# Patient Record
Sex: MALE | Race: WHITE | NOT HISPANIC OR LATINO | URBAN - METROPOLITAN AREA
[De-identification: names, ages, dates, MRNs, and addresses within clinical notes are randomized per-mention and may not be internally consistent; named-entity substitution may affect disease eponyms.]

---

## 2024-04-14 ENCOUNTER — INPATIENT (INPATIENT)
Facility: HOSPITAL | Age: 74
LOS: 19 days | Discharge: NURSING, REHAB OR LONG-TERM CARE | End: 2024-05-04
Attending: INTERNAL MEDICINE | Admitting: INTERNAL MEDICINE
Payer: MEDICARE

## 2024-04-14 VITALS
HEART RATE: 91 BPM | SYSTOLIC BLOOD PRESSURE: 161 MMHG | RESPIRATION RATE: 20 BRPM | DIASTOLIC BLOOD PRESSURE: 103 MMHG | OXYGEN SATURATION: 93 % | TEMPERATURE: 98 F

## 2024-04-14 DIAGNOSIS — R09.89 OTHER SPECIFIED SYMPTOMS AND SIGNS INVOLVING THE CIRCULATORY AND RESPIRATORY SYSTEMS: ICD-10-CM

## 2024-04-14 DIAGNOSIS — R74.01 ELEVATION OF LEVELS OF LIVER TRANSAMINASE LEVELS: ICD-10-CM

## 2024-04-14 DIAGNOSIS — I50.9 HEART FAILURE, UNSPECIFIED: ICD-10-CM

## 2024-04-14 DIAGNOSIS — I10 ESSENTIAL (PRIMARY) HYPERTENSION: ICD-10-CM

## 2024-04-14 DIAGNOSIS — M62.82 RHABDOMYOLYSIS: ICD-10-CM

## 2024-04-14 DIAGNOSIS — I48.91 UNSPECIFIED ATRIAL FIBRILLATION: ICD-10-CM

## 2024-04-14 DIAGNOSIS — E87.0 HYPEROSMOLALITY AND HYPERNATREMIA: ICD-10-CM

## 2024-04-14 DIAGNOSIS — I71.20 THORACIC AORTIC ANEURYSM, WITHOUT RUPTURE, UNSPECIFIED: ICD-10-CM

## 2024-04-14 LAB
ALBUMIN SERPL ELPH-MCNC: 3.1 G/DL — LOW (ref 3.4–5)
ALBUMIN SERPL ELPH-MCNC: 3.6 G/DL — SIGNIFICANT CHANGE UP (ref 3.3–5)
ALP SERPL-CCNC: 54 U/L — SIGNIFICANT CHANGE UP (ref 40–120)
ALP SERPL-CCNC: 60 U/L — SIGNIFICANT CHANGE UP (ref 40–120)
ALT FLD-CCNC: 54 U/L — HIGH (ref 10–45)
ALT FLD-CCNC: 58 U/L — HIGH (ref 12–42)
ANION GAP SERPL CALC-SCNC: 10 MMOL/L — SIGNIFICANT CHANGE UP (ref 5–17)
ANION GAP SERPL CALC-SCNC: 12 MMOL/L — SIGNIFICANT CHANGE UP (ref 5–17)
ANION GAP SERPL CALC-SCNC: 12 MMOL/L — SIGNIFICANT CHANGE UP (ref 9–16)
APPEARANCE UR: CLEAR — SIGNIFICANT CHANGE UP
APPEARANCE UR: CLEAR — SIGNIFICANT CHANGE UP
APTT BLD: 105 SEC — HIGH (ref 24.5–35.6)
APTT BLD: 41.2 SEC — HIGH (ref 24.5–35.6)
APTT BLD: >200 SEC — CRITICAL HIGH (ref 24.5–35.6)
AST SERPL-CCNC: 86 U/L — HIGH (ref 15–37)
AST SERPL-CCNC: 91 U/L — HIGH (ref 10–40)
BACTERIA # UR AUTO: ABNORMAL /HPF
BACTERIA # UR AUTO: NEGATIVE /HPF — SIGNIFICANT CHANGE UP
BASOPHILS # BLD AUTO: 0.02 K/UL — SIGNIFICANT CHANGE UP (ref 0–0.2)
BASOPHILS NFR BLD AUTO: 0.2 % — SIGNIFICANT CHANGE UP (ref 0–2)
BILIRUB SERPL-MCNC: 1.4 MG/DL — HIGH (ref 0.2–1.2)
BILIRUB SERPL-MCNC: 1.6 MG/DL — HIGH (ref 0.2–1.2)
BILIRUB UR-MCNC: NEGATIVE — SIGNIFICANT CHANGE UP
BILIRUB UR-MCNC: NEGATIVE — SIGNIFICANT CHANGE UP
BUN SERPL-MCNC: 19 MG/DL — SIGNIFICANT CHANGE UP (ref 7–23)
BUN SERPL-MCNC: 21 MG/DL — SIGNIFICANT CHANGE UP (ref 7–23)
BUN SERPL-MCNC: 22 MG/DL — SIGNIFICANT CHANGE UP (ref 7–23)
CALCIUM SERPL-MCNC: 8.9 MG/DL — SIGNIFICANT CHANGE UP (ref 8.5–10.5)
CALCIUM SERPL-MCNC: 9.4 MG/DL — SIGNIFICANT CHANGE UP (ref 8.4–10.5)
CALCIUM SERPL-MCNC: 9.8 MG/DL — SIGNIFICANT CHANGE UP (ref 8.4–10.5)
CAST: 0 /LPF — SIGNIFICANT CHANGE UP (ref 0–4)
CHLORIDE SERPL-SCNC: 108 MMOL/L — SIGNIFICANT CHANGE UP (ref 96–108)
CHLORIDE SERPL-SCNC: 109 MMOL/L — HIGH (ref 96–108)
CHLORIDE SERPL-SCNC: 111 MMOL/L — HIGH (ref 96–108)
CK MB CFR SERPL CALC: 13.1 NG/ML — HIGH (ref 0–6.7)
CK MB CFR SERPL CALC: 18.5 NG/ML — HIGH (ref 0–6.7)
CK SERPL-CCNC: 1545 U/L — HIGH (ref 30–200)
CK SERPL-CCNC: 1890 U/L — HIGH (ref 30–200)
CK SERPL-CCNC: 2002 U/L — HIGH (ref 39–308)
CK SERPL-CCNC: 2336 U/L — HIGH (ref 39–308)
CO2 SERPL-SCNC: 25 MMOL/L — SIGNIFICANT CHANGE UP (ref 22–31)
CO2 SERPL-SCNC: 30 MMOL/L — SIGNIFICANT CHANGE UP (ref 22–31)
CO2 SERPL-SCNC: 32 MMOL/L — HIGH (ref 22–31)
COLOR SPEC: YELLOW — SIGNIFICANT CHANGE UP
COLOR SPEC: YELLOW — SIGNIFICANT CHANGE UP
CREAT SERPL-MCNC: 0.87 MG/DL — SIGNIFICANT CHANGE UP (ref 0.5–1.3)
CREAT SERPL-MCNC: 0.88 MG/DL — SIGNIFICANT CHANGE UP (ref 0.5–1.3)
CREAT SERPL-MCNC: 1.14 MG/DL — SIGNIFICANT CHANGE UP (ref 0.5–1.3)
DIFF PNL FLD: ABNORMAL
DIFF PNL FLD: ABNORMAL
EGFR: 67 ML/MIN/1.73M2 — SIGNIFICANT CHANGE UP
EGFR: 90 ML/MIN/1.73M2 — SIGNIFICANT CHANGE UP
EGFR: 90 ML/MIN/1.73M2 — SIGNIFICANT CHANGE UP
EOSINOPHIL # BLD AUTO: 0 K/UL — SIGNIFICANT CHANGE UP (ref 0–0.5)
EOSINOPHIL NFR BLD AUTO: 0 % — SIGNIFICANT CHANGE UP (ref 0–6)
FLUAV AG NPH QL: SIGNIFICANT CHANGE UP
FLUBV AG NPH QL: SIGNIFICANT CHANGE UP
GLUCOSE SERPL-MCNC: 110 MG/DL — HIGH (ref 70–99)
GLUCOSE SERPL-MCNC: 133 MG/DL — HIGH (ref 70–99)
GLUCOSE SERPL-MCNC: 94 MG/DL — SIGNIFICANT CHANGE UP (ref 70–99)
GLUCOSE UR QL: NEGATIVE MG/DL — SIGNIFICANT CHANGE UP
GLUCOSE UR QL: NEGATIVE MG/DL — SIGNIFICANT CHANGE UP
HCT VFR BLD CALC: 50 % — SIGNIFICANT CHANGE UP (ref 39–50)
HCT VFR BLD CALC: 51.2 % — HIGH (ref 39–50)
HCT VFR BLD CALC: 52.9 % — HIGH (ref 39–50)
HGB BLD-MCNC: 16.1 G/DL — SIGNIFICANT CHANGE UP (ref 13–17)
HGB BLD-MCNC: 16.1 G/DL — SIGNIFICANT CHANGE UP (ref 13–17)
HGB BLD-MCNC: 16.7 G/DL — SIGNIFICANT CHANGE UP (ref 13–17)
IMM GRANULOCYTES NFR BLD AUTO: 0.2 % — SIGNIFICANT CHANGE UP (ref 0–0.9)
INR BLD: 1.36 — HIGH (ref 0.85–1.18)
INR BLD: 1.49 — HIGH (ref 0.85–1.18)
KETONES UR-MCNC: 15 MG/DL
KETONES UR-MCNC: NEGATIVE MG/DL — SIGNIFICANT CHANGE UP
LACTATE BLDV-MCNC: 1.7 MMOL/L — SIGNIFICANT CHANGE UP (ref 0.5–2)
LACTATE BLDV-MCNC: 2.2 MMOL/L — HIGH (ref 0.5–2)
LACTATE BLDV-MCNC: 3 MMOL/L — HIGH (ref 0.5–2)
LEUKOCYTE ESTERASE UR-ACNC: NEGATIVE — SIGNIFICANT CHANGE UP
LEUKOCYTE ESTERASE UR-ACNC: NEGATIVE — SIGNIFICANT CHANGE UP
LYMPHOCYTES # BLD AUTO: 0.73 K/UL — LOW (ref 1–3.3)
LYMPHOCYTES # BLD AUTO: 8.8 % — LOW (ref 13–44)
MAGNESIUM SERPL-MCNC: 1.8 MG/DL — SIGNIFICANT CHANGE UP (ref 1.6–2.6)
MCHC RBC-ENTMCNC: 29.4 PG — SIGNIFICANT CHANGE UP (ref 27–34)
MCHC RBC-ENTMCNC: 29.9 PG — SIGNIFICANT CHANGE UP (ref 27–34)
MCHC RBC-ENTMCNC: 30.1 PG — SIGNIFICANT CHANGE UP (ref 27–34)
MCHC RBC-ENTMCNC: 31.4 GM/DL — LOW (ref 32–36)
MCHC RBC-ENTMCNC: 31.6 GM/DL — LOW (ref 32–36)
MCHC RBC-ENTMCNC: 32.2 GM/DL — SIGNIFICANT CHANGE UP (ref 32–36)
MCV RBC AUTO: 93.6 FL — SIGNIFICANT CHANGE UP (ref 80–100)
MCV RBC AUTO: 93.6 FL — SIGNIFICANT CHANGE UP (ref 80–100)
MCV RBC AUTO: 94.6 FL — SIGNIFICANT CHANGE UP (ref 80–100)
MONOCYTES # BLD AUTO: 0.51 K/UL — SIGNIFICANT CHANGE UP (ref 0–0.9)
MONOCYTES NFR BLD AUTO: 6.1 % — SIGNIFICANT CHANGE UP (ref 2–14)
NEUTROPHILS # BLD AUTO: 7.04 K/UL — SIGNIFICANT CHANGE UP (ref 1.8–7.4)
NEUTROPHILS NFR BLD AUTO: 84.7 % — HIGH (ref 43–77)
NITRITE UR-MCNC: NEGATIVE — SIGNIFICANT CHANGE UP
NITRITE UR-MCNC: NEGATIVE — SIGNIFICANT CHANGE UP
NRBC # BLD: 0 /100 WBCS — SIGNIFICANT CHANGE UP (ref 0–0)
NT-PROBNP SERPL-SCNC: 5893 PG/ML — HIGH (ref 0–300)
NT-PROBNP SERPL-SCNC: 7174 PG/ML — HIGH
PCO2 BLDV: 53 MMHG — SIGNIFICANT CHANGE UP (ref 42–55)
PH BLDV: 7.32 — SIGNIFICANT CHANGE UP (ref 7.32–7.43)
PH UR: 5 — SIGNIFICANT CHANGE UP (ref 5–8)
PH UR: 6 — SIGNIFICANT CHANGE UP (ref 5–8)
PLATELET # BLD AUTO: 219 K/UL — SIGNIFICANT CHANGE UP (ref 150–400)
PLATELET # BLD AUTO: 223 K/UL — SIGNIFICANT CHANGE UP (ref 150–400)
PLATELET # BLD AUTO: 235 K/UL — SIGNIFICANT CHANGE UP (ref 150–400)
PO2 BLDV: <35 MMHG — SIGNIFICANT CHANGE UP (ref 25–45)
POTASSIUM SERPL-MCNC: 4 MMOL/L — SIGNIFICANT CHANGE UP (ref 3.5–5.3)
POTASSIUM SERPL-MCNC: 4.1 MMOL/L — SIGNIFICANT CHANGE UP (ref 3.5–5.3)
POTASSIUM SERPL-MCNC: 4.2 MMOL/L — SIGNIFICANT CHANGE UP (ref 3.5–5.3)
POTASSIUM SERPL-SCNC: 4 MMOL/L — SIGNIFICANT CHANGE UP (ref 3.5–5.3)
POTASSIUM SERPL-SCNC: 4.1 MMOL/L — SIGNIFICANT CHANGE UP (ref 3.5–5.3)
POTASSIUM SERPL-SCNC: 4.2 MMOL/L — SIGNIFICANT CHANGE UP (ref 3.5–5.3)
PROT SERPL-MCNC: 6.4 G/DL — SIGNIFICANT CHANGE UP (ref 6–8.3)
PROT SERPL-MCNC: 7.2 G/DL — SIGNIFICANT CHANGE UP (ref 6.4–8.2)
PROT UR-MCNC: 300 MG/DL
PROT UR-MCNC: NEGATIVE MG/DL — SIGNIFICANT CHANGE UP
PROTHROM AB SERPL-ACNC: 14.8 SEC — HIGH (ref 9.5–13)
PROTHROM AB SERPL-ACNC: 16.8 SEC — HIGH (ref 9.5–13)
RBC # BLD: 5.34 M/UL — SIGNIFICANT CHANGE UP (ref 4.2–5.8)
RBC # BLD: 5.47 M/UL — SIGNIFICANT CHANGE UP (ref 4.2–5.8)
RBC # BLD: 5.59 M/UL — SIGNIFICANT CHANGE UP (ref 4.2–5.8)
RBC # FLD: 17.2 % — HIGH (ref 10.3–14.5)
RBC # FLD: 17.2 % — HIGH (ref 10.3–14.5)
RBC # FLD: 17.4 % — HIGH (ref 10.3–14.5)
RBC CASTS # UR COMP ASSIST: 1 /HPF — SIGNIFICANT CHANGE UP (ref 0–4)
RBC CASTS # UR COMP ASSIST: 14 /HPF — HIGH (ref 0–4)
RSV RNA NPH QL NAA+NON-PROBE: SIGNIFICANT CHANGE UP
SAO2 % BLDV: 44.5 % — LOW (ref 67–88)
SARS-COV-2 RNA SPEC QL NAA+PROBE: SIGNIFICANT CHANGE UP
SODIUM SERPL-SCNC: 148 MMOL/L — HIGH (ref 132–145)
SODIUM SERPL-SCNC: 149 MMOL/L — HIGH (ref 135–145)
SODIUM SERPL-SCNC: 152 MMOL/L — HIGH (ref 135–145)
SP GR SPEC: 1.01 — SIGNIFICANT CHANGE UP (ref 1–1.03)
SP GR SPEC: 1.03 — HIGH (ref 1–1.03)
SQUAMOUS # UR AUTO: 0 /HPF — SIGNIFICANT CHANGE UP (ref 0–5)
TROPONIN I, HIGH SENSITIVITY RESULT: 109.8 NG/L — HIGH
TROPONIN I, HIGH SENSITIVITY RESULT: 91.9 NG/L — HIGH
TROPONIN T, HIGH SENSITIVITY RESULT: 52 NG/L — CRITICAL HIGH (ref 0–51)
TROPONIN T, HIGH SENSITIVITY RESULT: 54 NG/L — CRITICAL HIGH (ref 0–51)
TSH SERPL-MCNC: 3.09 UIU/ML — SIGNIFICANT CHANGE UP (ref 0.36–3.74)
UROBILINOGEN FLD QL: 1 MG/DL — SIGNIFICANT CHANGE UP (ref 0.2–1)
UROBILINOGEN FLD QL: 1 MG/DL — SIGNIFICANT CHANGE UP (ref 0.2–1)
WBC # BLD: 8.32 K/UL — SIGNIFICANT CHANGE UP (ref 3.8–10.5)
WBC # BLD: 8.42 K/UL — SIGNIFICANT CHANGE UP (ref 3.8–10.5)
WBC # BLD: 9.53 K/UL — SIGNIFICANT CHANGE UP (ref 3.8–10.5)
WBC # FLD AUTO: 8.32 K/UL — SIGNIFICANT CHANGE UP (ref 3.8–10.5)
WBC # FLD AUTO: 8.42 K/UL — SIGNIFICANT CHANGE UP (ref 3.8–10.5)
WBC # FLD AUTO: 9.53 K/UL — SIGNIFICANT CHANGE UP (ref 3.8–10.5)
WBC UR QL: 0 /HPF — SIGNIFICANT CHANGE UP (ref 0–5)
WBC UR QL: 1 /HPF — SIGNIFICANT CHANGE UP (ref 0–5)

## 2024-04-14 PROCEDURE — 99291 CRITICAL CARE FIRST HOUR: CPT

## 2024-04-14 PROCEDURE — 70450 CT HEAD/BRAIN W/O DYE: CPT | Mod: 26,MC

## 2024-04-14 PROCEDURE — 99223 1ST HOSP IP/OBS HIGH 75: CPT | Mod: GC

## 2024-04-14 PROCEDURE — 71045 X-RAY EXAM CHEST 1 VIEW: CPT | Mod: 26

## 2024-04-14 PROCEDURE — 93010 ELECTROCARDIOGRAM REPORT: CPT

## 2024-04-14 PROCEDURE — 74174 CTA ABD&PLVS W/CONTRAST: CPT | Mod: 26,MC

## 2024-04-14 PROCEDURE — 71275 CT ANGIOGRAPHY CHEST: CPT | Mod: 26,MC

## 2024-04-14 RX ORDER — VALSARTAN 80 MG/1
20 TABLET ORAL
Refills: 0 | Status: DISCONTINUED | OUTPATIENT
Start: 2024-04-14 | End: 2024-04-18

## 2024-04-14 RX ORDER — APIXABAN 2.5 MG/1
1 TABLET, FILM COATED ORAL
Qty: 60 | Refills: 0
Start: 2024-04-14 | End: 2024-05-13

## 2024-04-14 RX ORDER — PIPERACILLIN AND TAZOBACTAM 4; .5 G/20ML; G/20ML
3.38 INJECTION, POWDER, LYOPHILIZED, FOR SOLUTION INTRAVENOUS ONCE
Refills: 0 | Status: COMPLETED | OUTPATIENT
Start: 2024-04-14 | End: 2024-04-14

## 2024-04-14 RX ORDER — HEPARIN SODIUM 5000 [USP'U]/ML
4500 INJECTION INTRAVENOUS; SUBCUTANEOUS EVERY 6 HOURS
Refills: 0 | Status: DISCONTINUED | OUTPATIENT
Start: 2024-04-14 | End: 2024-04-14

## 2024-04-14 RX ORDER — VANCOMYCIN HCL 1 G
1250 VIAL (EA) INTRAVENOUS ONCE
Refills: 0 | Status: COMPLETED | OUTPATIENT
Start: 2024-04-14 | End: 2024-04-14

## 2024-04-14 RX ORDER — HEPARIN SODIUM 5000 [USP'U]/ML
9500 INJECTION INTRAVENOUS; SUBCUTANEOUS EVERY 6 HOURS
Refills: 0 | Status: DISCONTINUED | OUTPATIENT
Start: 2024-04-14 | End: 2024-04-14

## 2024-04-14 RX ORDER — SENNA PLUS 8.6 MG/1
2 TABLET ORAL AT BEDTIME
Refills: 0 | Status: DISCONTINUED | OUTPATIENT
Start: 2024-04-14 | End: 2024-04-24

## 2024-04-14 RX ORDER — FUROSEMIDE 40 MG
80 TABLET ORAL DAILY
Refills: 0 | Status: DISCONTINUED | OUTPATIENT
Start: 2024-04-14 | End: 2024-04-14

## 2024-04-14 RX ORDER — HEPARIN SODIUM 5000 [USP'U]/ML
4500 INJECTION INTRAVENOUS; SUBCUTANEOUS EVERY 6 HOURS
Refills: 0 | Status: DISCONTINUED | OUTPATIENT
Start: 2024-04-14 | End: 2024-04-22

## 2024-04-14 RX ORDER — HEPARIN SODIUM 5000 [USP'U]/ML
INJECTION INTRAVENOUS; SUBCUTANEOUS
Qty: 25000 | Refills: 0 | Status: DISCONTINUED | OUTPATIENT
Start: 2024-04-14 | End: 2024-04-14

## 2024-04-14 RX ORDER — HEPARIN SODIUM 5000 [USP'U]/ML
9500 INJECTION INTRAVENOUS; SUBCUTANEOUS ONCE
Refills: 0 | Status: COMPLETED | OUTPATIENT
Start: 2024-04-14 | End: 2024-04-14

## 2024-04-14 RX ORDER — HEPARIN SODIUM 5000 [USP'U]/ML
1700 INJECTION INTRAVENOUS; SUBCUTANEOUS
Qty: 25000 | Refills: 0 | Status: DISCONTINUED | OUTPATIENT
Start: 2024-04-14 | End: 2024-04-22

## 2024-04-14 RX ORDER — FUROSEMIDE 40 MG
80 TABLET ORAL EVERY 12 HOURS
Refills: 0 | Status: DISCONTINUED | OUTPATIENT
Start: 2024-04-14 | End: 2024-04-14

## 2024-04-14 RX ORDER — HEPARIN SODIUM 5000 [USP'U]/ML
9500 INJECTION INTRAVENOUS; SUBCUTANEOUS EVERY 6 HOURS
Refills: 0 | Status: DISCONTINUED | OUTPATIENT
Start: 2024-04-14 | End: 2024-04-22

## 2024-04-14 RX ORDER — POLYETHYLENE GLYCOL 3350 17 G/17G
17 POWDER, FOR SOLUTION ORAL DAILY
Refills: 0 | Status: DISCONTINUED | OUTPATIENT
Start: 2024-04-14 | End: 2024-04-19

## 2024-04-14 RX ADMIN — Medication 166.67 MILLIGRAM(S): at 06:50

## 2024-04-14 RX ADMIN — Medication 80 MILLIGRAM(S): at 06:32

## 2024-04-14 RX ADMIN — HEPARIN SODIUM 1700 UNIT(S)/HR: 5000 INJECTION INTRAVENOUS; SUBCUTANEOUS at 14:30

## 2024-04-14 RX ADMIN — HEPARIN SODIUM 1400 UNIT(S)/HR: 5000 INJECTION INTRAVENOUS; SUBCUTANEOUS at 20:37

## 2024-04-14 RX ADMIN — HEPARIN SODIUM 9500 UNIT(S): 5000 INJECTION INTRAVENOUS; SUBCUTANEOUS at 08:54

## 2024-04-14 RX ADMIN — VALSARTAN 20 MILLIGRAM(S): 80 TABLET ORAL at 17:55

## 2024-04-14 RX ADMIN — HEPARIN SODIUM 2100 UNIT(S)/HR: 5000 INJECTION INTRAVENOUS; SUBCUTANEOUS at 08:52

## 2024-04-14 RX ADMIN — HEPARIN SODIUM 2100 UNIT(S)/HR: 5000 INJECTION INTRAVENOUS; SUBCUTANEOUS at 10:58

## 2024-04-14 RX ADMIN — HEPARIN SODIUM 0 UNIT(S)/HR: 5000 INJECTION INTRAVENOUS; SUBCUTANEOUS at 13:13

## 2024-04-14 RX ADMIN — Medication 10 MILLIGRAM(S): at 16:07

## 2024-04-14 RX ADMIN — PIPERACILLIN AND TAZOBACTAM 200 GRAM(S): 4; .5 INJECTION, POWDER, LYOPHILIZED, FOR SOLUTION INTRAVENOUS at 06:32

## 2024-04-14 RX ADMIN — HEPARIN SODIUM 1700 UNIT(S)/HR: 5000 INJECTION INTRAVENOUS; SUBCUTANEOUS at 20:01

## 2024-04-14 NOTE — H&P ADULT - PROBLEM SELECTOR PLAN 6
CT Abd/Pelvis 04/13/24: No thoracoabdominal dissection, no thoracic aorta hematoma. Ascending thoracic aortic aneurysm 4.0 cm  - Consider CTS follow up in outpatient setting for surveillance

## 2024-04-14 NOTE — CONSULT NOTE ADULT - ASSESSMENT
74M no known significant PMHx presented to [facility] found to have CHF, Afib w/ RVR, w/ concern for PE and rhabdomyolysis medicine consulted for comanagement 74M no known significant PMHx presented to [facility] found to have CHF, Afib w/ RVR, w/ concern for PE and rhabdomyolysis medicine consulted for comanagement    #Rhabdomyolysis.   Patient had mechanical fall and was down on the ground x2 days   BUN/Cr 22/1.14 CK 2336 -> 2002   UA +blood. F/u repeat labs   CK level below 2,000 and continues to downtrend. mild elevation of Cr that did not meet criteria for SILKE on admission.   - encourage oral intake of fluids but does not need to  of CHF/Afib    #Hypernatremia  #Metabolic Alkalosis  free water deficit of 5 liters. contraction alkalosis 2/2 free water deficit  - Should not correct no more than 8mEq daily  - fluid balance managed by Cardiology given CHF exacerbation      #Transaminitis  patient w/ enlarged liver on CT scan w/ multiple small hypodensities that could not be characterized.  patient also w/ large stool burden.   DDx: Metastasis vs Primary Liver Pathology vs congestion from CHF  - bowel regimen  - consider Doppler RUQ ultrasound  - trend liver enzymes daily    #Segmental PE  CTA Chest 04/13/24 :No central PE, possible segmental PE within RLL written in on the prelim read per cardiology PA. on final read no mention of PE  -  C/w heparin gtt   -  f/u Pulm recs regarding CT imaging  -  F/u B/L LE Duplex U/S.    #HTN (hypertension).   SBP 150s- 160s  - C/w Valsartan 20mg BID     #Congestive heart failure (CHF).   - appreciate cardiology recommendations    #Atrial fibrillation.   - appreciate cardiology recommendations    #Aneurysm of thoracic aorta.   CT Abd/Pelvis 04/13/24:. Ascending thoracic aortic aneurysm 4.0 cm  - Consider CTS follow up in outpatient setting for surveillance.       74M no known significant PMHx presented to [facility] found to have CHF, Afib w/ RVR, w/ concern for PE and rhabdomyolysis medicine consulted for comanagement    #Rhabdomyolysis.   Patient had mechanical fall and was down on the ground x2 days   BUN/Cr 22/1.14 CK 2336 -> 2002   UA +blood. F/u repeat labs   CK level below 2,000 and continues to downtrend. mild elevation of Cr that did not meet criteria for SILKE on admission.   - encourage oral intake of fluids but does not need to  of CHF/Afib    #Hypernatremia  #Metabolic Alkalosis  free water deficit of 5 liters. contraction alkalosis 2/2 free water deficit  - Should not correct no more than 8mEq daily  - fluid balance managed by Cardiology given CHF exacerbation      #Transaminitis  patient w/ enlarged liver on CT scan w/ multiple small hypodensities that could not be characterized.  patient also w/ large stool burden.   DDx: Metastasis vs Primary Liver Pathology vs congestion from CHF  - bowel regimen  - consider Doppler RUQ ultrasound  - trend liver enzymes daily  - Hep Panel    #Segmental PE ???  CTA Chest 04/13/24 :No central PE, possible segmental PE within RLL written in on the prelim read per cardiology PA. on final read no mention of PE  -  C/w heparin gtt   -  f/u Pulm recs regarding CT imaging  -  F/u B/L LE Duplex U/S.    #HTN (hypertension).   SBP 150s- 160s  - C/w Valsartan 20mg BID     #Congestive heart failure (CHF).   - appreciate cardiology recommendations    #Atrial fibrillation.   - appreciate cardiology recommendations    #Aneurysm of thoracic aorta.   CT Abd/Pelvis 04/13/24:. Ascending thoracic aortic aneurysm 4.0 cm  - Consider CTS follow up in outpatient setting for surveillance.       74M no known significant PMHx presented to [facility] found to have CHF, Afib w/ RVR, w/ concern for PE and rhabdomyolysis medicine consulted for comanagement    #Rhabdomyolysis.   Patient had mechanical fall and was down on the ground x2 days   BUN/Cr 22/1.14 CK 2336 -> 2002   UA +blood. F/u repeat labs   CK level below 2,000 and continues to downtrend. mild elevation of Cr that did not meet criteria for SILKE on admission.   - encourage oral intake of fluids but does not need to  of CHF/Afib      #Hypernatremia  #Metabolic Alkalosis  free water deficit of 5 liters. contraction alkalosis 2/2 free water deficit  - Should not correct no more than 8mEq daily  - fluid balance managed by Cardiology given CHF exacerbation  - would hold diuresis i/s/o fluid deficit if ok from cards and begin diuresis tomorrow to allow oral intake       #Transaminitis  patient w/ enlarged liver on CT scan w/ multiple small hypodensities that could not be characterized.  patient also w/ large stool burden.   DDx: Metastasis vs Primary Liver Pathology vs congestion from CHF  - bowel regimen  - consider Doppler RUQ ultrasound  - trend liver enzymes daily  - Hep Panel    #Segmental PE ???  CTA Chest 04/13/24 :No central PE, possible segmental PE within RLL written in on the prelim read per cardiology PA. on final read no mention of PE  -  C/w heparin gtt   -  f/u Pulm recs regarding CT imaging  -  F/u B/L LE Duplex U/S.    #HTN (hypertension).   SBP 150s- 160s  - C/w Valsartan 20mg BID     #Congestive heart failure (CHF).   - appreciate cardiology recommendations    #Atrial fibrillation.   - appreciate cardiology recommendations    #Aneurysm of thoracic aorta.   CT Abd/Pelvis 04/13/24:. Ascending thoracic aortic aneurysm 4.0 cm  - Consider CTS follow up in outpatient setting for surveillance.

## 2024-04-14 NOTE — H&P ADULT - PROBLEM SELECTOR PLAN 3
HR 100s, unclear whether patient has hx of afib or is new   - EKG  BPM   - F/u repeat cardiac enzyme   - Holding BB/CCB given unknown EF.   - Continue with heparin gtt and with plan to transition to NOAC     #segmental PE  -  CTA Chest 04/13/24 :No central PE, possible segmental PE within RLL  -  C/w heparin gtt for now. Pulm consulted to review images.  -  F/u B/L LE Duplex U/S Patient had mechanical fall and was down on the ground x2 days   - BUN/Cr 22/1.14 CK 2336 -> 2002   - UA +blood. F/u repeat labs   - Medicine consulted for co-management On admission TBili 1.4 ALP 54 AST/ALT 91/54  - F/u Hep panel and RUQ. Unclear whether transaminitis can be related to congestion from CHF or malignancy.   - CT Pelvis : Liver measures near 18 cm in craniocaudal dimension. Subcentimeter hepatic hypodensities are too small to characterize.

## 2024-04-14 NOTE — H&P ADULT - HISTORY OF PRESENT ILLNESS
74M unclear PMHx presented to [facility] on 04/14/24 after falling on the floor 2 days ago and was stuck under the shelf until landlord called 911 to help patient get out. Patient denies head trauma or pain currently. Bishop has not seen medical doctor in many years.     On arrival to cardiac tele jess denies CP, SOB, palpitations, dizziness, lightheadness, orthopnea, PND, abd pain, N/V, hematuria, hematochezia, melena, BRPR, LE edema, fever,  chills, cough, recent travel, or sick contacts.     [facility] Course   -942/  HR 90 -118 RR 18 20 SpO2 98% on 4L NC  WBC 8.32 H/H 16.7/52.9 Plt 219 PT/PTT 14.8/41.2 INR 1.36  Na 148 K 4.2 Cl 111 Co2 25 AG 12 BUN/Cr 22/1.14 TBili 2.1 ALP 60 AST/ALT 86/58 Mg 1.8   Lactate  3.0 -> 2.2. 0> 1.7 BCx collcted   TSH 3.092   Trop I 91.9 CK 2k  BNP 7k   UA +ketones, +proteinuria, +blood. Negative for LE/Nitrites   Respiratory Panel: SARS-CoV/Influenza A/B, RSV not detected   EKG per ED documentation Afib w/ HR 100s     Imaging  o CXR 4/14/24: No acute infiltrates, cardiomegaly   o CTA Chest/ A/P 4/14/24: No central PE, possible segmental PE within RLL. No thoracoabdominal dissection, no thoracic aorta hematoma. Interlobular septal thickening, small pleural effusions, and cardiomegaly suggestive of pulmonary edema. Ascending thoracic aortic aneursym 4.0 cm, mild ascites, cholelithiasis.   o CTH 4/14/24: No acute intracranial hemorrhage or calvarial fracture.    ER Interventions: Lasix 80mg IVP x1 (400cc ouput), Zoysn 3.375mg IVPx1, and Vancomycin 1250mg IVPB x1    Patient now transferred to cardiac tele for further management of CHF exacerbation, Afib, rhabdomyolysis and segmental PE.    74M no known significant PMHx presented to [facility] on 04/14/24 after mechanical fall and was down on the ground x2 days. Patient states he attempted to crawl to phone to make call and chest fell down on him and he was unable to move and 911 was called to help get patient out. Patient endorses cramp in LLE and difficulty walking with L leg since Nov 2023, MATHEWS, and episodes of hematochezia.     On arrival to cardiac tele patient denies head trauma,  CP, SOB, palpitations, dizziness, lightheadness, orthopnea, PND, abd pain, N/V, hematuria,  melena, BRPR, LE edema, fever,  chills, cough, recent travel, or sick contacts. Patient has not seen medical doctor in many years.    [facility] Course   -605/  HR 90 -118 RR 18 20 SpO2 98% on 4L NC  WBC 8.32 H/H 16.7/52.9 Plt 219 PT/PTT 14.8/41.2 INR 1.36  Na 148 K 4.2 Cl 111 Co2 25 AG 12 BUN/Cr 22/1.14 TBili 2.1 ALP 60 AST/ALT 86/58 Mg 1.8   Lactate  3.0 -> 2.2. 0> 1.7 BCx collcted   TSH 3.092   Trop I 91.9 CK 2k  BNP 7k   UA +ketones, +proteinuria, +blood. Negative for LE/Nitrites   Respiratory Panel: SARS-CoV/Influenza A/B, RSV not detected   EKG per ED documentation Afib w/ HR 100s     Imaging  o CXR 4/14/24: No acute infiltrates, cardiomegaly   o CTA Chest/ A/P 4/14/24: No central PE, possible segmental PE within RLL. No thoracoabdominal dissection, no thoracic aorta hematoma. Interlobular septal thickening, small pleural effusions, and cardiomegaly suggestive of pulmonary edema. Ascending thoracic aortic aneurysm 4.0 cm, mild ascites, cholelithiasis.   o CTH 4/14/24: No acute intracranial hemorrhage or calvarial fracture.    ER Interventions: Lasix 80mg IVP x1 (400cc ouput), Zoysn 3.375mg IVPx1, and Vancomycin 1250mg IVPB x1    Patient now transferred to cardiac tele for further management of CHF exacerbation, Afib, rhabdomyolysis and segmental PE.    74M no known significant PMHx presented to [facility] on 04/14/24 after mechanical fall and was down on the ground x2 days. Patient states he attempted to crawl to phone to make call and chest fell down on him and he was unable to move and 911 was called to help get patient out. Patient endorses cramp in LLE and difficulty walking with L leg since Nov 2023, MATHEWS, and episodes of hematochezia.     On arrival to cardiac tele patient denies head trauma,  CP, SOB, palpitations, dizziness, lightheadedness, orthopnea, PND, abd pain, N/V, hematuria,  melena, BRPR, LE edema, fever,  chills, cough, recent travel, or sick contacts. Patient has not seen medical doctor in many years.    [facility] Course   -507/  HR 90 -118 RR 18 20 SpO2 98% on 4L NC  WBC 8.32 H/H 16.7/52.9 Plt 219 PT/PTT 14.8/41.2 INR 1.36  Na 148 K 4.2 Cl 111 Co2 25 AG 12 BUN/Cr 22/1.14 TBili 2.1 ALP 60 AST/ALT 86/58 Mg 1.8   Lactate  3.0 -> 2.2. 0> 1.7 BCx collcted   TSH 3.092   Trop I 91.9 CK 2k  BNP 7k   UA +ketones, +proteinuria, +blood. Negative for LE/Nitrites   Respiratory Panel: SARS-CoV/Influenza A/B, RSV not detected   EKG per ED documentation Afib w/ HR 100s     Imaging  o CXR 4/14/24: No acute infiltrates, cardiomegaly   o CTA Chest/ A/P 4/14/24: No central PE, possible segmental PE within RLL. No thoracoabdominal dissection, no thoracic aorta hematoma. Interlobular septal thickening, small pleural effusions, and cardiomegaly suggestive of pulmonary edema. Ascending thoracic aortic aneurysm 4.0 cm, mild ascites, cholelithiasis.   o CTH 4/14/24: No acute intracranial hemorrhage or calvarial fracture.    ER Interventions: Lasix 80mg IVP x1 (400cc ouput), Zoysn 3.375mg IVPx1, and Vancomycin 1250mg IVPB x1    Patient now transferred to cardiac tele for further management of CHF exacerbation, Afib, rhabdomyolysis and segmental PE.    74M no known significant PMHx presented to [facility] on 04/14/24 after mechanical fall and was down on the ground x2 days. Patient states he attempted to crawl to phone to make call and chest fell down on him and he was unable to move and 911 was called to help get patient out. Patient endorses cramp in LLE and difficulty walking with L leg since Nov 2023, MATHEWS, and episodes of hematochezia.     On arrival to cardiac tele patient denies head trauma,  CP, SOB, palpitations, dizziness, lightheadedness, orthopnea, PND, abd pain, N/V, hematuria,  melena, BRPR, LE edema, fever,  chills, cough, recent travel, or sick contacts. Patient has not seen medical doctor in many years.    [facility] Course   -033/  HR 90 -118 RR 18 20 SpO2 98% on 4L NC  WBC 8.32 H/H 16.7/52.9 Plt 219 PT/PTT 14.8/41.2 INR 1.36  Na 148 K 4.2 Cl 111 Co2 25 AG 12 BUN/Cr 22/1.14 TBili 2.1 ALP 60 AST/ALT 86/58 Mg 1.8   Lactate  3.0 -> 2.2. 0> 1.7 BCx collcted   TSH 3.092   Trop I 91.9 CK 2k  BNP 7k   UA +ketones, +proteinuria, +blood. Negative for LE/Nitrites   Respiratory Panel: SARS-CoV/Influenza A/B, RSV not detected   EKG per ED documentation Afib w/ HR 100s     Imaging  o CXR 4/14/24: No acute infiltrates, cardiomegaly   o CTA Chest 4/14/24:  No central, main, or lobar pulmonary embolus. Remainder of the pulmonary arterial tree cannot be assessed due to respiratory motion. This represents a change from the preliminary report. Enlarged main pulmonary   artery may reflect pulmonary arterial hypertension. Cardiac enlargement. Coronary artery calcifications. Small right and trace left pleural effusions.  Mid ascending thoracic aorta is aneurysmal measuring 4 cm. No aortic dissection identified within the limitations of this non-ECG gated study.  o CT ABDOMEN/PELVIS 4/14/24: Small volume abdominopelvic ascites, simple appearing in the pelvis. Upper abdomen ascites evaluation is limited by significant streak  artifact, particularly surrounding the liver and spleen. Short-term   follow-up reevaluation can be considered in the setting of trauma to exclude solid organ injury. Abdominal wall soft tissue edema, left greater than right, as well as at the umbilicus. Cholelithiasis.  o CTH 4/14/24: No acute intracranial hemorrhage or calvarial fracture.    ER Interventions: Lasix 80mg IVP x1 (400cc ouput), Zoysn 3.375mg IVPx1, and Vancomycin 1250mg IVPB x1    Patient now transferred to cardiac tele for further management of CHF exacerbation, Afib, rhabdomyolysis and segmental PE.

## 2024-04-14 NOTE — H&P ADULT - NS ATTEND AMEND GEN_ALL_CORE FT
74M no known PMHx presented to [facility] on 04/14/24 after mechanical fall, down on ground x2D trapped under a table. [facility] At [facility] patient with elevated CK, lactate, hypernatremia, CASI, rapid afib. UA + blood. CTA preliminary read could not rule out subsegmental PE, however final read cleared patient for PE.  Patient transferred to cardiac tele on heparin gtt for further management of suspected CHF and new onset afib. Upon arrival, patient stable, rate controlled Afib with brief episodes into HR 120s. Mucous membranes dry, CASI 2+ edema, chronic LE wounds in various stages of healing, unkempt appearance, visibly soiled extremities. Clear lungs, irreg irreg rhythm, tachycardic rate, CASI, obese abdomen, large neck circumference. Labs notable for resolved lactic acidosis, hypernatremia, down trending CK.   Plan  Medicine consult given c/f rhabdomyolysis  Pulmonary consult for CTA PE review given conflicting report  LE duplex to r/o DVT iso b/l edema and exam c/w chronic venous stasis/PVD  Cont heparin gtt for afib cva risk reduction, send NOAC to vivo for price check  Valsartan 20 BID for stage II HTN mgmt, if LVEF reduced on TTE will transition to ARNI  Holding IV diuresis today given med recs for patient to have unrestricted access to fluids given mild rhabdo  TTE for cardiac structural assessment  CPAP at night for suspected BETTINA  PT evaluation given fall with trauma  ROVERTO-M.Bright Evans M.D.  Cardiology Attending  During non face-to-face time, I reviewed relevant portions of the patient’s medical record; including vitals, labs, medications, cardiac studies, remaining additional imaging and consultant recommendations. During face-to-face time, I took a relevant history and examined the patient. I also explained to the patient their diagnoses, and specific cardiopulmonary management plan, which required a high level of medical decision making.  I answered all questions related to the patient's medical conditions. I spent 85 minutes on total encounter; more than 50% of the visit was spent counseling and/or coordinating care by the attending physician, with plan of care discussed with the patient, and cardiac care team.

## 2024-04-14 NOTE — H&P ADULT - PROBLEM SELECTOR PLAN 4
SBP 150s- 160s  - C/w Valsartan 20mg BID       F: PO   E: Replete for K <4, Mg <2  N: DASH (1.2L restriction)   GI PPx: Protonix   DVT PPx: Heparin Gtt full AC     FULL CODE  Discussed w/ Dr. Novoa CT Abd/Pelvis 04/13/24: No thoracoabdominal dissection, no thoracic aorta hematoma. Ascending thoracic aortic aneursym 4.0 cm  - Consider CTS follow up in outpatient setting for surveillance CT Abd/Pelvis 04/13/24: No thoracoabdominal dissection, no thoracic aorta hematoma. Ascending thoracic aortic aneurysm 4.0 cm  - Consider CTS follow up in outpatient setting for surveillance On admission Na On admission Na 152 likely from hypervolemia   - On admission Na 152 likely from hypervolemia   - Continue to monitor On admission Na 152 likely from hypervolemia   - As per medicine recs should not correct more than 8mEq daily  - Continue to monitor

## 2024-04-14 NOTE — H&P ADULT - NSICDXFAMILYHX_GEN_ALL_CORE_FT
FAMILY HISTORY:  Father  Still living? No  FH: pulmonary embolism, Age at diagnosis: Age Unknown    Mother  Still living? Unknown  Family history of MI (myocardial infarction), Age at diagnosis: 81-90    Sibling  Still living? No  FH: multiple myeloma, Age at diagnosis: Age Unknown

## 2024-04-14 NOTE — H&P ADULT - PROBLEM SELECTOR PLAN 2
Patient had mechanical fall and was down on the ground x2 days   - BUN/Cr 22/1.14 CK 2336 -> 2002   - UA +blood. F/u repeat labs   - Medicine consulted for co-management HR 100s, unclear whether patient has hx of afib or is new   - EKG  BPM   - F/u repeat cardiac enzyme   - Holding BB/CCB given unknown EF.   - Continue with heparin gtt and with plan to transition to NOAC     #segmental PE  -  CTA Chest 04/13/24 :No central PE, possible segmental PE within RLL  -  C/w heparin gtt for now. Pulm consulted to review images.  -  F/u B/L LE Duplex U/S HR 100s, unclear whether patient has hx of afib or is new   - EKG  BPM   - F/u repeat cardiac enzyme   - Holding BB/CCB given unknown EF.   - Continue with heparin gtt and with plan to transition to NOAC     #Rule out DVT   -  F/u B/L LE Duplex U/S

## 2024-04-14 NOTE — ED PROVIDER NOTE - OBJECTIVE STATEMENT
74-year-old male history of HTN?, HLD?, CHF? afib? presenting to the ED after patient fell on the floor 2 days ago and was stuck under  shelf  until normal call 911 to help get patient out.   No head trauma, no pain.  Patient has not seen a doctor in many years, so uncertain of diagnosis.   Denying any fevers or chills now. 74-year-old male history of HTN?, HLD?, CHF? afib? presenting to the ED after patient fell on the floor 2 days ago and was stuck under  shelf  until normal call 911 to help get patient out.   No head trauma, no pain currently.  Patient has not seen a doctor in many years, so uncertain of Any medical history. Denying any fevers or chills now.     EKG shows new A-fib.  Patient appears to be volume overloaded 74-year-old male history of HTN?, HLD?, CHF? afib? presenting to the ED after patient fell on the floor 2 days ago and was stuck under  shelf  until karen called 911 to help get patient out.   No head trauma, no pain currently.  Patient has not seen a doctor in many years, so uncertain of Any medical history. Denying any fevers or chills now. no cp, sob.

## 2024-04-14 NOTE — ED PROVIDER NOTE - PHYSICAL EXAMINATION
Const:   unkempt clothing,   Eyes: no conjunctival injection, and symmetrical lids.  HEENT: Head NCAT, no lesions. Atraumatic external nose and ears. Moist MM.  Neck: Symmetric, trachea midline.   RESP: Unlabored respiratory effort.   GI: Nontender/Nondistended  MSK:  bilateral lower extremity edema  Skin: Warm, dry and intact.   Neuro: CNs II-XII grossly intact. Motor & Sensation grossly intact.  Psych: Awake, Alert, & Oriented (AAO) x3. Appropriate mood and affect. Const:   unkempt clothing,   Eyes: no conjunctival injection, and symmetrical lids.  HEENT: Head NCAT, no lesions. Atraumatic external nose and ears. Moist MM.  Neck: Symmetric, trachea midline.   RESP: Unlabored respiratory effort. .  Decreased breath sounds diffusely no crackles rales or rhonchi  GI: Distended abdomen with ascites nontender reducible umbilical hernia, no overlying streaking  : Chaperone Nataliia Metz and Yael Alfred, normal external genitalia with bilateral testicular edema and overlying erythema there is no regions of necrosis crepitus or streaking.  MSK:  bilateral lower extremity edema, With regions of excoriation and erythema.  Both bilateral anterior shins with quarter sized stage ulceration.    Skin: Warm, dry and intact.   Neuro: CNs II-XII grossly intact. Motor & Sensation grossly intact.  Psych: Awake, Alert, & Oriented (AAO) x3. Appropriate mood and affect.

## 2024-04-14 NOTE — ED ADULT NURSE NOTE - NSFALLRISKINTERV_ED_ALL_ED

## 2024-04-14 NOTE — ED PROVIDER NOTE - NS ED MD DISPO SPECIAL CONSIDERATION1
Detail Level: Detailed Quality 130: Documentation Of Current Medications In The Medical Record: Current Medications Documented Quality 431: Preventive Care And Screening: Unhealthy Alcohol Use - Screening: Patient not screened for unhealthy alcohol use using a systematic screening method Quality 110: Preventive Care And Screening: Influenza Immunization: Influenza immunization was not ordered or administered, reason not given Quality 226: Preventive Care And Screening: Tobacco Use: Screening And Cessation Intervention: Tobacco Screening not Performed for Medical Reasons None

## 2024-04-14 NOTE — H&P ADULT - NSHPLABSRESULTS_GEN_ALL_CORE
16.1   9.53  )-----------( 223      ( 14 Apr 2024 10:47 )             51.2       04-14    x   |  x   |  19  ----------------------------<  110<H>  x    |  x   |  0.87    Ca    9.8      14 Apr 2024 10:47  Mg     1.8     04-14    TPro  7.2  /  Alb  3.1<L>  /  TBili  1.6<H>  /  DBili  x   /  AST  86<H>  /  ALT  58<H>  /  AlkPhos  60  04-14      PT/INR - ( 14 Apr 2024 11:00 )   PT: 16.8 sec;   INR: 1.49          PTT - ( 14 Apr 2024 11:00 )  PTT:>200.0 sec    CARDIAC MARKERS ( 14 Apr 2024 08:55 )  x     / x     / 2002 U/L / x     / x      CARDIAC MARKERS ( 14 Apr 2024 04:13 )  x     / x     / 2336 U/L / x     / x            Urinalysis Basic - ( 14 Apr 2024 10:47 )    Color: x / Appearance: x / SG: x / pH: x  Gluc: 110 mg/dL / Ketone: x  / Bili: x / Urobili: x   Blood: x / Protein: x / Nitrite: x   Leuk Esterase: x / RBC: x / WBC x   Sq Epi: x / Non Sq Epi: x / Bacteria: x        EKG:

## 2024-04-14 NOTE — ED PROVIDER NOTE - CARE PLAN
Principal Discharge DX:	Atrial fibrillation  Secondary Diagnosis:	Acute CHF  Secondary Diagnosis:	Pulmonary embolism   1 Principal Discharge DX:	Atrial fibrillation  Secondary Diagnosis:	Acute CHF  Secondary Diagnosis:	Pulmonary embolism  Secondary Diagnosis:	Rhabdomyolysis

## 2024-04-14 NOTE — ED PROVIDER NOTE - CLINICAL SUMMARY MEDICAL DECISION MAKING FREE TEXT BOX
74-year-old male history of HTN?, HLD?, CHF? afib? presenting to the ED after patient fell on the floor 2 days ago and was stuck under  shelf  until normal call 911 to help get patient out.   No head trauma, no pain currently.  Patient has not seen a doctor in many years, so uncertain of Any medical history. Denying any fevers or chills now.     EKG shows new A-fib.  Patient appears to be volume overloaded on exam,   Concern for CHF acute vs chronic.    Will get imaging, labs, diurese and admit. 74-year-old male history of HTN?, HLD?, CHF? afib? presenting to the ED after patient fell on the floor 2 days ago and was stuck under  shelf  until karen called 911 to help get patient out.   No head trauma, no pain currently.  Patient has not seen a doctor in many years, so uncertain of Any medical history. Denying any fevers or chills now. no cp, sob.     EKG shows new A-fib.  Patient appears to be volume overloaded on exam,  Concern for CHF acute vs chronic.    Will get imaging, labs, diurese and admit.

## 2024-04-14 NOTE — H&P ADULT - CONVERSATION DETAILS
Goals of Care Discussion  Date of evaluation:   Purpose of discussion: In the setting of advanced age and multiple comorbidities, discussion of patient's wishes was performed should there be any deterioration in health status.   Purpose of discussion: In the event of an emergency situation requiring life-rescuing therapy, it is best to proactively address patient's wishes.   Presentation: as above    Plan:  Code status: Patient is FULL CODE  Emergency Contact/Niece: Opal Jenkins

## 2024-04-14 NOTE — H&P ADULT - PROBLEM SELECTOR PLAN 1
3+ pitting edema above knees, satting 95%   -Trops I 91.9 -> 109.6 BNP 2k    - CXR: no acute infiltrates, cardiomegaly   -EKG Afib RVR w/ HR 112s    - Diuresis: Lasix 80mg IVP x1 at [facility] w/ 400cc urine output. Holding diuresis at this given concern for rhabdo   -Core measures, strict I/O's daily weights, 1L PO restriction, VS per routine, cont tele/pulse ox 3+ pitting edema above knees, satting 95%   -Trops I 91.9 -> 109.6 BNP 2k    - CXR: no acute infiltrates, cardiomegaly   -EKG Afib RVR w/ HR 112s    - Diuresis: Lasix 80mg IVP x1 at [facility] w/ 400cc urine output. Holding diuresis at this given concern for rhabdo   - GDMT: Start Valsartan 20mg BID.  - Etiology unclear given unclear if patient has prior hx of Afib and tachy-induced. Can consider ischemic work up once euvolemic   - F/u TTE   -Core measures, strict I/O's daily weights, 1.2L PO restriction, strict I/Os,  cont tele/pulse ox 3+ pitting edema above knees, satting 95%   -Trops I 91.9 -> 109.6 BNP 2k    - CXR: no acute infiltrates, cardiomegaly   -EKG Afib RVR w/ HR 112s    - Diuresis: Lasix 80mg IV BID  - GDMT: Start Valsartan 20mg BID.  - Etiology unclear given unclear if patient has prior hx of Afib and tachy-induced. Can consider ischemic work up once euvolemic   - F/u TTE   -Core measures, strict I/O's daily weights, 1.2L PO restriction, strict I/Os,  cont tele/pulse ox 3+ pitting edema above knees, satting 95%   -Trops I 91.9 -> 109.6 BNP 2k  Lactate 3.0 -> 1.7   - CXR: no acute infiltrates, cardiomegaly   -EKG Afib RVR w/ HR 112s    - Diuresis: Lasix 80mg IV BID  - GDMT: Start Valsartan 20mg BID.  - Etiology unclear given unclear if patient has prior hx of Afib and tachy-induced. Can consider ischemic work up once euvolemic   - F/u TTE   -Core measures, strict I/O's daily weights, 1.2L PO restriction, strict I/Os,  cont tele/pulse ox 3+ pitting edema above knees, satting 95%   -Trops I 91.9 -> 109.6 BNP 2k  Lactate 3.0 -> 1.7   - CXR: no acute infiltrates, cardiomegaly   -EKG Afib RVR w/ HR 112s    - Diuresis: Lasix 80mg IV BID  - GDMT: Start Valsartan 20mg BID.  - Etiology: unclear can consider tachy-induced however unclear whether Afib is new vs ischemic eval once euvolemic   - F/u TTE   -Core measures, strict I/O's daily weights, 1.2L PO restriction, strict I/Os,  cont tele/pulse ox

## 2024-04-14 NOTE — ED PROVIDER NOTE - NS ED ATTENDING STATEMENT MOD
I have seen and examined this patient and fully participated in the care of this patient as the teaching attending.  The service was shared with the TOBI.  I reviewed and verified the documentation.

## 2024-04-14 NOTE — H&P ADULT - PROBLEM SELECTOR PLAN 5
SBP 150s- 160s  - C/w Valsartan 20mg BID       F: PO   E: Replete for K <4, Mg <2  N: DASH (1.2L restriction)   GI PPx: Protonix   DVT PPx: Heparin gtt full AC     FULL CODE  Discussed w/ Dr. Novoa Patient had mechanical fall and was down on the ground x2 days   - BUN/Cr 22/1.14 CK 2336 -> 2002   - UA +blood. F/u repeat labs   - Medicine consulted for co-management

## 2024-04-14 NOTE — ED PROVIDER NOTE - ATTENDING CONTRIBUTION TO CARE
I have discussed the case with the resident/mid level provider. I have personally performed a history, physical exam, and my own medical decision making. I have reviewed the note and agree with the findings and plan     Upon my evaluation, this patient had a high probability of imminent or life-threatening deterioration due to __New onset atrial fibrillation and CHF as well as diagnosed pulmonary embolism requiring telemetry stepdown admission_, which required my direct attention, intervention, and personal management.    I have personally provided __30_ minutes of critical care time exclusive of time spent on separately billable procedures. Time includes review of laboratory data, radiology results, discussion with consultants, and monitoring for potential decompensation. Interventions were performed as documented above.    Patient presents for swelling and shortness of breath and found to be volume overloaded on exam likely secondary to, heart failure _, based on history, exam, and work up. Patient was given lasix_With adequate diuresis within the first after 400 cc. Patient also new onset atrial fibrillation at a rate of 90s to 100s, with adequate blood pressure clinically stable will hold on beta-blockers and calcium channel blockers in the setting of unknown ejection fraction.  Consider digoxen if heart rate increases significantly, will discuss with cardiology on-call for cardiology telemetry stepdown as well as start heparin drip in the setting of subsegmental PE as well as new onset A-fib.  Patient consented to admission and transfer to [facility] and understands why he is getting admitted.. He has elevated lactate but is afebrile has a normal white count and otherwise is not likely to be in septic shock or severe sepsis.  Covered antibiotics with vancomycin and Zosyn. Pancultured. Please see physical exam as noted above

## 2024-04-14 NOTE — PATIENT PROFILE ADULT - HOME ACCESSIBILITY CONCERNS - OTHER
Lives alone in apartment, fell with no ability to call for help, landlord found on floor of apartment 48 hrs after fall

## 2024-04-14 NOTE — ED ADULT TRIAGE NOTE - CHIEF COMPLAINT QUOTE
pt. was found lying on his apartment floor, as per EMS landlord had to call 911 to break the door down. Pt. was found wedged under a bookshelf reports he feel on Friday night and was unable to get up. Pt. denies any pain or head trauma. Pt. also noted to have bilateral pitting edema to lower extremities with weeping sores. Pt. denies any PMH- reports he last saw a doctor 40 years ago.

## 2024-04-14 NOTE — H&P ADULT - ASSESSMENT
74M with unclear PMHx presented to [facility] on 04/14/24 after beeing found down on the floor x2 days by karen. Labs significant for Trop T CK 2k BNP 7k . Imaging signifcant for segmental PE and ascites. CTH without acute bleed. Patient now admitted to cardiac tele for further management of CHF exacerbation, Afib, presumed RLL PE, and rhabdomyolysis.    74M with no known PMHx presented to [facility] on 04/14/24 after beeing found down on the floor x2 days by karen. Labs significant for Trop T CK 2k BNP 7k . Imaging signifcant for segmental PE and ascites. CTH without acute bleed. Patient now admitted to cardiac tele for further management of CHF exacerbation, Afib, presumed RLL PE, and rhabdomyolysis.    74M with no known PMHx presented to [facility] on 04/14/24 after beeing found down on the floor x2 days by karen. Labs significant for Trop T CK 2k BNP 7k . Imaging signifcant for segmental PE and ascites. CTH without acute bleed. Patient now admitted to cardiac tele for further management of acute CHF exacerbation, Afib, presumed RLL PE, and rhabdomyolysis.    74M with no known PMHx presented to [facility] on 04/14/24 after beeing found down on the floor x2 days by karen. Labs significant for Trop T CK 2k BNP 7k . Imaging signifcant for segmental PE and ascites. CTH without acute bleed. Patient now admitted to cardiac tele for further management of acute CHF exacerbation, Afib, and rhabdomyolysis.

## 2024-04-14 NOTE — PATIENT PROFILE ADULT - FALL HARM RISK - HARM RISK INTERVENTIONS
Assistance with ambulation/Assistance OOB with selected safe patient handling equipment/Communicate Risk of Fall with Harm to all staff/Discuss with provider need for PT consult/Monitor gait and stability/Reinforce activity limits and safety measures with patient and family/Tailored Fall Risk Interventions/Visual Cue: Yellow wristband and red socks/Bed in lowest position, wheels locked, appropriate side rails in place/Call bell, personal items and telephone in reach/Instruct patient to call for assistance before getting out of bed or chair/Non-slip footwear when patient is out of bed/[place] to call system/Physically safe environment - no spills, clutter or unnecessary equipment/Purposeful Proactive Rounding/Room/bathroom lighting operational, light cord in reach

## 2024-04-14 NOTE — ED PROVIDER NOTE - WHICH SHOWED
EKG was interpreted by me Dr. Estella Galvez, ED attending, which showed A-fib at a rate of 106 with a QTc of 470 T wave flattening and inversion in 3 and aVF as well as in 4 5 and 6

## 2024-04-14 NOTE — H&P ADULT - PROBLEM SELECTOR PLAN 7
SBP 150s- 160s  - C/w Valsartan 20mg BID       F: PO   E: Replete for K <4, Mg <2  N: DASH (1.2L restriction)   GI PPx: Protonix   DVT PPx: Heparin gtt full AC     FULL CODE  Discussed w/ Dr. Novoa

## 2024-04-14 NOTE — ED PROVIDER NOTE - PROGRESS NOTE DETAILS
attempted to call cardiology x 2 since 7:28 awaiting callback. Called report to cardiology on-call admission to be placed under Dr. Novoa agrees with starting heparin drip. Called report to cardiology on-call Dr Johnson admission to be placed under Dr. Novoa agrees with starting heparin drip. Patient is amenable to admission and transfer to [facility] understands why he is being admitted, and signed consent. he has no primary or cardiology outpatient care at this time. attempted to call cardiology x 2 since 7:15 awaiting callback.

## 2024-04-15 ENCOUNTER — RESULT REVIEW (OUTPATIENT)
Age: 74
End: 2024-04-15

## 2024-04-15 LAB
A1C WITH ESTIMATED AVERAGE GLUCOSE RESULT: 6 % — HIGH (ref 4–5.6)
ALBUMIN SERPL ELPH-MCNC: 3.3 G/DL — SIGNIFICANT CHANGE UP (ref 3.3–5)
ALBUMIN SERPL ELPH-MCNC: 3.3 G/DL — SIGNIFICANT CHANGE UP (ref 3.3–5)
ALP SERPL-CCNC: 48 U/L — SIGNIFICANT CHANGE UP (ref 40–120)
ALP SERPL-CCNC: 49 U/L — SIGNIFICANT CHANGE UP (ref 40–120)
ALT FLD-CCNC: 47 U/L — HIGH (ref 10–45)
ALT FLD-CCNC: 48 U/L — HIGH (ref 10–45)
ANION GAP SERPL CALC-SCNC: 8 MMOL/L — SIGNIFICANT CHANGE UP (ref 5–17)
ANION GAP SERPL CALC-SCNC: 9 MMOL/L — SIGNIFICANT CHANGE UP (ref 5–17)
APTT BLD: 199.9 SEC — CRITICAL HIGH (ref 24.5–35.6)
APTT BLD: 58.7 SEC — HIGH (ref 24.5–35.6)
APTT BLD: 60.3 SEC — HIGH (ref 24.5–35.6)
AST SERPL-CCNC: 63 U/L — HIGH (ref 10–40)
AST SERPL-CCNC: 64 U/L — HIGH (ref 10–40)
BILIRUB SERPL-MCNC: 0.7 MG/DL — SIGNIFICANT CHANGE UP (ref 0.2–1.2)
BILIRUB SERPL-MCNC: 0.9 MG/DL — SIGNIFICANT CHANGE UP (ref 0.2–1.2)
BUN SERPL-MCNC: 21 MG/DL — SIGNIFICANT CHANGE UP (ref 7–23)
BUN SERPL-MCNC: 21 MG/DL — SIGNIFICANT CHANGE UP (ref 7–23)
CALCIUM SERPL-MCNC: 9.3 MG/DL — SIGNIFICANT CHANGE UP (ref 8.4–10.5)
CALCIUM SERPL-MCNC: 9.4 MG/DL — SIGNIFICANT CHANGE UP (ref 8.4–10.5)
CHLORIDE SERPL-SCNC: 110 MMOL/L — HIGH (ref 96–108)
CHLORIDE SERPL-SCNC: 112 MMOL/L — HIGH (ref 96–108)
CHOLEST SERPL-MCNC: 129 MG/DL — SIGNIFICANT CHANGE UP
CK SERPL-CCNC: 777 U/L — HIGH (ref 30–200)
CO2 SERPL-SCNC: 31 MMOL/L — SIGNIFICANT CHANGE UP (ref 22–31)
CO2 SERPL-SCNC: 31 MMOL/L — SIGNIFICANT CHANGE UP (ref 22–31)
CREAT SERPL-MCNC: 0.81 MG/DL — SIGNIFICANT CHANGE UP (ref 0.5–1.3)
CREAT SERPL-MCNC: 0.87 MG/DL — SIGNIFICANT CHANGE UP (ref 0.5–1.3)
CULTURE RESULTS: SIGNIFICANT CHANGE UP
EGFR: 90 ML/MIN/1.73M2 — SIGNIFICANT CHANGE UP
EGFR: 92 ML/MIN/1.73M2 — SIGNIFICANT CHANGE UP
ESTIMATED AVERAGE GLUCOSE: 126 MG/DL — HIGH (ref 68–114)
GLUCOSE SERPL-MCNC: 104 MG/DL — HIGH (ref 70–99)
GLUCOSE SERPL-MCNC: 123 MG/DL — HIGH (ref 70–99)
HAV IGM SER-ACNC: SIGNIFICANT CHANGE UP
HBV CORE AB SER-ACNC: SIGNIFICANT CHANGE UP
HBV CORE IGM SER-ACNC: SIGNIFICANT CHANGE UP
HBV SURFACE AB SER-ACNC: SIGNIFICANT CHANGE UP
HBV SURFACE AG SER-ACNC: SIGNIFICANT CHANGE UP
HCT VFR BLD CALC: 49.6 % — SIGNIFICANT CHANGE UP (ref 39–50)
HCT VFR BLD CALC: 50 % — SIGNIFICANT CHANGE UP (ref 39–50)
HCV AB S/CO SERPL IA: 0.04 S/CO — SIGNIFICANT CHANGE UP
HCV AB S/CO SERPL IA: 0.04 S/CO — SIGNIFICANT CHANGE UP
HCV AB SERPL-IMP: SIGNIFICANT CHANGE UP
HCV AB SERPL-IMP: SIGNIFICANT CHANGE UP
HDLC SERPL-MCNC: 44 MG/DL — SIGNIFICANT CHANGE UP
HGB BLD-MCNC: 15.3 G/DL — SIGNIFICANT CHANGE UP (ref 13–17)
HGB BLD-MCNC: 15.4 G/DL — SIGNIFICANT CHANGE UP (ref 13–17)
INR BLD: 1.39 — HIGH (ref 0.85–1.18)
LIPID PNL WITH DIRECT LDL SERPL: 72 MG/DL — SIGNIFICANT CHANGE UP
MAGNESIUM SERPL-MCNC: 2.2 MG/DL — SIGNIFICANT CHANGE UP (ref 1.6–2.6)
MCHC RBC-ENTMCNC: 29.4 PG — SIGNIFICANT CHANGE UP (ref 27–34)
MCHC RBC-ENTMCNC: 29.7 PG — SIGNIFICANT CHANGE UP (ref 27–34)
MCHC RBC-ENTMCNC: 30.6 GM/DL — LOW (ref 32–36)
MCHC RBC-ENTMCNC: 31 GM/DL — LOW (ref 32–36)
MCV RBC AUTO: 95.6 FL — SIGNIFICANT CHANGE UP (ref 80–100)
MCV RBC AUTO: 96.2 FL — SIGNIFICANT CHANGE UP (ref 80–100)
NON HDL CHOLESTEROL: 85 MG/DL — SIGNIFICANT CHANGE UP
NRBC # BLD: 0 /100 WBCS — SIGNIFICANT CHANGE UP (ref 0–0)
NRBC # BLD: 0 /100 WBCS — SIGNIFICANT CHANGE UP (ref 0–0)
PLATELET # BLD AUTO: 220 K/UL — SIGNIFICANT CHANGE UP (ref 150–400)
PLATELET # BLD AUTO: 224 K/UL — SIGNIFICANT CHANGE UP (ref 150–400)
POTASSIUM SERPL-MCNC: 3.7 MMOL/L — SIGNIFICANT CHANGE UP (ref 3.5–5.3)
POTASSIUM SERPL-MCNC: 4.3 MMOL/L — SIGNIFICANT CHANGE UP (ref 3.5–5.3)
POTASSIUM SERPL-SCNC: 3.7 MMOL/L — SIGNIFICANT CHANGE UP (ref 3.5–5.3)
POTASSIUM SERPL-SCNC: 4.3 MMOL/L — SIGNIFICANT CHANGE UP (ref 3.5–5.3)
PROT SERPL-MCNC: 5.9 G/DL — LOW (ref 6–8.3)
PROT SERPL-MCNC: 6 G/DL — SIGNIFICANT CHANGE UP (ref 6–8.3)
PROTHROM AB SERPL-ACNC: 15.7 SEC — HIGH (ref 9.5–13)
RBC # BLD: 5.19 M/UL — SIGNIFICANT CHANGE UP (ref 4.2–5.8)
RBC # BLD: 5.2 M/UL — SIGNIFICANT CHANGE UP (ref 4.2–5.8)
RBC # FLD: 17 % — HIGH (ref 10.3–14.5)
RBC # FLD: 17.2 % — HIGH (ref 10.3–14.5)
SODIUM SERPL-SCNC: 150 MMOL/L — HIGH (ref 135–145)
SODIUM SERPL-SCNC: 151 MMOL/L — HIGH (ref 135–145)
SPECIMEN SOURCE: SIGNIFICANT CHANGE UP
T4 AB SER-ACNC: 5.06 UG/DL — SIGNIFICANT CHANGE UP (ref 4.5–11.7)
TRIGL SERPL-MCNC: 59 MG/DL — SIGNIFICANT CHANGE UP
TSH SERPL-MCNC: 2.04 UIU/ML — SIGNIFICANT CHANGE UP (ref 0.27–4.2)
WBC # BLD: 7.36 K/UL — SIGNIFICANT CHANGE UP (ref 3.8–10.5)
WBC # BLD: 7.72 K/UL — SIGNIFICANT CHANGE UP (ref 3.8–10.5)
WBC # FLD AUTO: 7.36 K/UL — SIGNIFICANT CHANGE UP (ref 3.8–10.5)
WBC # FLD AUTO: 7.72 K/UL — SIGNIFICANT CHANGE UP (ref 3.8–10.5)

## 2024-04-15 PROCEDURE — 93306 TTE W/DOPPLER COMPLETE: CPT | Mod: 26

## 2024-04-15 PROCEDURE — 99233 SBSQ HOSP IP/OBS HIGH 50: CPT

## 2024-04-15 RX ORDER — IPRATROPIUM/ALBUTEROL SULFATE 18-103MCG
3 AEROSOL WITH ADAPTER (GRAM) INHALATION EVERY 6 HOURS
Refills: 0 | Status: DISCONTINUED | OUTPATIENT
Start: 2024-04-15 | End: 2024-04-22

## 2024-04-15 RX ORDER — SODIUM CHLORIDE 9 MG/ML
1000 INJECTION, SOLUTION INTRAVENOUS
Refills: 0 | Status: DISCONTINUED | OUTPATIENT
Start: 2024-04-15 | End: 2024-04-16

## 2024-04-15 RX ORDER — SODIUM CHLORIDE 9 MG/ML
1000 INJECTION INTRAMUSCULAR; INTRAVENOUS; SUBCUTANEOUS
Refills: 0 | Status: DISCONTINUED | OUTPATIENT
Start: 2024-04-15 | End: 2024-04-15

## 2024-04-15 RX ADMIN — HEPARIN SODIUM 1000 UNIT(S)/HR: 5000 INJECTION INTRAVENOUS; SUBCUTANEOUS at 17:35

## 2024-04-15 RX ADMIN — Medication 3 MILLILITER(S): at 17:29

## 2024-04-15 RX ADMIN — HEPARIN SODIUM 1000 UNIT(S)/HR: 5000 INJECTION INTRAVENOUS; SUBCUTANEOUS at 04:06

## 2024-04-15 RX ADMIN — VALSARTAN 20 MILLIGRAM(S): 80 TABLET ORAL at 06:02

## 2024-04-15 RX ADMIN — HEPARIN SODIUM 0 UNIT(S)/HR: 5000 INJECTION INTRAVENOUS; SUBCUTANEOUS at 03:07

## 2024-04-15 RX ADMIN — VALSARTAN 20 MILLIGRAM(S): 80 TABLET ORAL at 17:28

## 2024-04-15 RX ADMIN — SODIUM CHLORIDE 75 MILLILITER(S): 9 INJECTION INTRAMUSCULAR; INTRAVENOUS; SUBCUTANEOUS at 13:14

## 2024-04-15 RX ADMIN — POLYETHYLENE GLYCOL 3350 17 GRAM(S): 17 POWDER, FOR SOLUTION ORAL at 13:16

## 2024-04-15 RX ADMIN — HEPARIN SODIUM 1000 UNIT(S)/HR: 5000 INJECTION INTRAVENOUS; SUBCUTANEOUS at 10:34

## 2024-04-15 RX ADMIN — SODIUM CHLORIDE 75 MILLILITER(S): 9 INJECTION, SOLUTION INTRAVENOUS at 21:01

## 2024-04-15 RX ADMIN — HEPARIN SODIUM 1000 UNIT(S)/HR: 5000 INJECTION INTRAVENOUS; SUBCUTANEOUS at 08:17

## 2024-04-15 RX ADMIN — HEPARIN SODIUM 1000 UNIT(S)/HR: 5000 INJECTION INTRAVENOUS; SUBCUTANEOUS at 19:54

## 2024-04-15 RX ADMIN — HEPARIN SODIUM 1400 UNIT(S)/HR: 5000 INJECTION INTRAVENOUS; SUBCUTANEOUS at 00:49

## 2024-04-15 NOTE — PROGRESS NOTE ADULT - PROBLEM SELECTOR PLAN 3
On admission TBili 1.4 ALP 54 AST/ALT 91/54  - F/u Hep panel and RUQ. Unclear whether transaminitis can be related to congestion from CHF or malignancy.   - CT Pelvis : Liver measures near 18 cm in craniocaudal dimension. Subcentimeter hepatic hypodensities are too small to characterize. On admission TBili 1.4 ALP 54 AST/ALT 91/54  . Unclear whether transaminitis can be related to congestion from CHF or malignancy.   - CT Pelvis : Liver measures near 18 cm in craniocaudal dimension. Subcentimeter hepatic hypodensities are too small to characterize.  - F/u Hep panel and RUQ

## 2024-04-15 NOTE — PROGRESS NOTE ADULT - SUBJECTIVE AND OBJECTIVE BOX
Cardiology PA Adult Progress Note    SUBJECTIVE ASSESSMENT:  	  MEDICATIONS:  valsartan 20 milliGRAM(s) Oral two times a day          polyethylene glycol 3350 17 Gram(s) Oral daily  senna 2 Tablet(s) Oral at bedtime      heparin   Injectable 4500 Unit(s) IV Push every 6 hours PRN  heparin   Injectable 9500 Unit(s) IV Push every 6 hours PRN  heparin  Infusion. 1700 Unit(s)/Hr IV Continuous <Continuous>    	  VITAL SIGNS:  T(C): 36.8 (04-15-24 @ 05:22), Max: 37 (04-14-24 @ 15:50)  HR: 109 (04-15-24 @ 05:22) (87 - 121)  BP: 148/84 (04-15-24 @ 05:22) (118/69 - 154/93)  RR: 18 (04-15-24 @ 05:22) (18 - 18)  SpO2: 94% (04-15-24 @ 05:22) (94% - 98%)  Wt(kg): --    I&O's Summary    14 Apr 2024 07:01  -  15 Apr 2024 07:00  --------------------------------------------------------  IN: 180 mL / OUT: 2050 mL / NET: -1870 mL      Height (cm): 167.6 (04-14 @ 16:05)  Weight (kg): 117.9 (04-14 @ 13:08)  BMI (kg/m2): 42 (04-14 @ 16:05)  BSA (m2): 2.24 (04-14 @ 16:05)                                     PHYSICAL EXAM:  Appearance: Normal	  HEENT: Normal oral mucosa, PERRL, EOMI	  Neck: Supple, + JVD/ - JVD; ___ Carotid Bruit   Cardiovascular: Normal S1 S2, No murmurs  Respiratory: Lungs clear to auscultation/Decreased Breath Sounds/No Rales, Rhonchi, Wheezing	  Gastrointestinal:  Soft, Non-tender, + BS	  Skin: No rashes, No ecchymoses, No cyanosis  Extremities: Normal range of motion, No clubbing, cyanosis or edema  Vascular: Peripheral pulses palpable 2+ bilaterally  Neurologic: Non-focal  Psychiatry: A & O x 3, Mood & affect appropriate    LABS:	 	                                  15.3   7.36  )-----------( 224      ( 15 Apr 2024 05:30 )             50.0     04-15    150<H>  |  110<H>  |  21  ----------------------------<  104<H>  3.7   |  31  |  0.87    Ca    9.4      15 Apr 2024 05:30  Mg     2.2     04-15    TPro  5.9<L>  /  Alb  3.3  /  TBili  0.9  /  DBili  x   /  AST  64<H>  /  ALT  47<H>  /  AlkPhos  49  04-15    proBNP:   Lipid Profile:   HgA1c:   TSH: Thyroid Stimulating Hormone, Serum: 2.040 uIU/mL (04-15 @ 05:30)    PT/INR - ( 14 Apr 2024 11:00 )   PT: 16.8 sec;   INR: 1.49          PTT - ( 15 Apr 2024 02:09 )  PTT:199.9 sec Cardiology PA Adult Progress Note    SUBJECTIVE ASSESSMENT:  	  MEDICATIONS:  valsartan 20 milliGRAM(s) Oral two times a day  polyethylene glycol 3350 17 Gram(s) Oral daily  senna 2 Tablet(s) Oral at bedtime  heparin   Injectable 4500 Unit(s) IV Push every 6 hours PRN  heparin   Injectable 9500 Unit(s) IV Push every 6 hours PRN  heparin  Infusion. 1700 Unit(s)/Hr IV Continuous <Continuous>    	  VITAL SIGNS:  T(C): 36.8 (04-15-24 @ 05:22), Max: 37 (04-14-24 @ 15:50)  HR: 109 (04-15-24 @ 05:22) (87 - 121)  BP: 148/84 (04-15-24 @ 05:22) (118/69 - 154/93)  RR: 18 (04-15-24 @ 05:22) (18 - 18)  SpO2: 94% (04-15-24 @ 05:22) (94% - 98%)  Wt(kg): --    I&O's Summary    14 Apr 2024 07:01  -  15 Apr 2024 07:00  --------------------------------------------------------  IN: 180 mL / OUT: 2050 mL / NET: -1870 mL      Height (cm): 167.6 (04-14 @ 16:05)  Weight (kg): 117.9 (04-14 @ 13:08)  BMI (kg/m2): 42 (04-14 @ 16:05)  BSA (m2): 2.24 (04-14 @ 16:05)                                     PHYSICAL EXAM:  Appearance: Normal	  HEENT:  EOMI	  Neck: Supple  Cardiovascular: Normal S1 S2, No murmurs  Respiratory: Distant lung sounds b/l, 4L oxygen   Gastrointestinal:  Soft, Non-tender 	  Skin: No rashes, No ecchymoses, No cyanosis  Extremities: +2-3 edema LE  Neurologic: Non-focal  Psychiatry: A & O x 3, Mood & affect appropriate    LABS:	 	                                  15.3   7.36  )-----------( 224      ( 15 Apr 2024 05:30 )             50.0     04-15    150<H>  |  110<H>  |  21  ----------------------------<  104<H>  3.7   |  31  |  0.87    Ca    9.4      15 Apr 2024 05:30  Mg     2.2     04-15    TPro  5.9<L>  /  Alb  3.3  /  TBili  0.9  /  DBili  x   /  AST  64<H>  /  ALT  47<H>  /  AlkPhos  49  04-15    TSH: Thyroid Stimulating Hormone, Serum: 2.040 uIU/mL (04-15 @ 05:30)    PT/INR - ( 14 Apr 2024 11:00 )   PT: 16.8 sec;   INR: 1.49          PTT - ( 15 Apr 2024 02:09 )  PTT:199.9 sec Cardiology PA Adult Progress Note    SUBJECTIVE ASSESSMENT:  	  Denies chest pain    MEDICATIONS:  valsartan 20 milliGRAM(s) Oral two times a day  polyethylene glycol 3350 17 Gram(s) Oral daily  senna 2 Tablet(s) Oral at bedtime  heparin   Injectable 4500 Unit(s) IV Push every 6 hours PRN  heparin   Injectable 9500 Unit(s) IV Push every 6 hours PRN  heparin  Infusion. 1700 Unit(s)/Hr IV Continuous <Continuous>    	  VITAL SIGNS:  T(C): 36.8 (04-15-24 @ 05:22), Max: 37 (04-14-24 @ 15:50)  HR: 109 (04-15-24 @ 05:22) (87 - 121)  BP: 148/84 (04-15-24 @ 05:22) (118/69 - 154/93)  RR: 18 (04-15-24 @ 05:22) (18 - 18)  SpO2: 94% (04-15-24 @ 05:22) (94% - 98%)  Wt(kg): --    I&O's Summary    14 Apr 2024 07:01  -  15 Apr 2024 07:00  --------------------------------------------------------  IN: 180 mL / OUT: 2050 mL / NET: -1870 mL      Height (cm): 167.6 (04-14 @ 16:05)  Weight (kg): 117.9 (04-14 @ 13:08)  BMI (kg/m2): 42 (04-14 @ 16:05)  BSA (m2): 2.24 (04-14 @ 16:05)                                     PHYSICAL EXAM:  Appearance: Normal	  HEENT:  EOMI	  Neck: Supple  Cardiovascular: Normal S1 S2, No murmurs  Respiratory: Distant lung sounds b/l, 4L oxygen   Gastrointestinal:  Soft, Non-tender 	  Skin: No rashes, No ecchymoses, No cyanosis  Extremities: +2-3 edema LE  Neurologic: Non-focal  Psychiatry: A & O x 3, Mood & affect appropriate    LABS:	 	                                  15.3   7.36  )-----------( 224      ( 15 Apr 2024 05:30 )             50.0     04-15    150<H>  |  110<H>  |  21  ----------------------------<  104<H>  3.7   |  31  |  0.87    Ca    9.4      15 Apr 2024 05:30  Mg     2.2     04-15    TPro  5.9<L>  /  Alb  3.3  /  TBili  0.9  /  DBili  x   /  AST  64<H>  /  ALT  47<H>  /  AlkPhos  49  04-15    TSH: Thyroid Stimulating Hormone, Serum: 2.040 uIU/mL (04-15 @ 05:30)    PT/INR - ( 14 Apr 2024 11:00 )   PT: 16.8 sec;   INR: 1.49          PTT - ( 15 Apr 2024 02:09 )  PTT:199.9 sec

## 2024-04-15 NOTE — PROGRESS NOTE ADULT - PROBLEM SELECTOR PLAN 2
HR 100s, unclear whether patient has hx of afib or is new   - EKG  BPM   - F/u repeat cardiac enzyme   - Holding BB/CCB given unknown EF.   - Continue with heparin gtt and with plan to transition to NOAC     #Rule out DVT   -  F/u B/L LE Duplex U/S HR 100s, unclear whether patient has hx of afib or is new   - EKG  BPM   -Now echo resulted w/ low EF, discuss uptitrating GDMT including considering BB for rate control as needed  - Continue with heparin gtt and with plan to transition to NOAC     #Rule out DVT   -  F/u B/L LE Duplex U/S HR 100s, unclear whether patient has hx of afib or is new   - EKG  BPM   -Now echo resulted w/ low EF, discuss uptitrating GDMT including considering BB for rate control as needed  - Continue with heparin gtt and with plan to transition to NOAC

## 2024-04-15 NOTE — PROGRESS NOTE ADULT - PROBLEM SELECTOR PLAN 4
On admission Na 152 likely from hypervolemia   - As per medicine recs should not correct more than 8mEq daily  - Continue to monitor On admission Na 152 likely from hypervolemia   - As per medicine recs should not correct more than 8mEq daily  - Continue to monitor  -Initially received IV NS hydration, however, now ordered lactate ringers IV  -f/u BMP at 3am on 4/16

## 2024-04-15 NOTE — PROGRESS NOTE ADULT - PROBLEM SELECTOR PLAN 5
Patient had mechanical fall and was down on the ground x2 days   - BUN/Cr 22/1.14 CK 2336 -> 2002   - UA +blood. F/u repeat labs   - Medicine consulted for co-management Patient had mechanical fall and was down on the ground x2 days   - BUN/Cr 22/1.14 CK 2336 -> 2002   - UA +blood.   -CK improving  - Medicine consulted for co-management

## 2024-04-15 NOTE — PROGRESS NOTE ADULT - ASSESSMENT
74 y/p M with no known PMHx presented to [facility] found to have CHF, Afib w/ RVR, w/ concern for PE and rhabdomyolysis medicine consulted for comanagement      Acute hypoxia  ? PE  Patient denies SOB, no pleuritic pain. On AC for Afib, CT chest noted. Unknown baseline 02 saturation, cannot r/o underlying parenchymal disease  Would start on duonebs, q6,  attempt to wean 02 as tolerated. Target 02 sat>93%  Will need PFTs as outpatient     Acute decompensated HF  Afib with RVR  HTN uncontrolled   Will defer diuresis to primary team, Na 150 probably in setting of Hypovolemia. Would not correct by more than 6-8 mEq/24h  Started on Valsartan, follow-up TTE  AC with heparin gtt, monitor PTT per protocol     Rhabdomyolysis traumatic  Hypernatremia  Patient denies myalgias, CK improving. Continue to monitor CK, Phos level and creatinine.   Abdominal imaging noted, Hb stable     Transaminitis  Follow-up US LUQ   Hold hepatotoxics     Thoracic aortic aneurysm   Follow-up with CTS    DVT ppx: Heparin gtt  Discussed with primary team

## 2024-04-15 NOTE — PROGRESS NOTE ADULT - ASSESSMENT
74M with no known PMHx presented to [facility] on 04/14/24 after beeing found down on the floor x2 days by karen. Labs significant for Trop T CK 2k BNP 7k . Imaging signifcant for segmental PE and ascites. CTH without acute bleed. Patient now admitted to cardiac tele for further management of acute CHF exacerbation, Afib, and rhabdomyolysis.    74M with no known PMHx presented to [facility] on 04/14/24 after beeing found down on the floor x2 days by karen. Labs significant for Trop T CK 2k BNP 7k . Imaging signifcant for segmental PE and ascites. CTH without acute bleed. Patient now admitted to cardiac tele for further management of acute CHF exacerbation, Afib, and rhabdomyolysis.    74M with no known PMHx presented to [facility] on 04/14/24 after being found down on the floor x2 days by karen. Labs significant for Trop T CK 2k BNP 7k . Imaging significant for segmental PE and ascites. CTH without acute bleed. Patient now admitted to cardiac tele for further management of acute CHF exacerbation, Afib, and rhabdomyolysis. Course c/b hypernatremia being managed w/ medicine team, and transaminitis.    74M with no known PMHx presented to [facility] on 04/14/24 after being found down on the floor x2 days by karen. Labs significant for Trop T CK 2k BNP 7k . Imaging significant for ascites. CTH without acute bleed. Patient now admitted to cardiac tele for further management of Afib, and rhabdomyolysis. Initially received lasix for suspicion of CHF exacerbation. Course c/b hypernatremia being managed w/ medicine team, and transaminitis.

## 2024-04-15 NOTE — PROGRESS NOTE ADULT - PROBLEM SELECTOR PLAN 1
3+ pitting edema above knees, satting 95%   -Trops I 91.9 -> 109.6 BNP 2k  Lactate 3.0 -> 1.7   - CXR: no acute infiltrates, cardiomegaly   -EKG Afib RVR w/ HR 112s    - Diuresis: Lasix 80mg IV BID  - GDMT: Start Valsartan 20mg BID.  - Etiology: unclear can consider tachy-induced however unclear whether Afib is new vs ischemic eval once euvolemic   - F/u TTE   -Core measures, strict I/O's daily weights, 1.2L PO restriction, strict I/Os,  cont tele/pulse ox 3+ pitting edema above knees, satting 95%.   -Trops I 91.9 -> 109.6 BNP 2k  Lactate 3.0 -> 1.7   - CXR: no acute infiltrates, cardiomegaly   -EKG Afib RVR w/ HR 112s    - Diuresis: Lasix 80mg IV BID given on 4/14, however, now being given IV lactate ringers for hypernatremia suspected iso hypovolemia  - GDMT: c/w Valsartan 20mg BID.  - Etiology: unclear can consider tachy-induced however unclear whether Afib is new vs ischemic eval once euvolemic   - Echo 4/15: Severely reduced LVEF 20-25%, reduced RVSF, mild biatrial enlargement, no significant valve disease, PASP 68mmHgm dilated IVC. Pt was tachycardic during the study.   -Core measures, strict I/O's daily weights, 1.2L PO restriction, strict I/Os,  cont tele/pulse ox

## 2024-04-15 NOTE — PROGRESS NOTE ADULT - SUBJECTIVE AND OBJECTIVE BOX
SUBJECTIVE:  Patient was seen and examined at bedside. Patient reports feeling at baseline, denies SOB, no chest pain, denies abdominal pain, no myalgias. Telemetry reviewed, no other complaints or events reported.    Review of systems: No fever, chills, dizziness, HA, Changes in vision, CP, dyspnea, nausea or vomiting, dysuria, changes in bowel movements, LE edema. Rest of 12 point Review of systems negative unless otherwise documented elsewhere in note.     Diet, DASH/TLC:   Sodium & Cholesterol Restricted  2000mL Fluid Restriction ([id number]) (04-14-24 @ 19:43) [Active]      MEDICATIONS:  MEDICATIONS  (STANDING):  heparin  Infusion. 1700 Unit(s)/Hr (17 mL/Hr) IV Continuous <Continuous>  polyethylene glycol 3350 17 Gram(s) Oral daily  senna 2 Tablet(s) Oral at bedtime  valsartan 20 milliGRAM(s) Oral two times a day    MEDICATIONS  (PRN):  heparin   Injectable 4500 Unit(s) IV Push every 6 hours PRN For aPTT between 40 - 57  heparin   Injectable 9500 Unit(s) IV Push every 6 hours PRN For aPTT less than 40      Allergies    No Known Allergies    Intolerances        OBJECTIVE:  Vital Signs Last 24 Hrs  T(C): 37.1 (15 Apr 2024 08:35), Max: 37.1 (15 Apr 2024 08:35)  T(F): 98.7 (15 Apr 2024 08:35), Max: 98.7 (15 Apr 2024 08:35)  HR: 105 (15 Apr 2024 08:47) (97 - 121)  BP: 145/88 (15 Apr 2024 08:35) (118/69 - 148/84)  BP(mean): 111 (15 Apr 2024 08:35) (88 - 111)  RR: 18 (15 Apr 2024 08:35) (18 - 18)  SpO2: 94% (15 Apr 2024 08:47) (94% - 98%)    Parameters below as of 15 Apr 2024 08:47  Patient On (Oxygen Delivery Method): nasal cannula  O2 Flow (L/min): 5    I&O's Summary    14 Apr 2024 07:01  -  15 Apr 2024 07:00  --------------------------------------------------------  IN: 180 mL / OUT: 2050 mL / NET: -1870 mL    15 Apr 2024 07:01  -  15 Apr 2024 11:28  --------------------------------------------------------  IN: 260 mL / OUT: 100 mL / NET: 160 mL        PHYSICAL EXAM:  General: AOX3, NAD, sitting in bed, speaking in full sentences, no labored breathing   HEENT: AT/NC, no facial asymmetry   Lungs: decreased air entry, decreased air movement in bases, no wheezes  Heart: irregular, tachycardic  Abdomen: obese, soft, no tenderness  Extremities: warm, + edema, legs in dressing with some wounds, no tenderness, no focal deficit     LABS:                        15.4   7.72  )-----------( 220      ( 15 Apr 2024 09:34 )             49.6     04-15    150<H>  |  110<H>  |  21  ----------------------------<  104<H>  3.7   |  31  |  0.87    Ca    9.4      15 Apr 2024 05:30  Mg     2.2     04-15    TPro  5.9<L>  /  Alb  3.3  /  TBili  0.9  /  DBili  x   /  AST  64<H>  /  ALT  47<H>  /  AlkPhos  49  04-15    LIVER FUNCTIONS - ( 15 Apr 2024 05:30 )  Alb: 3.3 g/dL / Pro: 5.9 g/dL / ALK PHOS: 49 U/L / ALT: 47 U/L / AST: 64 U/L / GGT: x           PT/INR - ( 15 Apr 2024 09:34 )   PT: 15.7 sec;   INR: 1.39          PTT - ( 15 Apr 2024 09:34 )  PTT:58.7 sec  CAPILLARY BLOOD GLUCOSE        Urinalysis Basic - ( 15 Apr 2024 05:30 )    Color: x / Appearance: x / SG: x / pH: x  Gluc: 104 mg/dL / Ketone: x  / Bili: x / Urobili: x   Blood: x / Protein: x / Nitrite: x   Leuk Esterase: x / RBC: x / WBC x   Sq Epi: x / Non Sq Epi: x / Bacteria: x        MICRODATA:    Urinalysis with Rflx Culture (collected 14 Apr 2024 11:52)        RADIOLOGY/OTHER STUDIES:

## 2024-04-15 NOTE — PROGRESS NOTE ADULT - NS ATTEND AMEND GEN_ALL_CORE FT
74M with no known PMHx presented to [facility] on 04/14/24 after being found down on the floor x2 days by karen. Labs significant for Trop T CK 2k BNP 7k . Imaging signifcant for segmental PE and ascites. CTH without acute bleed. Patient now admitted to cardiac tele for further management of acute CHF exacerbation, Afib, and rhabdomyolysis.    1. systolic HF, chronic   3+ pitting edema above knees, RA oxygen sat  95%   -Trop I 91.9 -> 109.6 BNP 2k  Lactate 3.0 -> 1.7   - CXR: no acute infiltrates, cardiomegaly     - Diuresis: Lasix 80mg IV BID  - GDMT: Start Valsartan 20mg BID.  - Etiology: unclear can consider tachy-induced however unclear whether Afib is new vs ischemic eval once euvolemic   - F/u TTE    2. PAF  -EKG Afib RVR w/ HR 112s    HR 100s, unclear whether patient has hx of afib or is new   - EKG  BPM   - F/u repeat cardiac enzyme   - Holding BB/CCB given unknown EF.   - Continue with heparin gtt and with plan to transition to NOAC    3. Rhabdo  Patient had mechanical fall and was on the ground x2 days   - BUN/Cr 22/1.14 CK 2336 -> 2002   - UA +blood. F/u repeat labs  4. Hypernatremia - resolving w iv fluid  - Medicine consulted for co-management    appreciate med consult   RUQ imaging pending fo enlarged liver (? fatty liver vs CHF?)

## 2024-04-16 LAB
ALBUMIN SERPL ELPH-MCNC: 3.3 G/DL — SIGNIFICANT CHANGE UP (ref 3.3–5)
ALBUMIN SERPL ELPH-MCNC: 3.9 G/DL — SIGNIFICANT CHANGE UP (ref 3.3–5)
ALP SERPL-CCNC: 50 U/L — SIGNIFICANT CHANGE UP (ref 40–120)
ALP SERPL-CCNC: 54 U/L — SIGNIFICANT CHANGE UP (ref 40–120)
ALT FLD-CCNC: 43 U/L — SIGNIFICANT CHANGE UP (ref 10–45)
ALT FLD-CCNC: 44 U/L — SIGNIFICANT CHANGE UP (ref 10–45)
ANION GAP SERPL CALC-SCNC: 11 MMOL/L — SIGNIFICANT CHANGE UP (ref 5–17)
ANION GAP SERPL CALC-SCNC: 6 MMOL/L — SIGNIFICANT CHANGE UP (ref 5–17)
ANION GAP SERPL CALC-SCNC: 7 MMOL/L — SIGNIFICANT CHANGE UP (ref 5–17)
APTT BLD: 70.4 SEC — HIGH (ref 24.5–35.6)
AST SERPL-CCNC: 45 U/L — HIGH (ref 10–40)
AST SERPL-CCNC: 55 U/L — HIGH (ref 10–40)
BASOPHILS # BLD AUTO: 0.02 K/UL — SIGNIFICANT CHANGE UP (ref 0–0.2)
BASOPHILS # BLD AUTO: 0.02 K/UL — SIGNIFICANT CHANGE UP (ref 0–0.2)
BASOPHILS NFR BLD AUTO: 0.3 % — SIGNIFICANT CHANGE UP (ref 0–2)
BASOPHILS NFR BLD AUTO: 0.3 % — SIGNIFICANT CHANGE UP (ref 0–2)
BILIRUB SERPL-MCNC: 0.7 MG/DL — SIGNIFICANT CHANGE UP (ref 0.2–1.2)
BILIRUB SERPL-MCNC: 0.8 MG/DL — SIGNIFICANT CHANGE UP (ref 0.2–1.2)
BUN SERPL-MCNC: 18 MG/DL — SIGNIFICANT CHANGE UP (ref 7–23)
BUN SERPL-MCNC: 19 MG/DL — SIGNIFICANT CHANGE UP (ref 7–23)
BUN SERPL-MCNC: 19 MG/DL — SIGNIFICANT CHANGE UP (ref 7–23)
CALCIUM SERPL-MCNC: 9 MG/DL — SIGNIFICANT CHANGE UP (ref 8.4–10.5)
CALCIUM SERPL-MCNC: 9.2 MG/DL — SIGNIFICANT CHANGE UP (ref 8.4–10.5)
CALCIUM SERPL-MCNC: 9.4 MG/DL — SIGNIFICANT CHANGE UP (ref 8.4–10.5)
CHLORIDE SERPL-SCNC: 106 MMOL/L — SIGNIFICANT CHANGE UP (ref 96–108)
CHLORIDE SERPL-SCNC: 110 MMOL/L — HIGH (ref 96–108)
CHLORIDE SERPL-SCNC: 110 MMOL/L — HIGH (ref 96–108)
CO2 SERPL-SCNC: 28 MMOL/L — SIGNIFICANT CHANGE UP (ref 22–31)
CO2 SERPL-SCNC: 34 MMOL/L — HIGH (ref 22–31)
CO2 SERPL-SCNC: 35 MMOL/L — HIGH (ref 22–31)
CREAT SERPL-MCNC: 0.8 MG/DL — SIGNIFICANT CHANGE UP (ref 0.5–1.3)
CREAT SERPL-MCNC: 0.81 MG/DL — SIGNIFICANT CHANGE UP (ref 0.5–1.3)
CREAT SERPL-MCNC: 0.89 MG/DL — SIGNIFICANT CHANGE UP (ref 0.5–1.3)
EGFR: 89 ML/MIN/1.73M2 — SIGNIFICANT CHANGE UP
EGFR: 92 ML/MIN/1.73M2 — SIGNIFICANT CHANGE UP
EGFR: 92 ML/MIN/1.73M2 — SIGNIFICANT CHANGE UP
EOSINOPHIL # BLD AUTO: 0.02 K/UL — SIGNIFICANT CHANGE UP (ref 0–0.5)
EOSINOPHIL # BLD AUTO: 0.04 K/UL — SIGNIFICANT CHANGE UP (ref 0–0.5)
EOSINOPHIL NFR BLD AUTO: 0.3 % — SIGNIFICANT CHANGE UP (ref 0–6)
EOSINOPHIL NFR BLD AUTO: 0.6 % — SIGNIFICANT CHANGE UP (ref 0–6)
GLUCOSE SERPL-MCNC: 105 MG/DL — HIGH (ref 70–99)
GLUCOSE SERPL-MCNC: 122 MG/DL — HIGH (ref 70–99)
GLUCOSE SERPL-MCNC: 94 MG/DL — SIGNIFICANT CHANGE UP (ref 70–99)
HCT VFR BLD CALC: 47.7 % — SIGNIFICANT CHANGE UP (ref 39–50)
HCT VFR BLD CALC: 50.7 % — HIGH (ref 39–50)
HGB BLD-MCNC: 14.7 G/DL — SIGNIFICANT CHANGE UP (ref 13–17)
HGB BLD-MCNC: 15.6 G/DL — SIGNIFICANT CHANGE UP (ref 13–17)
IMM GRANULOCYTES NFR BLD AUTO: 0.3 % — SIGNIFICANT CHANGE UP (ref 0–0.9)
IMM GRANULOCYTES NFR BLD AUTO: 0.5 % — SIGNIFICANT CHANGE UP (ref 0–0.9)
LACTATE SERPL-SCNC: 1.2 MMOL/L — SIGNIFICANT CHANGE UP (ref 0.5–2)
LYMPHOCYTES # BLD AUTO: 0.9 K/UL — LOW (ref 1–3.3)
LYMPHOCYTES # BLD AUTO: 1.22 K/UL — SIGNIFICANT CHANGE UP (ref 1–3.3)
LYMPHOCYTES # BLD AUTO: 13.5 % — SIGNIFICANT CHANGE UP (ref 13–44)
LYMPHOCYTES # BLD AUTO: 18.5 % — SIGNIFICANT CHANGE UP (ref 13–44)
MAGNESIUM SERPL-MCNC: 2 MG/DL — SIGNIFICANT CHANGE UP (ref 1.6–2.6)
MAGNESIUM SERPL-MCNC: 2.1 MG/DL — SIGNIFICANT CHANGE UP (ref 1.6–2.6)
MCHC RBC-ENTMCNC: 29.2 PG — SIGNIFICANT CHANGE UP (ref 27–34)
MCHC RBC-ENTMCNC: 29.6 PG — SIGNIFICANT CHANGE UP (ref 27–34)
MCHC RBC-ENTMCNC: 30.8 GM/DL — LOW (ref 32–36)
MCHC RBC-ENTMCNC: 30.8 GM/DL — LOW (ref 32–36)
MCV RBC AUTO: 94.6 FL — SIGNIFICANT CHANGE UP (ref 80–100)
MCV RBC AUTO: 96.2 FL — SIGNIFICANT CHANGE UP (ref 80–100)
MONOCYTES # BLD AUTO: 0.57 K/UL — SIGNIFICANT CHANGE UP (ref 0–0.9)
MONOCYTES # BLD AUTO: 0.66 K/UL — SIGNIFICANT CHANGE UP (ref 0–0.9)
MONOCYTES NFR BLD AUTO: 10 % — SIGNIFICANT CHANGE UP (ref 2–14)
MONOCYTES NFR BLD AUTO: 8.5 % — SIGNIFICANT CHANGE UP (ref 2–14)
NEUTROPHILS # BLD AUTO: 4.64 K/UL — SIGNIFICANT CHANGE UP (ref 1.8–7.4)
NEUTROPHILS # BLD AUTO: 5.16 K/UL — SIGNIFICANT CHANGE UP (ref 1.8–7.4)
NEUTROPHILS NFR BLD AUTO: 70.1 % — SIGNIFICANT CHANGE UP (ref 43–77)
NEUTROPHILS NFR BLD AUTO: 77.1 % — HIGH (ref 43–77)
NRBC # BLD: 0 /100 WBCS — SIGNIFICANT CHANGE UP (ref 0–0)
NRBC # BLD: 0 /100 WBCS — SIGNIFICANT CHANGE UP (ref 0–0)
PLATELET # BLD AUTO: 227 K/UL — SIGNIFICANT CHANGE UP (ref 150–400)
PLATELET # BLD AUTO: 228 K/UL — SIGNIFICANT CHANGE UP (ref 150–400)
POTASSIUM SERPL-MCNC: 3.9 MMOL/L — SIGNIFICANT CHANGE UP (ref 3.5–5.3)
POTASSIUM SERPL-MCNC: 4 MMOL/L — SIGNIFICANT CHANGE UP (ref 3.5–5.3)
POTASSIUM SERPL-MCNC: 4.1 MMOL/L — SIGNIFICANT CHANGE UP (ref 3.5–5.3)
POTASSIUM SERPL-SCNC: 3.9 MMOL/L — SIGNIFICANT CHANGE UP (ref 3.5–5.3)
POTASSIUM SERPL-SCNC: 4 MMOL/L — SIGNIFICANT CHANGE UP (ref 3.5–5.3)
POTASSIUM SERPL-SCNC: 4.1 MMOL/L — SIGNIFICANT CHANGE UP (ref 3.5–5.3)
PROCALCITONIN SERPL-MCNC: 0.06 NG/ML — SIGNIFICANT CHANGE UP (ref 0.02–0.1)
PROT SERPL-MCNC: 6.3 G/DL — SIGNIFICANT CHANGE UP (ref 6–8.3)
PROT SERPL-MCNC: 6.6 G/DL — SIGNIFICANT CHANGE UP (ref 6–8.3)
RAPID RVP RESULT: SIGNIFICANT CHANGE UP
RBC # BLD: 5.04 M/UL — SIGNIFICANT CHANGE UP (ref 4.2–5.8)
RBC # BLD: 5.27 M/UL — SIGNIFICANT CHANGE UP (ref 4.2–5.8)
RBC # FLD: 16.7 % — HIGH (ref 10.3–14.5)
RBC # FLD: 17.3 % — HIGH (ref 10.3–14.5)
SARS-COV-2 RNA SPEC QL NAA+PROBE: SIGNIFICANT CHANGE UP
SODIUM SERPL-SCNC: 148 MMOL/L — HIGH (ref 135–145)
SODIUM SERPL-SCNC: 149 MMOL/L — HIGH (ref 135–145)
SODIUM SERPL-SCNC: 150 MMOL/L — HIGH (ref 135–145)
WBC # BLD: 6.61 K/UL — SIGNIFICANT CHANGE UP (ref 3.8–10.5)
WBC # BLD: 6.69 K/UL — SIGNIFICANT CHANGE UP (ref 3.8–10.5)
WBC # FLD AUTO: 6.61 K/UL — SIGNIFICANT CHANGE UP (ref 3.8–10.5)
WBC # FLD AUTO: 6.69 K/UL — SIGNIFICANT CHANGE UP (ref 3.8–10.5)

## 2024-04-16 PROCEDURE — 99233 SBSQ HOSP IP/OBS HIGH 50: CPT

## 2024-04-16 PROCEDURE — 71045 X-RAY EXAM CHEST 1 VIEW: CPT | Mod: 26

## 2024-04-16 RX ORDER — FUROSEMIDE 40 MG
40 TABLET ORAL DAILY
Refills: 0 | Status: DISCONTINUED | OUTPATIENT
Start: 2024-04-16 | End: 2024-04-18

## 2024-04-16 RX ORDER — METOPROLOL TARTRATE 50 MG
25 TABLET ORAL
Refills: 0 | Status: DISCONTINUED | OUTPATIENT
Start: 2024-04-16 | End: 2024-04-24

## 2024-04-16 RX ORDER — SPIRONOLACTONE 25 MG/1
25 TABLET, FILM COATED ORAL DAILY
Refills: 0 | Status: DISCONTINUED | OUTPATIENT
Start: 2024-04-16 | End: 2024-04-22

## 2024-04-16 RX ADMIN — Medication 3 MILLILITER(S): at 18:15

## 2024-04-16 RX ADMIN — POLYETHYLENE GLYCOL 3350 17 GRAM(S): 17 POWDER, FOR SOLUTION ORAL at 14:09

## 2024-04-16 RX ADMIN — HEPARIN SODIUM 1000 UNIT(S)/HR: 5000 INJECTION INTRAVENOUS; SUBCUTANEOUS at 20:16

## 2024-04-16 RX ADMIN — Medication 25 MILLIGRAM(S): at 11:21

## 2024-04-16 RX ADMIN — SENNA PLUS 2 TABLET(S): 8.6 TABLET ORAL at 22:12

## 2024-04-16 RX ADMIN — Medication 40 MILLIGRAM(S): at 14:08

## 2024-04-16 RX ADMIN — SPIRONOLACTONE 25 MILLIGRAM(S): 25 TABLET, FILM COATED ORAL at 14:08

## 2024-04-16 RX ADMIN — HEPARIN SODIUM 1000 UNIT(S)/HR: 5000 INJECTION INTRAVENOUS; SUBCUTANEOUS at 08:34

## 2024-04-16 RX ADMIN — VALSARTAN 20 MILLIGRAM(S): 80 TABLET ORAL at 06:16

## 2024-04-16 RX ADMIN — Medication 3 MILLILITER(S): at 01:27

## 2024-04-16 RX ADMIN — HEPARIN SODIUM 1000 UNIT(S)/HR: 5000 INJECTION INTRAVENOUS; SUBCUTANEOUS at 22:13

## 2024-04-16 RX ADMIN — Medication 3 MILLILITER(S): at 11:21

## 2024-04-16 RX ADMIN — HEPARIN SODIUM 1000 UNIT(S)/HR: 5000 INJECTION INTRAVENOUS; SUBCUTANEOUS at 01:16

## 2024-04-16 RX ADMIN — Medication 25 MILLIGRAM(S): at 22:12

## 2024-04-16 RX ADMIN — VALSARTAN 20 MILLIGRAM(S): 80 TABLET ORAL at 17:33

## 2024-04-16 RX ADMIN — HEPARIN SODIUM 1000 UNIT(S)/HR: 5000 INJECTION INTRAVENOUS; SUBCUTANEOUS at 07:47

## 2024-04-16 NOTE — PROGRESS NOTE ADULT - PROBLEM SELECTOR PLAN 3
On admission TBili 1.4 ALP 54 AST/ALT 91/54  . Unclear whether transaminitis can be related to congestion from CHF or malignancy.   - CT Pelvis : Liver measures near 18 cm in craniocaudal dimension. Subcentimeter hepatic hypodensities are too small to characterize.  - F/u Hep panel and RUQ On admission TBili 1.4 ALP 54 AST/ALT 91/54-> improving  - Hold hepatotoxic agents  - CT Pelvis: Liver measures near 18 cm in craniocaudal dimension. Subcentimeter hepatic hypodensities are too small to characterize.  - Hep panel negative  - Pending RUQ US

## 2024-04-16 NOTE — PROGRESS NOTE ADULT - SUBJECTIVE AND OBJECTIVE BOX
SUBJECTIVE:  Patient was seen and examined at bedside. Reports feeling fine, denies SOB, no chest pain. Telemetry reviewed     Review of systems: No fever, chills, dizziness, HA, Changes in vision, CP, dyspnea, nausea or vomiting, dysuria, changes in bowel movements. Rest of 12 point Review of systems negative unless otherwise documented elsewhere in note.         MEDICATIONS:  MEDICATIONS  (STANDING):  albuterol/ipratropium for Nebulization 3 milliLiter(s) Nebulizer every 6 hours  heparin  Infusion. 1700 Unit(s)/Hr (17 mL/Hr) IV Continuous <Continuous>  polyethylene glycol 3350 17 Gram(s) Oral daily  senna 2 Tablet(s) Oral at bedtime  valsartan 20 milliGRAM(s) Oral two times a day    MEDICATIONS  (PRN):  heparin   Injectable 4500 Unit(s) IV Push every 6 hours PRN For aPTT between 40 - 57  heparin   Injectable 9500 Unit(s) IV Push every 6 hours PRN For aPTT less than 40      Allergies    No Known Allergies    Intolerances        OBJECTIVE:  Vital Signs Last 24 Hrs  T(C): 37.4 (16 Apr 2024 08:41), Max: 37.4 (16 Apr 2024 08:41)  T(F): 99.4 (16 Apr 2024 08:41), Max: 99.4 (16 Apr 2024 08:41)  HR: 123 (16 Apr 2024 08:41) (90 - 123)  BP: 129/91 (16 Apr 2024 08:41) (127/83 - 144/91)  BP(mean): 106 (16 Apr 2024 06:38) (96 - 113)  RR: 20 (16 Apr 2024 08:41) (18 - 22)  SpO2: 93% (16 Apr 2024 08:41) (92% - 96%)    Parameters below as of 16 Apr 2024 08:41  Patient On (Oxygen Delivery Method): nasal cannula  O2 Flow (L/min): 4    I&O's Summary    15 Apr 2024 07:01  -  16 Apr 2024 07:00  --------------------------------------------------------  IN: 1290 mL / OUT: 900 mL / NET: 390 mL    16 Apr 2024 07:01  -  16 Apr 2024 10:26  --------------------------------------------------------  IN: 180 mL / OUT: 0 mL / NET: 180 mL        PHYSICAL EXAM:  General: AOX3, NAD, lying in bed, speaking in full sentences, no labored breathing on NC 3L  HEENT: AT/NC, no facial asymmetry   Lungs: improved air movement, limited exam, no wheezes  Heart: irregular, tachycardic  Abdomen: obese, soft, no tenderness  Extremities: warm, + edema, legs in dressing, no tenderness, no focal deficit     LABS:                        14.7   6.61  )-----------( 227      ( 16 Apr 2024 05:30 )             47.7     04-16    149<H>  |  110<H>  |  19  ----------------------------<  94  4.1   |  28  |  0.81    Ca    9.0      16 Apr 2024 05:30  Mg     2.1     04-16    TPro  6.3  /  Alb  3.3  /  TBili  0.7  /  DBili  x   /  AST  55<H>  /  ALT  43  /  AlkPhos  54  04-16    LIVER FUNCTIONS - ( 16 Apr 2024 05:30 )  Alb: 3.3 g/dL / Pro: 6.3 g/dL / ALK PHOS: 54 U/L / ALT: 43 U/L / AST: 55 U/L / GGT: x           PT/INR - ( 15 Apr 2024 09:34 )   PT: 15.7 sec;   INR: 1.39          PTT - ( 16 Apr 2024 05:30 )  PTT:70.4 sec  CAPILLARY BLOOD GLUCOSE        Urinalysis Basic - ( 16 Apr 2024 05:30 )    Color: x / Appearance: x / SG: x / pH: x  Gluc: 94 mg/dL / Ketone: x  / Bili: x / Urobili: x   Blood: x / Protein: x / Nitrite: x   Leuk Esterase: x / RBC: x / WBC x   Sq Epi: x / Non Sq Epi: x / Bacteria: x        MICRODATA:    Urinalysis with Rflx Culture (collected 14 Apr 2024 11:52)    Culture - Urine (collected 14 Apr 2024 04:13)  Source: Clean Catch Clean Catch (Midstream)  Final Report (15 Apr 2024 12:18):    <10,000 CFU/mL Normal Urogenital Viridiana    Culture - Blood (collected 14 Apr 2024 04:13)  Source: .Blood Blood-Peripheral  Preliminary Report (15 Apr 2024 12:10):    No growth at 24 hours    Culture - Blood (collected 14 Apr 2024 04:13)  Source: .Blood Blood-Peripheral  Preliminary Report (15 Apr 2024 13:02):    No growth at 24 hours        RADIOLOGY/OTHER STUDIES:  TTE:  1. Severely reduced left ventricular systolic function.   2. Reduced right ventricular systolic function.   3. Mild biatrial enlargement.   4. Mild tricuspid regurgitation.   5. No other significant valvular disease.   6. Pulmonary hypertension present, pulmonary artery systolic pressure is   68 mmHg.   7. No pericardial effusion.   8. Dilated IVC.   9. Patient was tachycardic during the study.

## 2024-04-16 NOTE — CONSULT NOTE ADULT - ASSESSMENT
75 yo M with no reported PMHx presents after a mechanical fall to [facility], also found to have acute decompensated systolic heart failure and new atrial fibrillation. Course complicated by hypervolemic hypernatremia     #HFrEF (EF 20-25%)   ACC/AHA: Stage C  NYHA: NYHA Class II-III   Etiology: Unknown, possibly tachy induced   - GDMT: Start leigh ann 25mg po qd. C/w Valsartan 20 BID, plan for eventual addition of Entresto. On metoprolol tartrate 25 BID.   - Diuretics: start Lasix 40mg IV qd  - Device: premature, will need to reassess after 3months of maximal GDMT    #Atrial Fibrillation  [id number]: 3   Has-Bled: 1  AC: heparin gtt     #Hypervolemic hypernatremia  Calculated free water defecit 4.6L   - favor resuscitation with D5% if needed.     Patient seen and case discussed with Dr. Hernandez  73 yo M with no reported PMHx presents after a mechanical fall to [facility], also found to have acute decompensated systolic heart failure and new atrial fibrillation. Course complicated by hypervolemic hypernatremia     #HFrEF (EF 20-25%)   ACC/AHA: Stage C  NYHA: NYHA Class II-III   Etiology: Unknown, possibly tachy induced   - GDMT: Start leigh ann 25mg po qd. C/w Valsartan 20 BID, plan for eventual addition of Entresto. On metoprolol tartrate 25 BID.   - Diuretics: start Lasix 40mg IV qd  - Device: premature, will need to reassess after 3months of maximal GDMT  NPO for L+RHC tomorrow     #Atrial Fibrillation  [id number]: 3   Has-Bled: 1  AC: heparin gtt     #Hypervolemic hypernatremia  Calculated free water defecit 4.6L   - favor resuscitation with D5% if needed.     Patient seen and case discussed with Dr. Hernandez

## 2024-04-16 NOTE — PROGRESS NOTE ADULT - SUBJECTIVE AND OBJECTIVE BOX
Cardiology PA Adult Progress Note    SUBJECTIVE ASSESSMENT:  	  MEDICATIONS:  valsartan 20 milliGRAM(s) Oral two times a day      albuterol/ipratropium for Nebulization 3 milliLiter(s) Nebulizer every 6 hours      polyethylene glycol 3350 17 Gram(s) Oral daily  senna 2 Tablet(s) Oral at bedtime      heparin   Injectable 4500 Unit(s) IV Push every 6 hours PRN  heparin   Injectable 9500 Unit(s) IV Push every 6 hours PRN  heparin  Infusion. 1700 Unit(s)/Hr IV Continuous <Continuous>    	  VITAL SIGNS:  T(C): 37.4 (04-16-24 @ 08:41), Max: 37.4 (04-16-24 @ 08:41)  HR: 123 (04-16-24 @ 08:41) (90 - 123)  BP: 129/91 (04-16-24 @ 08:41) (127/83 - 144/91)  RR: 20 (04-16-24 @ 08:41) (18 - 22)  SpO2: 93% (04-16-24 @ 08:41) (92% - 96%)  Wt(kg): --    I&O's Summary    15 Apr 2024 07:01  -  16 Apr 2024 07:00  --------------------------------------------------------  IN: 1290 mL / OUT: 900 mL / NET: 390 mL    16 Apr 2024 07:01  -  16 Apr 2024 10:45  --------------------------------------------------------  IN: 180 mL / OUT: 0 mL / NET: 180 mL                                           PHYSICAL EXAM:  Appearance: Normal	  HEENT: Normal oral mucosa, PERRL, EOMI	  Neck: Supple, + JVD/ - JVD; ___ Carotid Bruit   Cardiovascular: Normal S1 S2, No murmurs  Respiratory: Lungs clear to auscultation/Decreased Breath Sounds/No Rales, Rhonchi, Wheezing	  Gastrointestinal:  Soft, Non-tender, + BS	  Skin: No rashes, No ecchymoses, No cyanosis  Extremities: Normal range of motion, No clubbing, cyanosis or edema  Vascular: Peripheral pulses palpable 2+ bilaterally  Neurologic: Non-focal  Psychiatry: A & O x 3, Mood & affect appropriate    LABS:	 	                                  14.7   6.61  )-----------( 227      ( 16 Apr 2024 05:30 )             47.7     04-16    149<H>  |  110<H>  |  19  ----------------------------<  94  4.1   |  28  |  0.81    Ca    9.0      16 Apr 2024 05:30  Mg     2.1     04-16    TPro  6.3  /  Alb  3.3  /  TBili  0.7  /  DBili  x   /  AST  55<H>  /  ALT  43  /  AlkPhos  54  04-16    proBNP:   Lipid Profile:   HgA1c:   TSH:   PT/INR - ( 15 Apr 2024 09:34 )   PT: 15.7 sec;   INR: 1.39          PTT - ( 16 Apr 2024 05:30 )  PTT:70.4 sec Cardiology PA Adult Progress Note    SUBJECTIVE ASSESSMENT: Seen and examined at bedside. Admits he is feeling ok. Still remains SOB on 4 L NC spO2 94%. Tele reveals afib w/ rapid -130s. Received NS bolus and LR for hypernatremia management that finished at 3 am.   	  MEDICATIONS:  valsartan 20 milliGRAM(s) Oral two times a day  albuterol/ipratropium for Nebulization 3 milliLiter(s) Nebulizer every 6 hours  polyethylene glycol 3350 17 Gram(s) Oral daily  senna 2 Tablet(s) Oral at bedtime  heparin   Injectable 4500 Unit(s) IV Push every 6 hours PRN  heparin   Injectable 9500 Unit(s) IV Push every 6 hours PRN  heparin  Infusion. 1700 Unit(s)/Hr IV Continuous <Continuous>    VITAL SIGNS:  T(C): 37.4 (04-16-24 @ 08:41), Max: 37.4 (04-16-24 @ 08:41)  HR: 123 (04-16-24 @ 08:41) (90 - 123)  BP: 129/91 (04-16-24 @ 08:41) (127/83 - 144/91)  RR: 20 (04-16-24 @ 08:41) (18 - 22)  SpO2: 93% (04-16-24 @ 08:41) (92% - 96%)  Wt(kg): --    I&O's Summary    15 Apr 2024 07:01  -  16 Apr 2024 07:00  --------------------------------------------------------  IN: 1290 mL / OUT: 900 mL / NET: 390 mL    16 Apr 2024 07:01  -  16 Apr 2024 10:45  --------------------------------------------------------  IN: 180 mL / OUT: 0 mL / NET: 180 mL                        PHYSICAL EXAM:  Appearance: Normal	  HEENT: Normal oral mucosa, PERRL, EOMI	  Neck: Supple, +JVP, No carotid bruit  Cardiovascular: Normal S1 S2, No murmurs  Respiratory: Diminished at bases, poor air inspiratory effort  Gastrointestinal:  Soft, Non-tender, + BS	  Skin: No rashes, No ecchymoses, No cyanosis  Extremities: +2-3 pitting edema bilaterally  Vascular: Peripheral pulses palpable 2+ bilaterally  Neurologic: Non-focal  Psychiatry: A & O x 3, Mood & affect appropriate    LABS:	 	             14.7   6.61  )-----------( 227      ( 16 Apr 2024 05:30 )             47.7     04-16    149<H>  |  110<H>  |  19  ----------------------------<  94  4.1   |  28  |  0.81    Ca    9.0      16 Apr 2024 05:30  Mg     2.1     04-16    TPro  6.3  /  Alb  3.3  /  TBili  0.7  /  DBili  x   /  AST  55<H>  /  ALT  43  /  AlkPhos  54  04-16    PT/INR - ( 15 Apr 2024 09:34 )   PT: 15.7 sec;   INR: 1.39        PTT - ( 16 Apr 2024 05:30 )  PTT:70.4 sec

## 2024-04-16 NOTE — PROGRESS NOTE ADULT - PROBLEM SELECTOR PLAN 7
SBP 150s- 160s  - C/w Valsartan 20mg BID       F: PO   E: Replete for K <4, Mg <2  N: DASH (1.2L restriction)   GI PPx: Protonix   DVT PPx: Heparin gtt full AC     FULL CODE  Discussed w/ Dr. Novoa SBP 150s- 160s  - C/w Valsartan and Metoprolol as ABOVE      F: PO   E: Replete for K <4, Mg <2  N: DASH (1.2L restriction)   GI PPx: Protonix   DVT PPx: Heparin gtt full AC     FULL CODE  Discussed w/ Dr. Novoa SBP 150s- 160s  - C/w Valsartan and Metoprolol as ABOVE      F: PO   E: Replete for K <4, Mg <2  N: DASH (1.2L restriction)   GI PPx: Protonix   DVT PPx: Heparin gtt full AC     FULL CODE  Discussed w/ Dr. Colon

## 2024-04-16 NOTE — PROGRESS NOTE ADULT - PROBLEM SELECTOR PLAN 5
Patient had mechanical fall and was down on the ground x2 days   - BUN/Cr 22/1.14 CK 2336 -> 2002   - UA +blood.   -CK improving  - Medicine consulted for co-management Patient had mechanical fall and was down on the ground x 2 days.   - BUN/Cr 22/1.14 CK 5526 -> 2002-> 777  - Medicine following

## 2024-04-16 NOTE — PROGRESS NOTE ADULT - PROBLEM SELECTOR PLAN 1
3+ pitting edema above knees, satting 95%.   -Trops I 91.9 -> 109.6 BNP 2k  Lactate 3.0 -> 1.7   - CXR: no acute infiltrates, cardiomegaly   -EKG Afib RVR w/ HR 112s    - Diuresis: Lasix 80mg IV BID given on 4/14, however, now being given IV lactate ringers for hypernatremia suspected iso hypovolemia  - GDMT: c/w Valsartan 20mg BID.  - Etiology: unclear can consider tachy-induced however unclear whether Afib is new vs ischemic eval once euvolemic   - Echo 4/15: Severely reduced LVEF 20-25%, reduced RVSF, mild biatrial enlargement, no significant valve disease, PASP 68mmHgm dilated IVC. Pt was tachycardic during the study.   -Core measures, strict I/O's daily weights, 1.2L PO restriction, strict I/Os,  cont tele/pulse ox Anasarca, 2-3+ pitting edema, +JVP, ascites, 92-94% on 4L  -TTE 4/15/24: Severely reduced LVSF and RVSF, LVEF 20-25%, mild biatrial enlargement, mild TR, PASP 68, dilated IVC  -CTA chest: Neg for PE. Small right and trace left pleural effusions.   -Trops T 52->54, BNP 5893,  Lactate 3.0 -> 1.7. AST 55/ALT 43  -EKG Afib RVR w/ HR 112s    -Diuresis: Lasix 80mg IV given on 4/14, however, s/p IV lactated ringers for hypernatremia.   -GDMT: c/w Valsartan 20mg BID. Start metoprolol tartrate 25 mg BID   -Core measures, strict I/O's daily weights, 1.2L PO restriction, strict I/Os,  cont tele/pulse ox  -Consulted Advanced HF team; Tentative RHC/LHC for hemodynamics and fluid assessment in AM Anasarca, 2-3+ pitting edema, +JVP, ascites, 92-94% on 4L  -TTE 4/15/24: Severely reduced LVSF and RVSF, LVEF 20-25%, mild biatrial enlargement, mild TR, PASP 68, dilated IVC  -CTA chest: Neg for PE. Small right and trace left pleural effusions.   -Trops T 52->54, BNP 5893,  Lactate 3.0 -> 1.7. AST 55/ALT 43  -EKG Afib RVR w/ HR 112s    -Diuresis: Start IV Lasix 40 mg daily. s/p Lasix 80mg IV given on 4/14, however, s/p IV LR for hypernatremia.   -GDMT: c/w Valsartan 20mg BID. Start spironolactone 25 mg QD and metoprolol tartrate 25 mg BID   -Core measures, strict I/O's daily weights, 1.2L PO restriction, strict I/Os,  cont tele/pulse ox  -Consulted Advanced HF team; [facility] for hemodynamics and fluid assessment in AM

## 2024-04-16 NOTE — PROGRESS NOTE ADULT - PROBLEM SELECTOR PLAN 2
HR 100s, unclear whether patient has hx of afib or is new   - EKG  BPM   -Now echo resulted w/ low EF, discuss uptitrating GDMT including considering BB for rate control as needed  - Continue with heparin gtt and with plan to transition to NOAC HR 100s, unclear whether patient has hx of afib or is new. Never f/u with Drs.  - EKG  BPM   - Rate control: Start metoprolol tartrate 25 mg BID  - AC: Heparin gtt

## 2024-04-16 NOTE — PROGRESS NOTE ADULT - NS ATTEND AMEND GEN_ALL_CORE FT
74M with no known PMHx presented to [facility] on 04/14/24 after being found down on the floor x2 days by landlord. Labs significant for Trop T CK 2k BNP 7k . Imaging signifcant for segmental PE and ascites. CTH without acute bleed. Patient now admitted to cardiac tele for further management of acute CHF exacerbation, Afib, and rhabdomyolysis.    1. systolic HF, chronic   3+ pitting edema above knees, RA oxygen sat  95%   still volume overload ed on exam, but hypernatremic    - Diuresis: Lasix 80mg IV BID  - GDMT: Start Valsartan 20mg BID.  - Etiology: unclear can consider tachy-induced, possible amyloid however unclear whether Afib is new vs ischemic eval once euvolemic   - CHF consult, for RHC tomorrow     2. PAF  -EKG Afib RVR w/ HR 112s    HR 100s, unclear whether patient has hx of afib or is new   - EKG  BPM   - F/u repeat cardiac enzyme   - can resume BB  - Continue with heparin gtt and with plan to transition to NOAC    3. Rhabdo  Patient had mechanical fall and was on the ground x2 days   - BUN/Cr 22/1.14 CK 2336 -> 2002   - UA +blood. F/u repeat labs    4. Hypernatremia - resolving w iv fluid  - Medicine consulted for co-management    appreciate med consult   RUQ imaging pending fo enlarged liver (? fatty liver vs CHF?).

## 2024-04-16 NOTE — PROGRESS NOTE ADULT - ASSESSMENT
74M with no known PMHx presented to [facility] on 04/14/24 after being found down on the floor x2 days by karen. Labs significant for Trop T CK 2k BNP 7k . Imaging significant for ascites. CTH without acute bleed. Patient now admitted to cardiac tele for further management of Afib, and rhabdomyolysis. Initially received lasix for suspicion of CHF exacerbation. Course c/b hypernatremia being managed w/ medicine team, and transaminitis.    73 y/o M with no known PMHx presented to [facility] on 04/14/24 after mechanical fall, was found down on floor x 2 days by landlord. Labs significant for Trop T 52->54, CK 2k BNP 7k . CTH without acute bleed. CT A/P neg for PE, + ascites. Pt admitted to cardiac tele for newly dx rapid Afib, rhabdo and suspected ADHF exacerbation, initiated on IV Lasix. Course c/b hypernatremia, being managed w/ medicine team, and transaminitis. Echo reveals LVEF 20-25%, reduced RVSF. Advanced HF team consulted, tentative RHC/LHC in AM.    75 y/o M with no known PMHx presented to [facility] on 04/14/24 after mechanical fall, was found down on floor x 2 days by landlord. Labs significant for Trop T 52->54, CK 2k BNP 7k . CTH without acute bleed. CT A/P neg for PE, + ascites. Pt admitted to cardiac tele for newly dx rapid Afib, rhabdo and suspected ADHF exacerbation, initiated on IV Lasix. Course c/b hypernatremia, being managed w/ medicine team, and transaminitis. Echo reveals LVEF 20-25%, reduced RVSF. Advanced HF team consulted. Start IV Lasix 40 mg daily, RHC/LHC in AM.

## 2024-04-16 NOTE — PROGRESS NOTE ADULT - PROBLEM SELECTOR PLAN 4
On admission Na 152 likely from hypervolemia   - As per medicine recs should not correct more than 8mEq daily  - Continue to monitor  -Initially received IV NS hydration, however, now ordered lactate ringers IV  -f/u BMP at 3am on 4/16 On admission Na 152->149   -Initially received IV NS hydration, then IV lactate ringers  -Remains hypernatremic, monitor closely. Asx presently. Medicine following.

## 2024-04-16 NOTE — PROGRESS NOTE ADULT - ASSESSMENT
74 y/p M with no known PMHx presented to [facility] found to have CHF, Afib w/ RVR, w/ concern for PE and rhabdomyolysis medicine consulted for comanagement      Acute hypoxia  ? PE  Patient denies SOB, no pleuritic pain. On AC for Afib, CT chest noted. Unknown baseline 02 saturation, cannot r/o underlying parenchymal disease. TTE reviewed, would get Pulmonary input     Acute decompensated HF  Afib with RVR  HTN uncontrolled   Clinically hypervolemic, denies thirst, IVC dilated. Patient remains hypernatremic, Bicarb decreased to 28 (received NS yesterday). Would get urine Osm, patient without diarrhea, no vomiting, afebrile. Pending primary team decision with diuresis, can anticipate some Na urinary losses with Furosemide. Based on Na trend, will adjust management   Started on Valsartan, TTE reviewed. Will defer BB to primary team   AC with heparin gtt, monitor PTT per protocol     Rhabdomyolysis traumatic  Hypernatremia  Patient denies myalgias, CK improving. Continue to monitor CK, Phos level and creatinine.   Patient with     Transaminitis  Follow-up US LUQ   Hold hepatotoxics     Thoracic aortic aneurysm   Follow-up with CTS    DVT ppx: Heparin gtt  Discussed with primary team, will follow-up    74 y/p M with no known PMHx presented to [facility] found to have CHF, Afib w/ RVR, w/ concern for PE and rhabdomyolysis medicine consulted for comanagement      Acute hypoxia  ? PE  Patient denies SOB, no pleuritic pain. On AC for Afib, CT chest noted. Unknown baseline 02 saturation, would suspect possible chronic hypoxia.    TTE reviewed, would get Pulmonary input     Acute decompensated HF  Afib with RVR  HTN uncontrolled   Clinically hypervolemic, denies thirst, IVC dilated. Patient remains hypernatremic, Bicarb decreased to 28 (received NS yesterday). Would get urine Osm, patient without diarrhea, no vomiting, afebrile. Pending primary team decision with diuresis, can anticipate some Na urinary losses with Furosemide. Based on Na trend, will adjust management   Started on Valsartan, TTE reviewed. Will defer BB to primary team   AC with heparin gtt, monitor PTT per protocol     Rhabdomyolysis traumatic  Hypernatremia  Patient denies myalgias, CK improving. Continue to monitor CK, Phos level and creatinine.   Patient with     Transaminitis  Follow-up US LUQ   Hold hepatotoxics     Thoracic aortic aneurysm   Follow-up with CTS    DVT ppx: Heparin gtt  Discussed with primary team, will follow-up

## 2024-04-17 DIAGNOSIS — R50.9 FEVER, UNSPECIFIED: ICD-10-CM

## 2024-04-17 DIAGNOSIS — A41.9 SEPSIS, UNSPECIFIED ORGANISM: ICD-10-CM

## 2024-04-17 LAB
ADD ON TEST-SPECIMEN IN LAB: SIGNIFICANT CHANGE UP
ALBUMIN SERPL ELPH-MCNC: 3.4 G/DL — SIGNIFICANT CHANGE UP (ref 3.3–5)
ALP SERPL-CCNC: 50 U/L — SIGNIFICANT CHANGE UP (ref 40–120)
ALT FLD-CCNC: 43 U/L — SIGNIFICANT CHANGE UP (ref 10–45)
ANION GAP SERPL CALC-SCNC: 9 MMOL/L — SIGNIFICANT CHANGE UP (ref 5–17)
APPEARANCE UR: CLEAR — SIGNIFICANT CHANGE UP
APTT BLD: 73.7 SEC — HIGH (ref 24.5–35.6)
AST SERPL-CCNC: 44 U/L — HIGH (ref 10–40)
BACTERIA # UR AUTO: NEGATIVE /HPF — SIGNIFICANT CHANGE UP
BASE EXCESS BLDV CALC-SCNC: 12.1 MMOL/L — HIGH (ref -2–3)
BASOPHILS # BLD AUTO: 0.02 K/UL — SIGNIFICANT CHANGE UP (ref 0–0.2)
BASOPHILS NFR BLD AUTO: 0.3 % — SIGNIFICANT CHANGE UP (ref 0–2)
BILIRUB SERPL-MCNC: 0.7 MG/DL — SIGNIFICANT CHANGE UP (ref 0.2–1.2)
BILIRUB UR-MCNC: NEGATIVE — SIGNIFICANT CHANGE UP
BUN SERPL-MCNC: 21 MG/DL — SIGNIFICANT CHANGE UP (ref 7–23)
CA-I SERPL-SCNC: 1.21 MMOL/L — SIGNIFICANT CHANGE UP (ref 1.15–1.33)
CALCIUM SERPL-MCNC: 9.7 MG/DL — SIGNIFICANT CHANGE UP (ref 8.4–10.5)
CAST: 0 /LPF — SIGNIFICANT CHANGE UP (ref 0–4)
CHLORIDE SERPL-SCNC: 106 MMOL/L — SIGNIFICANT CHANGE UP (ref 96–108)
CO2 BLDV-SCNC: 42 MMOL/L — HIGH (ref 22–26)
CO2 SERPL-SCNC: 33 MMOL/L — HIGH (ref 22–31)
COLOR SPEC: YELLOW — SIGNIFICANT CHANGE UP
CREAT SERPL-MCNC: 0.89 MG/DL — SIGNIFICANT CHANGE UP (ref 0.5–1.3)
DIFF PNL FLD: NEGATIVE — SIGNIFICANT CHANGE UP
EGFR: 89 ML/MIN/1.73M2 — SIGNIFICANT CHANGE UP
EOSINOPHIL # BLD AUTO: 0.04 K/UL — SIGNIFICANT CHANGE UP (ref 0–0.5)
EOSINOPHIL NFR BLD AUTO: 0.6 % — SIGNIFICANT CHANGE UP (ref 0–6)
GAS PNL BLDV: 143 MMOL/L — SIGNIFICANT CHANGE UP (ref 136–145)
GAS PNL BLDV: SIGNIFICANT CHANGE UP
GLUCOSE SERPL-MCNC: 109 MG/DL — HIGH (ref 70–99)
GLUCOSE UR QL: NEGATIVE MG/DL — SIGNIFICANT CHANGE UP
HCO3 BLDV-SCNC: 40 MMOL/L — HIGH (ref 22–29)
HCT VFR BLD CALC: 51.5 % — HIGH (ref 39–50)
HGB BLD-MCNC: 15.8 G/DL — SIGNIFICANT CHANGE UP (ref 13–17)
IMM GRANULOCYTES NFR BLD AUTO: 0.3 % — SIGNIFICANT CHANGE UP (ref 0–0.9)
KETONES UR-MCNC: NEGATIVE MG/DL — SIGNIFICANT CHANGE UP
LACTATE SERPL-SCNC: 1.4 MMOL/L — SIGNIFICANT CHANGE UP (ref 0.5–2)
LEUKOCYTE ESTERASE UR-ACNC: NEGATIVE — SIGNIFICANT CHANGE UP
LYMPHOCYTES # BLD AUTO: 1.1 K/UL — SIGNIFICANT CHANGE UP (ref 1–3.3)
LYMPHOCYTES # BLD AUTO: 17 % — SIGNIFICANT CHANGE UP (ref 13–44)
MAGNESIUM SERPL-MCNC: 2.1 MG/DL — SIGNIFICANT CHANGE UP (ref 1.6–2.6)
MCHC RBC-ENTMCNC: 29.9 PG — SIGNIFICANT CHANGE UP (ref 27–34)
MCHC RBC-ENTMCNC: 30.7 GM/DL — LOW (ref 32–36)
MCV RBC AUTO: 97.5 FL — SIGNIFICANT CHANGE UP (ref 80–100)
MONOCYTES # BLD AUTO: 0.54 K/UL — SIGNIFICANT CHANGE UP (ref 0–0.9)
MONOCYTES NFR BLD AUTO: 8.3 % — SIGNIFICANT CHANGE UP (ref 2–14)
NEUTROPHILS # BLD AUTO: 4.75 K/UL — SIGNIFICANT CHANGE UP (ref 1.8–7.4)
NEUTROPHILS NFR BLD AUTO: 73.5 % — SIGNIFICANT CHANGE UP (ref 43–77)
NITRITE UR-MCNC: NEGATIVE — SIGNIFICANT CHANGE UP
NRBC # BLD: 0 /100 WBCS — SIGNIFICANT CHANGE UP (ref 0–0)
PCO2 BLDV: 66 MMHG — HIGH (ref 42–55)
PH BLDV: 7.39 — SIGNIFICANT CHANGE UP (ref 7.32–7.43)
PH UR: 6 — SIGNIFICANT CHANGE UP (ref 5–8)
PLATELET # BLD AUTO: 213 K/UL — SIGNIFICANT CHANGE UP (ref 150–400)
PO2 BLDV: 62 MMHG — HIGH (ref 25–45)
POTASSIUM BLDV-SCNC: 4.1 MMOL/L — SIGNIFICANT CHANGE UP (ref 3.5–5.1)
POTASSIUM SERPL-MCNC: 3.8 MMOL/L — SIGNIFICANT CHANGE UP (ref 3.5–5.3)
POTASSIUM SERPL-SCNC: 3.8 MMOL/L — SIGNIFICANT CHANGE UP (ref 3.5–5.3)
PROT SERPL-MCNC: 6.5 G/DL — SIGNIFICANT CHANGE UP (ref 6–8.3)
PROT UR-MCNC: 30 MG/DL
RBC # BLD: 5.28 M/UL — SIGNIFICANT CHANGE UP (ref 4.2–5.8)
RBC # FLD: 16.6 % — HIGH (ref 10.3–14.5)
RBC CASTS # UR COMP ASSIST: 0 /HPF — SIGNIFICANT CHANGE UP (ref 0–4)
SAO2 % BLDV: 91.4 % — HIGH (ref 67–88)
SODIUM SERPL-SCNC: 148 MMOL/L — HIGH (ref 135–145)
SP GR SPEC: 1.01 — SIGNIFICANT CHANGE UP (ref 1–1.03)
SQUAMOUS # UR AUTO: 0 /HPF — SIGNIFICANT CHANGE UP (ref 0–5)
UROBILINOGEN FLD QL: 1 MG/DL — SIGNIFICANT CHANGE UP (ref 0.2–1)
WBC # BLD: 6.38 K/UL — SIGNIFICANT CHANGE UP (ref 3.8–10.5)
WBC # FLD AUTO: 6.38 K/UL — SIGNIFICANT CHANGE UP (ref 3.8–10.5)
WBC UR QL: 0 /HPF — SIGNIFICANT CHANGE UP (ref 0–5)

## 2024-04-17 PROCEDURE — 99222 1ST HOSP IP/OBS MODERATE 55: CPT

## 2024-04-17 PROCEDURE — 99233 SBSQ HOSP IP/OBS HIGH 50: CPT

## 2024-04-17 RX ORDER — CEFTRIAXONE 500 MG/1
2000 INJECTION, POWDER, FOR SOLUTION INTRAMUSCULAR; INTRAVENOUS EVERY 24 HOURS
Refills: 0 | Status: DISCONTINUED | OUTPATIENT
Start: 2024-04-17 | End: 2024-04-22

## 2024-04-17 RX ORDER — ACETAMINOPHEN 500 MG
650 TABLET ORAL ONCE
Refills: 0 | Status: COMPLETED | OUTPATIENT
Start: 2024-04-17 | End: 2024-04-17

## 2024-04-17 RX ORDER — POTASSIUM CHLORIDE 20 MEQ
20 PACKET (EA) ORAL ONCE
Refills: 0 | Status: COMPLETED | OUTPATIENT
Start: 2024-04-17 | End: 2024-04-17

## 2024-04-17 RX ADMIN — Medication 40 MILLIGRAM(S): at 05:41

## 2024-04-17 RX ADMIN — CEFTRIAXONE 100 MILLIGRAM(S): 500 INJECTION, POWDER, FOR SOLUTION INTRAMUSCULAR; INTRAVENOUS at 13:49

## 2024-04-17 RX ADMIN — Medication 650 MILLIGRAM(S): at 14:43

## 2024-04-17 RX ADMIN — Medication 3 MILLILITER(S): at 17:24

## 2024-04-17 RX ADMIN — Medication 3 MILLILITER(S): at 09:33

## 2024-04-17 RX ADMIN — Medication 3 MILLILITER(S): at 23:42

## 2024-04-17 RX ADMIN — Medication 25 MILLIGRAM(S): at 17:24

## 2024-04-17 RX ADMIN — HEPARIN SODIUM 1000 UNIT(S)/HR: 5000 INJECTION INTRAVENOUS; SUBCUTANEOUS at 07:57

## 2024-04-17 RX ADMIN — Medication 650 MILLIGRAM(S): at 01:49

## 2024-04-17 RX ADMIN — HEPARIN SODIUM 1000 UNIT(S)/HR: 5000 INJECTION INTRAVENOUS; SUBCUTANEOUS at 20:20

## 2024-04-17 RX ADMIN — HEPARIN SODIUM 1000 UNIT(S)/HR: 5000 INJECTION INTRAVENOUS; SUBCUTANEOUS at 08:21

## 2024-04-17 RX ADMIN — Medication 20 MILLIEQUIVALENT(S): at 13:50

## 2024-04-17 RX ADMIN — Medication 650 MILLIGRAM(S): at 13:49

## 2024-04-17 RX ADMIN — VALSARTAN 20 MILLIGRAM(S): 80 TABLET ORAL at 05:42

## 2024-04-17 RX ADMIN — SENNA PLUS 2 TABLET(S): 8.6 TABLET ORAL at 23:42

## 2024-04-17 RX ADMIN — POLYETHYLENE GLYCOL 3350 17 GRAM(S): 17 POWDER, FOR SOLUTION ORAL at 23:42

## 2024-04-17 RX ADMIN — VALSARTAN 20 MILLIGRAM(S): 80 TABLET ORAL at 17:24

## 2024-04-17 RX ADMIN — Medication 650 MILLIGRAM(S): at 00:46

## 2024-04-17 RX ADMIN — Medication 3 MILLILITER(S): at 05:41

## 2024-04-17 RX ADMIN — Medication 25 MILLIGRAM(S): at 05:42

## 2024-04-17 RX ADMIN — SPIRONOLACTONE 25 MILLIGRAM(S): 25 TABLET, FILM COATED ORAL at 05:42

## 2024-04-17 NOTE — CONSULT NOTE ADULT - ASSESSMENT
# Fever  - Favor this is due to a community acquired aspiration PNA (given patient history of being down for 2 days, with poor dentition, and possible developing L side opacity on CXR). Also, noted initial concern for subsegmental PE (not reported on final CTPE read). Recommend BLE venous duplex to evaluate for DVT.  - Recommend continue ceftriaxone 2g IV q24h. Anticipate 5 day empiric course. Stop doxycycline.  - Please collect urine strep and legionella antigen. If patient develops cough/sputum production, please send a sputum culture.  - Check routine HIV screen  - Follow pending blood cultures  - Favor that bilateral pleural effusions are from fluid overload, however if patient does not respond rapidly to antibiotic therapy will consider further workup for pleural infection.    ID Team 2 will follow

## 2024-04-17 NOTE — PROGRESS NOTE ADULT - SUBJECTIVE AND OBJECTIVE BOX
Patient is a 74y old  Male who presents with a chief complaint of CHF, AFib (17 Apr 2024 11:35)    INTERVAL EVENTS:  Had fever     SUBJECTIVE:  Patient was seen and examined at bedside. Patient reports feeling at baseline, denies headache, no change in vision, no cough, no abdominal pain, no N.V, denies pain with urination, no diarrhea. Denies leg pain, has chronic lesions in leg. No other complaints or events reported.    Review of systems: No chills, dizziness, HA, Changes in vision, CP, dyspnea, nausea or vomiting, dysuria, changes in bowel movements, LE edema. Rest of 12 point Review of systems negative unless otherwise documented elsewhere in note.     Diet, NPO after Midnight:      NPO Start Date: 16-Apr-2024,   NPO Start Time: 23:59 (04-16-24 @ 11:00) [Active]  Diet, DASH/TLC:   Sodium & Cholesterol Restricted (04-16-24 @ 10:45) [Active]      MEDICATIONS:  MEDICATIONS  (STANDING):  albuterol/ipratropium for Nebulization 3 milliLiter(s) Nebulizer every 6 hours  furosemide   Injectable 40 milliGRAM(s) IV Push daily  heparin  Infusion. 1700 Unit(s)/Hr (17 mL/Hr) IV Continuous <Continuous>  metoprolol tartrate 25 milliGRAM(s) Oral two times a day  polyethylene glycol 3350 17 Gram(s) Oral daily  potassium chloride    Tablet ER 20 milliEquivalent(s) Oral once  senna 2 Tablet(s) Oral at bedtime  spironolactone 25 milliGRAM(s) Oral daily  valsartan 20 milliGRAM(s) Oral two times a day    MEDICATIONS  (PRN):  heparin   Injectable 4500 Unit(s) IV Push every 6 hours PRN For aPTT between 40 - 57  heparin   Injectable 9500 Unit(s) IV Push every 6 hours PRN For aPTT less than 40      Allergies    No Known Allergies    Intolerances        OBJECTIVE:  Vital Signs Last 24 Hrs  T(C): 38.6 (17 Apr 2024 11:24), Max: 38.7 (17 Apr 2024 00:00)  T(F): 101.4 (17 Apr 2024 11:24), Max: 101.6 (17 Apr 2024 00:00)  HR: 102 (17 Apr 2024 11:24) (96 - 118)  BP: 124/77 (17 Apr 2024 11:24) (114/78 - 149/70)  BP(mean): 100 (17 Apr 2024 05:37) (100 - 130)  RR: 20 (17 Apr 2024 11:24) (18 - 22)  SpO2: 93% (17 Apr 2024 11:24) (92% - 100%)    Parameters below as of 17 Apr 2024 11:24  Patient On (Oxygen Delivery Method): nasal cannula w/ humidification  O2 Flow (L/min): 4    I&O's Summary    16 Apr 2024 07:01  -  17 Apr 2024 07:00  --------------------------------------------------------  IN: 900 mL / OUT: 3000 mL / NET: -2100 mL    17 Apr 2024 07:01  -  17 Apr 2024 12:33  --------------------------------------------------------  IN: 0 mL / OUT: 0 mL / NET: 0 mL        PHYSICAL EXAM:  General:  HEENT:  Lungs:  Heart:  Abdomen:  Extremities:      LABS:                        15.8   6.38  )-----------( 213      ( 17 Apr 2024 07:11 )             51.5     04-17    148<H>  |  106  |  21  ----------------------------<  109<H>  3.8   |  33<H>  |  0.89    Ca    9.7      17 Apr 2024 07:11  Mg     2.1     04-17    TPro  6.5  /  Alb  3.4  /  TBili  0.7  /  DBili  x   /  AST  44<H>  /  ALT  43  /  AlkPhos  50  04-17    LIVER FUNCTIONS - ( 17 Apr 2024 07:11 )  Alb: 3.4 g/dL / Pro: 6.5 g/dL / ALK PHOS: 50 U/L / ALT: 43 U/L / AST: 44 U/L / GGT: x           PTT - ( 17 Apr 2024 07:11 )  PTT:73.7 sec  CAPILLARY BLOOD GLUCOSE        Urinalysis Basic - ( 17 Apr 2024 07:11 )    Color: x / Appearance: x / SG: x / pH: x  Gluc: 109 mg/dL / Ketone: x  / Bili: x / Urobili: x   Blood: x / Protein: x / Nitrite: x   Leuk Esterase: x / RBC: x / WBC x   Sq Epi: x / Non Sq Epi: x / Bacteria: x        MICRODATA:    Urinalysis with Rflx Culture (collected 17 Apr 2024 00:14)        RADIOLOGY/OTHER STUDIES:   Patient is a 74y old  Male who presents with a chief complaint of CHF, AFib (17 Apr 2024 11:35)    INTERVAL EVENTS:  Had fever     SUBJECTIVE:  Patient was seen and examined at bedside. Patient reports feeling at baseline, denies headache, no change in vision, no cough, no abdominal pain, no N.V, denies pain with urination, no diarrhea. Denies leg pain, has chronic lesions in leg, no scrotal pain.  No other complaints or events reported.    Review of systems: No chills, dizziness, HA, Changes in vision, CP, dyspnea, nausea or vomiting, dysuria, changes in bowel movements, LE edema. Rest of 12 point Review of systems negative unless otherwise documented elsewhere in note.     Diet, NPO after Midnight:      NPO Start Date: 16-Apr-2024,   NPO Start Time: 23:59 (04-16-24 @ 11:00) [Active]  Diet, DASH/TLC:   Sodium & Cholesterol Restricted (04-16-24 @ 10:45) [Active]      MEDICATIONS:  MEDICATIONS  (STANDING):  albuterol/ipratropium for Nebulization 3 milliLiter(s) Nebulizer every 6 hours  furosemide   Injectable 40 milliGRAM(s) IV Push daily  heparin  Infusion. 1700 Unit(s)/Hr (17 mL/Hr) IV Continuous <Continuous>  metoprolol tartrate 25 milliGRAM(s) Oral two times a day  polyethylene glycol 3350 17 Gram(s) Oral daily  potassium chloride    Tablet ER 20 milliEquivalent(s) Oral once  senna 2 Tablet(s) Oral at bedtime  spironolactone 25 milliGRAM(s) Oral daily  valsartan 20 milliGRAM(s) Oral two times a day    MEDICATIONS  (PRN):  heparin   Injectable 4500 Unit(s) IV Push every 6 hours PRN For aPTT between 40 - 57  heparin   Injectable 9500 Unit(s) IV Push every 6 hours PRN For aPTT less than 40      Allergies    No Known Allergies    Intolerances        OBJECTIVE:  Vital Signs Last 24 Hrs  T(C): 38.6 (17 Apr 2024 11:24), Max: 38.7 (17 Apr 2024 00:00)  T(F): 101.4 (17 Apr 2024 11:24), Max: 101.6 (17 Apr 2024 00:00)  HR: 102 (17 Apr 2024 11:24) (96 - 118)  BP: 124/77 (17 Apr 2024 11:24) (114/78 - 149/70)  BP(mean): 100 (17 Apr 2024 05:37) (100 - 130)  RR: 20 (17 Apr 2024 11:24) (18 - 22)  SpO2: 93% (17 Apr 2024 11:24) (92% - 100%)    Parameters below as of 17 Apr 2024 11:24  Patient On (Oxygen Delivery Method): nasal cannula w/ humidification  O2 Flow (L/min): 4    I&O's Summary    16 Apr 2024 07:01  -  17 Apr 2024 07:00  --------------------------------------------------------  IN: 900 mL / OUT: 3000 mL / NET: -2100 mL    17 Apr 2024 07:01  -  17 Apr 2024 12:33  --------------------------------------------------------  IN: 0 mL / OUT: 0 mL / NET: 0 mL        PHYSICAL EXAM:  General: initially sleeping, awakes to voice, no labored breathing on NC 4L, speaking in full sentences  HEENT: AT/NC, no facial asymmetry, JACINDA, no neck rigidity  Lungs: limited exam, decreased air entry in bases  Heart: irregualr  Abdomen: soft, + edema, no visible erythema, no tenderness to palpation  Extremities: warm, + edema, skin ulceration, no signs of infection, no crepitus, no focal deficit. following commands       LABS:                        15.8   6.38  )-----------( 213      ( 17 Apr 2024 07:11 )             51.5     04-17    148<H>  |  106  |  21  ----------------------------<  109<H>  3.8   |  33<H>  |  0.89    Ca    9.7      17 Apr 2024 07:11  Mg     2.1     04-17    TPro  6.5  /  Alb  3.4  /  TBili  0.7  /  DBili  x   /  AST  44<H>  /  ALT  43  /  AlkPhos  50  04-17    LIVER FUNCTIONS - ( 17 Apr 2024 07:11 )  Alb: 3.4 g/dL / Pro: 6.5 g/dL / ALK PHOS: 50 U/L / ALT: 43 U/L / AST: 44 U/L / GGT: x           PTT - ( 17 Apr 2024 07:11 )  PTT:73.7 sec  CAPILLARY BLOOD GLUCOSE        Urinalysis Basic - ( 17 Apr 2024 07:11 )    Color: x / Appearance: x / SG: x / pH: x  Gluc: 109 mg/dL / Ketone: x  / Bili: x / Urobili: x   Blood: x / Protein: x / Nitrite: x   Leuk Esterase: x / RBC: x / WBC x   Sq Epi: x / Non Sq Epi: x / Bacteria: x        MICRODATA:    Urinalysis with Rflx Culture (collected 17 Apr 2024 00:14)        RADIOLOGY/OTHER STUDIES:

## 2024-04-17 NOTE — PROGRESS NOTE ADULT - PROBLEM SELECTOR PLAN 4
On admission Na 152->149   -Initially received IV NS hydration, then IV lactate ringers  -Remains hypernatremic, monitor closely. Asx presently. Medicine following. - On admission TBili 1.4 ALP 54 AST/ALT 91/54-> improving  - Hold hepatotoxic agents  - CT Pelvis: Liver measures near 18 cm in craniocaudal dimension. Subcentimeter hepatic hypodensities are too small to characterize.  - Hep panel negative  - Pending RUQ US

## 2024-04-17 NOTE — PROGRESS NOTE ADULT - PROBLEM SELECTOR PLAN 1
Anasarca, 2-3+ pitting edema, +JVP, ascites, 92-94% on 4L  -TTE 4/15/24: Severely reduced LVSF and RVSF, LVEF 20-25%, mild biatrial enlargement, mild TR, PASP 68, dilated IVC  -CTA chest: Neg for PE. Small right and trace left pleural effusions.   -Trops T 52->54, BNP 5893,  Lactate 3.0 -> 1.7. AST 55/ALT 43  -EKG Afib RVR w/ HR 112s    -Diuresis: Start IV Lasix 40 mg daily. s/p Lasix 80mg IV given on 4/14, however, s/p IV LR for hypernatremia.   -GDMT: c/w Valsartan 20mg BID. Start spironolactone 25 mg QD and metoprolol tartrate 25 mg BID   -Core measures, strict I/O's daily weights, 1.2L PO restriction, strict I/Os,  cont tele/pulse ox  -Consulted Advanced HF team; [facility] for hemodynamics and fluid assessment in AM -Clinically volume overloaded with warm extremities, anasarca, 2-3+ pitting edema, +JVP, ascites, 92-94% on 4L  -TTE 4/15/24: Severely reduced LVSF and RVSF, LVEF 20-25%, mild biatrial enlargement, mild TR, PASP 68, dilated IVC  -CTA chest: Neg for PE. Small right and trace left pleural effusions.   -Trops T 52->54, BNP 5893, Lactate 3.0 -> 1.7. AST 44/ALT 43  -EKG Afib RVR w/ HR 112s    -Diuresis: c/w IV Lasix 40 mg daily. s/p Lasix 80mg IV given on 4/14, however, s/p IV LR for hypernatremia.   -GDMT: c/w Valsartan 20mg BID, spironolactone 25 mg QD and metoprolol tartrate 25 mg BID   -Core measures, strict I/O's daily weights, 1.2L PO restriction, strict I/Os,  cont tele/pulse ox  -Advanced HF team recs to start spironolactone 25 mg po daily, valsartan with eventual transition to Entresto, and c/w metoprolol 25 mg po bid. Will need LHC/RHC once afebrile. -Clinically volume overloaded w/ warm extremities, anasarca, 2-3+ pitting edema, +JVP, ascites, 92-94% on 4L  -TTE 4/15/24: Severely reduced LVSF and RVSF, LVEF 20-25%, mild biatrial enlargement, mild TR, PASP 68, dilated IVC  -CTA chest: Neg for PE. Small right and trace left pleural effusions.   -Diuresis: c/w IV Lasix 40 mg daily  -GDMT: c/w Valsartan 20mg BID, spironolactone 25 mg QD, metoprolol tartrate 25 mg BID   -Core measures, strict I/O's daily weights, 1.2L PO restriction, strict I/Os, cont tele/pulse ox  -Advanced HF team following; Will need LHC/RHC once medically optimized

## 2024-04-17 NOTE — PROGRESS NOTE ADULT - SUBJECTIVE AND OBJECTIVE BOX
Cardiology PA Adult Progress Note    SUBJECTIVE ASSESSMENT:  	  MEDICATIONS:  furosemide   Injectable 40 milliGRAM(s) IV Push daily  metoprolol tartrate 25 milliGRAM(s) Oral two times a day  spironolactone 25 milliGRAM(s) Oral daily  valsartan 20 milliGRAM(s) Oral two times a day  albuterol/ipratropium for Nebulization 3 milliLiter(s) Nebulizer every 6 hours  polyethylene glycol 3350 17 Gram(s) Oral daily  senna 2 Tablet(s) Oral at bedtime  heparin   Injectable 4500 Unit(s) IV Push every 6 hours PRN  heparin   Injectable 9500 Unit(s) IV Push every 6 hours PRN  heparin  Infusion. 1700 Unit(s)/Hr IV Continuous <Continuous>  potassium chloride    Tablet ER 20 milliEquivalent(s) Oral once    	  VITAL SIGNS:  T(C): 38.6 (04-17-24 @ 11:24), Max: 38.7 (04-17-24 @ 00:00)  HR: 102 (04-17-24 @ 11:24) (96 - 118)  BP: 124/77 (04-17-24 @ 11:24) (114/78 - 149/70)  RR: 20 (04-17-24 @ 11:24) (18 - 22)  SpO2: 93% (04-17-24 @ 11:24) (92% - 100%)  Wt(kg): --    I&O's Summary    16 Apr 2024 07:01  -  17 Apr 2024 07:00  --------------------------------------------------------  IN: 900 mL / OUT: 3000 mL / NET: -2100 mL    17 Apr 2024 07:01  -  17 Apr 2024 11:36  --------------------------------------------------------  IN: 0 mL / OUT: 0 mL / NET: 0 mL                                           PHYSICAL EXAM:  Appearance: Normal	  HEENT: Normal oral mucosa, PERRL, EOMI	  Neck: Supple, + JVD/ - JVD; ___ Carotid Bruit   Cardiovascular: Normal S1 S2, No murmurs  Respiratory: Lungs clear to auscultation/Decreased Breath Sounds/No Rales, Rhonchi, Wheezing	  Gastrointestinal:  Soft, Non-tender, + BS	  Skin: No rashes, No ecchymoses, No cyanosis  Extremities: Normal range of motion, No clubbing, cyanosis or edema  Vascular: Peripheral pulses palpable 2+ bilaterally  Neurologic: Non-focal  Psychiatry: A & O x 3, Mood & affect appropriate    LABS:	 	                                  15.8   6.38  )-----------( 213      ( 17 Apr 2024 07:11 )             51.5     04-17    148<H>  |  106  |  21  ----------------------------<  109<H>  3.8   |  33<H>  |  0.89    Ca    9.7      17 Apr 2024 07:11  Mg     2.1     04-17    TPro  6.5  /  Alb  3.4  /  TBili  0.7  /  DBili  x   /  AST  44<H>  /  ALT  43  /  AlkPhos  50  04-17    proBNP:   Lipid Profile:   HgA1c:   TSH:   PTT - ( 17 Apr 2024 07:11 )  PTT:73.7 sec Cardiology PA Adult Progress Note    SUBJECTIVE ASSESSMENT: Seen and examined at bedside. Pt reports feeling fatigued. Pt denies SOB, on 4L nasal cannula. Pt became febrile, 101.4F. Ceftriaxone 2 g IV daily started. ID consulted. LHC/RHC cancelled due to fevers.   	  MEDICATIONS:  furosemide   Injectable 40 milliGRAM(s) IV Push daily  metoprolol tartrate 25 milliGRAM(s) Oral two times a day  spironolactone 25 milliGRAM(s) Oral daily  valsartan 20 milliGRAM(s) Oral two times a day  albuterol/ipratropium for Nebulization 3 milliLiter(s) Nebulizer every 6 hours  polyethylene glycol 3350 17 Gram(s) Oral daily  senna 2 Tablet(s) Oral at bedtime  heparin   Injectable 4500 Unit(s) IV Push every 6 hours PRN  heparin   Injectable 9500 Unit(s) IV Push every 6 hours PRN  heparin  Infusion. 1700 Unit(s)/Hr IV Continuous <Continuous>  potassium chloride    Tablet ER 20 milliEquivalent(s) Oral once    	  VITAL SIGNS:  T(C): 38.6 (04-17-24 @ 11:24), Max: 38.7 (04-17-24 @ 00:00)  HR: 102 (04-17-24 @ 11:24) (96 - 118)  BP: 124/77 (04-17-24 @ 11:24) (114/78 - 149/70)  RR: 20 (04-17-24 @ 11:24) (18 - 22)  SpO2: 93% (04-17-24 @ 11:24) (92% - 100%)  Wt(kg): --    I&O's Summary    16 Apr 2024 07:01  -  17 Apr 2024 07:00  --------------------------------------------------------  IN: 900 mL / OUT: 3000 mL / NET: -2100 mL    17 Apr 2024 07:01  -  17 Apr 2024 11:36  --------------------------------------------------------  IN: 0 mL / OUT: 0 mL / NET: 0 mL                                       PHYSICAL EXAM:  Appearance: Normal	  HEENT: Normal oral mucosa, PERRL, EOMI	  Neck: Supple, +JVD   Cardiovascular: Normal S1 S2, No murmurs  Respiratory: B/L crackles in the lower lung fields. 	  Gastrointestinal:  Soft, Non-tender, + BS	  Skin: No rashes, No ecchymoses, No cyanosis  Extremities: Normal range of motion, No clubbing, cyanosis. +2 B/L LE  edema. warm extremities.   Vascular: Peripheral pulses palpable 2+ bilaterally  Neurologic: Non-focal  Psychiatry: A & O x 3, Mood & affect appropriate    LABS:	 	             15.8   6.38  )-----------( 213      ( 17 Apr 2024 07:11 )             51.5     04-17    148<H>  |  106  |  21  ----------------------------<  109<H>  3.8   |  33<H>  |  0.89    Ca    9.7      17 Apr 2024 07:11  Mg     2.1     04-17    TPro  6.5  /  Alb  3.4  /  TBili  0.7  /  DBili  x   /  AST  44<H>  /  ALT  43  /  AlkPhos  50  04-17  PTT - ( 17 Apr 2024 07:11 )  PTT:73.7 sec Cardiology PA Adult Progress Note    SUBJECTIVE ASSESSMENT: Seen and examined at bedside. Pt reports feeling fatigued. Pt denies SOB, on 4L nasal cannula. Pt became febrile, 101.4F. Ceftriaxone 2 g IV daily started. ID consulted. LHC/RHC cancelled due to concern for sepsis.  	  MEDICATIONS:  furosemide   Injectable 40 milliGRAM(s) IV Push daily  metoprolol tartrate 25 milliGRAM(s) Oral two times a day  spironolactone 25 milliGRAM(s) Oral daily  valsartan 20 milliGRAM(s) Oral two times a day  albuterol/ipratropium for Nebulization 3 milliLiter(s) Nebulizer every 6 hours  polyethylene glycol 3350 17 Gram(s) Oral daily  senna 2 Tablet(s) Oral at bedtime  heparin   Injectable 4500 Unit(s) IV Push every 6 hours PRN  heparin   Injectable 9500 Unit(s) IV Push every 6 hours PRN  heparin  Infusion. 1700 Unit(s)/Hr IV Continuous <Continuous>  potassium chloride    Tablet ER 20 milliEquivalent(s) Oral once    	  VITAL SIGNS:  T(C): 38.6 (04-17-24 @ 11:24), Max: 38.7 (04-17-24 @ 00:00)  HR: 102 (04-17-24 @ 11:24) (96 - 118)  BP: 124/77 (04-17-24 @ 11:24) (114/78 - 149/70)  RR: 20 (04-17-24 @ 11:24) (18 - 22)  SpO2: 93% (04-17-24 @ 11:24) (92% - 100%)  Wt(kg): --    I&O's Summary    16 Apr 2024 07:01  -  17 Apr 2024 07:00  --------------------------------------------------------  IN: 900 mL / OUT: 3000 mL / NET: -2100 mL    17 Apr 2024 07:01  -  17 Apr 2024 11:36  --------------------------------------------------------  IN: 0 mL / OUT: 0 mL / NET: 0 mL                                       PHYSICAL EXAM:  Appearance: Normal	  HEENT: Normal oral mucosa, PERRL, EOMI	  Neck: Supple, +JVD   Cardiovascular: Normal S1 S2, No murmurs  Respiratory: B/L crackles in the lower lung fields. 	  Gastrointestinal:  Soft, Non-tender, + BS	  Skin: No rashes, No ecchymoses, No cyanosis  Extremities: Normal range of motion, +2 B/L LE  edema. warm extremities. ACE bandages.   Vascular: Peripheral pulses palpable 2+ bilaterally  Neurologic: Non-focal  Psychiatry: A & O x 3, Mood & affect appropriate    LABS:	 	             15.8   6.38  )-----------( 213      ( 17 Apr 2024 07:11 )             51.5     04-17    148<H>  |  106  |  21  ----------------------------<  109<H>  3.8   |  33<H>  |  0.89    Ca    9.7      17 Apr 2024 07:11  Mg     2.1     04-17    TPro  6.5  /  Alb  3.4  /  TBili  0.7  /  DBili  x   /  AST  44<H>  /  ALT  43  /  AlkPhos  50  04-17  PTT - ( 17 Apr 2024 07:11 )  PTT:73.7 sec

## 2024-04-17 NOTE — PROGRESS NOTE ADULT - PROBLEM SELECTOR PLAN 8
- -140s.   - c/w valsartan 20 mg BID PO and metoprolol 25 mg BID PO     F: none   E: Replete for K <4, Mg <2  N: DASH (1.2L restriction)   GI PPx: Protonix   DVT PPx: Heparin gtt full AC     FULL CODE - -140s.   - c/w valsartan 20 mg BID and metoprolol 25 mg BID     F: none   E: Replete for K <4, Mg <2  N: DASH (1.2L restriction)   GI PPx: Protonix   DVT PPx: Heparin gtt full AC     FULL CODE

## 2024-04-17 NOTE — PROGRESS NOTE ADULT - PROBLEM SELECTOR PLAN 5
Patient had mechanical fall and was down on the ground x 2 days.   - BUN/Cr 22/1.14 CK 6126 -> 2002-> 777  - Medicine following - On admission Na 152->148  - Initially received IV NS hydration, then IV lactate ringers  - Remains hypernatremic, monitor closely. Asx presently. Medicine following. - On admission Na 152->148  - Initially received IV NS hydration, then IV lactate ringers  - Remains hypernatremic, monitor closely. Asx.

## 2024-04-17 NOTE — PROGRESS NOTE ADULT - ASSESSMENT
74 y/p M with no known PMHx presented to [facility] found to have CHF, Afib w/ RVR, w/ concern for PE and rhabdomyolysis medicine consulted for comanagement    Fever  Patient with new fevers, denies any symptom. RVP negative, no leucocytosis. No signs of cellulitis.  CXR noted, procal negative, UA negative  Would get repeat Procalcitonin, ESR/CRP, Urine legionella, urine strep,  follow-up Bcx, Get Doppler US LE  Get ID evaluation     Acute hypoxia  ? PE  Patient denies SOB, no pleuritic pain. On AC for Afib, CT chest noted. Unknown baseline 02 saturation, would suspect possible chronic hypoxia.  CT chest with air trapping and mosaicisms   TTE reviewed, would get Pulmonary input     Acute decompensated HF  Afib with RVR  HTN uncontrolled   Started on diuresis, Na improving. VBG noted.  Diuresis per primary team. Based on Na trend, will adjust management   GDMT per primary team   AC with heparin gtt, monitor PTT per protocol. Patient without headache, no focal deficit on exam     Rhabdomyolysis traumatic  Hypernatremia  Patient denies myalgias, CK improved    Transaminitis  Resolved   Follow-up US LUQ   Hold hepatotoxics     Thoracic aortic aneurysm   Follow-up with CTS    DVT ppx: Heparin gtt  Discussed with primary team, will follow-up

## 2024-04-17 NOTE — PROGRESS NOTE ADULT - PROBLEM SELECTOR PLAN 3
On admission TBili 1.4 ALP 54 AST/ALT 91/54-> improving  - Hold hepatotoxic agents  - CT Pelvis: Liver measures near 18 cm in craniocaudal dimension. Subcentimeter hepatic hypodensities are too small to characterize.  - Hep panel negative  - Pending RUQ US - HR 100s, unclear whether patient has hx of afib or is new. Never f/u with Drs.  - EKG  BPM   - Rate control: c/w metoprolol tartrate 25 mg BID  - AC: c/w Heparin gtt

## 2024-04-17 NOTE — PROGRESS NOTE ADULT - NS ATTEND AMEND GEN_ALL_CORE FT
74M with no known PMHx presented to [facility] on 04/14/24 after being found down on the floor x2 days by landlordennis. Labs significant for Trop T CK 2k BNP 7k . Imaging signifcant for segmental PE and ascites. CTH without acute bleed. Patient now admitted to cardiac tele for further management of acute CHF exacerbation, Afib, and rhabdomyolysis.    1. systolic HF, chronic   3+ pitting edema above knees, RA oxygen sat  95%   still volume overload ed on exam, but hypernatremic    - Diuresis: Lasix 80mg IV BID  - GDMT: Start Valsartan 20mg BID.  - Etiology: unclear can consider tachy-induced, possible amyloid however unclear whether Afib is new vs ischemic eval once euvolemic   - CHF consult, for RHC, defer for now re fever      2. PAF  -EKG Afib RVR w/ HR 112s    HR 100s, unclear whether patient has hx of afib or is new   - EKG  BPM   - F/u repeat cardiac enzyme   - can resume BB  - Continue with heparin gtt and with plan to transition to NOAC    3. Rhabdo  Patient had mechanical fall and was on the ground x2 days   - BUN/Cr 22/1.14 CK 2336 -> 2002   - UA +blood. F/u repeat labs    4. Hypernatremia - resolving w iv fluid  - Medicine consulted for co-management    5. fever  concern re possible pneumonia  pro cac neg  02 sat 93% RA  ID consult (? tap pleural effusion as source of fever)

## 2024-04-17 NOTE — PROGRESS NOTE ADULT - ASSESSMENT
73 y/o M with no known PMHx presented to [facility] on 04/14/24 after mechanical fall, was found down on floor x 2 days by landlord. Labs significant for Trop T 52->54, CK 2k BNP 7k . CTH without acute bleed. CT A/P neg for PE, + ascites. Pt admitted to cardiac tele for newly dx rapid Afib, rhabdo and suspected ADHF exacerbation, initiated on IV Lasix. Course c/b hypernatremia, being managed w/ medicine team, and transaminitis. Echo reveals LVEF 20-25%, reduced RVSF. Advanced HF team consulted. Start IV Lasix 40 mg daily, RHC/LHC in AM.    75 y/o M with no known PMHx presented to [facility] on 04/14/24 after mechanical fall, was found down on floor x 2 days by landlord. Labs significant for Trop T 52->54, CK 2k BNP 7k . CTH without acute bleed. CT A/P neg for PE, + ascites. Pt admitted to cardiac tele for newly dx rapid Afib, rhabdo and suspected ADHF exacerbation, initiated on IV Lasix. Course c/b hypernatremia, being managed w/ medicine team, and transaminitis. Echo reveals LVEF 20-25%, reduced RVSF. Advanced HF team consulted, c/w IV Lasix 40 mg daily, RHC/LHC cancelled due to fevers, r/o infectious etiology. Blood cultures sent, pending results. ID consulted, rec ceftriaxone 2 g iv daily.  75 y/o M with no known PMHx presented to [facility] on 04/14/24 after mechanical fall, was found down on floor x 2 days by landlord. Labs significant for Trop T 52->54, CK 2k BNP 7k . CTH without acute bleed. CT A/P neg for PE, + ascites. Pt admitted to cardiac tele for newly dx rapid Afib, rhabdo and suspected ADHF exacerbation, initiated on IV Lasix. Course c/b hypernatremia, s/p lactated ringers. Echo reveals LVEF 20-25%, reduced RVSF. Advanced HF team consulted, rec'd IV Lasix 40 mg daily w/ plan for RHC/LHC, however postponed given new fevers. ID consulted, rec ceftriaxone 2 g IV daily for possible aspiration PNA.

## 2024-04-17 NOTE — PROGRESS NOTE ADULT - PROBLEM SELECTOR PLAN 2
HR 100s, unclear whether patient has hx of afib or is new. Never f/u with Drs.  - EKG  BPM   - Rate control: Start metoprolol tartrate 25 mg BID  - AC: Heparin gtt - intermittent fevers, highest temperature recorded is 101.6F   - empiric coverage with ceftriaxone 2 gram iv QD per ID recs.    - Per ID recs, recommend with ceftriaxone 2 gram IV QD, anticipate 5 day empiric course. Doxycycline d/c'd.   - Routine HIV, urine strep and legionella antigen pending.   - If patient develops cough/sputum production, will send sputum culture.   - Blood cultures sent and pending results.   - CTA PE negative for PE. B/L LE dopplers pending to evaluate for DVTs.   - if pt does not respond to antibiotic therapy, will consider further workup for pleural effusions. - Intermittent fevers, highest temp 101.6F. Now 100.5 F  - Empiric coverage with ceftriaxone 2 g IV QD per ID recs, anticipate 5 day empiric course. Doxycycline d/c'd.   - Routine HIV, urine strep and legionella antigen pending.   - If patient develops cough/sputum production, will send sputum cx  - Blood cultures sent, pending results  - CTA PE negative for PE. B/L LE dopplers pending to evaluate for DVTs. - Intermittent fevers, highest temp 101.6F. Now 100.5 F  - Per ID, favor community acquired aspiration PNA (given poor dentition, L side opacity on CXR)  - Empiric coverage with ceftriaxone 2 g IV QD per ID recs, anticipate 5 day empiric course. Doxycycline d/c'd.   - Routine HIV, urine strep and legionella antigen pending.   - If patient develops cough/sputum production, will send sputum cx  - Blood cultures sent, pending results  - CTA PE negative for PE. B/L LE dopplers pending to evaluate for DVTs.

## 2024-04-17 NOTE — PROGRESS NOTE ADULT - PROBLEM SELECTOR PLAN 6
CT Abd/Pelvis 04/13/24: No thoracoabdominal dissection, no thoracic aorta hematoma. Ascending thoracic aortic aneurysm 4.0 cm  - Consider CTS follow up in outpatient setting for surveillance - Patient had mechanical fall and was down on the ground x 2 days.   - BUN/Cr 22/1.14 -> 21/0.89  - CK improving, 2336 -> 2002-> 777  - Medicine following

## 2024-04-17 NOTE — PROGRESS NOTE ADULT - PROBLEM SELECTOR PLAN 7
SBP 150s- 160s  - C/w Valsartan and Metoprolol as ABOVE      F: PO   E: Replete for K <4, Mg <2  N: DASH (1.2L restriction)   GI PPx: Protonix   DVT PPx: Heparin gtt full AC     FULL CODE  Discussed w/ Dr. Colon - CT Abd/Pelvis 04/13/24: No thoracoabdominal dissection, no thoracic aorta hematoma. Ascending thoracic aortic aneurysm 4.0 cm  - Consider CTS follow up in outpatient setting for surveillance

## 2024-04-18 LAB
ADD ON TEST-SPECIMEN IN LAB: SIGNIFICANT CHANGE UP
ALBUMIN SERPL ELPH-MCNC: 3.2 G/DL — LOW (ref 3.3–5)
ALP SERPL-CCNC: 44 U/L — SIGNIFICANT CHANGE UP (ref 40–120)
ALT FLD-CCNC: 38 U/L — SIGNIFICANT CHANGE UP (ref 10–45)
ANION GAP SERPL CALC-SCNC: 8 MMOL/L — SIGNIFICANT CHANGE UP (ref 5–17)
APTT BLD: 110.8 SEC — HIGH (ref 24.5–35.6)
APTT BLD: 55.2 SEC — HIGH (ref 24.5–35.6)
APTT BLD: 79.9 SEC — HIGH (ref 24.5–35.6)
AST SERPL-CCNC: 36 U/L — SIGNIFICANT CHANGE UP (ref 10–40)
BASOPHILS # BLD AUTO: 0.02 K/UL — SIGNIFICANT CHANGE UP (ref 0–0.2)
BASOPHILS NFR BLD AUTO: 0.3 % — SIGNIFICANT CHANGE UP (ref 0–2)
BILIRUB SERPL-MCNC: 0.5 MG/DL — SIGNIFICANT CHANGE UP (ref 0.2–1.2)
BUN SERPL-MCNC: 23 MG/DL — SIGNIFICANT CHANGE UP (ref 7–23)
CALCIUM SERPL-MCNC: 9.6 MG/DL — SIGNIFICANT CHANGE UP (ref 8.4–10.5)
CHLORIDE SERPL-SCNC: 108 MMOL/L — SIGNIFICANT CHANGE UP (ref 96–108)
CO2 SERPL-SCNC: 32 MMOL/L — HIGH (ref 22–31)
CREAT SERPL-MCNC: 0.92 MG/DL — SIGNIFICANT CHANGE UP (ref 0.5–1.3)
CRP SERPL-MCNC: 13.8 MG/L — HIGH (ref 0–4)
EGFR: 87 ML/MIN/1.73M2 — SIGNIFICANT CHANGE UP
EOSINOPHIL # BLD AUTO: 0.06 K/UL — SIGNIFICANT CHANGE UP (ref 0–0.5)
EOSINOPHIL NFR BLD AUTO: 1 % — SIGNIFICANT CHANGE UP (ref 0–6)
ERYTHROCYTE [SEDIMENTATION RATE] IN BLOOD: 7 MM/HR — SIGNIFICANT CHANGE UP
GLUCOSE SERPL-MCNC: 125 MG/DL — HIGH (ref 70–99)
HCT VFR BLD CALC: 51.6 % — HIGH (ref 39–50)
HGB BLD-MCNC: 15.8 G/DL — SIGNIFICANT CHANGE UP (ref 13–17)
HIV 1+2 AB+HIV1 P24 AG SERPL QL IA: SIGNIFICANT CHANGE UP
IMM GRANULOCYTES NFR BLD AUTO: 0.5 % — SIGNIFICANT CHANGE UP (ref 0–0.9)
LEGIONELLA AG UR QL: NEGATIVE — SIGNIFICANT CHANGE UP
LYMPHOCYTES # BLD AUTO: 1.03 K/UL — SIGNIFICANT CHANGE UP (ref 1–3.3)
LYMPHOCYTES # BLD AUTO: 16.3 % — SIGNIFICANT CHANGE UP (ref 13–44)
MAGNESIUM SERPL-MCNC: 2.1 MG/DL — SIGNIFICANT CHANGE UP (ref 1.6–2.6)
MCHC RBC-ENTMCNC: 29.5 PG — SIGNIFICANT CHANGE UP (ref 27–34)
MCHC RBC-ENTMCNC: 30.6 GM/DL — LOW (ref 32–36)
MCV RBC AUTO: 96.4 FL — SIGNIFICANT CHANGE UP (ref 80–100)
MONOCYTES # BLD AUTO: 0.54 K/UL — SIGNIFICANT CHANGE UP (ref 0–0.9)
MONOCYTES NFR BLD AUTO: 8.6 % — SIGNIFICANT CHANGE UP (ref 2–14)
NEUTROPHILS # BLD AUTO: 4.63 K/UL — SIGNIFICANT CHANGE UP (ref 1.8–7.4)
NEUTROPHILS NFR BLD AUTO: 73.3 % — SIGNIFICANT CHANGE UP (ref 43–77)
NRBC # BLD: 0 /100 WBCS — SIGNIFICANT CHANGE UP (ref 0–0)
PLATELET # BLD AUTO: 209 K/UL — SIGNIFICANT CHANGE UP (ref 150–400)
POTASSIUM SERPL-MCNC: 4.2 MMOL/L — SIGNIFICANT CHANGE UP (ref 3.5–5.3)
POTASSIUM SERPL-SCNC: 4.2 MMOL/L — SIGNIFICANT CHANGE UP (ref 3.5–5.3)
PROCALCITONIN SERPL-MCNC: 0.05 NG/ML — SIGNIFICANT CHANGE UP (ref 0.02–0.1)
PROT SERPL-MCNC: 6.3 G/DL — SIGNIFICANT CHANGE UP (ref 6–8.3)
RBC # BLD: 5.35 M/UL — SIGNIFICANT CHANGE UP (ref 4.2–5.8)
RBC # FLD: 16.5 % — HIGH (ref 10.3–14.5)
S PNEUM AG UR QL: NEGATIVE — SIGNIFICANT CHANGE UP
SODIUM SERPL-SCNC: 148 MMOL/L — HIGH (ref 135–145)
WBC # BLD: 6.31 K/UL — SIGNIFICANT CHANGE UP (ref 3.8–10.5)
WBC # FLD AUTO: 6.31 K/UL — SIGNIFICANT CHANGE UP (ref 3.8–10.5)

## 2024-04-18 PROCEDURE — 99222 1ST HOSP IP/OBS MODERATE 55: CPT

## 2024-04-18 PROCEDURE — 99233 SBSQ HOSP IP/OBS HIGH 50: CPT

## 2024-04-18 PROCEDURE — 99232 SBSQ HOSP IP/OBS MODERATE 35: CPT

## 2024-04-18 PROCEDURE — 71045 X-RAY EXAM CHEST 1 VIEW: CPT | Mod: 26

## 2024-04-18 PROCEDURE — 70450 CT HEAD/BRAIN W/O DYE: CPT | Mod: 26

## 2024-04-18 RX ORDER — VALSARTAN 80 MG/1
20 TABLET ORAL ONCE
Refills: 0 | Status: COMPLETED | OUTPATIENT
Start: 2024-04-18 | End: 2024-04-18

## 2024-04-18 RX ORDER — INFLUENZA VIRUS VACCINE 15; 15; 15; 15 UG/.5ML; UG/.5ML; UG/.5ML; UG/.5ML
0.7 SUSPENSION INTRAMUSCULAR ONCE
Refills: 0 | Status: DISCONTINUED | OUTPATIENT
Start: 2024-04-18 | End: 2024-05-04

## 2024-04-18 RX ORDER — VALSARTAN 80 MG/1
40 TABLET ORAL EVERY 12 HOURS
Refills: 0 | Status: DISCONTINUED | OUTPATIENT
Start: 2024-04-18 | End: 2024-04-22

## 2024-04-18 RX ORDER — FUROSEMIDE 40 MG
40 TABLET ORAL EVERY 12 HOURS
Refills: 0 | Status: DISCONTINUED | OUTPATIENT
Start: 2024-04-18 | End: 2024-04-19

## 2024-04-18 RX ADMIN — Medication 3 MILLILITER(S): at 05:10

## 2024-04-18 RX ADMIN — HEPARIN SODIUM 1000 UNIT(S)/HR: 5000 INJECTION INTRAVENOUS; SUBCUTANEOUS at 02:14

## 2024-04-18 RX ADMIN — VALSARTAN 20 MILLIGRAM(S): 80 TABLET ORAL at 05:11

## 2024-04-18 RX ADMIN — HEPARIN SODIUM 700 UNIT(S)/HR: 5000 INJECTION INTRAVENOUS; SUBCUTANEOUS at 08:38

## 2024-04-18 RX ADMIN — Medication 3 MILLILITER(S): at 22:16

## 2024-04-18 RX ADMIN — VALSARTAN 40 MILLIGRAM(S): 80 TABLET ORAL at 22:15

## 2024-04-18 RX ADMIN — HEPARIN SODIUM 1000 UNIT(S)/HR: 5000 INJECTION INTRAVENOUS; SUBCUTANEOUS at 20:18

## 2024-04-18 RX ADMIN — Medication 40 MILLIGRAM(S): at 05:11

## 2024-04-18 RX ADMIN — SENNA PLUS 2 TABLET(S): 8.6 TABLET ORAL at 22:16

## 2024-04-18 RX ADMIN — HEPARIN SODIUM 1000 UNIT(S)/HR: 5000 INJECTION INTRAVENOUS; SUBCUTANEOUS at 22:53

## 2024-04-18 RX ADMIN — Medication 40 MILLIGRAM(S): at 17:40

## 2024-04-18 RX ADMIN — HEPARIN SODIUM 1000 UNIT(S)/HR: 5000 INJECTION INTRAVENOUS; SUBCUTANEOUS at 15:36

## 2024-04-18 RX ADMIN — Medication 3 MILLILITER(S): at 11:28

## 2024-04-18 RX ADMIN — VALSARTAN 20 MILLIGRAM(S): 80 TABLET ORAL at 12:44

## 2024-04-18 RX ADMIN — Medication 3 MILLILITER(S): at 17:41

## 2024-04-18 RX ADMIN — Medication 25 MILLIGRAM(S): at 17:41

## 2024-04-18 RX ADMIN — Medication 25 MILLIGRAM(S): at 05:11

## 2024-04-18 RX ADMIN — HEPARIN SODIUM 700 UNIT(S)/HR: 5000 INJECTION INTRAVENOUS; SUBCUTANEOUS at 07:44

## 2024-04-18 RX ADMIN — SPIRONOLACTONE 25 MILLIGRAM(S): 25 TABLET, FILM COATED ORAL at 05:10

## 2024-04-18 NOTE — CONSULT NOTE ADULT - ASSESSMENT
74 year old with no known medical issues presenting with worsening hypoxia and concern for volume overload. Given the hypoxia pulmonary team was consulted due to possible pneumonia.    Acute hypoxic respiratory failure  CHF exacerbation    US shows bilateral B lines with small bilateral effusions and bilateral lower extremity edema. Patient appears to be volume overloaded and is likely cause of hypoxia. CT scan also is compatibility with these findings and no signs of consolidation. Agree with continued diuresis and BiPAP at night time as he likely has underlying OHS given elevated bicarb and high BMI.    - Agree with diuresis  - Volume overload as cause of hypoxia  - No signs of pneumonia

## 2024-04-18 NOTE — PROGRESS NOTE ADULT - SUBJECTIVE AND OBJECTIVE BOX
Patient is a 74y old  Male who presents with a chief complaint of CHF, AFib (17 Apr 2024 15:48)    INTERVAL EVENTS:  Tamx 100.1    SUBJECTIVE:  Patient was seen and examined at bedside. Initially sleeping, awakes to voice, following simple commands, denies complaints.    Review of systems: limited, negative    Diet, DASH/TLC:   Sodium & Cholesterol Restricted (04-16-24 @ 10:45) [Active]      MEDICATIONS:  MEDICATIONS  (STANDING):  albuterol/ipratropium for Nebulization 3 milliLiter(s) Nebulizer every 6 hours  cefTRIAXone   IVPB 2000 milliGRAM(s) IV Intermittent every 24 hours  furosemide   Injectable 40 milliGRAM(s) IV Push daily  heparin  Infusion. 1700 Unit(s)/Hr (17 mL/Hr) IV Continuous <Continuous>  influenza  Vaccine (HIGH DOSE) 0.7 milliLiter(s) IntraMuscular once  metoprolol tartrate 25 milliGRAM(s) Oral two times a day  polyethylene glycol 3350 17 Gram(s) Oral daily  senna 2 Tablet(s) Oral at bedtime  spironolactone 25 milliGRAM(s) Oral daily  valsartan 20 milliGRAM(s) Oral two times a day    MEDICATIONS  (PRN):  heparin   Injectable 4500 Unit(s) IV Push every 6 hours PRN For aPTT between 40 - 57  heparin   Injectable 9500 Unit(s) IV Push every 6 hours PRN For aPTT less than 40      Allergies    No Known Allergies    Intolerances        OBJECTIVE:  Vital Signs Last 24 Hrs  T(C): 37.8 (18 Apr 2024 10:32), Max: 38.6 (17 Apr 2024 11:24)  T(F): 100.1 (18 Apr 2024 10:32), Max: 101.4 (17 Apr 2024 11:24)  HR: 108 (18 Apr 2024 09:18) (89 - 119)  BP: 138/97 (18 Apr 2024 09:18) (122/79 - 166/94)  BP(mean): 118 (18 Apr 2024 04:10) (96 - 118)  RR: 20 (18 Apr 2024 09:18) (18 - 20)  SpO2: 93% (18 Apr 2024 09:18) (93% - 99%)    Parameters below as of 18 Apr 2024 09:18    O2 Flow (L/min): 4    I&O's Summary    17 Apr 2024 07:01  -  18 Apr 2024 07:00  --------------------------------------------------------  IN: 600 mL / OUT: 1100 mL / NET: -500 mL    18 Apr 2024 07:01  -  18 Apr 2024 11:12  --------------------------------------------------------  IN: 194 mL / OUT: 0 mL / NET: 194 mL        PHYSICAL EXAM:  General: initially sleeping, awakes to voice, no labored breathing on NC 4L,   HEENT: AT/NC, no facial asymmetry, JACINDA  Lungs: limited exam, decreased air entry in bases  Heart: irregular  Abdomen: soft, + edema, no visible erythema, no tenderness to palpation    LABS:                        15.8   6.31  )-----------( 209      ( 18 Apr 2024 05:30 )             51.6     04-18    148<H>  |  108  |  23  ----------------------------<  125<H>  4.2   |  32<H>  |  0.92    Ca    9.6      18 Apr 2024 05:30  Mg     2.1     04-18    TPro  6.3  /  Alb  3.2<L>  /  TBili  0.5  /  DBili  x   /  AST  36  /  ALT  38  /  AlkPhos  44  04-18    LIVER FUNCTIONS - ( 18 Apr 2024 05:30 )  Alb: 3.2 g/dL / Pro: 6.3 g/dL / ALK PHOS: 44 U/L / ALT: 38 U/L / AST: 36 U/L / GGT: x           PTT - ( 18 Apr 2024 05:30 )  PTT:110.8 sec  CAPILLARY BLOOD GLUCOSE        Urinalysis Basic - ( 18 Apr 2024 05:30 )    Color: x / Appearance: x / SG: x / pH: x  Gluc: 125 mg/dL / Ketone: x  / Bili: x / Urobili: x   Blood: x / Protein: x / Nitrite: x   Leuk Esterase: x / RBC: x / WBC x   Sq Epi: x / Non Sq Epi: x / Bacteria: x        MICRODATA:    Urinalysis with Rflx Culture (collected 17 Apr 2024 00:14)    Culture - Blood (collected 16 Apr 2024 18:05)  Source: .Blood Blood-Venous  Preliminary Report (17 Apr 2024 23:08):    No growth at 24 hours        RADIOLOGY/OTHER STUDIES:   Patient is a 74y old  Male who presents with a chief complaint of CHF, AFib (17 Apr 2024 15:48)    INTERVAL EVENTS:  Tamx 100.1    SUBJECTIVE:  Patient was seen and examined at bedside. Initially sleeping, awakes to voice, following simple commands, denies complaints.  Patient reevaluated later in morning, more awake and interactive. Denies any complaints.    Review of systems: limited, negative    Diet, DASH/TLC:   Sodium & Cholesterol Restricted (04-16-24 @ 10:45) [Active]      MEDICATIONS:  MEDICATIONS  (STANDING):  albuterol/ipratropium for Nebulization 3 milliLiter(s) Nebulizer every 6 hours  cefTRIAXone   IVPB 2000 milliGRAM(s) IV Intermittent every 24 hours  furosemide   Injectable 40 milliGRAM(s) IV Push daily  heparin  Infusion. 1700 Unit(s)/Hr (17 mL/Hr) IV Continuous <Continuous>  influenza  Vaccine (HIGH DOSE) 0.7 milliLiter(s) IntraMuscular once  metoprolol tartrate 25 milliGRAM(s) Oral two times a day  polyethylene glycol 3350 17 Gram(s) Oral daily  senna 2 Tablet(s) Oral at bedtime  spironolactone 25 milliGRAM(s) Oral daily  valsartan 20 milliGRAM(s) Oral two times a day    MEDICATIONS  (PRN):  heparin   Injectable 4500 Unit(s) IV Push every 6 hours PRN For aPTT between 40 - 57  heparin   Injectable 9500 Unit(s) IV Push every 6 hours PRN For aPTT less than 40      Allergies    No Known Allergies    Intolerances        OBJECTIVE:  Vital Signs Last 24 Hrs  T(C): 37.8 (18 Apr 2024 10:32), Max: 38.6 (17 Apr 2024 11:24)  T(F): 100.1 (18 Apr 2024 10:32), Max: 101.4 (17 Apr 2024 11:24)  HR: 108 (18 Apr 2024 09:18) (89 - 119)  BP: 138/97 (18 Apr 2024 09:18) (122/79 - 166/94)  BP(mean): 118 (18 Apr 2024 04:10) (96 - 118)  RR: 20 (18 Apr 2024 09:18) (18 - 20)  SpO2: 93% (18 Apr 2024 09:18) (93% - 99%)    Parameters below as of 18 Apr 2024 09:18    O2 Flow (L/min): 4    I&O's Summary    17 Apr 2024 07:01  -  18 Apr 2024 07:00  --------------------------------------------------------  IN: 600 mL / OUT: 1100 mL / NET: -500 mL    18 Apr 2024 07:01  -  18 Apr 2024 11:12  --------------------------------------------------------  IN: 194 mL / OUT: 0 mL / NET: 194 mL        PHYSICAL EXAM:  General: initially sleeping, awakes to voice, no labored breathing on NC 4L,   HEENT: AT/NC, no facial asymmetry, JACINDA  Lungs: limited exam, decreased air entry in bases  Heart: irregular  Abdomen: soft, + edema, no visible erythema, no tenderness to palpation    LABS:                        15.8   6.31  )-----------( 209      ( 18 Apr 2024 05:30 )             51.6     04-18    148<H>  |  108  |  23  ----------------------------<  125<H>  4.2   |  32<H>  |  0.92    Ca    9.6      18 Apr 2024 05:30  Mg     2.1     04-18    TPro  6.3  /  Alb  3.2<L>  /  TBili  0.5  /  DBili  x   /  AST  36  /  ALT  38  /  AlkPhos  44  04-18    LIVER FUNCTIONS - ( 18 Apr 2024 05:30 )  Alb: 3.2 g/dL / Pro: 6.3 g/dL / ALK PHOS: 44 U/L / ALT: 38 U/L / AST: 36 U/L / GGT: x           PTT - ( 18 Apr 2024 05:30 )  PTT:110.8 sec  CAPILLARY BLOOD GLUCOSE        Urinalysis Basic - ( 18 Apr 2024 05:30 )    Color: x / Appearance: x / SG: x / pH: x  Gluc: 125 mg/dL / Ketone: x  / Bili: x / Urobili: x   Blood: x / Protein: x / Nitrite: x   Leuk Esterase: x / RBC: x / WBC x   Sq Epi: x / Non Sq Epi: x / Bacteria: x        MICRODATA:    Urinalysis with Rflx Culture (collected 17 Apr 2024 00:14)    Culture - Blood (collected 16 Apr 2024 18:05)  Source: .Blood Blood-Venous  Preliminary Report (17 Apr 2024 23:08):    No growth at 24 hours        RADIOLOGY/OTHER STUDIES:

## 2024-04-18 NOTE — PROGRESS NOTE ADULT - PROBLEM SELECTOR PLAN 3
- HR 100s, unclear whether patient has hx of afib or is new. Never f/u with Drs.  - EKG  BPM   - Rate control: c/w metoprolol tartrate 25 mg BID  - AC: c/w Heparin gtt

## 2024-04-18 NOTE — PROGRESS NOTE ADULT - PROBLEM SELECTOR PLAN 2
- Intermittent fevers, highest temp 101.6F. Now 100.5 F  - Per ID, favor community acquired aspiration PNA (given poor dentition, L side opacity on CXR)  - Empiric coverage with ceftriaxone 2 g IV QD per ID recs, anticipate 5 day empiric course. Doxycycline d/c'd.   - Routine HIV, urine strep and legionella antigen pending.   - If patient develops cough/sputum production, will send sputum cx  - Blood cultures sent, pending results  - CTA PE negative for PE. B/L LE dopplers pending to evaluate for DVTs. - Intermittent fevers, highest temp rectal 101.6F 4/17, now rectal 100 4/18  - Per ID, favor community acquired aspiration PNA (given poor dentition, L side opacity on CXR)  - Empiric coverage with ceftriaxone 2 g IV QD per ID recs, anticipate 5 day empiric course.  - Routine HIV, urine strep and legionella antigen pending.   - If patient develops cough/sputum production, will send sputum cx  - Blood cultures sent, pending results  - CTA PE negative for PE. B/L LE dopplers pending to evaluate for DVTs.

## 2024-04-18 NOTE — PROGRESS NOTE ADULT - PROBLEM SELECTOR PLAN 5
- On admission Na 152->148  - Initially received IV NS hydration, then IV lactate ringers  - Remains hypernatremic, monitor closely. Asx.

## 2024-04-18 NOTE — PROGRESS NOTE ADULT - PROBLEM SELECTOR PLAN 7
- CT Abd/Pelvis 04/13/24: No thoracoabdominal dissection, no thoracic aorta hematoma. Ascending thoracic aortic aneurysm 4.0 cm  - Consider CTS follow up in outpatient setting for surveillance

## 2024-04-18 NOTE — PROGRESS NOTE ADULT - NS ATTEND AMEND GEN_ALL_CORE FT
74M with no known PMHx presented to [facility] on 04/14/24 after being found down on the floor x2 days by landlordennis. Labs significant for Trop T CK 2k BNP 7k . Imaging signifcant for segmental PE and ascites. CTH without acute bleed. Patient now admitted to cardiac tele for further management of acute CHF exacerbation, Afib, and rhabdomyolysis.    1. systolic HF, chronic   3+ pitting edema above knees, RA oxygen sat  95%   still volume overloaded on exam, but hypernatremic    - Diuresis: Lasix 80mg IV BID  - GDMT: Start Valsartan 20mg BID.  - Etiology: unclear can consider tachy-induced, possible amyloid however unclear whether Afib is new vs ischemic eval once euvolemic   - CHF consult, for RHC, defer for now re fever      2. PAF  -EKG Afib RVR w/ HR 112s    HR 100s, unclear whether patient has hx of afib or is new   - EKG  BPM   - F/u repeat cardiac enzyme   - can resume BB  - Continue with heparin gtt and with plan to transition to NOAC    3. Rhabdo  Patient had mechanical fall and was on the ground x2 days   - BUN/Cr 22/1.14 CK 2336 -> 2002   - UA +blood. F/u repeat labs    4. Hypernatremia - resolving w iv fluid  - Medicine consulted for co-management    5. fever  improved to 100.1   concern re possible pneumonia  pro cac neg  02 sat 93% RA  ID consult (? tap pleural effusion as source of fever).

## 2024-04-18 NOTE — ADVANCED PRACTICE NURSE CONSULT - REASON FOR CONSULT
BLE wounds, present on admission    Per chart review, 75 y/o M with no known PMHx presented to [facility] on 04/14/24 after mechanical fall, was found down on floor x 2 days by landlord. Labs significant for Trop T 52->54, CK 2k BNP 7k . CTH without acute bleed. CT A/P neg for PE, + ascites. Pt admitted to cardiac tele for newly dx rapid Afib, rhabdo and suspected ADHF exacerbation. Course c/b hypernatremia, s/p lactated ringers. Echo revealing LVEF 20-25%, reduced RVSF. Advanced HF team consulted, rec'd IV Lasix 40 mg BID w/ plan for RHC/LHC, however postponed given new fevers (fever peak 4/18 pm rectal 101). ID consulted, rec ceftriaxone 2 g IV daily for possible aspiration PNA, medicine team following, pulm consulted for mosaicism and air trapping on CT. LHC to be done when patient is afebrile.

## 2024-04-18 NOTE — PROGRESS NOTE ADULT - SUBJECTIVE AND OBJECTIVE BOX
INFECTIOUS DISEASES CONSULT FOLLOW-UP NOTE    INTERVAL HPI/OVERNIGHT EVENTS:  Fever curve improving, no cough, tolerating abx  Still tachypneic and hypoxic- team planning to increase IV diuresis today    ROS:   Constitutional, eyes, ENT, cardiovascular, respiratory, gastrointestinal, genitourinary, integumentary, neurological, psychiatric and heme/lymph are otherwise negative other than noted above       ANTIBIOTICS/RELEVANT:    MEDICATIONS  (STANDING):  albuterol/ipratropium for Nebulization 3 milliLiter(s) Nebulizer every 6 hours  cefTRIAXone   IVPB 2000 milliGRAM(s) IV Intermittent every 24 hours  furosemide   Injectable 40 milliGRAM(s) IV Push daily  heparin  Infusion. 1700 Unit(s)/Hr (17 mL/Hr) IV Continuous <Continuous>  influenza  Vaccine (HIGH DOSE) 0.7 milliLiter(s) IntraMuscular once  metoprolol tartrate 25 milliGRAM(s) Oral two times a day  polyethylene glycol 3350 17 Gram(s) Oral daily  senna 2 Tablet(s) Oral at bedtime  spironolactone 25 milliGRAM(s) Oral daily  valsartan 20 milliGRAM(s) Oral two times a day    MEDICATIONS  (PRN):  heparin   Injectable 4500 Unit(s) IV Push every 6 hours PRN For aPTT between 40 - 57  heparin   Injectable 9500 Unit(s) IV Push every 6 hours PRN For aPTT less than 40        Vital Signs Last 24 Hrs  T(C): 37.8 (18 Apr 2024 10:32), Max: 38.1 (17 Apr 2024 16:44)  T(F): 100.1 (18 Apr 2024 10:32), Max: 100.5 (17 Apr 2024 16:44)  HR: 108 (18 Apr 2024 09:18) (89 - 119)  BP: 138/97 (18 Apr 2024 09:18) (122/79 - 166/94)  BP(mean): 118 (18 Apr 2024 04:10) (96 - 118)  RR: 20 (18 Apr 2024 09:18) (18 - 20)  SpO2: 93% (18 Apr 2024 09:18) (93% - 99%)    Parameters below as of 18 Apr 2024 09:18    O2 Flow (L/min): 4      04-17-24 @ 07:01  -  04-18-24 @ 07:00  --------------------------------------------------------  IN: 600 mL / OUT: 1100 mL / NET: -500 mL    04-18-24 @ 07:01  -  04-18-24 @ 11:50  --------------------------------------------------------  IN: 194 mL / OUT: 0 mL / NET: 194 mL      PHYSICAL EXAM:  Constitutional: alert, NAD  Eyes: the sclera and conjunctiva were normal.   ENT: poor dentition  Neck: the appearance of the neck was normal and the neck was supple.   Pulmonary: mildly increased effort on 4L NC. Clear in anterior fields  Heart: mild tachycardia  Abdomen: soft, non-tender  : Condom cath with clear yellow urine  MSK: Bilateral lower extremity edema, with small ulcerations/scabs over anterior shin, without surrounding erythema or tenderness          LABS:                        15.8   6.31  )-----------( 209      ( 18 Apr 2024 05:30 )             51.6     04-18    148<H>  |  108  |  23  ----------------------------<  125<H>  4.2   |  32<H>  |  0.92    Ca    9.6      18 Apr 2024 05:30  Mg     2.1     04-18    TPro  6.3  /  Alb  3.2<L>  /  TBili  0.5  /  DBili  x   /  AST  36  /  ALT  38  /  AlkPhos  44  04-18    PTT - ( 18 Apr 2024 05:30 )  PTT:110.8 sec  Urinalysis Basic - ( 18 Apr 2024 05:30 )    Color: x / Appearance: x / SG: x / pH: x  Gluc: 125 mg/dL / Ketone: x  / Bili: x / Urobili: x   Blood: x / Protein: x / Nitrite: x   Leuk Esterase: x / RBC: x / WBC x   Sq Epi: x / Non Sq Epi: x / Bacteria: x        MICROBIOLOGY:  Reviewed    RADIOLOGY & ADDITIONAL STUDIES:  Reviewed

## 2024-04-18 NOTE — PROGRESS NOTE ADULT - PROBLEM SELECTOR PLAN 4
- On admission TBili 1.4 ALP 54 AST/ALT 91/54-> improving  - Hold hepatotoxic agents  - CT Pelvis: Liver measures near 18 cm in craniocaudal dimension. Subcentimeter hepatic hypodensities are too small to characterize.  - Hep panel negative  - Pending RUQ US

## 2024-04-18 NOTE — PROGRESS NOTE ADULT - PROBLEM SELECTOR PLAN 1
-TTE 4/15/24: Severely reduced LVSF and RVSF, LVEF 20-25%, mild biatrial enlargement, mild TR, PASP 68, dilated IVC  -CTA chest: Neg for PE. Small right and trace left pleural effusions.   -Diuresis: INCREASE IV Lasix 40 BID  -GDMT: INCREASE Valsartan 40 QD, spironolactone 25 mg QD, metoprolol tartrate 25 mg BID   -Core measures, strict I/O's daily weights, 1.2L PO restriction, strict I/Os, cont tele/pulse ox  -Advanced HF team following; [facility] tentatively for 4/19 -TTE 4/15/24: Severely reduced LVSF and RVSF, LVEF 20-25%, mild biatrial enlargement, mild TR, PASP 68, dilated IVC  -CTA chest: Neg for PE. Small right and trace left pleural effusions.   -Diuresis: INCREASE IV Lasix 40 BID  -GDMT: INCREASE Valsartan 40 BID, spironolactone 25 mg QD, metoprolol tartrate 25 mg BID   -Core measures, strict I/O's daily weights, 1.2L PO restriction, strict I/Os, cont tele/pulse ox  -Advanced HF team following; [facility] tentatively for 4/19

## 2024-04-18 NOTE — ADVANCED PRACTICE NURSE CONSULT - ASSESSMENT
Pt awake, alert, states BLE wounds started as blisters when his legs were swollen. Denies pain.    BLE: very minimal nonpitting edema, feet warm, dry, faint DPs. Scattered flattened dried crusted blisters to bilateral shins, no drainage, no erythema. Denies pain. No open wounds.

## 2024-04-18 NOTE — PROGRESS NOTE ADULT - ASSESSMENT
74 y/p M with no known PMHx presented to [facility] found to have CHF, Afib w/ RVR, w/ concern for PE and rhabdomyolysis medicine consulted for comanagement    Fever  Patient with new fevers, denies any symptom. RVP negative, no leucocytosis. No signs of cellulitis.  CXR noted, procal negative, UA negative  Would get repeat Procalcitonin, ESR/CRP, Urine legionella, urine strep,  follow-up Bcx, Get Doppler US LE  Appreciate ID, patient on Ceftriaxone.     Lethargy?  Patient noted more sleepy, no signs of CNS infection, neuro exam non-focal, VBG without increased CO2. Patient on Heparin gtt, would get CT head without contrast to evaluate.      Acute hypoxia  ? PE  Patient denies SOB, no pleuritic pain. On AC for Afib, CT chest noted. Unknown baseline 02 saturation, would suspect possible chronic hypoxia.  CT chest with air trapping and mosaicisms   TTE reviewed, would get Pulmonary input     Acute decompensated HF  Afib with RVR  HTN uncontrolled   Started on diuresis, Na stable.  Diuresis per primary team. Based on Na trend, will adjust management   GDMT per primary team   AC with heparin gtt, monitor PTT per protocol. Patient denies headache, no focal deficit on exam, follow-up CT head    Rhabdomyolysis traumatic  Hypernatremia  Patient denies myalgias, CK improved    Transaminitis  Resolved   Follow-up US LUQ   Hold hepatotoxics     Thoracic aortic aneurysm   Follow-up with CTS    DVT ppx: Heparin gtt  Discussed with primary team, will follow-up

## 2024-04-18 NOTE — PROGRESS NOTE ADULT - SUBJECTIVE AND OBJECTIVE BOX
Heart Failure Consult Service Progress Note     Olu Garcia MD ELIZA  Cardiology Fellow   Pager [phone]    Patient is a 74y old  Male who presents with a chief complaint of CHF, AFib (18 Apr 2024 11:49)    SUBJECTIVE/OVERNIGHT EVENTS   Persistent fevers   Reporting cough     OBJECTIVE  Vital Signs Last 24 Hrs  T(C): 37.4 (18 Apr 2024 12:32), Max: 38.1 (17 Apr 2024 16:44)  T(F): 99.3 (18 Apr 2024 12:32), Max: 100.5 (17 Apr 2024 16:44)  HR: 105 (18 Apr 2024 12:32) (89 - 119)  BP: 140/94 (18 Apr 2024 12:32) (122/79 - 166/94)  BP(mean): 118 (18 Apr 2024 04:10) (96 - 118)  RR: 20 (18 Apr 2024 12:32) (18 - 20)  SpO2: 95% (18 Apr 2024 12:32) (93% - 99%)    Parameters below as of 18 Apr 2024 12:32    O2 Flow (L/min): 4    I&O's Summary    17 Apr 2024 07:01  -  18 Apr 2024 07:00  --------------------------------------------------------  IN: 600 mL / OUT: 1100 mL / NET: -500 mL    18 Apr 2024 07:01  -  18 Apr 2024 14:34  --------------------------------------------------------  IN: 374 mL / OUT: 0 mL / NET: 374 mL    PHYSICAL EXAM:  GENERAL: NAD  HEAD:  Atraumatic, Normocephalic  EYES: EOMI, conjunctiva and sclera clear  NECK: Supple, JVP ~12cm  CHEST/LUNG: Coarse breath sounds; No wheeze  HEART: Irregularly irregular rhythm; No murmurs  ABDOMEN: Soft, Nontender, Nondistended; Bowel sounds present  EXTREMITIES:  +WWP, +2 peripheral edema  PSYCH: AAOx3  NEUROLOGY: non-focal  SKIN: No rashes or lesions    LABS                        15.8   6.31  )-----------( 209      ( 18 Apr 2024 05:30 )             51.6                         15.8   6.38  )-----------( 213      ( 17 Apr 2024 07:11 )             51.5     04-18    148<H>  |  108  |  23  ----------------------------<  125<H>  4.2   |  32<H>  |  0.92  04-17    148<H>  |  106  |  21  ----------------------------<  109<H>  3.8   |  33<H>  |  0.89    Ca    9.6      18 Apr 2024 05:30  Ca    9.7      17 Apr 2024 07:11  Mg     2.1     04-18    TPro  6.3  /  Alb  3.2<L>  /  TBili  0.5  /  DBili  x   /  AST  36  /  ALT  38  /  AlkPhos  44  04-18  TPro  6.5  /  Alb  3.4  /  TBili  0.7  /  DBili  x   /  AST  44<H>  /  ALT  43  /  AlkPhos  50  04-17    PTT - ( 18 Apr 2024 13:58 )  PTT:55.2 sec   15 Apr 2024 05:30 Troponin x     /  U/L / CKMB x     / CKMB Units x       14 Apr 2024 18:00 Troponin x     / CK 1545 U/L / CKMB x     / CKMB Units 13.1 ng/mL   14 Apr 2024 10:47 Troponin x     / CK 1890 U/L / CKMB x     / CKMB Units 18.5 ng/mL    MEDICATIONS  (STANDING):  albuterol/ipratropium for Nebulization 3 milliLiter(s) Nebulizer every 6 hours  cefTRIAXone   IVPB 2000 milliGRAM(s) IV Intermittent every 24 hours  furosemide   Injectable 40 milliGRAM(s) IV Push every 12 hours  heparin  Infusion. 1700 Unit(s)/Hr (17 mL/Hr) IV Continuous <Continuous>  influenza  Vaccine (HIGH DOSE) 0.7 milliLiter(s) IntraMuscular once  metoprolol tartrate 25 milliGRAM(s) Oral two times a day  polyethylene glycol 3350 17 Gram(s) Oral daily  senna 2 Tablet(s) Oral at bedtime  spironolactone 25 milliGRAM(s) Oral daily  valsartan 40 milliGRAM(s) Oral every 12 hours    MEDICATIONS  (PRN):  heparin   Injectable 4500 Unit(s) IV Push every 6 hours PRN For aPTT between 40 - 57  heparin   Injectable 9500 Unit(s) IV Push every 6 hours PRN For aPTT less than 40

## 2024-04-18 NOTE — PROGRESS NOTE ADULT - PROBLEM SELECTOR PLAN 8
- -140s.   - c/w valsartan 20 mg BID and metoprolol 25 mg BID     F: none   E: Replete for K <4, Mg <2  N: DASH (1.2L restriction)   GI PPx: Protonix   DVT PPx: Heparin gtt full AC     FULL CODE - -140s.   - c/w valsartan 40 mg BID and metoprolol 25 mg BID     F: none   E: Replete for K <4, Mg <2  N: DASH (1.2L restriction)   GI PPx: Protonix   DVT PPx: Heparin gtt full AC     FULL CODE

## 2024-04-18 NOTE — PROGRESS NOTE ADULT - ASSESSMENT
75 yo M with no reported PMHx presents after a mechanical fall to [facility], also found to have acute decompensated systolic heart failure and new atrial fibrillation. Course complicated by hypervolemic hypernatremia     #HFrEF (EF 20-25%)   ACC/AHA: Stage C  NYHA: NYHA Class II-III   Etiology: Unknown, possibly tachy induced   - GDMT: C/w leigh ann 25mg po qd. Increase Valsartan to 40 BID, plan for eventual addition of Entresto. On metoprolol tartrate 25 BID.   - Diuretics: Increase Lasix to 40mg IV BID  - Device: premature, will need to reassess after 3months of maximal GDMT  NPO @ MN, for L+RHC tomorrow if afebrile     #Atrial Fibrillation  [id number]: 3   Has-Bled: 1  AC: heparin gtt     #Hypervolemic hypernatremia  Calculated free water deficit 3.4L   - favor resuscitation with D5% if needed.     Patient seen and case discussed with Dr. Hernandez

## 2024-04-18 NOTE — PROGRESS NOTE ADULT - PROBLEM SELECTOR PLAN 6
- Patient had mechanical fall and was down on the ground x 2 days.   - BUN/Cr 22/1.14 -> 21/0.89  - CK improving, 2336 -> 2002-> 777  - Medicine following

## 2024-04-18 NOTE — PROGRESS NOTE ADULT - SUBJECTIVE AND OBJECTIVE BOX
Interventional Cardiology PA Adult Progress Note    Overnight Events:      Subjective Assessment:      ROS Negative except as per Subjective and HPI  	  MEDICATIONS:  furosemide   Injectable 40 milliGRAM(s) IV Push daily  metoprolol tartrate 25 milliGRAM(s) Oral two times a day  spironolactone 25 milliGRAM(s) Oral daily  valsartan 20 milliGRAM(s) Oral two times a day    cefTRIAXone   IVPB 2000 milliGRAM(s) IV Intermittent every 24 hours    albuterol/ipratropium for Nebulization 3 milliLiter(s) Nebulizer every 6 hours      polyethylene glycol 3350 17 Gram(s) Oral daily  senna 2 Tablet(s) Oral at bedtime      heparin   Injectable 4500 Unit(s) IV Push every 6 hours PRN  heparin   Injectable 9500 Unit(s) IV Push every 6 hours PRN  heparin  Infusion. 1700 Unit(s)/Hr IV Continuous <Continuous>  influenza  Vaccine (HIGH DOSE) 0.7 milliLiter(s) IntraMuscular once        [PHYSICAL EXAM:  TELEMETRY:  T(C): 37.8 (04-18-24 @ 10:32), Max: 38.6 (04-17-24 @ 11:24)  HR: 108 (04-18-24 @ 09:18) (89 - 119)  BP: 138/97 (04-18-24 @ 09:18) (122/79 - 166/94)  RR: 20 (04-18-24 @ 09:18) (18 - 20)  SpO2: 93% (04-18-24 @ 09:18) (93% - 99%)  Wt(kg): --  I&O's Summary    17 Apr 2024 07:01  -  18 Apr 2024 07:00  --------------------------------------------------------  IN: 600 mL / OUT: 1100 mL / NET: -500 mL    18 Apr 2024 07:01  -  18 Apr 2024 11:17  --------------------------------------------------------  IN: 194 mL / OUT: 0 mL / NET: 194 mL                                  Appearance: Normal, warm to touch	  HEENT:   Normal oral mucosa, PERRL, EOMI	  Neck: Supple, - JVD  Cardiovascular: Normal S1 S2, No JVD, No murmurs,   Respiratory: Lungs clear to auscultation  Gastrointestinal:  Soft, Non-tender, + BS	  Skin: No rashes, No ecchymoses, No cyanosis  Extremities: Normal range of motion, No clubbing, cyanosis or edema, warm  Neurologic: Non-focal  Psychiatry: A & O x 3, Mood & affect appropriate  	  CARDIAC MARKERS:                            15.8   6.31  )-----------( 209      ( 18 Apr 2024 05:30 )             51.6     04-18    148<H>  |  108  |  23  ----------------------------<  125<H>  4.2   |  32<H>  |  0.92    Ca    9.6      18 Apr 2024 05:30  Mg     2.1     04-18    TPro  6.3  /  Alb  3.2<L>  /  TBili  0.5  /  DBili  x   /  AST  36  /  ALT  38  /  AlkPhos  44  04-18    PTT - ( 18 Apr 2024 05:30 )  PTT:110.8 sec     Interventional Cardiology PA Adult Progress Note    Overnight Events:  None    Subjective Assessment:  Patient seen and examined at bedside. Patient alert and oriented x 3. Patient denied any acute symptoms including CP, abdominal pain, fatigue, dysuria, cough and fevers.     ROS Negative except as per Subjective and HPI  	  MEDICATIONS:  furosemide   Injectable 40 milliGRAM(s) IV Push daily  metoprolol tartrate 25 milliGRAM(s) Oral two times a day  spironolactone 25 milliGRAM(s) Oral daily  valsartan 20 milliGRAM(s) Oral two times a day  cefTRIAXone   IVPB 2000 milliGRAM(s) IV Intermittent every 24 hours  albuterol/ipratropium for Nebulization 3 milliLiter(s) Nebulizer every 6 hours  polyethylene glycol 3350 17 Gram(s) Oral daily  senna 2 Tablet(s) Oral at bedtime  heparin   Injectable 4500 Unit(s) IV Push every 6 hours PRN  heparin   Injectable 9500 Unit(s) IV Push every 6 hours PRN  heparin  Infusion. 1700 Unit(s)/Hr IV Continuous <Continuous>  influenza  Vaccine (HIGH DOSE) 0.7 milliLiter(s) IntraMuscular once        [PHYSICAL EXAM:  TELEMETRY:  T(C): 37.8 (04-18-24 @ 10:32), Max: 38.6 (04-17-24 @ 11:24)  HR: 108 (04-18-24 @ 09:18) (89 - 119)  BP: 138/97 (04-18-24 @ 09:18) (122/79 - 166/94)  RR: 20 (04-18-24 @ 09:18) (18 - 20)  SpO2: 93% (04-18-24 @ 09:18) (93% - 99%)  Wt(kg): --  I&O's Summary    17 Apr 2024 07:01  -  18 Apr 2024 07:00  --------------------------------------------------------  IN: 600 mL / OUT: 1100 mL / NET: -500 mL    18 Apr 2024 07:01  -  18 Apr 2024 11:17  --------------------------------------------------------  IN: 194 mL / OUT: 0 mL / NET: 194 mL                                  Appearance: Normal, warm to touch	  HEENT:   Normal oral mucosa, PERRL, EOMI	  Neck: Supple, - JVD  Cardiovascular: Normal S1 S2, No JVD, No murmurs,   Respiratory: Lungs clear to auscultation  Gastrointestinal:  Soft, Non-tender, + BS	  Skin: No rashes, No ecchymoses, No cyanosis  Extremities: Normal range of motion, No clubbing, cyanosis or edema, warm  Neurologic: Non-focal  Psychiatry: A & O x 3, Mood & affect appropriate  	  CARDIAC MARKERS:                            15.8   6.31  )-----------( 209      ( 18 Apr 2024 05:30 )             51.6     04-18    148<H>  |  108  |  23  ----------------------------<  125<H>  4.2   |  32<H>  |  0.92    Ca    9.6      18 Apr 2024 05:30  Mg     2.1     04-18    TPro  6.3  /  Alb  3.2<L>  /  TBili  0.5  /  DBili  x   /  AST  36  /  ALT  38  /  AlkPhos  44  04-18    PTT - ( 18 Apr 2024 05:30 )  PTT:110.8 sec

## 2024-04-18 NOTE — PROGRESS NOTE ADULT - ASSESSMENT
# Community acquired aspiration PNA  - Continue ceftriaxone 2g IV q24h. Anticipate 5 day empiric course (EOT 4/21)  - Please collect urine strep and legionella antigen. If patient develops cough/sputum production, please send a sputum culture.  - Follow pending blood cultures  - Favor that bilateral pleural effusions are from fluid overload, however if patient does not respond rapidly to antibiotic therapy will consider further workup for pleural infection.    ID Team 2 will follow

## 2024-04-18 NOTE — ADVANCED PRACTICE NURSE CONSULT - RECOMMEDATIONS
Recommend twice daily moisturizing  No topical wound care needed    Discussed with patient and PA.    Please reconsult if wounds deteriorate or new concerns arise.    Total time spent: 20 minutes

## 2024-04-19 DIAGNOSIS — I50.23 ACUTE ON CHRONIC SYSTOLIC (CONGESTIVE) HEART FAILURE: ICD-10-CM

## 2024-04-19 DIAGNOSIS — I50.20 UNSPECIFIED SYSTOLIC (CONGESTIVE) HEART FAILURE: ICD-10-CM

## 2024-04-19 DIAGNOSIS — K59.00 CONSTIPATION, UNSPECIFIED: ICD-10-CM

## 2024-04-19 LAB
ALBUMIN SERPL ELPH-MCNC: 3.4 G/DL — SIGNIFICANT CHANGE UP (ref 3.3–5)
ALP SERPL-CCNC: 47 U/L — SIGNIFICANT CHANGE UP (ref 40–120)
ALT FLD-CCNC: 30 U/L — SIGNIFICANT CHANGE UP (ref 10–45)
ANION GAP SERPL CALC-SCNC: 9 MMOL/L — SIGNIFICANT CHANGE UP (ref 5–17)
APPEARANCE UR: CLEAR — SIGNIFICANT CHANGE UP
APTT BLD: 69.9 SEC — HIGH (ref 24.5–35.6)
AST SERPL-CCNC: 25 U/L — SIGNIFICANT CHANGE UP (ref 10–40)
BACTERIA # UR AUTO: NEGATIVE /HPF — SIGNIFICANT CHANGE UP
BILIRUB SERPL-MCNC: 0.6 MG/DL — SIGNIFICANT CHANGE UP (ref 0.2–1.2)
BILIRUB UR-MCNC: NEGATIVE — SIGNIFICANT CHANGE UP
BUN SERPL-MCNC: 24 MG/DL — HIGH (ref 7–23)
CALCIUM SERPL-MCNC: 9.4 MG/DL — SIGNIFICANT CHANGE UP (ref 8.4–10.5)
CHLORIDE SERPL-SCNC: 103 MMOL/L — SIGNIFICANT CHANGE UP (ref 96–108)
CO2 SERPL-SCNC: 36 MMOL/L — HIGH (ref 22–31)
COLOR SPEC: YELLOW — SIGNIFICANT CHANGE UP
CREAT SERPL-MCNC: 0.8 MG/DL — SIGNIFICANT CHANGE UP (ref 0.5–1.3)
CULTURE RESULTS: SIGNIFICANT CHANGE UP
CULTURE RESULTS: SIGNIFICANT CHANGE UP
DIFF PNL FLD: NEGATIVE — SIGNIFICANT CHANGE UP
EGFR: 92 ML/MIN/1.73M2 — SIGNIFICANT CHANGE UP
GLUCOSE SERPL-MCNC: 100 MG/DL — HIGH (ref 70–99)
GLUCOSE UR QL: NEGATIVE MG/DL — SIGNIFICANT CHANGE UP
HCT VFR BLD CALC: 52 % — HIGH (ref 39–50)
HGB BLD-MCNC: 15.9 G/DL — SIGNIFICANT CHANGE UP (ref 13–17)
KETONES UR-MCNC: NEGATIVE MG/DL — SIGNIFICANT CHANGE UP
LACTATE SERPL-SCNC: 1.4 MMOL/L — SIGNIFICANT CHANGE UP (ref 0.5–2)
LEUKOCYTE ESTERASE UR-ACNC: NEGATIVE — SIGNIFICANT CHANGE UP
MAGNESIUM SERPL-MCNC: 2 MG/DL — SIGNIFICANT CHANGE UP (ref 1.6–2.6)
MCHC RBC-ENTMCNC: 29.2 PG — SIGNIFICANT CHANGE UP (ref 27–34)
MCHC RBC-ENTMCNC: 30.6 GM/DL — LOW (ref 32–36)
MCV RBC AUTO: 95.6 FL — SIGNIFICANT CHANGE UP (ref 80–100)
NITRITE UR-MCNC: NEGATIVE — SIGNIFICANT CHANGE UP
NRBC # BLD: 0 /100 WBCS — SIGNIFICANT CHANGE UP (ref 0–0)
PH UR: 8 — SIGNIFICANT CHANGE UP (ref 5–8)
PLATELET # BLD AUTO: 205 K/UL — SIGNIFICANT CHANGE UP (ref 150–400)
POTASSIUM SERPL-MCNC: 4.1 MMOL/L — SIGNIFICANT CHANGE UP (ref 3.5–5.3)
POTASSIUM SERPL-SCNC: 4.1 MMOL/L — SIGNIFICANT CHANGE UP (ref 3.5–5.3)
PROCALCITONIN SERPL-MCNC: 0.04 NG/ML — SIGNIFICANT CHANGE UP (ref 0.02–0.1)
PROT SERPL-MCNC: 6.5 G/DL — SIGNIFICANT CHANGE UP (ref 6–8.3)
PROT UR-MCNC: 30 MG/DL
RAPID RVP RESULT: SIGNIFICANT CHANGE UP
RBC # BLD: 5.44 M/UL — SIGNIFICANT CHANGE UP (ref 4.2–5.8)
RBC # FLD: 16.3 % — HIGH (ref 10.3–14.5)
RBC CASTS # UR COMP ASSIST: 1 /HPF — SIGNIFICANT CHANGE UP (ref 0–4)
SARS-COV-2 RNA SPEC QL NAA+PROBE: SIGNIFICANT CHANGE UP
SODIUM SERPL-SCNC: 148 MMOL/L — HIGH (ref 135–145)
SP GR SPEC: 1.01 — SIGNIFICANT CHANGE UP (ref 1–1.03)
SPECIMEN SOURCE: SIGNIFICANT CHANGE UP
SPECIMEN SOURCE: SIGNIFICANT CHANGE UP
UROBILINOGEN FLD QL: 1 MG/DL — SIGNIFICANT CHANGE UP (ref 0.2–1)
WBC # BLD: 6.31 K/UL — SIGNIFICANT CHANGE UP (ref 3.8–10.5)
WBC # FLD AUTO: 6.31 K/UL — SIGNIFICANT CHANGE UP (ref 3.8–10.5)
WBC UR QL: 1 /HPF — SIGNIFICANT CHANGE UP (ref 0–5)

## 2024-04-19 PROCEDURE — 76705 ECHO EXAM OF ABDOMEN: CPT | Mod: 26

## 2024-04-19 PROCEDURE — 99233 SBSQ HOSP IP/OBS HIGH 50: CPT

## 2024-04-19 PROCEDURE — 99232 SBSQ HOSP IP/OBS MODERATE 35: CPT

## 2024-04-19 PROCEDURE — 71045 X-RAY EXAM CHEST 1 VIEW: CPT | Mod: 26

## 2024-04-19 PROCEDURE — 93970 EXTREMITY STUDY: CPT | Mod: 26

## 2024-04-19 RX ORDER — LINEZOLID 600 MG/300ML
600 INJECTION, SOLUTION INTRAVENOUS EVERY 12 HOURS
Refills: 0 | Status: COMPLETED | OUTPATIENT
Start: 2024-04-19 | End: 2024-04-24

## 2024-04-19 RX ORDER — MINERAL OIL
133 OIL (ML) MISCELLANEOUS ONCE
Refills: 0 | Status: COMPLETED | OUTPATIENT
Start: 2024-04-19 | End: 2024-04-19

## 2024-04-19 RX ORDER — POLYETHYLENE GLYCOL 3350 17 G/17G
17 POWDER, FOR SOLUTION ORAL
Refills: 0 | Status: DISCONTINUED | OUTPATIENT
Start: 2024-04-19 | End: 2024-04-24

## 2024-04-19 RX ORDER — FUROSEMIDE 40 MG
20 TABLET ORAL ONCE
Refills: 0 | Status: COMPLETED | OUTPATIENT
Start: 2024-04-19 | End: 2024-04-19

## 2024-04-19 RX ORDER — ACETAMINOPHEN 500 MG
650 TABLET ORAL ONCE
Refills: 0 | Status: COMPLETED | OUTPATIENT
Start: 2024-04-19 | End: 2024-04-19

## 2024-04-19 RX ORDER — MUPIROCIN 20 MG/G
1 OINTMENT TOPICAL
Refills: 0 | Status: DISCONTINUED | OUTPATIENT
Start: 2024-04-19 | End: 2024-05-04

## 2024-04-19 RX ORDER — FUROSEMIDE 40 MG
60 TABLET ORAL EVERY 24 HOURS
Refills: 0 | Status: DISCONTINUED | OUTPATIENT
Start: 2024-04-20 | End: 2024-04-22

## 2024-04-19 RX ADMIN — Medication 40 MILLIGRAM(S): at 05:37

## 2024-04-19 RX ADMIN — Medication 25 MILLIGRAM(S): at 05:38

## 2024-04-19 RX ADMIN — Medication 3 MILLILITER(S): at 11:19

## 2024-04-19 RX ADMIN — Medication 3 MILLILITER(S): at 05:41

## 2024-04-19 RX ADMIN — SPIRONOLACTONE 25 MILLIGRAM(S): 25 TABLET, FILM COATED ORAL at 05:38

## 2024-04-19 RX ADMIN — POLYETHYLENE GLYCOL 3350 17 GRAM(S): 17 POWDER, FOR SOLUTION ORAL at 18:07

## 2024-04-19 RX ADMIN — Medication 25 MILLIGRAM(S): at 18:07

## 2024-04-19 RX ADMIN — LINEZOLID 600 MILLIGRAM(S): 600 INJECTION, SOLUTION INTRAVENOUS at 14:30

## 2024-04-19 RX ADMIN — Medication 133 MILLILITER(S): at 10:15

## 2024-04-19 RX ADMIN — SENNA PLUS 2 TABLET(S): 8.6 TABLET ORAL at 21:59

## 2024-04-19 RX ADMIN — CEFTRIAXONE 100 MILLIGRAM(S): 500 INJECTION, POWDER, FOR SOLUTION INTRAMUSCULAR; INTRAVENOUS at 14:34

## 2024-04-19 RX ADMIN — Medication 650 MILLIGRAM(S): at 11:18

## 2024-04-19 RX ADMIN — Medication 650 MILLIGRAM(S): at 12:00

## 2024-04-19 RX ADMIN — VALSARTAN 40 MILLIGRAM(S): 80 TABLET ORAL at 18:07

## 2024-04-19 RX ADMIN — Medication 20 MILLIGRAM(S): at 14:30

## 2024-04-19 RX ADMIN — HEPARIN SODIUM 1000 UNIT(S)/HR: 5000 INJECTION INTRAVENOUS; SUBCUTANEOUS at 08:43

## 2024-04-19 RX ADMIN — HEPARIN SODIUM 1000 UNIT(S)/HR: 5000 INJECTION INTRAVENOUS; SUBCUTANEOUS at 05:36

## 2024-04-19 RX ADMIN — Medication 3 MILLILITER(S): at 21:59

## 2024-04-19 RX ADMIN — VALSARTAN 40 MILLIGRAM(S): 80 TABLET ORAL at 05:38

## 2024-04-19 RX ADMIN — MUPIROCIN 1 APPLICATION(S): 20 OINTMENT TOPICAL at 17:59

## 2024-04-19 NOTE — PROGRESS NOTE ADULT - SUBJECTIVE AND OBJECTIVE BOX
Heart Failure Consult Service Progress Note     Olu Garcia MD ELIZA  Cardiology Fellow   Pager [phone]    Patient is a 74y old  Male who presents with a chief complaint of FALL (19 Apr 2024 12:36)    SUBJECTIVE/OVERNIGHT EVENTS   No acute events overnight.    Febrile overnight   No subjective complaints     OBJECTIVE  Vital Signs Last 24 Hrs  T(C): 38.1 (19 Apr 2024 10:23), Max: 38.2 (18 Apr 2024 19:31)  T(F): 100.6 (19 Apr 2024 10:23), Max: 100.8 (18 Apr 2024 19:31)  HR: 112 (19 Apr 2024 11:18) (102 - 118)  BP: 141/93 (19 Apr 2024 11:18) (111/72 - 148/91)  BP(mean): 113 (19 Apr 2024 05:25) (113 - 113)  RR: 18 (19 Apr 2024 11:18) (17 - 21)  SpO2: 92% (19 Apr 2024 11:18) (90% - 98%)    Parameters below as of 19 Apr 2024 11:18  Patient On (Oxygen Delivery Method): nasal cannula  O2 Flow (L/min): 4    I&O's Summary    18 Apr 2024 07:01  -  19 Apr 2024 07:00  --------------------------------------------------------  IN: 374 mL / OUT: 200 mL / NET: 174 mL    19 Apr 2024 07:01  -  19 Apr 2024 14:51  --------------------------------------------------------  IN: 480 mL / OUT: 1400 mL / NET: -920 mL    PHYSICAL EXAM:  GENERAL: NAD  HEAD:  Atraumatic, Normocephalic  EYES: EOMI, conjunctiva and sclera clear  NECK: Supple, JVP ~6-8 cm  CHEST/LUNG: Clear to auscultation bilaterally; No wheeze  HEART: Regular rate and rhythm; No murmurs  ABDOMEN: Soft, Nontender, Nondistended; Bowel sounds present  EXTREMITIES:  +WWP, improved (+2) peripheral edema  PSYCH: AAOx3  NEUROLOGY: non-focal  SKIN: No rashes or lesions    LABS                        15.9   6.31  )-----------( 205      ( 19 Apr 2024 06:34 )             52.0                         15.8   6.31  )-----------( 209      ( 18 Apr 2024 05:30 )             51.6     04-19    148<H>  |  103  |  24<H>  ----------------------------<  100<H>  4.1   |  36<H>  |  0.80  04-18    148<H>  |  108  |  23  ----------------------------<  125<H>  4.2   |  32<H>  |  0.92    Ca    9.4      19 Apr 2024 06:34  Ca    9.6      18 Apr 2024 05:30  Mg     2.0     04-19    TPro  6.5  /  Alb  3.4  /  TBili  0.6  /  DBili  x   /  AST  25  /  ALT  30  /  AlkPhos  47  04-19  TPro  6.3  /  Alb  3.2<L>  /  TBili  0.5  /  DBili  x   /  AST  36  /  ALT  38  /  AlkPhos  44  04-18    PTT - ( 19 Apr 2024 04:24 )  PTT:69.9 sec   15 Apr 2024 05:30 Troponin x     /  U/L / CKMB x     / CKMB Units x       14 Apr 2024 18:00 Troponin x     / CK 1545 U/L / CKMB x     / CKMB Units 13.1 ng/mL   14 Apr 2024 10:47 Troponin x     / CK 1890 U/L / CKMB x     / CKMB Units 18.5 ng/mL    MEDICATIONS  (STANDING):  albuterol/ipratropium for Nebulization 3 milliLiter(s) Nebulizer every 6 hours  cefTRIAXone   IVPB 2000 milliGRAM(s) IV Intermittent every 24 hours  heparin  Infusion. 1700 Unit(s)/Hr (17 mL/Hr) IV Continuous <Continuous>  influenza  Vaccine (HIGH DOSE) 0.7 milliLiter(s) IntraMuscular once  linezolid    Tablet 600 milliGRAM(s) Oral every 12 hours  metoprolol tartrate 25 milliGRAM(s) Oral two times a day  mupirocin 2% Ointment 1 Application(s) Topical two times a day  polyethylene glycol 3350 17 Gram(s) Oral two times a day  senna 2 Tablet(s) Oral at bedtime  spironolactone 25 milliGRAM(s) Oral daily  valsartan 40 milliGRAM(s) Oral every 12 hours    MEDICATIONS  (PRN):  heparin   Injectable 9500 Unit(s) IV Push every 6 hours PRN For aPTT less than 40  heparin   Injectable 4500 Unit(s) IV Push every 6 hours PRN For aPTT between 40 - 57

## 2024-04-19 NOTE — PROGRESS NOTE ADULT - PROBLEM SELECTOR PLAN 1
-TTE 4/15/24: Severely reduced LVSF and RVSF, LVEF 20-25%, mild biatrial enlargement, mild TR, PASP 68, dilated IVC  -CTA chest: Neg for PE. Small right and trace left pleural effusions.   -Diuresis: INCREASE IV Lasix 40 BID  -GDMT: INCREASE Valsartan 40 BID, spironolactone 25 mg QD, metoprolol tartrate 25 mg BID   -Core measures, strict I/O's daily weights, 1.2L PO restriction, strict I/Os, cont tele/pulse ox  -Advanced HF team following; [facility] tentatively for 4/19 -TTE 4/15/24: Severely reduced LVSF and RVSF, LVEF 20-25%, mild biatrial enlargement, mild TR, PASP 68, dilated IVC  -CTA chest: Neg for PE. Small right and trace left pleural effusions.   -Diuresis: IV Lasix 60   -GDMT: Valsartan 40 BID, spironolactone 25 mg QD, metoprolol tartrate 25 mg BID   -Core measures, strict I/O's daily weights, 1.2L PO restriction, strict I/Os, cont tele/pulse ox  -Advanced HF team following; [facility] tentatively for 4/22

## 2024-04-19 NOTE — PROGRESS NOTE ADULT - ASSESSMENT
# Likely community acquired aspiration PNA  # Possible mild SSTI involving lower extremity anterior shin wounds  - Continue ceftriaxone 2g IV q24h. Anticipate 5 day empiric course (EOT 4/21)  - Start linezolid 600mg PO q12h. Anticipate 5 day empiric course (EOT 4/24)  - Follow pending blood cultures  - Recommend repeat CTPA to r/o PE (initial CT was motion degraded and the prelim read reported a possible segmental PE) and also to re-evaluate pleural space    ID Team 2 will follow

## 2024-04-19 NOTE — DIETITIAN INITIAL EVALUATION ADULT - PROBLEM SELECTOR PLAN 1
3+ pitting edema above knees, satting 95%   -Trops I 91.9 -> 109.6 BNP 2k  Lactate 3.0 -> 1.7   - CXR: no acute infiltrates, cardiomegaly   -EKG Afib RVR w/ HR 112s    - Diuresis: Lasix 80mg IV BID  - GDMT: Start Valsartan 20mg BID.  - Etiology: unclear can consider tachy-induced however unclear whether Afib is new vs ischemic eval once euvolemic   - F/u TTE   -Core measures, strict I/O's daily weights, 1.2L PO restriction, strict I/Os,  cont tele/pulse ox

## 2024-04-19 NOTE — DIETITIAN INITIAL EVALUATION ADULT - NSFNSPHYEXAMSKINFT_GEN_A_CORE
Alvaro 13. 2+Edema (left leg; right leg; scrotum) - varying during admit. Noted with venous ulcers. No pressure ulcers per flow sheets.

## 2024-04-19 NOTE — PROGRESS NOTE ADULT - SUBJECTIVE AND OBJECTIVE BOX
Patient is a 74y old  Male who presents with a chief complaint of CHF, AFib (2024 14:51)    INTERVAL EVENTS:  tolerating diet, no nausea,   no dysuria,   no cough, no chills,   last bowel movement was ~7 days ago (last Friday)     SUBJECTIVE:  Patient was seen and examined at bedside  Review of systems: No CP, dyspnea, nausea or vomiting, dysuria,     Diet, DASH/TLC:   Sodium & Cholesterol Restricted (24 @ 10:45) [Active]    MEDICATIONS:  MEDICATIONS  (STANDING):  albuterol/ipratropium for Nebulization 3 milliLiter(s) Nebulizer every 6 hours  cefTRIAXone   IVPB 2000 milliGRAM(s) IV Intermittent every 24 hours  heparin  Infusion. 1700 Unit(s)/Hr (17 mL/Hr) IV Continuous <Continuous>  influenza  Vaccine (HIGH DOSE) 0.7 milliLiter(s) IntraMuscular once  linezolid    Tablet 600 milliGRAM(s) Oral every 12 hours  metoprolol tartrate 25 milliGRAM(s) Oral two times a day  mupirocin 2% Ointment 1 Application(s) Topical two times a day  polyethylene glycol 3350 17 Gram(s) Oral two times a day  senna 2 Tablet(s) Oral at bedtime  spironolactone 25 milliGRAM(s) Oral daily  valsartan 40 milliGRAM(s) Oral every 12 hours    MEDICATIONS  (PRN):  heparin   Injectable 4500 Unit(s) IV Push every 6 hours PRN For aPTT between 40 - 57  heparin   Injectable 9500 Unit(s) IV Push every 6 hours PRN For aPTT less than 40    Allergies    No Known Allergies    Intolerances    OBJECTIVE:  Vital Signs Last 24 Hrs  T(C): 38.1 (2024 10:23), Max: 38.2 (2024 19:31)  T(F): 100.6 (2024 10:23), Max: 100.8 (2024 19:31)  HR: 125 (2024 14:26) (102 - 125)  BP: 151/99 (2024 14:26) (111/72 - 151/99)  BP(mean): 113 (2024 05:25) (113 - 113)  RR: 18 (2024 14:26) (17 - 21)  SpO2: 96% (2024 14:26) (90% - 98%)    Parameters below as of 2024 14:26  Patient On (Oxygen Delivery Method): nasal cannula  O2 Flow (L/min): 4    I&O's Summary    2024 07:01  -  2024 07:00  --------------------------------------------------------  IN: 374 mL / OUT: 200 mL / NET: 174 mL    2024 07:01  -  2024 16:36  --------------------------------------------------------  IN: 480 mL / OUT: 1400 mL / NET: -920 mL    PHYSICAL EXAM:  Gen: Reclining in bed at time of exam, appears stated age  HEENT: MMM, clear OP  Neck: supple, trachea at midline  CV: RRR, +S1/S2  Pulm: adequate respiratory effort, no increase in work of breathing  Abd: soft, ND  Skin: warm and dry,   Ext: some purulent drainage from scabbed over wounds on bilateral anterior shins today ; unroofed ; no other areas of fluctuance or induration   Neuro: AOx3, speaking in full sentences  Psych: affect and behavior appropriate, pleasant at time of interview    LABS:                        15.9   6.31  )-----------( 205      ( 2024 06:34 )             52.0     19    148<H>  |  103  |  24<H>  ----------------------------<  100<H>  4.1   |  36<H>  |  0.80    Ca    9.4      2024 06:34  Mg     2.0         TPro  6.5  /  Alb  3.4  /  TBili  0.6  /  DBili  x   /  AST  25  /  ALT  30  /  AlkPhos  47  0419    LIVER FUNCTIONS - ( 2024 06:34 )  Alb: 3.4 g/dL / Pro: 6.5 g/dL / ALK PHOS: 47 U/L / ALT: 30 U/L / AST: 25 U/L / GGT: x           PTT - ( 2024 04:24 )  PTT:69.9 sec  CAPILLARY BLOOD GLUCOSE        Urinalysis Basic - ( 2024 10:56 )    Color: Yellow / Appearance: Clear / S.011 / pH: x  Gluc: x / Ketone: Negative mg/dL  / Bili: Negative / Urobili: 1.0 mg/dL   Blood: x / Protein: 30 mg/dL / Nitrite: Negative   Leuk Esterase: Negative / RBC: 1 /HPF / WBC 1 /HPF   Sq Epi: x / Non Sq Epi: x / Bacteria: Negative /HPF        MICRODATA:    Urinalysis with Rflx Culture (collected 2024 00:14)    Culture - Blood (collected 2024 18:05)  Source: .Blood Blood-Venous  Preliminary Report (2024 23:01):    No growth at 48 Hours        RADIOLOGY/OTHER STUDIES:

## 2024-04-19 NOTE — PROGRESS NOTE ADULT - PROBLEM SELECTOR PLAN 2
- Intermittent fevers, highest temp rectal 101.6F 4/17, now rectal 100 4/18  - Per ID, favor community acquired aspiration PNA (given poor dentition, L side opacity on CXR)  - Empiric coverage with ceftriaxone 2 g IV QD per ID recs, anticipate 5 day empiric course.  - Routine HIV, urine strep and legionella antigen pending.   - If patient develops cough/sputum production, will send sputum cx  - Blood cultures sent, pending results  - CTA PE negative for PE. B/L LE dopplers pending to evaluate for DVTs. - Intermittent fevers, highest temp rectal 101.6F 4/17, now rectal 100. 6 4/19  - Per ID, favor community acquired aspiration PNA (given poor dentition, L side opacity on CXR)  - Empiric coverage with ceftriaxone 2 g IV QD per ID recs, anticipate 5 day empiric course.  - HIV -, HEP -  - If patient develops cough/sputum production, will send sputum cx  - BCx 3 NGTD for 48 hours, BC collected 4/19 f/u  - CTA PE negative for PE at [facility]  - was indeterminate d/t motion artifact  - will order CTA PE to r/o PE  - B/L LE dopplers pending to evaluate for DVTs. - Intermittent fevers, highest temp rectal 101.6F 4/17, now rectal 100. 6 4/19  - Per ID, favor community acquired aspiration PNA (given poor dentition, L side opacity on CXR)  - Empiric coverage with ceftriaxone 2 g IV QD per ID recs, anticipate 5 day empiric course.  - HIV -, HEP -, UStrep -, Ulegionella -, Procal -  - If patient develops cough/sputum production, will send sputum cx  - BCx 3 NGTD for 48 hours, BC collected 4/19 f/u  - CTA PE negative for PE at [facility]  - was indeterminate d/t motion artifact  - will order CTA PE to r/o PE  - B/L LE dopplers pending to evaluate for DVTs.

## 2024-04-19 NOTE — PROGRESS NOTE ADULT - ASSESSMENT
73 yo M with no reported PMHx presents after a mechanical fall to [facility], also found to have acute decompensated systolic heart failure and new atrial fibrillation. Course complicated by hypervolemic hypernatremia     #HFrEF (EF 20-25%)   ACC/AHA: Stage C  NYHA: NYHA Class II-III   Etiology: Unknown, possibly tachy induced   - GDMT: C/w leigh ann 25mg po qd. c/w Valsartan to 40 BID, plan for eventual addition of Entresto. On metoprolol tartrate 25 BID.   - Diuretics: Cahnge Lasix to 60mg IV qd  - Device: premature, will need to reassess after 3months of maximal GDMT  L+RHC potentially on Monday      #Atrial Fibrillation  [id number]: 3   Has-Bled: 1  AC: heparin gtt     #Hypervolemic hypernatremia  Calculated free water deficit 1.5L   - favor resuscitation with D5% if needed.     Patient seen and case discussed with Dr. Hernandez

## 2024-04-19 NOTE — DIETITIAN INITIAL EVALUATION ADULT - OTHER CALCULATIONS
5'6''  pounds+-10%; %IBW>120%  IBW used to calculate energy needs due to pt's current body weight exceeding 120% of IBW   Adjusted for age/BMI, HF/Renal, Fevers, Malnutrition Risk - fluids per team

## 2024-04-19 NOTE — DIETITIAN INITIAL EVALUATION ADULT - PROBLEM SELECTOR PLAN 4
On admission Na 152 likely from hypervolemia   - As per medicine recs should not correct more than 8mEq daily  - Continue to monitor

## 2024-04-19 NOTE — PROGRESS NOTE ADULT - PROBLEM SELECTOR PLAN 7
- CT Abd/Pelvis 04/13/24: No thoracoabdominal dissection, no thoracic aorta hematoma. Ascending thoracic aortic aneurysm 4.0 cm  - Consider CTS follow up in outpatient setting for surveillance Last BM was 7 days ago.     - CONT miralax BID, senna qHS   - s/p mineral oil enema 4/19  - tap water enema 4/20 if pt still constipated

## 2024-04-19 NOTE — DIETITIAN INITIAL EVALUATION ADULT - PROBLEM SELECTOR PLAN 2
HR 100s, unclear whether patient has hx of afib or is new   - EKG  BPM   - F/u repeat cardiac enzyme   - Holding BB/CCB given unknown EF.   - Continue with heparin gtt and with plan to transition to NOAC     #Rule out DVT   -  F/u B/L LE Duplex U/S

## 2024-04-19 NOTE — PROGRESS NOTE ADULT - PROBLEM SELECTOR PLAN 8
- -140s.   - c/w valsartan 40 mg BID and metoprolol 25 mg BID     F: none   E: Replete for K <4, Mg <2  N: DASH (1.2L restriction)   GI PPx: Protonix   DVT PPx: Heparin gtt full AC     FULL CODE - CT Abd/Pelvis 04/13/24: No thoracoabdominal dissection, no thoracic aorta hematoma. Ascending thoracic aortic aneurysm 4.0 cm  - Consider CTS follow up in outpatient setting for surveillance

## 2024-04-19 NOTE — DIETITIAN INITIAL EVALUATION ADULT - NSFNSGIIOFT_GEN_A_CORE
Per rounds possible plan for Enema this afternoon d/t lack of BM x5 days. LBM per flow sheets 4/14. Currently ordered for Senna and MIRALAX.

## 2024-04-19 NOTE — DIETITIAN INITIAL EVALUATION ADULT - OTHER INFO
75 y/o M with no known PMHx presented to [facility] 4/14/24 after mechanical fall, was found down on floor x 2 days by landlord. Labs significant for Trop T 52->54, CK 2k BNP 7k . CTH without acute bleed. CT A/P neg for PE, + ascites. Pt admitted to cardiac tele for newly dx rapid Afib, rhabdo and suspected ADHF exacerbation. Course c/b hypernatremia, s/p lactated ringers. Echo revealing LVEF 20-25%, reduced RVSF. Advanced HF team consulted, rec'd IV Lasix 40 mg BID w/ plan for RHC/LHC, however postponed given new fevers (fever peak 4/18 pm rectal 101). ID consulted, rec ceftriaxone 2 g IV daily for possible aspiration PNA, medicine team following, pulm consulted for mosaicism and air trapping on CT. LHC to be done when pt is afebrile - possible plan for 4/19.     Pt seen on 5UR. Pt presented with limited concern for RD visit today. Diet: DASHTLC. Reports eating without issues during admit - denies issues chewing/swallowing. However during admit pt with Intermittent fevers and per progress notes pt being treated for aspiration PNA, (given poor dentition, L side opacity on CXR). Unclear when episode occurred, per team pt was down for over 2 days PTA - unclear if aspiration occurred during admit/PTA. No bedside dysphagia screen noted.     PO intake/Diet Type: Limited recall obtained PTA - assume poor intake x at least 2 days while down - Should pt have been down longer, would pt at Risk for Malnutrtion. Otherwise pt consumes salmon, OJ, Milk, yogurt. NKFA.   Weight: Pt unsure of UBW however, reports his clothing has fit the same. Admit wt 249 pounds/BMI 42 noted. Daily wts on down trend since admit, assume i/s/o ongoing diuresis.   Please see below for nutritions recommendations.

## 2024-04-19 NOTE — DIETITIAN INITIAL EVALUATION ADULT - PERTINENT MEDS FT
MEDICATIONS  (STANDING):  albuterol/ipratropium for Nebulization 3 milliLiter(s) Nebulizer every 6 hours  cefTRIAXone   IVPB 2000 milliGRAM(s) IV Intermittent every 24 hours  furosemide   Injectable 40 milliGRAM(s) IV Push every 12 hours  heparin  Infusion. 1700 Unit(s)/Hr (17 mL/Hr) IV Continuous <Continuous>  influenza  Vaccine (HIGH DOSE) 0.7 milliLiter(s) IntraMuscular once  metoprolol tartrate 25 milliGRAM(s) Oral two times a day  polyethylene glycol 3350 17 Gram(s) Oral two times a day  senna 2 Tablet(s) Oral at bedtime  spironolactone 25 milliGRAM(s) Oral daily  valsartan 40 milliGRAM(s) Oral every 12 hours    MEDICATIONS  (PRN):  heparin   Injectable 4500 Unit(s) IV Push every 6 hours PRN For aPTT between 40 - 57  heparin   Injectable 9500 Unit(s) IV Push every 6 hours PRN For aPTT less than 40

## 2024-04-19 NOTE — PROGRESS NOTE ADULT - PROBLEM SELECTOR PLAN 5
- On admission Na 152->148  - Initially received IV NS hydration, then IV lactate ringers  - Remains hypernatremic, monitor closely. Asx. - On admission Na 152->148  - Initially received IV NS hydration, then IV lactate ringers  - Remains hypernatremic, monitor closely. Asx.  - medicine following

## 2024-04-19 NOTE — PROGRESS NOTE ADULT - SUBJECTIVE AND OBJECTIVE BOX
INFECTIOUS DISEASES CONSULT FOLLOW-UP NOTE    INTERVAL HPI/OVERNIGHT EVENTS:  Still febrile, 100.6  Tachycardic in 130s on my visit  WBC wnl  Denies any symptoms. Tolerating abx    ROS:   Constitutional, eyes, ENT, cardiovascular, respiratory, gastrointestinal, genitourinary, integumentary, neurological, psychiatric and heme/lymph are otherwise negative other than noted above       ANTIBIOTICS/RELEVANT:    MEDICATIONS  (STANDING):  albuterol/ipratropium for Nebulization 3 milliLiter(s) Nebulizer every 6 hours  cefTRIAXone   IVPB 2000 milliGRAM(s) IV Intermittent every 24 hours  heparin  Infusion. 1700 Unit(s)/Hr (17 mL/Hr) IV Continuous <Continuous>  influenza  Vaccine (HIGH DOSE) 0.7 milliLiter(s) IntraMuscular once  linezolid    Tablet 600 milliGRAM(s) Oral every 12 hours  metoprolol tartrate 25 milliGRAM(s) Oral two times a day  mupirocin 2% Ointment 1 Application(s) Topical two times a day  polyethylene glycol 3350 17 Gram(s) Oral two times a day  senna 2 Tablet(s) Oral at bedtime  spironolactone 25 milliGRAM(s) Oral daily  valsartan 40 milliGRAM(s) Oral every 12 hours    MEDICATIONS  (PRN):  heparin   Injectable 9500 Unit(s) IV Push every 6 hours PRN For aPTT less than 40  heparin   Injectable 4500 Unit(s) IV Push every 6 hours PRN For aPTT between 40 - 57        Vital Signs Last 24 Hrs  T(C): 38.1 (2024 10:23), Max: 38.2 (2024 19:31)  T(F): 100.6 (2024 10:23), Max: 100.8 (2024 19:31)  HR: 125 (2024 14:26) (102 - 125)  BP: 151/99 (2024 14:26) (111/72 - 151/99)  BP(mean): 113 (2024 05:25) (113 - 113)  RR: 18 (2024 14:26) (17 - 21)  SpO2: 96% (2024 14:26) (90% - 98%)    Parameters below as of 2024 14:26  Patient On (Oxygen Delivery Method): nasal cannula  O2 Flow (L/min): 4      24 @ 07:01  -  24 @ 07:00  --------------------------------------------------------  IN: 374 mL / OUT: 200 mL / NET: 174 mL    24 @ 07:01  -  24 @ 17:20  --------------------------------------------------------  IN: 480 mL / OUT: 1400 mL / NET: -920 mL      PHYSICAL EXAM:  Constitutional: alert, NAD  Eyes: the sclera and conjunctiva were normal.   ENT: poor dentition  Neck: the appearance of the neck was normal and the neck was supple.   Pulmonary: mildly increased effort on 4L NC. Clear in anterior fields  Heart: tachycardic  Abdomen: soft, non-tender  : Condom cath with clear yellow urine  MSK: Bilateral lower extremity edema, with small ulcerations/scabs over anterior shin, without surrounding erythema or tenderness, however on picture by Dr. Wick today there was mild purulent drainage from both of these wounds (none could be expressed on my exam)          LABS:                        15.9   6.31  )-----------( 205      ( 2024 06:34 )             52.0         148<H>  |  103  |  24<H>  ----------------------------<  100<H>  4.1   |  36<H>  |  0.80    Ca    9.4      2024 06:34  Mg     2.0         TPro  6.5  /  Alb  3.4  /  TBili  0.6  /  DBili  x   /  AST  25  /  ALT  30  /  AlkPhos  47  04-19    PTT - ( 2024 04:24 )  PTT:69.9 sec  Urinalysis Basic - ( 2024 10:56 )    Color: Yellow / Appearance: Clear / S.011 / pH: x  Gluc: x / Ketone: Negative mg/dL  / Bili: Negative / Urobili: 1.0 mg/dL   Blood: x / Protein: 30 mg/dL / Nitrite: Negative   Leuk Esterase: Negative / RBC: 1 /HPF / WBC 1 /HPF   Sq Epi: x / Non Sq Epi: x / Bacteria: Negative /HPF        MICROBIOLOGY:  Reviewed    RADIOLOGY & ADDITIONAL STUDIES:  Reviewed

## 2024-04-19 NOTE — PROGRESS NOTE ADULT - ASSESSMENT
75 y/o man with minimal medical hx in our EMR; who presented to [facility] after a fall at home. Found to have acute HFrEF,   Afib w/ RVR, hypoxemia (still requiring O2 Nasal cannula ), planned for left heart cath later  this admission.     # Fever  No cough, no sore throat, RVP from time of admission negative, no leucocytosis.   procal negative, UA from time of admission negative  On Ceftriaxone April 17th -- planned for 5 day course.   Source of fever unclear.   [ ] f/u LE Dopplers   [ ] initial CT PE from time of admission (April 14th) unable to exclude segmental PE 2/2 respiratory motion.   Already on full dose AC for afib wtih RVR.   Results would not change AC plan, but would consider repeating CT PE protocol to clarify if PE present (if PE present, would explain fevers); and to check for interval changes in pleural effusions and lung parenchyma    # Acute decompensated HF  Diuresis per primary team. currently on furosemide 60mg IV q24hrs   GDMT per primary team ; on valsartan 40 mg q12, spironolactone 25mg qd     # Atrial fibrillation with RVR - on heparin drip. Metoprolol tartrate 25mg BID ,     # Leg wounds  some purulent drainage from scabbed over wounds on bilateral anterior shins on April 19th. ; unroofed ; no other areas of fluctuance or induration.   Recommend linezolid 600mg BID x 5 days (Apri 19th - )  Recommend topical mupirocin x 5 days to areas of drainage    # Constipation   Last BM was 7 days ago.    Bowel regimen increased to miralax BID, senna qHS   [ ] recommend mineral oil enema   [ ] escalate bowel regimen if still constipated. suggest     # Hypoxemia   - consider repeating CT PE (first scan from April 4th unable to exclude segemental PE due to respiratory motion)   - pleural effusions likely contributory to hypoxemia; diuretic plan per primary team   defer final recs to pulm consult     # Lethargy - improved  per chart review, Some sleepiness noted on previous days. CT head on 4/19 showing no hemorrhage, mass effect or infarction.  Today, awake, alert, participating appropriately in interview . Agree with pulm recs for BiPAP at night  Per pulm, some suspicion for OHS, BETTINA, given elevated bicarb and high BMI.     -- [ ] Check arterial blood gas if patient becomes lethargic   Should get close follow up with pulmonology for sleep study vs setting up with BiPAP before discharge if daytime PaCO2 >45mmHg.     # Incidental finding of Thoracic aortic aneurysm  Thoracic aortic aneurysm measuring 4cm.   f/u with CT surgery   Can follow up outpatient for interval imaging if consistent with patient's wishes    # Morbid Obesity  BMI: BMI (kg/m2): 42 (04-14-24 @ 16:05)  Affects all aspects of care. Dose medications by weight   HbA1c: A1C with Estimated Average Glucose Result: 6.0 % (04-15-24 @ 05:30)    DVT ppx: Heparin gtt 73 y/o man with minimal medical hx in our EMR; who presented to [facility] after a fall at home. Found to have acute HFrEF,   Afib w/ RVR, hypoxemia (still requiring O2 Nasal cannula ), planned for left heart cath later  this admission. Cath delayed due to fever.     # Fever  No cough, no sore throat, RVP from time of admission negative, no leucocytosis.   procal negative, UA from time of admission negative  On Ceftriaxone April 17th -- planned for 5 day course.   Source of fever unclear.   [ ] f/u LE Dopplers   [ ] initial CT PE from time of admission (April 14th) unable to exclude segmental PE 2/2 respiratory motion.   Already on full dose AC for afib wtih RVR.   Results would not change AC plan, but would consider repeating CT PE protocol to clarify if PE present (if PE present, would explain fevers); and to check for interval changes in pleural effusions and lung parenchyma    # Acute decompensated HF  Diuresis per primary team. currently on furosemide 60mg IV q24hrs   GDMT per primary team ; on valsartan 40 mg q12, spironolactone 25mg qd     # Atrial fibrillation with RVR - on heparin drip. Metoprolol tartrate 25mg BID ,     # Leg wounds  some purulent drainage from scabbed over wounds on bilateral anterior shins on April 19th. ; unroofed ; no other areas of fluctuance or induration.   Recommend linezolid 600mg BID x 5 days (Apri 19th - )  Recommend topical mupirocin x 5 days to areas of drainage    # Constipation   Last BM was 7 days ago.    Bowel regimen increased to miralax BID, senna qHS   [ ] recommend mineral oil enema   [ ] escalate bowel regimen if still constipated. suggest     # Hypoxemia   - consider repeating CT PE (first scan from April 4th unable to exclude segemental PE due to respiratory motion)   - pleural effusions likely contributory to hypoxemia; diuretic plan per primary team   defer final recs to pulm consult     # Lethargy - improved  per chart review, Some sleepiness noted on previous days. CT head on 4/19 showing no hemorrhage, mass effect or infarction.  Today, awake, alert, participating appropriately in interview . Agree with pulm recs for BiPAP at night  Per pulm, some suspicion for OHS, BETTINA, given elevated bicarb and high BMI.     -- [ ] Check arterial blood gas if patient becomes lethargic   Should get close follow up with pulmonology for sleep study vs setting up with BiPAP before discharge if daytime PaCO2 >45mmHg.     # Incidental finding of Thoracic aortic aneurysm  Thoracic aortic aneurysm measuring 4cm.   f/u with CT surgery   Can follow up outpatient for interval imaging if consistent with patient's wishes    # Morbid Obesity  BMI: BMI (kg/m2): 42 (04-14-24 @ 16:05)  Affects all aspects of care. Dose medications by weight   HbA1c: A1C with Estimated Average Glucose Result: 6.0 % (04-15-24 @ 05:30)    DVT ppx: Heparin gtt

## 2024-04-19 NOTE — DIETITIAN INITIAL EVALUATION ADULT - ADD RECOMMEND
1. ? Need for Change to Soft Diet given poor dentition vs need for Formal SLP Eval given noted aspiration PNA in pt with ongoing fevers.  >> If SLP Eval: NPO Prior if s/s of issues swallowing are noted or suspected. With passing results, recommend DASH TLC diet with PO consistency per SLP Recs.   2. Pain/GI per team. Optimize regime in setting PRN.  3. Monitor Skin, Wt, Pain, Labs, GOC.  4. RD to remain available for additional nutrition interventions as needed.  ** High Nutrition Risk.

## 2024-04-19 NOTE — DIETITIAN INITIAL EVALUATION ADULT - PROBLEM SELECTOR PLAN 3
On admission TBili 1.4 ALP 54 AST/ALT 91/54  - F/u Hep panel and RUQ. Unclear whether transaminitis can be related to congestion from CHF or malignancy.   - CT Pelvis : Liver measures near 18 cm in craniocaudal dimension. Subcentimeter hepatic hypodensities are too small to characterize.

## 2024-04-19 NOTE — PROGRESS NOTE ADULT - SUBJECTIVE AND OBJECTIVE BOX
Interventional Cardiology PA Adult Progress Note    Overnight Events:      Subjective Assessment:      ROS Negative except as per Subjective and HPI  	  MEDICATIONS:  metoprolol tartrate 25 milliGRAM(s) Oral two times a day  spironolactone 25 milliGRAM(s) Oral daily  valsartan 40 milliGRAM(s) Oral every 12 hours    cefTRIAXone   IVPB 2000 milliGRAM(s) IV Intermittent every 24 hours  linezolid    Tablet 600 milliGRAM(s) Oral every 12 hours    albuterol/ipratropium for Nebulization 3 milliLiter(s) Nebulizer every 6 hours      polyethylene glycol 3350 17 Gram(s) Oral two times a day  senna 2 Tablet(s) Oral at bedtime      heparin   Injectable 9500 Unit(s) IV Push every 6 hours PRN  heparin   Injectable 4500 Unit(s) IV Push every 6 hours PRN  heparin  Infusion. 1700 Unit(s)/Hr IV Continuous <Continuous>  influenza  Vaccine (HIGH DOSE) 0.7 milliLiter(s) IntraMuscular once  mupirocin 2% Ointment 1 Application(s) Topical two times a day        [PHYSICAL EXAM:  TELEMETRY:  T(C): 38.1 (04-19-24 @ 10:23), Max: 38.2 (04-18-24 @ 19:31)  HR: 112 (04-19-24 @ 11:18) (102 - 118)  BP: 141/93 (04-19-24 @ 11:18) (111/72 - 148/91)  RR: 18 (04-19-24 @ 11:18) (17 - 21)  SpO2: 92% (04-19-24 @ 11:18) (90% - 98%)  Wt(kg): --  I&O's Summary    18 Apr 2024 07:01  -  19 Apr 2024 07:00  --------------------------------------------------------  IN: 374 mL / OUT: 200 mL / NET: 174 mL    19 Apr 2024 07:01  -  19 Apr 2024 15:21  --------------------------------------------------------  IN: 480 mL / OUT: 1400 mL / NET: -920 mL                                           Appearance: Normal	  HEENT:   Normal oral mucosa, PERRL, EOMI	  Neck: Supple, + JVD/ - JVD; Carotid Bruit   Cardiovascular: Normal S1 S2, No JVD, No murmurs,   Respiratory: Lungs clear to auscultation/Decreased Breath Sounds/No Rales, Rhonchi, Wheezing	  Gastrointestinal:  Soft, Non-tender, + BS	  Skin: No rashes, No ecchymoses, No cyanosis  Extremities: Normal range of motion, No clubbing, cyanosis or edema  Vascular: Peripheral pulses palpable 2+ bilaterally  Neurologic: Non-focal  Psychiatry: A & O x 3, Mood & affect appropriate  	  CARDIAC MARKERS                          15.9   6.31  )-----------( 205      ( 19 Apr 2024 06:34 )             52.0     04-19    148<H>  |  103  |  24<H>  ----------------------------<  100<H>  4.1   |  36<H>  |  0.80    Ca    9.4      19 Apr 2024 06:34  Mg     2.0     04-19    TPro  6.5  /  Alb  3.4  /  TBili  0.6  /  DBili  x   /  AST  25  /  ALT  30  /  AlkPhos  47  04-19    proBNP:   Lipid Profile:   HgA1c:   TSH:   PTT - ( 19 Apr 2024 04:24 )  PTT:69.9 sec     Interventional Cardiology PA Adult Progress Note    Overnight Events:  None    Subjective Assessment:  Patient seen and examined at bedside. Patient denied chest pain, SOB, abdominal pain, fatigue and dysuria.     ROS Negative except as per Subjective and HPI  	  MEDICATIONS:  metoprolol tartrate 25 milliGRAM(s) Oral two times a day  spironolactone 25 milliGRAM(s) Oral daily  valsartan 40 milliGRAM(s) Oral every 12 hours  cefTRIAXone   IVPB 2000 milliGRAM(s) IV Intermittent every 24 hours  linezolid    Tablet 600 milliGRAM(s) Oral every 12 hours  albuterol/ipratropium for Nebulization 3 milliLiter(s) Nebulizer every 6 hours  polyethylene glycol 3350 17 Gram(s) Oral two times a day  senna 2 Tablet(s) Oral at bedtime  heparin   Injectable 9500 Unit(s) IV Push every 6 hours PRN  heparin   Injectable 4500 Unit(s) IV Push every 6 hours PRN  heparin  Infusion. 1700 Unit(s)/Hr IV Continuous <Continuous>  influenza  Vaccine (HIGH DOSE) 0.7 milliLiter(s) IntraMuscular once  mupirocin 2% Ointment 1 Application(s) Topical two times a day    [PHYSICAL EXAM:  TELEMETRY:  T(C): 38.1 (04-19-24 @ 10:23), Max: 38.2 (04-18-24 @ 19:31)  HR: 112 (04-19-24 @ 11:18) (102 - 118)  BP: 141/93 (04-19-24 @ 11:18) (111/72 - 148/91)  RR: 18 (04-19-24 @ 11:18) (17 - 21)  SpO2: 92% (04-19-24 @ 11:18) (90% - 98%)  Wt(kg): --  I&O's Summary    18 Apr 2024 07:01  -  19 Apr 2024 07:00  --------------------------------------------------------  IN: 374 mL / OUT: 200 mL / NET: 174 mL    19 Apr 2024 07:01  -  19 Apr 2024 15:21  --------------------------------------------------------  IN: 480 mL / OUT: 1400 mL / NET: -920 mL                                 Appearance: Normal	  HEENT:   Normal oral mucosa, PERRL, EOMI	  Neck: Supple, + JVD  Cardiovascular: Normal S1 S2, No JVD, No murmurs,   Respiratory: b/l low-mid lung crackles   Gastrointestinal:  Soft, Non-tender, + BS	  Skin: No rashes, No ecchymoses, No cyanosis  Extremities: warm, scab on L and R anterior tibia, 1+ edema  Neurologic: Non-focal  Psychiatry: A & O x 3  	  CARDIAC MARKERS                          15.9   6.31  )-----------( 205      ( 19 Apr 2024 06:34 )             52.0     04-19    148<H>  |  103  |  24<H>  ----------------------------<  100<H>  4.1   |  36<H>  |  0.80    Ca    9.4      19 Apr 2024 06:34  Mg     2.0     04-19    TPro  6.5  /  Alb  3.4  /  TBili  0.6  /  DBili  x   /  AST  25  /  ALT  30  /  AlkPhos  47  04-19    proBNP:   Lipid Profile:   HgA1c:   TSH:   PTT - ( 19 Apr 2024 04:24 )  PTT:69.9 sec

## 2024-04-19 NOTE — DIETITIAN INITIAL EVALUATION ADULT - PERTINENT LABORATORY DATA
04-19    148<H>  |  103  |  24<H>  ----------------------------<  100<H>  4.1   |  36<H>  |  0.80    Ca    9.4      19 Apr 2024 06:34  Mg     2.0     04-19    TPro  6.5  /  Alb  3.4  /  TBili  0.6  /  DBili  x   /  AST  25  /  ALT  30  /  AlkPhos  47  04-19  A1C with Estimated Average Glucose Result: 6.0 % (04-15-24 @ 05:30)

## 2024-04-19 NOTE — PROGRESS NOTE ADULT - ASSESSMENT
75 y/o M with no known PMHx presented to [facility] on 04/14/24 after mechanical fall, was found down on floor x 2 days by landlord. Labs significant for Trop T 52->54, CK 2k BNP 7k . CTH without acute bleed. CT A/P neg for PE, + ascites. Pt admitted to cardiac tele for newly dx rapid Afib, rhabdo and suspected ADHF exacerbation. Course c/b hypernatremia, s/p lactated ringers. Echo revealing LVEF 20-25%, reduced RVSF. Advanced HF team consulted plan for R/LHC however postponed given new fevers (fever peak 4/18 pm rectal 101). 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 LHC for Monday/Tuesday with Dr. Hernandez. CT PE ordered 4/19 to r/o PE d/t continued fever on IV Abx.

## 2024-04-19 NOTE — PROGRESS NOTE ADULT - NS ATTEND AMEND GEN_ALL_CORE FT
74M with no known PMHx presented to [facility] on 04/14/24 after being found down on the floor x2 days by karen. Labs significant for Trop T CK 2k BNP 7k . Imaging signifcant for segmental PE and ascites. CTH without acute bleed. Patient now admitted to cardiac tele for further management of acute CHF exacerbation, Afib, and rhabdomyolysis.    1. systolic HF, chronic   3+ pitting edema above knees, RA oxygen sat  95%   still volume overloaded on exam, but hypernatremic, CXR with effusions, CTPA today for further evaluation. Lasix 80mg IV BID  valsartan 20mg BID.  - Etiology: unclear can consider tachy-induced, possible amyloid however unclear whether Afib is new vs ischemic eval once euvolemic   - CHF consult, for RHC, defer for now re fever  , probably on Monday if necessary.    2. PAF  -EKG Afib RVR w/ HR 112s    HR 100s, unclear whether patient has hx of afib or is new   - EKG  BPM   - F/u repeat cardiac enzyme   - can resume BB  - Continue with heparin gtt and with plan to transition to NOAC    3. Rhabdo  Patient had mechanical fall and was on the ground x2 days   - BUN/Cr 22/1.14 CK 2336 -> 2002   - UA +blood. F/u repeat labs    4. Hypernatremia - resolving w iv fluid  - Medicine consulted for co-management    5. fever  improved to 100.1   concern re possible pneumonia  pro cac neg  02 sat 93% RA  ID consult (? tap pleural effusion as source of fever).  CTPA.    During non face-to-face time, I reviewed relevant portions of the patient’s medical record; including vitals, labs, medications, cardiac studies, remaining additional imaging and consultant recommendations. During face-to-face time, I took a relevant history and examined the patient. I also explained to the patient their diagnoses, and specific cardiopulmonary management plan, which required a high level of medical decision making.  I answered all questions related to the patient's medical conditions. I spent 50  minutes on total encounter; more than 50% of the visit was spent counseling and/or coordinating care by the attending physician, with plan of care discussed with the patient..

## 2024-04-20 LAB
ALBUMIN SERPL ELPH-MCNC: 3.4 G/DL — SIGNIFICANT CHANGE UP (ref 3.3–5)
ALP SERPL-CCNC: 46 U/L — SIGNIFICANT CHANGE UP (ref 40–120)
ALT FLD-CCNC: 34 U/L — SIGNIFICANT CHANGE UP (ref 10–45)
ANION GAP SERPL CALC-SCNC: 9 MMOL/L — SIGNIFICANT CHANGE UP (ref 5–17)
APTT BLD: 94.7 SEC — HIGH (ref 24.5–35.6)
AST SERPL-CCNC: 25 U/L — SIGNIFICANT CHANGE UP (ref 10–40)
BILIRUB SERPL-MCNC: 0.6 MG/DL — SIGNIFICANT CHANGE UP (ref 0.2–1.2)
BUN SERPL-MCNC: 26 MG/DL — HIGH (ref 7–23)
CALCIUM SERPL-MCNC: 9.6 MG/DL — SIGNIFICANT CHANGE UP (ref 8.4–10.5)
CHLORIDE SERPL-SCNC: 103 MMOL/L — SIGNIFICANT CHANGE UP (ref 96–108)
CO2 SERPL-SCNC: 36 MMOL/L — HIGH (ref 22–31)
CREAT SERPL-MCNC: 0.81 MG/DL — SIGNIFICANT CHANGE UP (ref 0.5–1.3)
EGFR: 92 ML/MIN/1.73M2 — SIGNIFICANT CHANGE UP
GLUCOSE SERPL-MCNC: 108 MG/DL — HIGH (ref 70–99)
HCT VFR BLD CALC: 53.7 % — HIGH (ref 39–50)
HGB BLD-MCNC: 16.4 G/DL — SIGNIFICANT CHANGE UP (ref 13–17)
MAGNESIUM SERPL-MCNC: 2.2 MG/DL — SIGNIFICANT CHANGE UP (ref 1.6–2.6)
MCHC RBC-ENTMCNC: 29.2 PG — SIGNIFICANT CHANGE UP (ref 27–34)
MCHC RBC-ENTMCNC: 30.5 GM/DL — LOW (ref 32–36)
MCV RBC AUTO: 95.7 FL — SIGNIFICANT CHANGE UP (ref 80–100)
NRBC # BLD: 0 /100 WBCS — SIGNIFICANT CHANGE UP (ref 0–0)
PLATELET # BLD AUTO: 188 K/UL — SIGNIFICANT CHANGE UP (ref 150–400)
POTASSIUM SERPL-MCNC: 3.7 MMOL/L — SIGNIFICANT CHANGE UP (ref 3.5–5.3)
POTASSIUM SERPL-SCNC: 3.7 MMOL/L — SIGNIFICANT CHANGE UP (ref 3.5–5.3)
PROT SERPL-MCNC: 6.2 G/DL — SIGNIFICANT CHANGE UP (ref 6–8.3)
RBC # BLD: 5.61 M/UL — SIGNIFICANT CHANGE UP (ref 4.2–5.8)
RBC # FLD: 15.9 % — HIGH (ref 10.3–14.5)
SODIUM SERPL-SCNC: 148 MMOL/L — HIGH (ref 135–145)
WBC # BLD: 6.46 K/UL — SIGNIFICANT CHANGE UP (ref 3.8–10.5)
WBC # FLD AUTO: 6.46 K/UL — SIGNIFICANT CHANGE UP (ref 3.8–10.5)

## 2024-04-20 PROCEDURE — 99233 SBSQ HOSP IP/OBS HIGH 50: CPT

## 2024-04-20 PROCEDURE — 71275 CT ANGIOGRAPHY CHEST: CPT | Mod: 26

## 2024-04-20 RX ADMIN — Medication 60 MILLIGRAM(S): at 09:20

## 2024-04-20 RX ADMIN — Medication 25 MILLIGRAM(S): at 06:09

## 2024-04-20 RX ADMIN — HEPARIN SODIUM 1000 UNIT(S)/HR: 5000 INJECTION INTRAVENOUS; SUBCUTANEOUS at 06:08

## 2024-04-20 RX ADMIN — Medication 3 MILLILITER(S): at 22:08

## 2024-04-20 RX ADMIN — HEPARIN SODIUM 1000 UNIT(S)/HR: 5000 INJECTION INTRAVENOUS; SUBCUTANEOUS at 07:54

## 2024-04-20 RX ADMIN — MUPIROCIN 1 APPLICATION(S): 20 OINTMENT TOPICAL at 06:10

## 2024-04-20 RX ADMIN — POLYETHYLENE GLYCOL 3350 17 GRAM(S): 17 POWDER, FOR SOLUTION ORAL at 06:09

## 2024-04-20 RX ADMIN — SPIRONOLACTONE 25 MILLIGRAM(S): 25 TABLET, FILM COATED ORAL at 06:09

## 2024-04-20 RX ADMIN — Medication 3 MILLILITER(S): at 15:42

## 2024-04-20 RX ADMIN — MUPIROCIN 1 APPLICATION(S): 20 OINTMENT TOPICAL at 17:33

## 2024-04-20 RX ADMIN — POLYETHYLENE GLYCOL 3350 17 GRAM(S): 17 POWDER, FOR SOLUTION ORAL at 17:32

## 2024-04-20 RX ADMIN — CEFTRIAXONE 100 MILLIGRAM(S): 500 INJECTION, POWDER, FOR SOLUTION INTRAMUSCULAR; INTRAVENOUS at 11:47

## 2024-04-20 RX ADMIN — LINEZOLID 600 MILLIGRAM(S): 600 INJECTION, SOLUTION INTRAVENOUS at 13:19

## 2024-04-20 RX ADMIN — VALSARTAN 40 MILLIGRAM(S): 80 TABLET ORAL at 05:19

## 2024-04-20 RX ADMIN — Medication 3 MILLILITER(S): at 05:24

## 2024-04-20 RX ADMIN — SENNA PLUS 2 TABLET(S): 8.6 TABLET ORAL at 22:08

## 2024-04-20 RX ADMIN — VALSARTAN 40 MILLIGRAM(S): 80 TABLET ORAL at 16:04

## 2024-04-20 RX ADMIN — HEPARIN SODIUM 1000 UNIT(S)/HR: 5000 INJECTION INTRAVENOUS; SUBCUTANEOUS at 20:16

## 2024-04-20 RX ADMIN — Medication 3 MILLILITER(S): at 09:20

## 2024-04-20 RX ADMIN — LINEZOLID 600 MILLIGRAM(S): 600 INJECTION, SOLUTION INTRAVENOUS at 01:54

## 2024-04-20 RX ADMIN — Medication 25 MILLIGRAM(S): at 17:33

## 2024-04-20 NOTE — PROGRESS NOTE ADULT - PROBLEM SELECTOR PLAN 7
Last BM was 7 days ago.     - CONT miralax BID, senna qHS   - s/p mineral oil enema 4/19  - tap water enema ordered

## 2024-04-20 NOTE — PROGRESS NOTE ADULT - PROBLEM SELECTOR PLAN 4
- On admission TBili 1.4 ALP 54 AST/ALT 91/54-> improving  - Hold hepatotoxic agents  - CT Pelvis: Liver measures near 18 cm in craniocaudal dimension. Subcentimeter hepatic hypodensities are too small to characterize.  - Hep panel negative  - RUQ US no acute findings Unremarkable liver. No biliary dilatation. 1.4 cm gallstone within the gallbladder neck similar to CT abdomen/pelvis from 4/14/2024. No evidence of acute cholecystitis.

## 2024-04-20 NOTE — PROGRESS NOTE ADULT - PROBLEM SELECTOR PLAN 5
- On admission Na 152->148  - Initially received IV NS hydration, then IV lactate ringers  - Remains hypernatremic, monitor closely. Asx.  - medicine following

## 2024-04-20 NOTE — PROGRESS NOTE ADULT - ASSESSMENT
73 y/o M with no known PMHx presented to [facility] on 04/14/24 after mechanical fall, was found down on floor x 2 days by landlord. Labs significant for Trop T 52->54, CK 2k BNP 7k . CTH without acute bleed. CT A/P neg for PE, + ascites. Pt admitted to cardiac tele for newly dx rapid Afib, rhabdo and suspected ADHF exacerbation. Course c/b hypernatremia, s/p lactated ringers. Echo revealing LVEF 20-25%, reduced RVSF. Advanced HF team consulted plan for R/LHC however postponed given new fevers (fever peak 4/18 pm rectal 101). 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 LHC for Monday/Tuesday with Dr. Hernandez. CT PE ordered 4/19 to r/o PE d/t continued fever on IV Abx.

## 2024-04-20 NOTE — PROGRESS NOTE ADULT - PROBLEM SELECTOR PLAN 1
-TTE 4/15/24: Severely reduced LVSF and RVSF, LVEF 20-25%, mild biatrial enlargement, mild TR, PASP 68, dilated IVC  -CTA chest: Neg for PE. Small right and trace left pleural effusions.   -Diuresis: IV Lasix 60   -GDMT: Valsartan 40 BID, spironolactone 25 mg QD, metoprolol tartrate 25 mg BID   -Core measures, strict I/O's daily weights, 1.2L PO restriction, strict I/Os, cont tele/pulse ox  -Advanced HF team following; [facility] tentatively for 4/22

## 2024-04-20 NOTE — PROGRESS NOTE ADULT - NS ATTEND AMEND GEN_ALL_CORE FT
74M with no known PMHx presented to [facility] on 04/14/24 after being found down on the floor x2 days by karen. Labs significant for Trop T CK 2k BNP 7k . Imaging signifcant for segmental PE and ascites. CTH without acute bleed. Patient now admitted to cardiac tele for further management of acute CHF exacerbation, Afib, and rhabdomyolysis.    1. systolic HF, chronic   3+ pitting edema above knees, RA oxygen sat  95%   still volume overloaded on exam, but hypernatremic, CXR with effusions, CTPA today for further evaluation. Lasix 80mg IV BID  valsartan 20mg BID.  - Etiology: unclear can consider tachy-induced, possible amyloid however unclear whether Afib is new vs ischemic eval once euvolemic   - CHF consult, for RHC, defer for now re fever  , probably on Monday if necessary.    2. PAF  -EKG Afib RVR w/ HR 112s    HR 100s, unclear whether patient has hx of afib or is new   - EKG  BPM   - F/u repeat cardiac enzyme   - can resume BB  - Continue with heparin gtt and with plan to transition to NOAC    3. Rhabdo  Patient had mechanical fall and was on the ground x2 days   - BUN/Cr 22/1.14 CK 2336 -> 2002   - UA +blood. F/u repeat labs    4. Hypernatremia - resolving w iv fluid  - Medicine consulted for co-management    5. fever  CTPA with PE, on hep gtt.    During non face-to-face time, I reviewed relevant portions of the patient’s medical record; including vitals, labs, medications, cardiac studies, remaining additional imaging and consultant recommendations. During face-to-face time, I took a relevant history and examined the patient. I also explained to the patient their diagnoses, and specific cardiopulmonary management plan, which required a high level of medical decision making.  I answered all questions related to the patient's medical conditions. I spent 50  minutes on total encounter; more than 50% of the visit was spent counseling and/or coordinating care by the attending physician, with plan of care discussed with the patient..

## 2024-04-20 NOTE — PROGRESS NOTE ADULT - ASSESSMENT
75 y/o man with minimal medical hx in our EMR; who presented to [facility] after a fall at home. Found to have acute HFrEF,   Afib w/ RVR, hypoxemia (still requiring O2 Nasal cannula ), planned for left heart cath later  this admission. Cath delayed due to fever.     # Fever  -Pt has been afebrile for over 24hrs and no leucocytosis.   -Will continue Ceftriaxone fro 5 day course started on April 17th -- planned for 5 day course. (EOT 4/21)  -Will f/u Blood cx   -Pt is s/p negative LE Dopplers on 4/19/24   -Awaiting repeating CT PE protocol to clarify if PE present (if PE present, would explain fevers); and to evaluate  for interval changes in pleural effusions and lung parenchyma    # Acute decompensated HF  Diuresis per primary team. Pt is currently on furosemide 60mg IV q24hrs   GDMT per primary team and pt is currently on  valsartan 40 mg q12 and , spironolactone 25mg qd     # Atrial fibrillation with RVR   -Will continue  heparin gtt and monitor PTT Goal 58-99  -Will continue Metoprolol tartrate 25mg BID     # Leg wounds   -Will continue linezolid 600mg BID x 5 days started Apri 19th  (EOT 4/24)  -Will continue topical mupirocin x 5 days to areas of drainage    # Constipation   Last BM was over7 days ago.  -Continue miralax BID and senna qHS   -Will start lactulose     # Hypoxemia likley secondary to volume overload  -Awaiting repeating CT PE (first scan from April 4th unable to exclude segemental PE due to respiratory motion)   - pleural effusions likely contributory to hypoxemia; diuretic plan per primary team   -Pulmonary following and will continue aggressive diuresis     # Lethargy  (resolved)  -Will continue to monitor MS   -Per chart review pt s/p CT head on 4/19 showing no hemorrhage, mass effect or infarction.  -Will continue BiPAP at night  -Per pulm, some suspicion for OHS, BETTINA, given elevated bicarb and high BMI.   -Recommend Checking an  arterial blood gas if patient becomes lethargic   -Pt should  follow up with pulmonology for sleep study vs setting up with BiPAP before discharge if daytime PaCO2 >45mmHg.     # Incidental finding of Thoracic aortic aneurysm  Thoracic aortic aneurysm measuring 4cm.   -Pt to f/u with CT surgery as outpatient for interval imaging if consistent with patient's wishes    # Morbid Obesity  BMI: BMI (kg/m2): 42 (04-14-24 @ 16:05)  Affects all aspects of care. Dose medications by weight       DVT ppx: Heparin gtt    #DISPO  -As per primary team     50 minutes spent on total encounter. The necessity of the time spent during the encounter on this date of service was due to:    Review of hospital course, labs, vitals, medical records.  Bedside exam   Discussed plan of care with primary team   Documenting the encounter. 75 y/o man with minimal medical hx in our EMR; who presented to [facility] after a fall at home. Found to have acute HFrEF,   Afib w/ RVR, hypoxemia (still requiring O2 Nasal cannula ), planned for left heart cath later  this admission. Cath delayed due to fever.     # Fever  -Pt has been afebrile for over 24hrs and no leucocytosis.   -Will continue Ceftriaxone fro 5 day course started on April 17th -- planned for 5 day course. (EOT 4/21)  -Will f/u Blood cx   -Pt is s/p negative LE Dopplers on 4/19/24   -Awaiting repeating CT PE protocol to clarify if PE present (if PE present, would explain fevers); and to evaluate  for interval changes in pleural effusions and lung parenchyma    # Acute decompensated HF  Diuresis per primary team. Pt is currently on furosemide 60mg IV q24hrs   GDMT per primary team and pt is currently on  valsartan 40 mg q12 and , spironolactone 25mg qd   -Pt is for L+RHC potentially on Monday      # Atrial fibrillation with RVR   -Will continue  heparin gtt and monitor PTT Goal 58-99  -Will continue Metoprolol tartrate 25mg BID     # Leg wounds   -Will continue linezolid 600mg BID x 5 days started Apri 19th  (EOT 4/24)  -Will continue topical mupirocin x 5 days to areas of drainage    # Constipation   Last BM was over7 days ago.  -Continue miralax BID and senna qHS   -Will start lactulose     # Hypoxemia likley secondary to volume overload  -Awaiting repeating CT PE (first scan from April 4th unable to exclude segemental PE due to respiratory motion)   - pleural effusions likely contributory to hypoxemia; diuretic plan per primary team   -Pulmonary following and will continue aggressive diuresis     # Lethargy  (resolved)  -Will continue to monitor MS   -Per chart review pt s/p CT head on 4/19 showing no hemorrhage, mass effect or infarction.  -Will continue BiPAP at night  -Per pulm, some suspicion for OHS, BETTINA, given elevated bicarb and high BMI.   -Recommend Checking an  arterial blood gas if patient becomes lethargic   -Pt should  follow up with pulmonology for sleep study vs setting up with BiPAP before discharge if daytime PaCO2 >45mmHg.     # Incidental finding of Thoracic aortic aneurysm  Thoracic aortic aneurysm measuring 4cm.   -Pt to f/u with CT surgery as outpatient for interval imaging if consistent with patient's wishes    # Morbid Obesity  BMI: BMI (kg/m2): 42 (04-14-24 @ 16:05)  Affects all aspects of care. Dose medications by weight       DVT ppx: Heparin gtt    #DISPO  -As per primary team     50 minutes spent on total encounter. The necessity of the time spent during the encounter on this date of service was due to:    Review of hospital course, labs, vitals, medical records.  Bedside exam   Discussed plan of care with primary team   Documenting the encounter. 75 y/o man with minimal medical hx in our EMR; who presented to [facility] after a fall at home. Found to have acute HFrEF,   Afib w/ RVR, hypoxemia (still requiring O2 Nasal cannula ), planned for left heart cath later  this admission. Cath delayed due to fever.     # Fever  -Pt has been afebrile for over 24hrs and no leucocytosis.   -Will continue Ceftriaxone fro 5 day course started on April 17th -- planned for 5 day course. (EOT 4/21)  -Will f/u Blood cx   -Pt is s/p negative LE Dopplers on 4/19/24   -Awaiting repeating CT PE protocol to clarify if PE present (if PE present, would explain fevers); and to evaluate  for interval changes in pleural effusions and lung parenchyma    # Acute decompensated HF  Diuresis per primary team. Pt is currently on furosemide 60mg IV q24hrs   GDMT per primary team and pt is currently on  valsartan 40 mg q12 and , spironolactone 25mg qd   -Pt is for L+RHC potentially on Monday      # Atrial fibrillation with RVR   -Will continue  heparin gtt and monitor PTT Goal 58-99  -Will continue Metoprolol tartrate 25mg BID     # Leg wounds   -Will continue linezolid 600mg BID x 5 days started Apri 19th  (EOT 4/24)  -Will continue topical mupirocin x 5 days to areas of drainage    # Constipation   Last BM was over7 days ago.  -Continue miralax BID and senna qHS   -Will start lactulose and given an enema.     # Hypoxemia likley secondary to volume overload  -Awaiting repeating CT PE (first scan from April 4th unable to exclude segemental PE due to respiratory motion)   - pleural effusions likely contributory to hypoxemia; diuretic plan per primary team   -Pulmonary following and will continue aggressive diuresis     # Lethargy  (resolved)  -Will continue to monitor MS   -Per chart review pt s/p CT head on 4/19 showing no hemorrhage, mass effect or infarction.  -Will continue BiPAP at night  -Per pulm, some suspicion for OHS, BETTINA, given elevated bicarb and high BMI.   -Recommend Checking an  arterial blood gas if patient becomes lethargic   -Pt should  follow up with pulmonology for sleep study vs setting up with BiPAP before discharge if daytime PaCO2 >45mmHg.     # Incidental finding of Thoracic aortic aneurysm  Thoracic aortic aneurysm measuring 4cm.   -Pt to f/u with CT surgery as outpatient for interval imaging if consistent with patient's wishes    # Morbid Obesity  BMI: BMI (kg/m2): 42 (04-14-24 @ 16:05)  Affects all aspects of care. Dose medications by weight       DVT ppx: Heparin gtt    #DISPO  -As per primary team     50 minutes spent on total encounter. The necessity of the time spent during the encounter on this date of service was due to:    Review of hospital course, labs, vitals, medical records.  Bedside exam   Discussed plan of care with primary team   Documenting the encounter. 73 y/o man with minimal medical hx in our EMR; who presented to [facility] after a fall at home. Found to have acute HFrEF,   Afib w/ RVR, hypoxemia (still requiring O2 Nasal cannula ), planned for left heart cath later  this admission. Cath delayed due to fever.     # Fever  -Pt has been afebrile for over 24hrs and no leucocytosis.   -Will continue Ceftriaxone fro 5 day course started on April 17th -- planned for 5 day course. (EOT 4/21)  -Will f/u Blood cx   -Pt is s/p negative LE Dopplers on 4/19/24   -Awaiting repeating CT PE protocol to clarify if PE present (if PE present, would explain fevers); and to evaluate  for interval changes in pleural effusions and lung parenchyma    # Acute decompensated HF  Diuresis per primary team. Pt is currently on furosemide 60mg IV q24hrs   GDMT per primary team and pt is currently on  valsartan 40 mg q12 and , spironolactone 25mg qd   -Pt is for L+RHC potentially on Monday      #Hypernatremia (downtrending)  -Na 148  -Will continue to monitor Na     # Atrial fibrillation with RVR   -Will continue  heparin gtt and monitor PTT Goal 58-99  -Will continue Metoprolol tartrate 25mg BID     # Leg wounds   -Will continue linezolid 600mg BID x 5 days started Apri 19th  (EOT 4/24)  -Will continue topical mupirocin x 5 days to areas of drainage    # Constipation   Last BM was over7 days ago.  -Continue miralax BID and senna qHS   -Will start lactulose and given an enema.     # Hypoxemia likley secondary to volume overload  -Awaiting repeating CT PE (first scan from April 4th unable to exclude segemental PE due to respiratory motion)   - pleural effusions likely contributory to hypoxemia; diuretic plan per primary team   -Pulmonary following and will continue aggressive diuresis     # Lethargy  (resolved)  -Will continue to monitor MS   -Per chart review pt s/p CT head on 4/19 showing no hemorrhage, mass effect or infarction.  -Will continue BiPAP at night  -Per pulm, some suspicion for OHS, BETTINA, given elevated bicarb and high BMI.   -Recommend Checking an  arterial blood gas if patient becomes lethargic   -Pt should  follow up with pulmonology for sleep study vs setting up with BiPAP before discharge if daytime PaCO2 >45mmHg.     # Incidental finding of Thoracic aortic aneurysm  Thoracic aortic aneurysm measuring 4cm.   -Pt to f/u with CT surgery as outpatient for interval imaging if consistent with patient's wishes    # Morbid Obesity  BMI: BMI (kg/m2): 42 (04-14-24 @ 16:05)  Affects all aspects of care. Dose medications by weight       DVT ppx: Heparin gtt    #DISPO  -As per primary team     50 minutes spent on total encounter. The necessity of the time spent during the encounter on this date of service was due to:    Review of hospital course, labs, vitals, medical records.  Bedside exam   Discussed plan of care with primary team   Documenting the encounter.

## 2024-04-20 NOTE — PROGRESS NOTE ADULT - SUBJECTIVE AND OBJECTIVE BOX
Patient was seen and examined at bedside. Case discuss with the primary  team. Pt still without a BM despite bowel regimen. Pt denied CP or SOB.     OBJECTIVE:  Vital Signs Last 24 Hrs  T(C): 36.9 (20 Apr 2024 05:15), Max: 37.1 (19 Apr 2024 18:06)  T(F): 98.4 (20 Apr 2024 05:15), Max: 98.7 (19 Apr 2024 18:06)  HR: 100 (20 Apr 2024 08:34) (98 - 125)  BP: 155/86 (20 Apr 2024 08:34) (108/66 - 155/86)  BP(mean): 108 (20 Apr 2024 08:34) (101 - 111)  RR: 18 (20 Apr 2024 08:34) (18 - 19)  SpO2: 92% (20 Apr 2024 08:34) (92% - 96%)    Parameters below as of 20 Apr 2024 08:34  Patient On (Oxygen Delivery Method): nasal cannula  O2 Flow (L/min): 4        PHYSICAL EXAM:  Gen: NAD laying in bed; Pt is on nasal cannula 4L   CV: RRR, +S1/S2, no mumur  Pulm: CTA b/l no wheezing or crackles   Abd: soft, NTND + BS no rebound or guarding   Ext: some purulent drainage from scabbed over wounds on bilateral anterior shins today   Neuro: AOx3, speaking in full sentences        LABS:                        16.4   6.46  )-----------( 188      ( 20 Apr 2024 05:30 )             53.7     04-20    148<H>  |  103  |  26<H>  ----------------------------<  108<H>  3.7   |  36<H>  |  0.81    Ca    9.6      20 Apr 2024 05:30  Mg     2.2     04-20    TPro  6.2  /  Alb  3.4  /  TBili  0.6  /  DBili  x   /  AST  25  /  ALT  34  /  AlkPhos  46  04-20    LIVER FUNCTIONS - ( 20 Apr 2024 05:30 )  Alb: 3.4 g/dL / Pro: 6.2 g/dL / ALK PHOS: 46 U/L / ALT: 34 U/L / AST: 25 U/L / GGT: x           PTT - ( 20 Apr 2024 05:30 )  PTT:94.7 sec  CAPILLARY BLOOD GLUCOSE      albuterol/ipratropium for Nebulization 3 milliLiter(s) Nebulizer every 6 hours  cefTRIAXone   IVPB 2000 milliGRAM(s) IV Intermittent every 24 hours  furosemide   Injectable 60 milliGRAM(s) IV Push every 24 hours  heparin   Injectable 4500 Unit(s) IV Push every 6 hours PRN  heparin   Injectable 9500 Unit(s) IV Push every 6 hours PRN  heparin  Infusion. 1700 Unit(s)/Hr IV Continuous <Continuous>  influenza  Vaccine (HIGH DOSE) 0.7 milliLiter(s) IntraMuscular once  linezolid    Tablet 600 milliGRAM(s) Oral every 12 hours  metoprolol tartrate 25 milliGRAM(s) Oral two times a day  mupirocin 2% Ointment 1 Application(s) Topical two times a day  polyethylene glycol 3350 17 Gram(s) Oral two times a day  senna 2 Tablet(s) Oral at bedtime  spironolactone 25 milliGRAM(s) Oral daily  valsartan 40 milliGRAM(s) Oral every 12 hours

## 2024-04-20 NOTE — PROGRESS NOTE ADULT - PROBLEM SELECTOR PLAN 2
- Intermittent fevers, highest temp rectal 101.6F 4/17, now rectal 100. 6 4/19  - Per ID, favor community acquired aspiration PNA (given poor dentition, L side opacity on CXR)  - Empiric coverage with ceftriaxone 2 g IV QD per ID recs, anticipate 5 day empiric course.  - HIV -, HEP -, UStrep -, Ulegionella -, Procal -  - If patient develops cough/sputum production, will send sputum cx  - BCx 3 NGTD for 48 hours, BC collected 4/19 f/u  - CTA PE negative for PE at [facility]  - was indeterminate d/t motion artifact  - CTA pe rule out on 4/20 with improvement in study showing Multiple small subsegmental emboli identified within the right lower lobe pulmonary artery branches. Small plueral effusions with atelectasis.   - B/L LE dopplers negative for DVT

## 2024-04-20 NOTE — PROGRESS NOTE ADULT - SUBJECTIVE AND OBJECTIVE BOX
INCOMPLETE    OVERNIGHT EVENTS:  No acute events overnight.    SUBJECTIVE / INTERVAL HPI: Patient seen and examined at bedside.    Denies CP, SOB, dizziness/lightheadedness, palpitations    12 point ROS otherwise negative    VITAL SIGNS:  Vital Signs Last 24 Hrs  T(C): 36.9 (20 Apr 2024 05:15), Max: 37.1 (19 Apr 2024 18:06)  T(F): 98.4 (20 Apr 2024 05:15), Max: 98.7 (19 Apr 2024 18:06)  HR: 110 (20 Apr 2024 16:08) (98 - 112)  BP: 137/56 (20 Apr 2024 16:08) (95/67 - 155/86)  BP(mean): 78 (20 Apr 2024 16:08) (77 - 111)  RR: 16 (20 Apr 2024 16:08) (16 - 19)  SpO2: 95% (20 Apr 2024 16:08) (92% - 96%)    Parameters below as of 20 Apr 2024 16:08  Patient On (Oxygen Delivery Method): nasal cannula  O2 Flow (L/min): 4        I&O's Summary    19 Apr 2024 07:01  -  20 Apr 2024 07:00  --------------------------------------------------------  IN: 480 mL / OUT: 1400 mL / NET: -920 mL    20 Apr 2024 07:01  -  20 Apr 2024 17:11  --------------------------------------------------------  IN: 470 mL / OUT: 900 mL / NET: -430 mL          PHYSICAL EXAM:    General: sitting up in bed, NAD  HEENT: conjunctiva clear; MMM  Neck: supple, no JVD  Cardiovascular: RRR, no murmurs  Respiratory: CTA B/L  Gastrointestinal: soft, NT/ND, +BS  Extremities: WWP, no edema or cyanosis  Vascular: Peripheral pulses palpable 2+ bilaterally/ carotid 2+ b/l, no bruits; radial 2+ b/l; femoral 2+ b/l no bruits; DP/PT 2+ b/l  Neurological: AAOx3, no focal deficits    MEDICATIONS:  MEDICATIONS  (STANDING):  albuterol/ipratropium for Nebulization 3 milliLiter(s) Nebulizer every 6 hours  cefTRIAXone   IVPB 2000 milliGRAM(s) IV Intermittent every 24 hours  furosemide   Injectable 60 milliGRAM(s) IV Push every 24 hours  heparin  Infusion. 1700 Unit(s)/Hr (17 mL/Hr) IV Continuous <Continuous>  influenza  Vaccine (HIGH DOSE) 0.7 milliLiter(s) IntraMuscular once  linezolid    Tablet 600 milliGRAM(s) Oral every 12 hours  metoprolol tartrate 25 milliGRAM(s) Oral two times a day  mupirocin 2% Ointment 1 Application(s) Topical two times a day  polyethylene glycol 3350 17 Gram(s) Oral two times a day  senna 2 Tablet(s) Oral at bedtime  spironolactone 25 milliGRAM(s) Oral daily  valsartan 40 milliGRAM(s) Oral every 12 hours    MEDICATIONS  (PRN):  heparin   Injectable 4500 Unit(s) IV Push every 6 hours PRN For aPTT between 40 - 57  heparin   Injectable 9500 Unit(s) IV Push every 6 hours PRN For aPTT less than 40      LABS:                        16.4   6.46  )-----------( 188      ( 20 Apr 2024 05:30 )             53.7       04-20    148<H>  |  103  |  26<H>  ----------------------------<  108<H>  3.7   |  36<H>  |  0.81    Ca    9.6      20 Apr 2024 05:30  Mg     2.2     04-20    TPro  6.2  /  Alb  3.4  /  TBili  0.6  /  DBili  x   /  AST  25  /  ALT  34  /  AlkPhos  46  04-20      PTT - ( 20 Apr 2024 05:30 )  PTT:94.7 sec          TELEMETRY:    RADIOLOGY & ADDITIONAL TESTS: Reviewed.

## 2024-04-21 LAB
ALBUMIN SERPL ELPH-MCNC: 3.6 G/DL — SIGNIFICANT CHANGE UP (ref 3.3–5)
ALP SERPL-CCNC: 50 U/L — SIGNIFICANT CHANGE UP (ref 40–120)
ALT FLD-CCNC: 38 U/L — SIGNIFICANT CHANGE UP (ref 10–45)
ANION GAP SERPL CALC-SCNC: 10 MMOL/L — SIGNIFICANT CHANGE UP (ref 5–17)
ANION GAP SERPL CALC-SCNC: 8 MMOL/L — SIGNIFICANT CHANGE UP (ref 5–17)
APTT BLD: 73.3 SEC — HIGH (ref 24.5–35.6)
AST SERPL-CCNC: 30 U/L — SIGNIFICANT CHANGE UP (ref 10–40)
BILIRUB SERPL-MCNC: 0.5 MG/DL — SIGNIFICANT CHANGE UP (ref 0.2–1.2)
BUN SERPL-MCNC: 28 MG/DL — HIGH (ref 7–23)
BUN SERPL-MCNC: 37 MG/DL — HIGH (ref 7–23)
CALCIUM SERPL-MCNC: 9.8 MG/DL — SIGNIFICANT CHANGE UP (ref 8.4–10.5)
CALCIUM SERPL-MCNC: 9.8 MG/DL — SIGNIFICANT CHANGE UP (ref 8.4–10.5)
CHLORIDE SERPL-SCNC: 100 MMOL/L — SIGNIFICANT CHANGE UP (ref 96–108)
CHLORIDE SERPL-SCNC: 103 MMOL/L — SIGNIFICANT CHANGE UP (ref 96–108)
CO2 SERPL-SCNC: 36 MMOL/L — HIGH (ref 22–31)
CO2 SERPL-SCNC: 37 MMOL/L — HIGH (ref 22–31)
CREAT SERPL-MCNC: 0.86 MG/DL — SIGNIFICANT CHANGE UP (ref 0.5–1.3)
CREAT SERPL-MCNC: 1.78 MG/DL — HIGH (ref 0.5–1.3)
CULTURE RESULTS: SIGNIFICANT CHANGE UP
EGFR: 39 ML/MIN/1.73M2 — LOW
EGFR: 90 ML/MIN/1.73M2 — SIGNIFICANT CHANGE UP
GLUCOSE SERPL-MCNC: 101 MG/DL — HIGH (ref 70–99)
GLUCOSE SERPL-MCNC: 112 MG/DL — HIGH (ref 70–99)
HCT VFR BLD CALC: 54.4 % — HIGH (ref 39–50)
HGB BLD-MCNC: 16.5 G/DL — SIGNIFICANT CHANGE UP (ref 13–17)
MAGNESIUM SERPL-MCNC: 2.3 MG/DL — SIGNIFICANT CHANGE UP (ref 1.6–2.6)
MCHC RBC-ENTMCNC: 29.1 PG — SIGNIFICANT CHANGE UP (ref 27–34)
MCHC RBC-ENTMCNC: 30.3 GM/DL — LOW (ref 32–36)
MCV RBC AUTO: 95.9 FL — SIGNIFICANT CHANGE UP (ref 80–100)
NRBC # BLD: 0 /100 WBCS — SIGNIFICANT CHANGE UP (ref 0–0)
PLATELET # BLD AUTO: 194 K/UL — SIGNIFICANT CHANGE UP (ref 150–400)
POTASSIUM SERPL-MCNC: 3.7 MMOL/L — SIGNIFICANT CHANGE UP (ref 3.5–5.3)
POTASSIUM SERPL-MCNC: 3.8 MMOL/L — SIGNIFICANT CHANGE UP (ref 3.5–5.3)
POTASSIUM SERPL-SCNC: 3.7 MMOL/L — SIGNIFICANT CHANGE UP (ref 3.5–5.3)
POTASSIUM SERPL-SCNC: 3.8 MMOL/L — SIGNIFICANT CHANGE UP (ref 3.5–5.3)
PROT SERPL-MCNC: 6.9 G/DL — SIGNIFICANT CHANGE UP (ref 6–8.3)
RBC # BLD: 5.67 M/UL — SIGNIFICANT CHANGE UP (ref 4.2–5.8)
RBC # FLD: 15.9 % — HIGH (ref 10.3–14.5)
SODIUM SERPL-SCNC: 147 MMOL/L — HIGH (ref 135–145)
SODIUM SERPL-SCNC: 147 MMOL/L — HIGH (ref 135–145)
SPECIMEN SOURCE: SIGNIFICANT CHANGE UP
WBC # BLD: 5.77 K/UL — SIGNIFICANT CHANGE UP (ref 3.8–10.5)
WBC # FLD AUTO: 5.77 K/UL — SIGNIFICANT CHANGE UP (ref 3.8–10.5)

## 2024-04-21 PROCEDURE — 99233 SBSQ HOSP IP/OBS HIGH 50: CPT

## 2024-04-21 PROCEDURE — 99232 SBSQ HOSP IP/OBS MODERATE 35: CPT

## 2024-04-21 RX ORDER — METOPROLOL TARTRATE 50 MG
5 TABLET ORAL ONCE
Refills: 0 | Status: COMPLETED | OUTPATIENT
Start: 2024-04-21 | End: 2024-04-21

## 2024-04-21 RX ORDER — ASPIRIN/CALCIUM CARB/MAGNESIUM 324 MG
325 TABLET ORAL ONCE
Refills: 0 | Status: COMPLETED | OUTPATIENT
Start: 2024-04-22 | End: 2024-04-22

## 2024-04-21 RX ORDER — CLOPIDOGREL BISULFATE 75 MG/1
600 TABLET, FILM COATED ORAL ONCE
Refills: 0 | Status: COMPLETED | OUTPATIENT
Start: 2024-04-21 | End: 2024-04-22

## 2024-04-21 RX ADMIN — CEFTRIAXONE 100 MILLIGRAM(S): 500 INJECTION, POWDER, FOR SOLUTION INTRAMUSCULAR; INTRAVENOUS at 12:41

## 2024-04-21 RX ADMIN — HEPARIN SODIUM 1000 UNIT(S)/HR: 5000 INJECTION INTRAVENOUS; SUBCUTANEOUS at 03:43

## 2024-04-21 RX ADMIN — Medication 3 MILLILITER(S): at 17:07

## 2024-04-21 RX ADMIN — Medication 3 MILLILITER(S): at 04:10

## 2024-04-21 RX ADMIN — Medication 5 MILLIGRAM(S): at 17:02

## 2024-04-21 RX ADMIN — Medication 60 MILLIGRAM(S): at 09:49

## 2024-04-21 RX ADMIN — MUPIROCIN 1 APPLICATION(S): 20 OINTMENT TOPICAL at 05:45

## 2024-04-21 RX ADMIN — LINEZOLID 600 MILLIGRAM(S): 600 INJECTION, SOLUTION INTRAVENOUS at 23:55

## 2024-04-21 RX ADMIN — VALSARTAN 40 MILLIGRAM(S): 80 TABLET ORAL at 17:50

## 2024-04-21 RX ADMIN — POLYETHYLENE GLYCOL 3350 17 GRAM(S): 17 POWDER, FOR SOLUTION ORAL at 05:46

## 2024-04-21 RX ADMIN — SPIRONOLACTONE 25 MILLIGRAM(S): 25 TABLET, FILM COATED ORAL at 05:45

## 2024-04-21 RX ADMIN — Medication 3 MILLILITER(S): at 10:50

## 2024-04-21 RX ADMIN — HEPARIN SODIUM 1000 UNIT(S)/HR: 5000 INJECTION INTRAVENOUS; SUBCUTANEOUS at 08:30

## 2024-04-21 RX ADMIN — LINEZOLID 600 MILLIGRAM(S): 600 INJECTION, SOLUTION INTRAVENOUS at 01:05

## 2024-04-21 RX ADMIN — HEPARIN SODIUM 1000 UNIT(S)/HR: 5000 INJECTION INTRAVENOUS; SUBCUTANEOUS at 20:47

## 2024-04-21 RX ADMIN — HEPARIN SODIUM 1000 UNIT(S)/HR: 5000 INJECTION INTRAVENOUS; SUBCUTANEOUS at 08:28

## 2024-04-21 RX ADMIN — Medication 3 MILLILITER(S): at 21:46

## 2024-04-21 RX ADMIN — Medication 25 MILLIGRAM(S): at 17:50

## 2024-04-21 RX ADMIN — Medication 25 MILLIGRAM(S): at 05:45

## 2024-04-21 RX ADMIN — VALSARTAN 40 MILLIGRAM(S): 80 TABLET ORAL at 05:45

## 2024-04-21 RX ADMIN — MUPIROCIN 1 APPLICATION(S): 20 OINTMENT TOPICAL at 17:54

## 2024-04-21 RX ADMIN — SENNA PLUS 2 TABLET(S): 8.6 TABLET ORAL at 21:46

## 2024-04-21 RX ADMIN — LINEZOLID 600 MILLIGRAM(S): 600 INJECTION, SOLUTION INTRAVENOUS at 15:28

## 2024-04-21 NOTE — PHYSICAL THERAPY INITIAL EVALUATION ADULT - DIAGNOSIS, PT EVAL
5A: Primary Prevention/Risk Reduction for Loss of Balance and Falling 6A: Primary Prevention/Risk Reduction for Cardiovascular/Pulmonary Disorders

## 2024-04-21 NOTE — PROGRESS NOTE ADULT - SUBJECTIVE AND OBJECTIVE BOX
Patient was seen and examined at bedside. Case discuss with the primary  team. Pt had a bowel movement this morning.     OBJECTIVE:  Vital Signs Last 24 Hrs  T(C): 36.5 (21 Apr 2024 09:00), Max: 37.2 (20 Apr 2024 20:52)  T(F): 97.7 (21 Apr 2024 09:00), Max: 98.9 (20 Apr 2024 20:52)  HR: 110 (21 Apr 2024 12:37) (97 - 114)  BP: 87/53 (21 Apr 2024 12:37) (87/53 - 164/73)  BP(mean): 96 (21 Apr 2024 05:18) (78 - 96)  RR: 16 (21 Apr 2024 12:37) (16 - 18)  SpO2: 94% (21 Apr 2024 12:37) (92% - 98%)    Parameters below as of 21 Apr 2024 12:37  Patient On (Oxygen Delivery Method): nasal cannula w/ humidification  O2 Flow (L/min): 3    PHYSICAL EXAM:  Gen: NAD laying in bed; Pt is on nasal cannula 3L   CV: RRR, +S1/S2, no mumur  Pulm: CTA b/l no wheezing or crackles   Abd: soft, NTND + BS no rebound or guarding ;+ barrett   Ext: some purulent drainage from scabbed over wounds on bilateral anterior shins today   Neuro: AOx3, speaking in full sentences    LABS:                        16.5   5.77  )-----------( 194      ( 21 Apr 2024 05:30 )             54.4     04-21    147<H>  |  103  |  28<H>  ----------------------------<  101<H>  3.7   |  36<H>  |  0.86    Ca    9.8      21 Apr 2024 05:30  Mg     2.3     04-21    TPro  6.2  /  Alb  3.4  /  TBili  0.6  /  DBili  x   /  AST  25  /  ALT  34  /  AlkPhos  46  04-20    LIVER FUNCTIONS - ( 20 Apr 2024 05:30 )  Alb: 3.4 g/dL / Pro: 6.2 g/dL / ALK PHOS: 46 U/L / ALT: 34 U/L / AST: 25 U/L / GGT: x           PTT - ( 21 Apr 2024 05:30 )  PTT:73.3 sec    4/20 CT angio: 1.  Multiple small subsegmental emboli identified within the right lower   lobe pulmonary artery branches.  2.  Small bowel pleural effusions, left greater than right, with   associated atelectasis.          albuterol/ipratropium for Nebulization 3 milliLiter(s) Nebulizer every 6 hours  cefTRIAXone   IVPB 2000 milliGRAM(s) IV Intermittent every 24 hours  furosemide   Injectable 60 milliGRAM(s) IV Push every 24 hours  heparin   Injectable 4500 Unit(s) IV Push every 6 hours PRN  heparin   Injectable 9500 Unit(s) IV Push every 6 hours PRN  heparin  Infusion. 1700 Unit(s)/Hr IV Continuous <Continuous>  influenza  Vaccine (HIGH DOSE) 0.7 milliLiter(s) IntraMuscular once  linezolid    Tablet 600 milliGRAM(s) Oral every 12 hours  metoprolol tartrate 25 milliGRAM(s) Oral two times a day  mupirocin 2% Ointment 1 Application(s) Topical two times a day  polyethylene glycol 3350 17 Gram(s) Oral two times a day  senna 2 Tablet(s) Oral at bedtime  spironolactone 25 milliGRAM(s) Oral daily  valsartan 40 milliGRAM(s) Oral every 12 hours

## 2024-04-21 NOTE — PROGRESS NOTE ADULT - SUBJECTIVE AND OBJECTIVE BOX
OVERNIGHT EVENTS:  No acute events overnight.    SUBJECTIVE / INTERVAL HPI: Patient seen and examined at bedside.    Denies CP, SOB, dizziness/lightheadedness, palpitations    12 point ROS otherwise negative    VITAL SIGNS:  Vital Signs Last 24 Hrs  T(C): 36.5 (21 Apr 2024 09:00), Max: 37.2 (20 Apr 2024 20:52)  T(F): 97.7 (21 Apr 2024 09:00), Max: 98.9 (20 Apr 2024 20:52)  HR: 104 (21 Apr 2024 09:00) (97 - 114)  BP: 114/70 (21 Apr 2024 09:00) (104/76 - 164/73)  BP(mean): 96 (21 Apr 2024 05:18) (77 - 96)  RR: 17 (21 Apr 2024 09:00) (16 - 18)  SpO2: 98% (21 Apr 2024 09:00) (92% - 98%)    Parameters below as of 21 Apr 2024 09:00  Patient On (Oxygen Delivery Method): nasal cannula w/ humidification  O2 Flow (L/min): 4        I&O's Summary    20 Apr 2024 07:01  -  21 Apr 2024 07:00  --------------------------------------------------------  IN: 640 mL / OUT: 1650 mL / NET: -1010 mL    21 Apr 2024 07:01  -  21 Apr 2024 12:19  --------------------------------------------------------  IN: 0 mL / OUT: 0 mL / NET: 0 mL          PHYSICAL EXAM:    General: sitting up in bed, NAD  HEENT: conjunctiva clear; MMM  Neck: supple, no JVD  Cardiovascular: RRR, no murmurs  Respiratory: CTA B/L  Gastrointestinal: soft, NT/ND, +BS  Extremities: WWP, no edema or cyanosis  Vascular: Peripheral pulses palpable 2+ bilaterally/ carotid 2+ b/l, no bruits; radial 2+ b/l; femoral 2+ b/l no bruits; DP/PT 2+ b/l  Neurological: AAOx3, no focal deficits    MEDICATIONS:  MEDICATIONS  (STANDING):  albuterol/ipratropium for Nebulization 3 milliLiter(s) Nebulizer every 6 hours  cefTRIAXone   IVPB 2000 milliGRAM(s) IV Intermittent every 24 hours  furosemide   Injectable 60 milliGRAM(s) IV Push every 24 hours  heparin  Infusion. 1700 Unit(s)/Hr (17 mL/Hr) IV Continuous <Continuous>  influenza  Vaccine (HIGH DOSE) 0.7 milliLiter(s) IntraMuscular once  linezolid    Tablet 600 milliGRAM(s) Oral every 12 hours  metoprolol tartrate 25 milliGRAM(s) Oral two times a day  mupirocin 2% Ointment 1 Application(s) Topical two times a day  polyethylene glycol 3350 17 Gram(s) Oral two times a day  senna 2 Tablet(s) Oral at bedtime  spironolactone 25 milliGRAM(s) Oral daily  valsartan 40 milliGRAM(s) Oral every 12 hours    MEDICATIONS  (PRN):  heparin   Injectable 4500 Unit(s) IV Push every 6 hours PRN For aPTT between 40 - 57  heparin   Injectable 9500 Unit(s) IV Push every 6 hours PRN For aPTT less than 40      LABS:                        16.5   5.77  )-----------( 194      ( 21 Apr 2024 05:30 )             54.4       04-21    147<H>  |  103  |  28<H>  ----------------------------<  101<H>  3.7   |  36<H>  |  0.86    Ca    9.8      21 Apr 2024 05:30  Mg     2.3     04-21    TPro  6.2  /  Alb  3.4  /  TBili  0.6  /  DBili  x   /  AST  25  /  ALT  34  /  AlkPhos  46  04-20      PTT - ( 21 Apr 2024 05:30 )  PTT:73.3 sec          TELEMETRY:    RADIOLOGY & ADDITIONAL TESTS: Reviewed.

## 2024-04-21 NOTE — PROGRESS NOTE ADULT - NS ATTEND AMEND GEN_ALL_CORE FT
74M with no known PMHx presented to [facility] on 04/14/24 after being found down on the floor x2 days by karen. Labs significant for Trop T CK 2k BNP 7k . Imaging signifcant for segmental PE and ascites. CTH without acute bleed. Patient now admitted to cardiac tele for further management of acute CHF exacerbation, Afib, and rhabdomyolysis.    1. systolic HF, chronic 20-25%  Dry, SILKE, hold off lasix IV today. CTPA with PE with small bilateral effusions 2 days ago.  On metoprolol, valsartan and aldactone.    2. PAF  EKG Afib RVR w/ HR 112s    Continue with heparin gtt and with plan to transition to NOAC    3. Rhabdo  Resolved.    4. Hypernatremia - resolving w iv fluid  Resolved.    5. fever  CTPA with PE, on hep gtt.    6. SILKE  Probably dry, stop lasix IV.    During non face-to-face time, I reviewed relevant portions of the patient’s medical record; including vitals, labs, medications, cardiac studies, remaining additional imaging and consultant recommendations. During face-to-face time, I took a relevant history and examined the patient. I also explained to the patient their diagnoses, and specific cardiopulmonary management plan, which required a high level of medical decision making.  I answered all questions related to the patient's medical conditions. I spent 50  minutes on total encounter; more than 50% of the visit was spent counseling and/or coordinating care by the attending physician, with plan of care discussed with the patient.

## 2024-04-21 NOTE — PROGRESS NOTE ADULT - ASSESSMENT
73 y/o man with minimal medical hx in our EMR; who presented to [facility] after a fall at home. Found to have acute HFrEF,   Afib w/ RVR, hypoxemia (still requiring O2 Nasal cannula ), planned for left heart cath later  this admission. Cath delayed due to fever.     # Fever  -Pt has been afebrile for over 24hrs and no leucocytosis.   -Pt to complete 5 day  Ceftriaxone course today.   -Will f/u Blood cx  NTD   -Pt is s/p negative LE Dopplers on 4/19/24     #Pulmonary emboli   -Will continue Heparin gtt and monitor respiratory status and PTTT    # Acute decompensated HF  Diuresis per primary team. Pt is currently on furosemide 60mg IV q24hrs   GDMT per primary team and pt is currently on  valsartan 40 mg q12 and  spironolactone 25mg qd   -Pt is for L+RHC potentially on Monday      #Hypernatremia (downtrending)  -Na 148 ->147   -Will continue to monitor Na     # Atrial fibrillation with RVR   -Will continue heparin gtt and monitor PTT Goal 58-99  -Will continue Metoprolol tartrate 25mg BID     # Leg wounds   -Will continue linezolid 600mg BID x 5 days  (EOT 4/24)  -Will continue topical mupirocin x 5 days to areas of drainage    # Constipation (resolved)  -Pt s/p BM this morning   -Continue miralax BID and senna qHS   -Will start lactulose and given an enema.     # Hypoxemia likley secondary to volume overload  -Awaiting repeating CT PE (first scan from April 4th unable to exclude segemental PE due to respiratory motion)   - pleural effusions likely contributory to hypoxemia; diuretic plan per primary team   -Pulmonary following and will continue aggressive diuresis     # Incidental finding of Thoracic aortic aneurysm  Thoracic aortic aneurysm measuring 4cm.   -Pt to f/u with CT surgery as outpatient for interval imaging if consistent with patient's wishes    # Morbid Obesity  BMI: BMI (kg/m2): 42 (04-14-24 @ 16:05)  Affects all aspects of care. Dose medications by weight       DVT ppx: Heparin gtt    #DISPO  -As per primary team  73 y/o man with minimal medical hx in our EMR; who presented to [facility] after a fall at home. Found to have acute HFrEF,   Afib w/ RVR, hypoxemia (still requiring O2 Nasal cannula ), planned for left heart cath later  this admission. Cath delayed due to fever.     # Fever  -Pt has been afebrile for over 24hrs and no leucocytosis.   -Pt to complete 5 day  Ceftriaxone course today.   -Will f/u Blood cx  NTD   -Pt is s/p negative LE Dopplers on 4/19/24     #Pulmonary emboli   -Will continue Heparin gtt and monitor respiratory status and PTT    # Acute decompensated HF  Diuresis per primary team. Pt is currently on furosemide 60mg IV q24hrs   GDMT per primary team and pt is currently on  valsartan 40 mg q12 and  spironolactone 25mg qd   -Pt is for L+RHC potentially on Monday      #Hypernatremia (downtrending)  -Na 148 ->147   -Will continue to monitor Na     # Atrial fibrillation with RVR   -Will continue heparin gtt and monitor PTT Goal 58-99  -Will continue Metoprolol tartrate 25mg BID     # Leg wounds   -Will continue linezolid 600mg BID x 5 days  (EOT 4/24)  -Will continue topical mupirocin x 5 days to areas of drainage    # Constipation (resolved)  -Pt s/p BM this morning   -Continue miralax BID and senna qHS   -Will start lactulose and given an enema.     # Hypoxemia likley secondary to volume overload  -Awaiting repeating CT PE (first scan from April 4th unable to exclude segemental PE due to respiratory motion)   - pleural effusions likely contributory to hypoxemia; diuretic plan per primary team   -Pulmonary following and will continue aggressive diuresis     # Incidental finding of Thoracic aortic aneurysm  Thoracic aortic aneurysm measuring 4cm.   -Pt to f/u with CT surgery as outpatient for interval imaging if consistent with patient's wishes    # Morbid Obesity  BMI: BMI (kg/m2): 42 (04-14-24 @ 16:05)  Affects all aspects of care. Dose medications by weight       DVT ppx: Heparin gtt    #DISPO  -As per primary team

## 2024-04-21 NOTE — PROGRESS NOTE ADULT - PROBLEM SELECTOR PLAN 2
- Intermittent fevers, highest temp rectal 101.6F 4/17, now rectal 100. 6 4/19  - Per ID, favor community acquired aspiration PNA (given poor dentition, L side opacity on CXR)  - Empiric coverage with ceftriaxone 2 g IV QD per ID recs, anticipate 5 day empiric course.  - HIV -, HEP -, UStrep -, Ulegionella -, Procal -  - If patient develops cough/sputum production, will send sputum cx  - BCx 3 NGTD for 48 hours, BC collected 4/19 f/u  - CTA PE negative for PE at [facility]  - was indeterminate d/t motion artifact  - CTA pe rule out on 4/20 with improvement in study showing Multiple small subsegmental emboli identified within the right lower lobe pulmonary artery branches. Small plueral effusions with atelectasis.   - B/L LE dopplers negative for DVT

## 2024-04-21 NOTE — PROGRESS NOTE ADULT - ASSESSMENT
75 y/o M with no known PMHx presented to [facility] on 04/14/24 after mechanical fall, was found down on floor x 2 days by landlord. Labs significant for Trop T 52->54, CK 2k BNP 7k . CTH without acute bleed. CT A/P neg for PE, + ascites. Pt admitted to cardiac tele for newly dx rapid Afib, rhabdo and suspected ADHF exacerbation. Course c/b hypernatremia, s/p lactated ringers. Echo revealing LVEF 20-25%, reduced RVSF. Advanced HF team consulted plan for R/LHC however postponed given new fevers (fever peak 4/18 pm rectal 101). 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 LHC for Monday/Tuesday with Dr. Hernandez. CT PE ordered 4/19 to r/o PE d/t continued fever on IV Abx.

## 2024-04-21 NOTE — PHARMACY COMMUNICATION NOTE - COMMENTS
Admission medication  -	Aspirin 81mg DR daily  Spoke to patient to confirm home medication as above

## 2024-04-21 NOTE — PROGRESS NOTE ADULT - PROBLEM SELECTOR PLAN 1
-TTE 4/15/24: Severely reduced LVSF and RVSF, LVEF 20-25%, mild biatrial enlargement, mild TR, PASP 68, dilated IVC  -CTA chest: Neg for PE. Small right and trace left pleural effusions.   -Diuresis: IV Lasix 60   -GDMT: Valsartan 40 BID, spironolactone 25 mg QD, metoprolol tartrate 25 mg BID   -Core measures, strict I/O's daily weights, 1.2L PO restriction, strict I/Os, cont tele/pulse ox  -Advanced HF team following; [facility] tentatively for 4/22

## 2024-04-22 LAB
ALBUMIN SERPL ELPH-MCNC: 3.2 G/DL — LOW (ref 3.3–5)
ALP SERPL-CCNC: 47 U/L — SIGNIFICANT CHANGE UP (ref 40–120)
ALT FLD-CCNC: 45 U/L — SIGNIFICANT CHANGE UP (ref 10–45)
ANION GAP SERPL CALC-SCNC: 11 MMOL/L — SIGNIFICANT CHANGE UP (ref 5–17)
APTT BLD: 127.2 SEC — CRITICAL HIGH (ref 24.5–35.6)
AST SERPL-CCNC: 39 U/L — SIGNIFICANT CHANGE UP (ref 10–40)
BASE EXCESS BLDA CALC-SCNC: 15.8 MMOL/L — HIGH (ref -2–3)
BILIRUB SERPL-MCNC: 0.5 MG/DL — SIGNIFICANT CHANGE UP (ref 0.2–1.2)
BUN SERPL-MCNC: 38 MG/DL — HIGH (ref 7–23)
CALCIUM SERPL-MCNC: 10 MG/DL — SIGNIFICANT CHANGE UP (ref 8.4–10.5)
CHLORIDE SERPL-SCNC: 100 MMOL/L — SIGNIFICANT CHANGE UP (ref 96–108)
CO2 BLDA-SCNC: 44 MMOL/L — HIGH (ref 19–24)
CO2 SERPL-SCNC: 34 MMOL/L — HIGH (ref 22–31)
CREAT SERPL-MCNC: 1.53 MG/DL — HIGH (ref 0.5–1.3)
EGFR: 47 ML/MIN/1.73M2 — LOW
GLUCOSE SERPL-MCNC: 128 MG/DL — HIGH (ref 70–99)
HCO3 BLDA-SCNC: 42 MMOL/L — HIGH (ref 21–28)
HCT VFR BLD CALC: 52 % — HIGH (ref 39–50)
HGB BLD-MCNC: 16.4 G/DL — SIGNIFICANT CHANGE UP (ref 13–17)
LACTATE SERPL-SCNC: 1.6 MMOL/L — SIGNIFICANT CHANGE UP (ref 0.5–2)
MAGNESIUM SERPL-MCNC: 2.4 MG/DL — SIGNIFICANT CHANGE UP (ref 1.6–2.6)
MCHC RBC-ENTMCNC: 29.3 PG — SIGNIFICANT CHANGE UP (ref 27–34)
MCHC RBC-ENTMCNC: 31.5 GM/DL — LOW (ref 32–36)
MCV RBC AUTO: 93 FL — SIGNIFICANT CHANGE UP (ref 80–100)
NRBC # BLD: 0 /100 WBCS — SIGNIFICANT CHANGE UP (ref 0–0)
PCO2 BLDA: 55 MMHG — HIGH (ref 35–48)
PH BLDA: 7.49 — HIGH (ref 7.35–7.45)
PLATELET # BLD AUTO: 194 K/UL — SIGNIFICANT CHANGE UP (ref 150–400)
PO2 BLDA: 120 MMHG — HIGH (ref 83–108)
POTASSIUM SERPL-MCNC: 3.8 MMOL/L — SIGNIFICANT CHANGE UP (ref 3.5–5.3)
POTASSIUM SERPL-SCNC: 3.8 MMOL/L — SIGNIFICANT CHANGE UP (ref 3.5–5.3)
PROT SERPL-MCNC: 6.4 G/DL — SIGNIFICANT CHANGE UP (ref 6–8.3)
RBC # BLD: 5.59 M/UL — SIGNIFICANT CHANGE UP (ref 4.2–5.8)
RBC # FLD: 17.2 % — HIGH (ref 10.3–14.5)
SAO2 % BLDA: 99.7 % — HIGH (ref 94–98)
SODIUM SERPL-SCNC: 145 MMOL/L — SIGNIFICANT CHANGE UP (ref 135–145)
WBC # BLD: 6.83 K/UL — SIGNIFICANT CHANGE UP (ref 3.8–10.5)
WBC # FLD AUTO: 6.83 K/UL — SIGNIFICANT CHANGE UP (ref 3.8–10.5)

## 2024-04-22 PROCEDURE — 93010 ELECTROCARDIOGRAM REPORT: CPT

## 2024-04-22 PROCEDURE — 99233 SBSQ HOSP IP/OBS HIGH 50: CPT

## 2024-04-22 PROCEDURE — 99232 SBSQ HOSP IP/OBS MODERATE 35: CPT

## 2024-04-22 PROCEDURE — 93456 R HRT CORONARY ARTERY ANGIO: CPT | Mod: 26

## 2024-04-22 PROCEDURE — 99152 MOD SED SAME PHYS/QHP 5/>YRS: CPT

## 2024-04-22 PROCEDURE — 0523T NTRAPX C FFR W/3D FUNCJL MAP: CPT

## 2024-04-22 RX ORDER — POTASSIUM CHLORIDE 20 MEQ
40 PACKET (EA) ORAL ONCE
Refills: 0 | Status: COMPLETED | OUTPATIENT
Start: 2024-04-22 | End: 2024-04-22

## 2024-04-22 RX ORDER — HEPARIN SODIUM 5000 [USP'U]/ML
7000 INJECTION INTRAVENOUS; SUBCUTANEOUS EVERY 6 HOURS
Refills: 0 | Status: DISCONTINUED | OUTPATIENT
Start: 2024-04-22 | End: 2024-04-23

## 2024-04-22 RX ORDER — HEPARIN SODIUM 5000 [USP'U]/ML
9500 INJECTION INTRAVENOUS; SUBCUTANEOUS EVERY 6 HOURS
Refills: 0 | Status: DISCONTINUED | OUTPATIENT
Start: 2024-04-22 | End: 2024-04-22

## 2024-04-22 RX ORDER — HEPARIN SODIUM 5000 [USP'U]/ML
3500 INJECTION INTRAVENOUS; SUBCUTANEOUS EVERY 6 HOURS
Refills: 0 | Status: DISCONTINUED | OUTPATIENT
Start: 2024-04-22 | End: 2024-04-23

## 2024-04-22 RX ORDER — FUROSEMIDE 40 MG
60 TABLET ORAL ONCE
Refills: 0 | Status: COMPLETED | OUTPATIENT
Start: 2024-04-22 | End: 2024-04-22

## 2024-04-22 RX ORDER — ACETAZOLAMIDE 250 MG/1
250 TABLET ORAL ONCE
Refills: 0 | Status: COMPLETED | OUTPATIENT
Start: 2024-04-22 | End: 2024-04-22

## 2024-04-22 RX ORDER — HEPARIN SODIUM 5000 [USP'U]/ML
INJECTION INTRAVENOUS; SUBCUTANEOUS
Qty: 25000 | Refills: 0 | Status: DISCONTINUED | OUTPATIENT
Start: 2024-04-22 | End: 2024-04-23

## 2024-04-22 RX ORDER — POTASSIUM CHLORIDE 20 MEQ
20 PACKET (EA) ORAL ONCE
Refills: 0 | Status: COMPLETED | OUTPATIENT
Start: 2024-04-22 | End: 2024-04-22

## 2024-04-22 RX ORDER — IPRATROPIUM BROMIDE 0.2 MG/ML
500 SOLUTION, NON-ORAL INHALATION EVERY 6 HOURS
Refills: 0 | Status: DISCONTINUED | OUTPATIENT
Start: 2024-04-22 | End: 2024-05-04

## 2024-04-22 RX ORDER — SODIUM CHLORIDE 9 MG/ML
250 INJECTION INTRAMUSCULAR; INTRAVENOUS; SUBCUTANEOUS ONCE
Refills: 0 | Status: COMPLETED | OUTPATIENT
Start: 2024-04-22 | End: 2024-04-22

## 2024-04-22 RX ORDER — HEPARIN SODIUM 5000 [USP'U]/ML
4500 INJECTION INTRAVENOUS; SUBCUTANEOUS EVERY 6 HOURS
Refills: 0 | Status: DISCONTINUED | OUTPATIENT
Start: 2024-04-22 | End: 2024-04-22

## 2024-04-22 RX ORDER — HEPARIN SODIUM 5000 [USP'U]/ML
INJECTION INTRAVENOUS; SUBCUTANEOUS
Qty: 25000 | Refills: 0 | Status: DISCONTINUED | OUTPATIENT
Start: 2024-04-22 | End: 2024-04-22

## 2024-04-22 RX ADMIN — Medication 500 MICROGRAM(S): at 00:56

## 2024-04-22 RX ADMIN — HEPARIN SODIUM 700 UNIT(S)/HR: 5000 INJECTION INTRAVENOUS; SUBCUTANEOUS at 08:18

## 2024-04-22 RX ADMIN — ACETAZOLAMIDE 105 MILLIGRAM(S): 250 TABLET ORAL at 15:57

## 2024-04-22 RX ADMIN — MUPIROCIN 1 APPLICATION(S): 20 OINTMENT TOPICAL at 19:50

## 2024-04-22 RX ADMIN — Medication 25 MILLIGRAM(S): at 05:15

## 2024-04-22 RX ADMIN — VALSARTAN 40 MILLIGRAM(S): 80 TABLET ORAL at 05:16

## 2024-04-22 RX ADMIN — MUPIROCIN 1 APPLICATION(S): 20 OINTMENT TOPICAL at 05:15

## 2024-04-22 RX ADMIN — SODIUM CHLORIDE 250 MILLILITER(S): 9 INJECTION INTRAMUSCULAR; INTRAVENOUS; SUBCUTANEOUS at 17:22

## 2024-04-22 RX ADMIN — Medication 60 MILLIGRAM(S): at 09:31

## 2024-04-22 RX ADMIN — Medication 60 MILLIGRAM(S): at 01:35

## 2024-04-22 RX ADMIN — CLOPIDOGREL BISULFATE 600 MILLIGRAM(S): 75 TABLET, FILM COATED ORAL at 05:15

## 2024-04-22 RX ADMIN — SPIRONOLACTONE 25 MILLIGRAM(S): 25 TABLET, FILM COATED ORAL at 05:16

## 2024-04-22 RX ADMIN — Medication 40 MILLIEQUIVALENT(S): at 05:16

## 2024-04-22 RX ADMIN — HEPARIN SODIUM 1000 UNIT(S)/HR: 5000 INJECTION INTRAVENOUS; SUBCUTANEOUS at 05:08

## 2024-04-22 RX ADMIN — HEPARIN SODIUM 700 UNIT(S)/HR: 5000 INJECTION INTRAVENOUS; SUBCUTANEOUS at 07:56

## 2024-04-22 RX ADMIN — HEPARIN SODIUM 1600 UNIT(S)/HR: 5000 INJECTION INTRAVENOUS; SUBCUTANEOUS at 20:15

## 2024-04-22 RX ADMIN — Medication 325 MILLIGRAM(S): at 05:15

## 2024-04-22 NOTE — PROGRESS NOTE ADULT - NS ATTEND AMEND GEN_ALL_CORE FT
74M with no known PMHx presented to [facility] on 04/14/24 after being found down on the floor x2 days by karen. Labs significant for Trop T CK 2k BNP 7k . Imaging signifcant for segmental PE and ascites. CTH without acute bleed. Patient now admitted to cardiac tele for further management of acute CHF exacerbation, Afib, and rhabdomyolysis.    1. 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  Check Cr and HCO3 today, resume valsaratan and aldactone.    2. PAF  RVR. Start eliquis 10 mg bid for PE, than transition to 5 mg bid.  Disucss with EP if CIELO/DCCV.    3. Rhabdo  Resolved.    4. Hypernatremia - resolving w iv fluid  Resolved.    5. fever  CTPA with PE, no PNA.    6. SILKE  Dry, off lasix, check CR.    7. Acute hypoxemic hypercapneic respiratory failure  Likely from diuresis - led to contraction alkalosis, compensatory respiratory acidosis leading to turpor and poor ventilation/oxygenation.  Responded to BIPAP and acetalozamide x.1    During non face-to-face time, I reviewed relevant portions of the patient’s medical record; including vitals, labs, medications, cardiac studies, remaining additional imaging and consultant recommendations. During face-to-face time, I took a relevant history and examined the patient. I also explained to the patient their diagnoses, and specific cardiopulmonary management plan, which required a high level of medical decision making.  I answered all questions related to the patient's medical conditions. I spent 50 minutes on total encounter; more than 50% of the visit was spent counseling and/or coordinating care by the attending physician, with plan of care discussed with the patient.

## 2024-04-22 NOTE — CHART NOTE - NSCHARTNOTEFT_GEN_A_CORE
Attempted to see patient x2 today but he was out of room.    Currently treating patient for possible aspiration CAP, and possible lower extremity skin infection (slight purulent drainage from anterior lower leg wounds without surrounding erythema). At this point favor that his fevers were due to PE, and he has had no ongoing signs of infection. Recommend stop ceftriaxone. Continue empiric linezolid 600mg PO q12h to complete 5 day course (EOT 4/22).    ID Team 2 will sign off. Please call if further ID input is desired.

## 2024-04-22 NOTE — PROGRESS NOTE ADULT - ASSESSMENT
75 y/o man with minimal medical hx in our EMR; who presented to [facility] after a fall at home. Found to have acute HFrEF,   Afib w/ RVR, hypoxemia (still requiring O2 Nasal cannula ), planned for left heart cath later  this admission. Cath delayed due to fever.     # Fever  -Pt has been afebrile for over 24hrs and no leucocytosis. Started on Linezolid per ID for total 5 days (04/24)  S/p 5 days course with Ceftriaxone for possible aspiration PNA.      # Acute kidney injury  Patient reported with decreased UOP yesterday, hypotensive during day. No reports of urinary retention, UOP improved post diuretics. Creatinine 0.8>1.7>1.5. Patient warm, perfused on exam. Monitor UOP, bladder scan q6h. Diuresis per primary team, avoid hypotension.    #Pulmonary emboli   -Will continue Heparin gtt and monitor respiratory status and PTT    # Acute decompensated HF  Diuresis per primary team. Pt is currently on furosemide 60mg IV q24hrs. Monitor UOP and creatinine   GDMT per primary team and pt is currently on  valsartan 40 mg q12 and  spironolactone 25mg qd   -Pt is for L+RHC potentially Today    #Hypernatremia (downtrending)  -Na 148 ->147   -Will continue to monitor Na     # Atrial fibrillation with RVR   -Will continue heparin gtt and monitor PTT Goal 58-99  -Will continue Metoprolol tartrate 25mg BID,     # Leg wounds   -Will continue linezolid 600mg BID x 5 days  (EOT 4/24)    # Constipation (resolved)  -Pt s/p BM   -Continue miralax BID and senna qHS   -avoid diarrhea     # Hypoxemia likely secondary to volume overload  - pleural effusions likely contributory to hypoxemia; diuretic plan per primary team   -Pulmonary following    # Incidental finding of Thoracic aortic aneurysm  Thoracic aortic aneurysm measuring 4cm.   -Pt to f/u with CT surgery as outpatient for interval imaging if consistent with patient's wishes    # Morbid Obesity  BMI: BMI (kg/m2): 42 (04-14-24 @ 16:05)  Affects all aspects of care. Dose medications by weight       DVT ppx: Heparin gtt  Discussed with primary team      75 y/o man with minimal medical hx in our EMR; who presented to [facility] after a fall at home. Found to have acute HFrEF,   Afib w/ RVR, hypoxemia (still requiring O2 Nasal cannula ), planned for left heart cath later  this admission. Cath delayed due to fever.     # Fever  -Pt has been afebrile for over 24hrs and no leucocytosis. Started on Linezolid per ID for total 5 days (04/24)  S/p 5 days course with Ceftriaxone for possible aspiration PNA.      # Acute kidney injury  Patient reported with decreased UOP yesterday, hypotensive during day. No reports of urinary retention, UOP improved post diuretics. Creatinine 0.8>1.7>1.5. Patient warm, perfused on exam. Monitor UOP, bladder scan q6h. Diuresis per primary team, avoid hypotension.    #Pulmonary emboli   -Will continue Heparin gtt and monitor respiratory status and PTT    # Acute decompensated HF  Diuresis per primary team. Pt is currently on furosemide 60mg IV q24hrs. Monitor UOP and creatinine   GDMT per primary team and pt is currently on  valsartan 40 mg q12 and  spironolactone 25mg qd   -Pt is for L+RHC potentially Today    #Hypernatremia (downtrending)  -Na 148 ->147   -Will continue to monitor Na     # Atrial fibrillation with RVR   -Will continue heparin gtt and monitor PTT Goal 58-99  -Will continue Metoprolol tartrate 25mg BID,     # Leg wounds   -Will continue linezolid 600mg BID x 5 days  (EOT 4/24)    # Constipation (resolved)  -Pt s/p BM   -Continue miralax BID and senna qHS   -avoid diarrhea     # Hypoxemia likely secondary to volume overload  - pleural effusions likely contributory to hypoxemia; diuretic plan per primary team   -Pulmonary following    # Incidental finding of Thoracic aortic aneurysm  Thoracic aortic aneurysm measuring 4cm.   -Pt to f/u with CT surgery as outpatient for interval imaging if consistent with patient's wishes    # Morbid Obesity  BMI: BMI (kg/m2): 42 (04-14-24 @ 16:05)  Affects all aspects of care. Dose medications by weight       DVT ppx: Heparin gtt

## 2024-04-22 NOTE — PROGRESS NOTE ADULT - PROBLEM SELECTOR PLAN 1
-TTE 4/15/24: Severely reduced LVSF and RVSF, LVEF 20-25%, mild biatrial enlargement, mild TR, PASP 68, dilated IVC  -CTA chest: Neg for PE. Small right and trace left pleural effusions.   -Diuresis: IV Lasix 60   -GDMT: Valsartan 40 BID, spironolactone 25 mg QD, metoprolol tartrate 25 mg BID   -Core measures, strict I/O's daily weights, 1.2L PO restriction, strict I/Os, cont tele/pulse ox  -Advanced HF team following; L/RHC tentatively for 4/22

## 2024-04-22 NOTE — PROGRESS NOTE ADULT - SUBJECTIVE AND OBJECTIVE BOX
INTERVAL EVENTS:    Cardiac medications administered in the last 24h:  furosemide   Injectable: 60 milliGRAM(s) IV Push (04-22-24 @ 09:31)  valsartan: 40 milliGRAM(s) Oral (04-22-24 @ 05:16)  spironolactone: 25 milliGRAM(s) Oral (04-22-24 @ 05:16)  metoprolol tartrate: 25 milliGRAM(s) Oral (04-22-24 @ 05:15)  furosemide   Injectable: 60 milliGRAM(s) IV Push (04-22-24 @ 01:35)  metolazone: 5 milliGRAM(s) Oral (04-22-24 @ 00:57)  valsartan: 40 milliGRAM(s) Oral (04-21-24 @ 17:50)  metoprolol tartrate: 25 milliGRAM(s) Oral (04-21-24 @ 17:50)  metoprolol tartrate Injectable: 5 milliGRAM(s) IV Push (04-21-24 @ 17:02)  furosemide   Injectable: 60 milliGRAM(s) IV Push (04-21-24 @ 09:49)  valsartan: 40 milliGRAM(s) Oral (04-21-24 @ 05:45)  spironolactone: 25 milliGRAM(s) Oral (04-21-24 @ 05:45)  metoprolol tartrate: 25 milliGRAM(s) Oral (04-21-24 @ 05:45)  metoprolol tartrate: 25 milliGRAM(s) Oral (04-20-24 @ 17:33)  valsartan: 40 milliGRAM(s) Oral (04-20-24 @ 16:04)      MEDICATIONS  (STANDING):  acetaZOLAMIDE  IVPB 250 milliGRAM(s) IV Intermittent once  heparin  Infusion.  Unit(s)/Hr (21 mL/Hr) IV Continuous <Continuous>  influenza  Vaccine (HIGH DOSE) 0.7 milliLiter(s) IntraMuscular once  linezolid    Tablet 600 milliGRAM(s) Oral every 12 hours  metoprolol tartrate 25 milliGRAM(s) Oral two times a day  mupirocin 2% Ointment 1 Application(s) Topical two times a day  polyethylene glycol 3350 17 Gram(s) Oral two times a day  potassium chloride   Powder 40 milliEquivalent(s) Oral once  senna 2 Tablet(s) Oral at bedtime  spironolactone 25 milliGRAM(s) Oral daily  valsartan 40 milliGRAM(s) Oral every 12 hours    MEDICATIONS  (PRN):  heparin   Injectable 9500 Unit(s) IV Push every 6 hours PRN For aPTT less than 40  heparin   Injectable 4500 Unit(s) IV Push every 6 hours PRN For aPTT between 40 - 57  ipratropium    for Nebulization 500 MICROGram(s) Nebulizer every 6 hours PRN Shortness of Breath and/or Wheezing      VITALS 24H  T(F): 98.2 (04-22-24 @ 08:33), Max: 98.4 (04-22-24 @ 05:14)  HR: 112 (04-22-24 @ 13:55) (100 - 126)  BP: 153/74 (04-22-24 @ 08:33) (86/56 - 153/74)  BP(mean): 65 (04-21-24 @ 16:36) (65 - 65)  ABP: --  ABP(mean): --  RR: 26 (04-22-24 @ 13:55) (16 - 26)  SpO2: 96% (04-22-24 @ 13:55) (92% - 98%)    I/O Detail 24H    21 Apr 2024 07:01  -  22 Apr 2024 07:00  --------------------------------------------------------  IN:    Heparin Infusion: 70 mL    IV PiggyBack: 50 mL    Oral Fluid: 1060 mL  Total IN: 1180 mL    OUT:    Voided (mL): 1600 mL  Total OUT: 1600 mL    Total NET: -420 mL      22 Apr 2024 07:01  -  22 Apr 2024 15:17  --------------------------------------------------------  IN:  Total IN: 0 mL    OUT:    Oral Fluid: 0 mL    Voided (mL): 1050 mL  Total OUT: 1050 mL    Total NET: -1050 mL        Daily Weight Trend (kg):   88.9 (04-22-24 @ 06:49)  79.8 (04-21-24 @ 06:45)  81.2 (04-20-24 @ 05:08)  87.6 (04-18-24 @ 05:18)      PHYSICAL EXAM:  GEN: NAD  HEENT: EOMI   RESP: CTA b/l  CV: RRR. Normal S1/S2. No m/r/g.  ABD: soft, non-distended  EXT: No edema   NEURO: alert and attentive    LABS:  CBC 04-22-24 @ 05:30                        16.4   6.83  )-----------( 194                   52.0       Hgb trend: 16.4 <-- , 16.5 <-- , 16.4 <--   WBC trend: 6.83 <-- , 5.77 <-- , 6.46 <--       CMP 04-22-24 @ 05:30    145  |  100  |  38<H>  ----------------------------<  128<H>  3.8   |  34<H>  |  1.53<H>    Ca    10.0      04-22-24 @ 05:30  Mg     2.4     04-22    TPro  6.4  /  Alb  3.2<L>  /  TBili  0.5  /  DBili  x   /  AST  39  /  ALT  45  /  AlkPhos  47     04-22      Serum Cr trend: 1.53 <-- , 1.78 <-- , 0.86 <-- , 0.81 <--     PTT - ( 22 Apr 2024 05:30 ):127.2 sec    Cardiac Markers            INTERVAL EVENTS:  -S/p LHC and RHC today    Cardiac medications administered in the last 24h:  furosemide   Injectable: 60 milliGRAM(s) IV Push (04-22-24 @ 09:31)  valsartan: 40 milliGRAM(s) Oral (04-22-24 @ 05:16)  spironolactone: 25 milliGRAM(s) Oral (04-22-24 @ 05:16)  metoprolol tartrate: 25 milliGRAM(s) Oral (04-22-24 @ 05:15)  furosemide   Injectable: 60 milliGRAM(s) IV Push (04-22-24 @ 01:35)  metolazone: 5 milliGRAM(s) Oral (04-22-24 @ 00:57)  valsartan: 40 milliGRAM(s) Oral (04-21-24 @ 17:50)  metoprolol tartrate: 25 milliGRAM(s) Oral (04-21-24 @ 17:50)  metoprolol tartrate Injectable: 5 milliGRAM(s) IV Push (04-21-24 @ 17:02)  furosemide   Injectable: 60 milliGRAM(s) IV Push (04-21-24 @ 09:49)  valsartan: 40 milliGRAM(s) Oral (04-21-24 @ 05:45)  spironolactone: 25 milliGRAM(s) Oral (04-21-24 @ 05:45)  metoprolol tartrate: 25 milliGRAM(s) Oral (04-21-24 @ 05:45)  metoprolol tartrate: 25 milliGRAM(s) Oral (04-20-24 @ 17:33)  valsartan: 40 milliGRAM(s) Oral (04-20-24 @ 16:04)      MEDICATIONS  (STANDING):  acetaZOLAMIDE  IVPB 250 milliGRAM(s) IV Intermittent once  heparin  Infusion.  Unit(s)/Hr (21 mL/Hr) IV Continuous <Continuous>  influenza  Vaccine (HIGH DOSE) 0.7 milliLiter(s) IntraMuscular once  linezolid    Tablet 600 milliGRAM(s) Oral every 12 hours  metoprolol tartrate 25 milliGRAM(s) Oral two times a day  mupirocin 2% Ointment 1 Application(s) Topical two times a day  polyethylene glycol 3350 17 Gram(s) Oral two times a day  potassium chloride   Powder 40 milliEquivalent(s) Oral once  senna 2 Tablet(s) Oral at bedtime  spironolactone 25 milliGRAM(s) Oral daily  valsartan 40 milliGRAM(s) Oral every 12 hours    MEDICATIONS  (PRN):  heparin   Injectable 9500 Unit(s) IV Push every 6 hours PRN For aPTT less than 40  heparin   Injectable 4500 Unit(s) IV Push every 6 hours PRN For aPTT between 40 - 57  ipratropium    for Nebulization 500 MICROGram(s) Nebulizer every 6 hours PRN Shortness of Breath and/or Wheezing      VITALS 24H  T(F): 98.2 (04-22-24 @ 08:33), Max: 98.4 (04-22-24 @ 05:14)  HR: 112 (04-22-24 @ 13:55) (100 - 126)  BP: 153/74 (04-22-24 @ 08:33) (86/56 - 153/74)  BP(mean): 65 (04-21-24 @ 16:36) (65 - 65)  ABP: --  ABP(mean): --  RR: 26 (04-22-24 @ 13:55) (16 - 26)  SpO2: 96% (04-22-24 @ 13:55) (92% - 98%)    I/O Detail 24H    21 Apr 2024 07:01  -  22 Apr 2024 07:00  --------------------------------------------------------  IN:    Heparin Infusion: 70 mL    IV PiggyBack: 50 mL    Oral Fluid: 1060 mL  Total IN: 1180 mL    OUT:    Voided (mL): 1600 mL  Total OUT: 1600 mL    Total NET: -420 mL      22 Apr 2024 07:01  -  22 Apr 2024 15:17  --------------------------------------------------------  IN:  Total IN: 0 mL    OUT:    Oral Fluid: 0 mL    Voided (mL): 1050 mL  Total OUT: 1050 mL    Total NET: -1050 mL        Daily Weight Trend (kg):   88.9 (04-22-24 @ 06:49)  79.8 (04-21-24 @ 06:45)  81.2 (04-20-24 @ 05:08)  87.6 (04-18-24 @ 05:18)      PHYSICAL EXAM:  GEN: NAD  HEENT: EOMI   RESP: CTA b/l  CV: RRR. Normal S1/S2. No m/r/g.  ABD: soft, non-distended  EXT: No edema   NEURO: alert and attentive    LABS:  CBC 04-22-24 @ 05:30                        16.4   6.83  )-----------( 194                   52.0       Hgb trend: 16.4 <-- , 16.5 <-- , 16.4 <--   WBC trend: 6.83 <-- , 5.77 <-- , 6.46 <--       CMP 04-22-24 @ 05:30    145  |  100  |  38<H>  ----------------------------<  128<H>  3.8   |  34<H>  |  1.53<H>    Ca    10.0      04-22-24 @ 05:30  Mg     2.4     04-22    TPro  6.4  /  Alb  3.2<L>  /  TBili  0.5  /  DBili  x   /  AST  39  /  ALT  45  /  AlkPhos  47     04-22      Serum Cr trend: 1.53 <-- , 1.78 <-- , 0.86 <-- , 0.81 <--     PTT - ( 22 Apr 2024 05:30 ):127.2 sec    Cardiac Markers

## 2024-04-22 NOTE — PROGRESS NOTE ADULT - SUBJECTIVE AND OBJECTIVE BOX
Chief complaint: Patient is a 74y old  Male who presents with a chief complaint of CHF, AFib (21 Apr 2024 12:18)    Interval history: 11PM Nurse called PA's office, pt. not voiding, only 50cc for 5 hours, PA evaulated patient, pt noted to be tachypnea, O2 Sat 4L 95%, diaphoretic with b/l crackles through out lungs.  Ordered labs Lactate 1.6, gave Metolazone 5mg IV X1 followed by 30 minutes later Lasix 60mg IV X1.  This morning 6AM pt. voided 800cc, breathing better. This AM, patient laying comfortably in bed. Currently denies CP, dizziness, palpitations, SOB, dyspnea, coughing, headaches, N/V/D/abdominal pain. Patient understands plan for the day.     Review of systems: A complete 10-point review of systems was obtained and is negative except as stated in the interval history.    Vitals:  T(F): 98.4, Max: 98.4 (04-22 @ 05:14)  HR: 107 (101 - 126)  BP: 114/85 (82/51 - 133/76)  RR: 16 (16 - 17)  SpO2: 93% (92% - 98%)    Ins & outs:     04-19 @ 07:01  -  04-20 @ 07:00  --------------------------------------------------------  IN: 480 mL / OUT: 1400 mL / NET: -920 mL    04-20 @ 07:01 - 04-21 @ 07:00  --------------------------------------------------------  IN: 640 mL / OUT: 1650 mL / NET: -1010 mL    04-21 @ 07:01  -  04-22 @ 07:00  --------------------------------------------------------  IN: 1180 mL / OUT: 1600 mL / NET: -420 mL      Weight trend:      Physical exam:  General: No apparent distress  HEENT: Anicteric sclera. Moist mucous membranes. JVD -   Cardiac: Regular rate and rhythm. No murmurs, rubs, or gallops.   Vascular: Symmetric radial pulses. Dorsalis pedis pulses palpable.   Respiratory: Normal effort. Clear to ascultation.   Abdomen: Soft, nontender. Audible bowel sounds.   Extremities: Warm with no edema. No cyanosis or clubbing.   Skin: Warm and dry. No rash.   Neurologic: Grossly normal motor function.   Psychiatric: Oriented to person, place, and time.     Data reviewed:  - Telemetry:     - TTE (date 4/15/24):   1. Severely reduced left ventricular systolic function.   2. Reduced right ventricular systolic function.   3. Mild biatrial enlargement.   4. Mild tricuspid regurgitation.   5. No other significant valvular disease.   6. Pulmonary hypertension present, pulmonary artery systolic pressure is 68 mmHg.   7. No pericardial effusion.   8. Dilated IVC.   9. Patient was tachycardic during the study.    - Chest x-ray (date 4/19/24):   impression: Bilateral opacities/pleural effusions, unchanged. Stable   cardiomegaly and bony structures.    - CT ANGIO PE Protocol w/ IV cont (4/20/24)   IMPRESSION:  1.  Multiple small subsegmental emboli identified within the right lower lobe pulmonary artery branches.  2.  Small bowel pleural effusions, left greater than right, with associated atelectasis.    - No prior Stress test, CCTA, Cardiac catheterization, Cardiac MRI:    - Labs:                        16.4   6.83  )-----------( 194      ( 22 Apr 2024 05:30 )             52.0     04-22    145  |  100  |  38<H>  ----------------------------<  128<H>  3.8   |  34<H>  |  1.53<H>    Ca    10.0      22 Apr 2024 05:30  Mg     2.4     04-22    TPro  6.4  /  Alb  3.2<L>  /  TBili  0.5  /  DBili  x   /  AST  39  /  ALT  45  /  AlkPhos  47  04-22    PTT - ( 22 Apr 2024 05:30 )  PTT:127.2 sec        Triglycerides, Serum: 59 mg/dL (04-15-24 @ 05:30)  LDL Cholesterol Calculated: 72 mg/dL (04-15-24 @ 05:30)    Thyroid Stimulating Hormone, Serum: 2.040 uIU/mL (04-15-24 @ 05:30)  Thyroid Stimulating Hormone, Serum: 3.092 uIU/mL (04-14-24 @ 04:13)    Urinalysis Basic - ( 22 Apr 2024 05:30 )    Color: x / Appearance: x / SG: x / pH: x  Gluc: 128 mg/dL / Ketone: x  / Bili: x / Urobili: x   Blood: x / Protein: x / Nitrite: x   Leuk Esterase: x / RBC: x / WBC x   Sq Epi: x / Non Sq Epi: x / Bacteria: x        Medications:  cefTRIAXone   IVPB 2000 milliGRAM(s) IV Intermittent every 24 hours  furosemide   Injectable 60 milliGRAM(s) IV Push every 24 hours  influenza  Vaccine (HIGH DOSE) 0.7 milliLiter(s) IntraMuscular once  linezolid    Tablet 600 milliGRAM(s) Oral every 12 hours  metoprolol tartrate 25 milliGRAM(s) Oral two times a day  mupirocin 2% Ointment 1 Application(s) Topical two times a day  polyethylene glycol 3350 17 Gram(s) Oral two times a day  senna 2 Tablet(s) Oral at bedtime  spironolactone 25 milliGRAM(s) Oral daily  valsartan 40 milliGRAM(s) Oral every 12 hours    Drips:  heparin  Infusion. 1700 Unit(s)/Hr (17 mL/Hr) IV Continuous <Continuous>    PRN:     Allergies    No Known Allergies    Intolerances        Home Medications:

## 2024-04-22 NOTE — PROGRESS NOTE ADULT - ASSESSMENT
73 yo M with no reported PMHx presents after a mechanical fall to [facility], also found to have acute decompensated systolic heart failure and new atrial fibrillation. Course complicated by hypervolemic hypernatremia     Review of studies  TTE 4/25/24: LVEF 20-25% (global). Diastolic dysfunction likely. Reduced RV systolic function. Biatrial dilation. Mild TR. PASP 68.   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 Continue metoprolol tartrate 25 BID.   - Diuretics: Patient is hypovolemic/euvolemic based on RHC and labs (likely contraction alkalosis, pre-renal SILKE). Hold diuretics. Consider IVF and/or albumin  - Device: premature, will need to reassess after 3months of maximal GDMT    #Atrial Fibrillation  [id number]: 3   Has-Bled: 1  AC: heparin gtt     Seen and discussed with HF attending

## 2024-04-22 NOTE — CHART NOTE - NSCHARTNOTEFT_GEN_A_CORE
Admitting Diagnosis:   Patient is a 74y old  Male who presents with a chief complaint of CHF, AFib (22 Apr 2024 11:14)      PAST MEDICAL & SURGICAL HISTORY:  Hiatal hernia          Current Nutrition Order:  Diet, NPO after Midnight:   NPO Start Date: 21-Apr-2024,   NPO Start Time: 23:59 (04-21-24 @ 14:15)    PO Intake: Good (%) [   ]  Fair (50-75%) [   ] Poor (<25%) [   ]-- NA NPO     GI Issues:  Noted Constipation (resolved - s/p mineral oil enema 4/19)   BM+4/21, distended   Ordered for Senna and MIRALAX   However is not voiding    Pain: none per flow sheets     Skin Integrity:  1+Edema (left leg; right leg) - vary during admit   BL Bolanos Venous Ulcer/Leg wounds       04-21-24 @ 07:01  -  04-22-24 @ 07:00  --------------------------------------------------------  IN: 1180 mL / OUT: 1600 mL / NET: -420 mL    04-22-24 @ 07:01  -  04-22-24 @ 13:12  --------------------------------------------------------  IN: 0 mL / OUT: 1050 mL / NET: -1050 mL        Labs:   04-22    145  |  100  |  38<H>  ----------------------------<  128<H>  3.8   |  34<H>  |  1.53<H>    Ca    10.0      22 Apr 2024 05:30  Mg     2.4     04-22    TPro  6.4  /  Alb  3.2<L>  /  TBili  0.5  /  DBili  x   /  AST  39  /  ALT  45  /  AlkPhos  47  04-22    CAPILLARY BLOOD GLUCOSE          Medications:  MEDICATIONS  (STANDING):  heparin  Infusion. 1700 Unit(s)/Hr (17 mL/Hr) IV Continuous <Continuous>  influenza  Vaccine (HIGH DOSE) 0.7 milliLiter(s) IntraMuscular once  linezolid    Tablet 600 milliGRAM(s) Oral every 12 hours  metoprolol tartrate 25 milliGRAM(s) Oral two times a day  mupirocin 2% Ointment 1 Application(s) Topical two times a day  polyethylene glycol 3350 17 Gram(s) Oral two times a day  potassium chloride   Powder 40 milliEquivalent(s) Oral once  senna 2 Tablet(s) Oral at bedtime  spironolactone 25 milliGRAM(s) Oral daily  valsartan 40 milliGRAM(s) Oral every 12 hours    MEDICATIONS  (PRN):  heparin   Injectable 4500 Unit(s) IV Push every 6 hours PRN For aPTT between 40 - 57  heparin   Injectable 9500 Unit(s) IV Push every 6 hours PRN For aPTT less than 40  ipratropium    for Nebulization 500 MICROGram(s) Nebulizer every 6 hours PRN Shortness of Breath and/or Wheezing      5'6''  pounds+-10%  Wegiht 259 pounds BMI 42 %IBW>120%    4/22 195.9 pounds   4/21 175.9 pounds   4/20 179 pounds  4/16 200.4 pounds     [PCM}  - Unable to DX at this time, however pt with Risk d/t Being found down PTA x at least 2 days     Estimated energy needs:   IBW used to calculate energy needs due to pt's current body weight exceeding 120% of IBW   Adjusted for age/BMI, HF/Renal, Fevers, Malnutrition Risk - fluids per team    Estimated Energy Needs From (juventino/kg)	25  Estimated Energy Needs To (juventino/kg)	30  Estimated Energy Needs Calculated From (juventino/kg)	1610  Estimated Energy Needs Calculated To (juventino/kg)	1932  Estimated Protein Needs From (g/kg)	1.25  Estimated Protein Needs To (g/kg)	1.5  Estimated Protein Needs Calculated From (g/kg)	80.5  Estimated Protein Needs Calculated To (g/kg)	96.6    Subjective: 73 y/o M with no known PMHx presented to [facility] 4/14/24 after mechanical fall, was found down on floor x 2 days by landlord. Labs significant for Trop T 52->54, CK 2k BNP 7k . CTH without acute bleed. CT A/P neg for PE, + ascites. Pt admitted to cardiac tele for newly dx rapid Afib, rhabdo and suspected ADHF exacerbation. Course c/b hypernatremia, s/p lactated ringers. Echo revealing LVEF 20-25%, reduced RVSF. Advanced HF team consulted, rec'd IV Lasix 40 mg BID w/ plan for RHC/LHC, however postponed given new fevers (fever peak 4/18 pm rectal 101). ID consulted, rec ceftriaxone 2 g IV daily for possible aspiration PNA, medicine team following, pulm consulted for mosaicism and air trapping on CT. LHC to be done when pt is afebrile - possible plan for 4/19.     Pt remains on 5UR. RD Attended rounds and spoke with RN. Pt was NPO this AM. RN reports ongoing issues swallowing pills with noted cough. RN Feels pt to benefit from SLP. Of note pt being treated for aspiration PNA, (given poor dentition, L side opacity on CXR) - S/p 5 days course with Ceftriaxone.  Please see below for nutritions recommendations - D/w 5UR Team.    Prior PES: Increased Nutrient Needs...related to Increased Demands as evidenced by: Fevers, HF  >>ongoing at this time  GOAL: Pt will meet at least 75% of nutrient needs via feasible route    Recommendations:  1. Recommend SLP EVAL - NPO prior.  >> With passing results, recommend DASH Diet (+/- oral nutrition supplements PRN Pending %PO) with PO consistency per SLP Recs.   >> If pt fails and EN is indicated, Please reconult for Recommendations PRN.  2. Labs: monitor BMP, CBC, glucose, lytes, tren , LFTs.  3. Pain/GI per team. Optimize regime PRN.   4. Monitor Skin, Wt, GOC.  5. RD to remain available for additional nutrition interventions as needed.     Education: NA at this time     Risk Level: High [ XX ] Moderate [   ] Low [   ]

## 2024-04-22 NOTE — PROGRESS NOTE ADULT - PROBLEM SELECTOR PLAN 2
- Intermittent fevers, highest temp rectal 101.6F 4/17, now rectal 100. 6 4/19  - Per ID, favor community acquired aspiration PNA (given poor dentition, L side opacity on CXR)  - Empiric coverage with ceftriaxone 2 g IV QD per ID recs, anticipate 5 day empiric course.  - HIV -, HEP -, UStrep -, Ulegionella -, Procal -  - If patient develops cough/sputum production, will send sputum cx  - BCx 3 NGTD for 48 hours, BC collected 4/19 f/u  - CTA PE negative for PE at [facility]  - was indeterminate d/t motion artifact  - CTA pe rule out on 4/20 with improvement in study showing Multiple small subsegmental emboli identified within the right lower lobe pulmonary artery branches. Small plueral effusions with atelectasis.   - B/L LE dopplers negative for DVT

## 2024-04-22 NOTE — PROGRESS NOTE ADULT - ASSESSMENT
73 y/o M with no known PMHx presented to [facility] on 04/14/24 after mechanical fall, was found down on floor x 2 days by landlord. Labs significant for Trop T 52->54, CK 2k BNP 7k . CTH without acute bleed. CT A/P neg for PE, + ascites. Pt admitted to cardiac tele for newly dx rapid Afib, rhabdo and suspected ADHF exacerbation. Course c/b hypernatremia, s/p lactated ringers. Echo revealing LVEF 20-25%, reduced RVSF. Advanced HF team consulted plan for R/LHC however postponed given new fevers (fever peak 4/18 pm rectal 101). ID consulted, rec ceftriaxone 2 g IV daily for possible aspiration PNA + Linezolid 500 mg BID x 5 days(started 4/19) for wounds at b/l , medicine team following, pulm consulted for mosaicism and air trapping on CT. CT PE ordered 4/19 to r/o PE d/t continued fever on IV Abx.   LHC for Monday/Tuesday with Dr. Hernandez. CT PE ordered 4/19 to r/o PE d/t continued fever on IV Abx.

## 2024-04-22 NOTE — PROGRESS NOTE ADULT - SUBJECTIVE AND OBJECTIVE BOX
Patient is a 74y old  Male who presents with a chief complaint of CHF, AFib (22 Apr 2024 08:05)    INTERVAL EVENTS:  Weekend events reviewed, Patient with oliguria yesterday, received Metolazone and Furosemide.  cc this am.    SUBJECTIVE:  Patient was seen and examined at bedside. Reports feeling fine, denies SOB, no chest pain, afebrile. Reports having BMs, No other complaints or events reported. Telemetry reviewed     Review of systems: No fever, chills, dizziness, HA, Changes in vision, CP, dyspnea, nausea or vomiting, dysuria, changes in bowel movements, LE edema. Rest of 12 point Review of systems negative unless otherwise documented elsewhere in note.     Diet, NPO after Midnight:      NPO Start Date: 21-Apr-2024,   NPO Start Time: 23:59 (04-21-24 @ 14:15) [Active]  Diet, DASH/TLC:   Sodium & Cholesterol Restricted (04-16-24 @ 10:45) [Active]      MEDICATIONS:  MEDICATIONS  (STANDING):  cefTRIAXone   IVPB 2000 milliGRAM(s) IV Intermittent every 24 hours  furosemide   Injectable 60 milliGRAM(s) IV Push every 24 hours  heparin  Infusion. 1700 Unit(s)/Hr (17 mL/Hr) IV Continuous <Continuous>  influenza  Vaccine (HIGH DOSE) 0.7 milliLiter(s) IntraMuscular once  linezolid    Tablet 600 milliGRAM(s) Oral every 12 hours  metoprolol tartrate 25 milliGRAM(s) Oral two times a day  mupirocin 2% Ointment 1 Application(s) Topical two times a day  polyethylene glycol 3350 17 Gram(s) Oral two times a day  senna 2 Tablet(s) Oral at bedtime  spironolactone 25 milliGRAM(s) Oral daily  valsartan 40 milliGRAM(s) Oral every 12 hours    MEDICATIONS  (PRN):  heparin   Injectable 4500 Unit(s) IV Push every 6 hours PRN For aPTT between 40 - 57  heparin   Injectable 9500 Unit(s) IV Push every 6 hours PRN For aPTT less than 40  ipratropium    for Nebulization 500 MICROGram(s) Nebulizer every 6 hours PRN Shortness of Breath and/or Wheezing      Allergies    No Known Allergies    Intolerances        OBJECTIVE:  Vital Signs Last 24 Hrs  T(C): 36.8 (22 Apr 2024 08:33), Max: 36.9 (22 Apr 2024 05:14)  T(F): 98.2 (22 Apr 2024 08:33), Max: 98.4 (22 Apr 2024 05:14)  HR: 100 (22 Apr 2024 08:33) (100 - 126)  BP: 153/74 (22 Apr 2024 08:33) (82/51 - 153/74)  BP(mean): 65 (21 Apr 2024 16:36) (60 - 65)  RR: 19 (22 Apr 2024 08:33) (16 - 19)  SpO2: 95% (22 Apr 2024 08:33) (92% - 98%)    Parameters below as of 22 Apr 2024 08:33  Patient On (Oxygen Delivery Method): nasal cannula  O2 Flow (L/min): 4    I&O's Summary    21 Apr 2024 07:01  -  22 Apr 2024 07:00  --------------------------------------------------------  IN: 1180 mL / OUT: 1600 mL / NET: -420 mL    22 Apr 2024 07:01  -  22 Apr 2024 11:17  --------------------------------------------------------  IN: 0 mL / OUT: 0 mL / NET: 0 mL        PHYSICAL EXAM:  General: initially sleeping, awakes to voice, no labored breathing on NC 2L  HEENT: AT/NC, no facial asymmetry   Lungs: limited exam, rare crackles  Heart: irregular  Abdomen: soft, non-tender  Extremities:  warm, leg ulcerations with chronic skin changes, edema improved, no focal deficit, urine bag with clear urine.    LABS:                        16.4   6.83  )-----------( 194      ( 22 Apr 2024 05:30 )             52.0     04-22    145  |  100  |  38<H>  ----------------------------<  128<H>  3.8   |  34<H>  |  1.53<H>    Ca    10.0      22 Apr 2024 05:30  Mg     2.4     04-22    TPro  6.4  /  Alb  3.2<L>  /  TBili  0.5  /  DBili  x   /  AST  39  /  ALT  45  /  AlkPhos  47  04-22    LIVER FUNCTIONS - ( 22 Apr 2024 05:30 )  Alb: 3.2 g/dL / Pro: 6.4 g/dL / ALK PHOS: 47 U/L / ALT: 45 U/L / AST: 39 U/L / GGT: x           PTT - ( 22 Apr 2024 05:30 )  PTT:127.2 sec  CAPILLARY BLOOD GLUCOSE        Urinalysis Basic - ( 22 Apr 2024 05:30 )    Color: x / Appearance: x / SG: x / pH: x  Gluc: 128 mg/dL / Ketone: x  / Bili: x / Urobili: x   Blood: x / Protein: x / Nitrite: x   Leuk Esterase: x / RBC: x / WBC x   Sq Epi: x / Non Sq Epi: x / Bacteria: x        MICRODATA:      RADIOLOGY/OTHER STUDIES:

## 2024-04-23 LAB
ADD ON TEST-SPECIMEN IN LAB: SIGNIFICANT CHANGE UP
ALBUMIN SERPL ELPH-MCNC: 3 G/DL — LOW (ref 3.3–5)
ALBUMIN SERPL ELPH-MCNC: 3.2 G/DL — LOW (ref 3.3–5)
ALBUMIN SERPL ELPH-MCNC: 3.3 G/DL — SIGNIFICANT CHANGE UP (ref 3.3–5)
ALBUMIN SERPL ELPH-MCNC: 3.3 G/DL — SIGNIFICANT CHANGE UP (ref 3.3–5)
ALP SERPL-CCNC: 44 U/L — SIGNIFICANT CHANGE UP (ref 40–120)
ALP SERPL-CCNC: 45 U/L — SIGNIFICANT CHANGE UP (ref 40–120)
ALP SERPL-CCNC: 47 U/L — SIGNIFICANT CHANGE UP (ref 40–120)
ALP SERPL-CCNC: SIGNIFICANT CHANGE UP (ref 40–120)
ALT FLD-CCNC: 53 U/L — HIGH (ref 10–45)
ALT FLD-CCNC: 58 U/L — HIGH (ref 10–45)
ALT FLD-CCNC: 61 U/L — HIGH (ref 10–45)
ALT FLD-CCNC: SIGNIFICANT CHANGE UP (ref 10–45)
ANION GAP SERPL CALC-SCNC: 10 MMOL/L — SIGNIFICANT CHANGE UP (ref 5–17)
ANION GAP SERPL CALC-SCNC: 11 MMOL/L — SIGNIFICANT CHANGE UP (ref 5–17)
ANION GAP SERPL CALC-SCNC: 9 MMOL/L — SIGNIFICANT CHANGE UP (ref 5–17)
APTT BLD: 182.3 SEC — CRITICAL HIGH (ref 24.5–35.6)
APTT BLD: 194.9 SEC — CRITICAL HIGH (ref 24.5–35.6)
APTT BLD: 34.5 SEC — SIGNIFICANT CHANGE UP (ref 24.5–35.6)
APTT BLD: 37.4 SEC — HIGH (ref 24.5–35.6)
AST SERPL-CCNC: 53 U/L — HIGH (ref 10–40)
AST SERPL-CCNC: 54 U/L — HIGH (ref 10–40)
AST SERPL-CCNC: 55 U/L — HIGH (ref 10–40)
AST SERPL-CCNC: SIGNIFICANT CHANGE UP (ref 10–40)
BASE EXCESS BLDA CALC-SCNC: 10.6 MMOL/L — HIGH (ref -2–3)
BASOPHILS # BLD AUTO: 0.03 K/UL — SIGNIFICANT CHANGE UP (ref 0–0.2)
BASOPHILS NFR BLD AUTO: 0.3 % — SIGNIFICANT CHANGE UP (ref 0–2)
BILIRUB DIRECT SERPL-MCNC: 0.2 MG/DL — SIGNIFICANT CHANGE UP (ref 0–0.3)
BILIRUB INDIRECT FLD-MCNC: 0.6 MG/DL — SIGNIFICANT CHANGE UP (ref 0.2–1)
BILIRUB SERPL-MCNC: 0.7 MG/DL — SIGNIFICANT CHANGE UP (ref 0.2–1.2)
BILIRUB SERPL-MCNC: 0.8 MG/DL — SIGNIFICANT CHANGE UP (ref 0.2–1.2)
BLD GP AB SCN SERPL QL: NEGATIVE — SIGNIFICANT CHANGE UP
BUN SERPL-MCNC: 79 MG/DL — HIGH (ref 7–23)
BUN SERPL-MCNC: 82 MG/DL — HIGH (ref 7–23)
BUN SERPL-MCNC: 84 MG/DL — HIGH (ref 7–23)
BUN SERPL-MCNC: 93 MG/DL — HIGH (ref 7–23)
BUN SERPL-MCNC: 99 MG/DL — HIGH (ref 7–23)
CALCIUM SERPL-MCNC: 9 MG/DL — SIGNIFICANT CHANGE UP (ref 8.4–10.5)
CALCIUM SERPL-MCNC: 9.2 MG/DL — SIGNIFICANT CHANGE UP (ref 8.4–10.5)
CALCIUM SERPL-MCNC: 9.3 MG/DL — SIGNIFICANT CHANGE UP (ref 8.4–10.5)
CALCIUM SERPL-MCNC: 9.4 MG/DL — SIGNIFICANT CHANGE UP (ref 8.4–10.5)
CALCIUM SERPL-MCNC: 9.9 MG/DL — SIGNIFICANT CHANGE UP (ref 8.4–10.5)
CHLORIDE SERPL-SCNC: 101 MMOL/L — SIGNIFICANT CHANGE UP (ref 96–108)
CHLORIDE SERPL-SCNC: 105 MMOL/L — SIGNIFICANT CHANGE UP (ref 96–108)
CHLORIDE SERPL-SCNC: 105 MMOL/L — SIGNIFICANT CHANGE UP (ref 96–108)
CHLORIDE SERPL-SCNC: 107 MMOL/L — SIGNIFICANT CHANGE UP (ref 96–108)
CHLORIDE SERPL-SCNC: 107 MMOL/L — SIGNIFICANT CHANGE UP (ref 96–108)
CO2 BLDA-SCNC: 38 MMOL/L — HIGH (ref 19–24)
CO2 SERPL-SCNC: 32 MMOL/L — HIGH (ref 22–31)
CO2 SERPL-SCNC: 32 MMOL/L — HIGH (ref 22–31)
CO2 SERPL-SCNC: 33 MMOL/L — HIGH (ref 22–31)
CO2 SERPL-SCNC: 36 MMOL/L — HIGH (ref 22–31)
CO2 SERPL-SCNC: 36 MMOL/L — HIGH (ref 22–31)
COHGB MFR BLDA: 2.2 % — SIGNIFICANT CHANGE UP
CREAT SERPL-MCNC: 1.64 MG/DL — HIGH (ref 0.5–1.3)
CREAT SERPL-MCNC: 1.66 MG/DL — HIGH (ref 0.5–1.3)
CREAT SERPL-MCNC: 1.72 MG/DL — HIGH (ref 0.5–1.3)
CREAT SERPL-MCNC: 2.12 MG/DL — HIGH (ref 0.5–1.3)
CREAT SERPL-MCNC: 2.37 MG/DL — HIGH (ref 0.5–1.3)
EGFR: 28 ML/MIN/1.73M2 — LOW
EGFR: 32 ML/MIN/1.73M2 — LOW
EGFR: 41 ML/MIN/1.73M2 — LOW
EGFR: 43 ML/MIN/1.73M2 — LOW
EGFR: 43 ML/MIN/1.73M2 — LOW
EOSINOPHIL # BLD AUTO: 0.01 K/UL — SIGNIFICANT CHANGE UP (ref 0–0.5)
EOSINOPHIL NFR BLD AUTO: 0.1 % — SIGNIFICANT CHANGE UP (ref 0–6)
GLUCOSE SERPL-MCNC: 137 MG/DL — HIGH (ref 70–99)
GLUCOSE SERPL-MCNC: 140 MG/DL — HIGH (ref 70–99)
GLUCOSE SERPL-MCNC: 149 MG/DL — HIGH (ref 70–99)
GLUCOSE SERPL-MCNC: 150 MG/DL — HIGH (ref 70–99)
GLUCOSE SERPL-MCNC: 167 MG/DL — HIGH (ref 70–99)
HCO3 BLDA-SCNC: 36 MMOL/L — HIGH (ref 21–28)
HCT VFR BLD CALC: 39 % — SIGNIFICANT CHANGE UP (ref 39–50)
HCT VFR BLD CALC: 41.3 % — SIGNIFICANT CHANGE UP (ref 39–50)
HCT VFR BLD CALC: 44.1 % — SIGNIFICANT CHANGE UP (ref 39–50)
HCT VFR BLD CALC: 44.9 % — SIGNIFICANT CHANGE UP (ref 39–50)
HCT VFR BLD CALC: 48.7 % — SIGNIFICANT CHANGE UP (ref 39–50)
HCT VFR BLD CALC: 49.6 % — SIGNIFICANT CHANGE UP (ref 39–50)
HCT VFR BLD CALC: 51.6 % — HIGH (ref 39–50)
HGB BLD-MCNC: 12.6 G/DL — LOW (ref 13–17)
HGB BLD-MCNC: 13.1 G/DL — SIGNIFICANT CHANGE UP (ref 13–17)
HGB BLD-MCNC: 14 G/DL — SIGNIFICANT CHANGE UP (ref 13–17)
HGB BLD-MCNC: 14.2 G/DL — SIGNIFICANT CHANGE UP (ref 13–17)
HGB BLD-MCNC: 15.6 G/DL — SIGNIFICANT CHANGE UP (ref 13–17)
HGB BLD-MCNC: 15.8 G/DL — SIGNIFICANT CHANGE UP (ref 13–17)
HGB BLD-MCNC: 16.6 G/DL — SIGNIFICANT CHANGE UP (ref 13–17)
HGB BLDA-MCNC: 16.7 G/DL — SIGNIFICANT CHANGE UP (ref 12.6–17.4)
IMM GRANULOCYTES NFR BLD AUTO: 0.3 % — SIGNIFICANT CHANGE UP (ref 0–0.9)
INR BLD: 1.57 — HIGH (ref 0.85–1.18)
INR BLD: 1.65 — HIGH (ref 0.85–1.18)
ISTAT ACTK (ACTIVATED CLOTTING TIME KAOLIN): 120 SEC — SIGNIFICANT CHANGE UP (ref 74–137)
LACTATE SERPL-SCNC: 1.5 MMOL/L — SIGNIFICANT CHANGE UP (ref 0.5–2)
LACTATE SERPL-SCNC: 1.5 MMOL/L — SIGNIFICANT CHANGE UP (ref 0.5–2)
LYMPHOCYTES # BLD AUTO: 1.58 K/UL — SIGNIFICANT CHANGE UP (ref 1–3.3)
LYMPHOCYTES # BLD AUTO: 17 % — SIGNIFICANT CHANGE UP (ref 13–44)
MAGNESIUM SERPL-MCNC: 2.4 MG/DL — SIGNIFICANT CHANGE UP (ref 1.6–2.6)
MCHC RBC-ENTMCNC: 29.4 PG — SIGNIFICANT CHANGE UP (ref 27–34)
MCHC RBC-ENTMCNC: 29.5 PG — SIGNIFICANT CHANGE UP (ref 27–34)
MCHC RBC-ENTMCNC: 29.5 PG — SIGNIFICANT CHANGE UP (ref 27–34)
MCHC RBC-ENTMCNC: 29.6 PG — SIGNIFICANT CHANGE UP (ref 27–34)
MCHC RBC-ENTMCNC: 29.6 PG — SIGNIFICANT CHANGE UP (ref 27–34)
MCHC RBC-ENTMCNC: 29.7 PG — SIGNIFICANT CHANGE UP (ref 27–34)
MCHC RBC-ENTMCNC: 30 PG — SIGNIFICANT CHANGE UP (ref 27–34)
MCHC RBC-ENTMCNC: 31.2 GM/DL — LOW (ref 32–36)
MCHC RBC-ENTMCNC: 31.7 GM/DL — LOW (ref 32–36)
MCHC RBC-ENTMCNC: 31.9 GM/DL — LOW (ref 32–36)
MCHC RBC-ENTMCNC: 32 GM/DL — SIGNIFICANT CHANGE UP (ref 32–36)
MCHC RBC-ENTMCNC: 32.2 GM/DL — SIGNIFICANT CHANGE UP (ref 32–36)
MCHC RBC-ENTMCNC: 32.2 GM/DL — SIGNIFICANT CHANGE UP (ref 32–36)
MCHC RBC-ENTMCNC: 32.3 GM/DL — SIGNIFICANT CHANGE UP (ref 32–36)
MCV RBC AUTO: 91.1 FL — SIGNIFICANT CHANGE UP (ref 80–100)
MCV RBC AUTO: 92.1 FL — SIGNIFICANT CHANGE UP (ref 80–100)
MCV RBC AUTO: 92.1 FL — SIGNIFICANT CHANGE UP (ref 80–100)
MCV RBC AUTO: 92.3 FL — SIGNIFICANT CHANGE UP (ref 80–100)
MCV RBC AUTO: 92.9 FL — SIGNIFICANT CHANGE UP (ref 80–100)
MCV RBC AUTO: 94.5 FL — SIGNIFICANT CHANGE UP (ref 80–100)
MCV RBC AUTO: 94.5 FL — SIGNIFICANT CHANGE UP (ref 80–100)
METHGB MFR BLDA: 0.9 % — SIGNIFICANT CHANGE UP
MONOCYTES # BLD AUTO: 0.65 K/UL — SIGNIFICANT CHANGE UP (ref 0–0.9)
MONOCYTES NFR BLD AUTO: 7 % — SIGNIFICANT CHANGE UP (ref 2–14)
NEUTROPHILS # BLD AUTO: 6.97 K/UL — SIGNIFICANT CHANGE UP (ref 1.8–7.4)
NEUTROPHILS NFR BLD AUTO: 75.3 % — SIGNIFICANT CHANGE UP (ref 43–77)
NRBC # BLD: 0 /100 WBCS — SIGNIFICANT CHANGE UP (ref 0–0)
OXYHGB MFR BLDA: 96.8 % — HIGH (ref 90–95)
PCO2 BLDA: 51 MMHG — HIGH (ref 35–48)
PH BLDA: 7.46 — HIGH (ref 7.35–7.45)
PHOSPHATE SERPL-MCNC: 4.7 MG/DL — HIGH (ref 2.5–4.5)
PLATELET # BLD AUTO: 191 K/UL — SIGNIFICANT CHANGE UP (ref 150–400)
PLATELET # BLD AUTO: 193 K/UL — SIGNIFICANT CHANGE UP (ref 150–400)
PLATELET # BLD AUTO: 207 K/UL — SIGNIFICANT CHANGE UP (ref 150–400)
PLATELET # BLD AUTO: 210 K/UL — SIGNIFICANT CHANGE UP (ref 150–400)
PLATELET # BLD AUTO: 212 K/UL — SIGNIFICANT CHANGE UP (ref 150–400)
PLATELET # BLD AUTO: 218 K/UL — SIGNIFICANT CHANGE UP (ref 150–400)
PLATELET # BLD AUTO: 226 K/UL — SIGNIFICANT CHANGE UP (ref 150–400)
PO2 BLDA: 71 MMHG — LOW (ref 83–108)
POTASSIUM SERPL-MCNC: 4.4 MMOL/L — SIGNIFICANT CHANGE UP (ref 3.5–5.3)
POTASSIUM SERPL-MCNC: 4.6 MMOL/L — SIGNIFICANT CHANGE UP (ref 3.5–5.3)
POTASSIUM SERPL-MCNC: SIGNIFICANT CHANGE UP (ref 3.5–5.3)
POTASSIUM SERPL-SCNC: 4.4 MMOL/L — SIGNIFICANT CHANGE UP (ref 3.5–5.3)
POTASSIUM SERPL-SCNC: 4.6 MMOL/L — SIGNIFICANT CHANGE UP (ref 3.5–5.3)
POTASSIUM SERPL-SCNC: SIGNIFICANT CHANGE UP (ref 3.5–5.3)
PROT SERPL-MCNC: 6 G/DL — SIGNIFICANT CHANGE UP (ref 6–8.3)
PROT SERPL-MCNC: 6.7 G/DL — SIGNIFICANT CHANGE UP (ref 6–8.3)
PROTHROM AB SERPL-ACNC: 17.7 SEC — HIGH (ref 9.5–13)
PROTHROM AB SERPL-ACNC: 18.5 SEC — HIGH (ref 9.5–13)
RBC # BLD: 4.28 M/UL — SIGNIFICANT CHANGE UP (ref 4.2–5.8)
RBC # BLD: 4.37 M/UL — SIGNIFICANT CHANGE UP (ref 4.2–5.8)
RBC # BLD: 4.75 M/UL — SIGNIFICANT CHANGE UP (ref 4.2–5.8)
RBC # BLD: 4.79 M/UL — SIGNIFICANT CHANGE UP (ref 4.2–5.8)
RBC # BLD: 5.29 M/UL — SIGNIFICANT CHANGE UP (ref 4.2–5.8)
RBC # BLD: 5.34 M/UL — SIGNIFICANT CHANGE UP (ref 4.2–5.8)
RBC # BLD: 5.59 M/UL — SIGNIFICANT CHANGE UP (ref 4.2–5.8)
RBC # FLD: 16 % — HIGH (ref 10.3–14.5)
RBC # FLD: 16.1 % — HIGH (ref 10.3–14.5)
RBC # FLD: 16.2 % — HIGH (ref 10.3–14.5)
RBC # FLD: 16.3 % — HIGH (ref 10.3–14.5)
RBC # FLD: 16.4 % — HIGH (ref 10.3–14.5)
RBC # FLD: 16.6 % — HIGH (ref 10.3–14.5)
RBC # FLD: 16.7 % — HIGH (ref 10.3–14.5)
RH IG SCN BLD-IMP: POSITIVE — SIGNIFICANT CHANGE UP
SAO2 % BLDA: 100 % — HIGH (ref 94–98)
SAO2 % BLDA: 95.6 % — SIGNIFICANT CHANGE UP (ref 94–98)
SODIUM SERPL-SCNC: 148 MMOL/L — HIGH (ref 135–145)
SODIUM SERPL-SCNC: 148 MMOL/L — HIGH (ref 135–145)
SODIUM SERPL-SCNC: 149 MMOL/L — HIGH (ref 135–145)
SODIUM SERPL-SCNC: 150 MMOL/L — HIGH (ref 135–145)
SODIUM SERPL-SCNC: 151 MMOL/L — HIGH (ref 135–145)
WBC # BLD: 6.97 K/UL — SIGNIFICANT CHANGE UP (ref 3.8–10.5)
WBC # BLD: 7.93 K/UL — SIGNIFICANT CHANGE UP (ref 3.8–10.5)
WBC # BLD: 8.06 K/UL — SIGNIFICANT CHANGE UP (ref 3.8–10.5)
WBC # BLD: 8.48 K/UL — SIGNIFICANT CHANGE UP (ref 3.8–10.5)
WBC # BLD: 8.83 K/UL — SIGNIFICANT CHANGE UP (ref 3.8–10.5)
WBC # BLD: 8.95 K/UL — SIGNIFICANT CHANGE UP (ref 3.8–10.5)
WBC # BLD: 9.27 K/UL — SIGNIFICANT CHANGE UP (ref 3.8–10.5)
WBC # FLD AUTO: 6.97 K/UL — SIGNIFICANT CHANGE UP (ref 3.8–10.5)
WBC # FLD AUTO: 7.93 K/UL — SIGNIFICANT CHANGE UP (ref 3.8–10.5)
WBC # FLD AUTO: 8.06 K/UL — SIGNIFICANT CHANGE UP (ref 3.8–10.5)
WBC # FLD AUTO: 8.48 K/UL — SIGNIFICANT CHANGE UP (ref 3.8–10.5)
WBC # FLD AUTO: 8.83 K/UL — SIGNIFICANT CHANGE UP (ref 3.8–10.5)
WBC # FLD AUTO: 8.95 K/UL — SIGNIFICANT CHANGE UP (ref 3.8–10.5)
WBC # FLD AUTO: 9.27 K/UL — SIGNIFICANT CHANGE UP (ref 3.8–10.5)

## 2024-04-23 PROCEDURE — 99222 1ST HOSP IP/OBS MODERATE 55: CPT | Mod: GC

## 2024-04-23 PROCEDURE — 99233 SBSQ HOSP IP/OBS HIGH 50: CPT

## 2024-04-23 RX ORDER — APIXABAN 2.5 MG/1
1 TABLET, FILM COATED ORAL
Qty: 60 | Refills: 0
Start: 2024-04-23 | End: 2024-05-22

## 2024-04-23 RX ORDER — SODIUM CHLORIDE 9 MG/ML
250 INJECTION INTRAMUSCULAR; INTRAVENOUS; SUBCUTANEOUS ONCE
Refills: 0 | Status: COMPLETED | OUTPATIENT
Start: 2024-04-23 | End: 2024-04-23

## 2024-04-23 RX ORDER — METOPROLOL TARTRATE 50 MG
5 TABLET ORAL ONCE
Refills: 0 | Status: COMPLETED | OUTPATIENT
Start: 2024-04-23 | End: 2024-04-23

## 2024-04-23 RX ORDER — NOREPINEPHRINE BITARTRATE/D5W 8 MG/250ML
0.1 PLASTIC BAG, INJECTION (ML) INTRAVENOUS
Qty: 8 | Refills: 0 | Status: DISCONTINUED | OUTPATIENT
Start: 2024-04-23 | End: 2024-04-24

## 2024-04-23 RX ORDER — APIXABAN 2.5 MG/1
10 TABLET, FILM COATED ORAL EVERY 12 HOURS
Refills: 0 | Status: DISCONTINUED | OUTPATIENT
Start: 2024-04-23 | End: 2024-04-23

## 2024-04-23 RX ORDER — PANTOPRAZOLE SODIUM 20 MG/1
40 TABLET, DELAYED RELEASE ORAL
Refills: 0 | Status: DISCONTINUED | OUTPATIENT
Start: 2024-04-23 | End: 2024-04-24

## 2024-04-23 RX ORDER — APIXABAN 2.5 MG/1
10 TABLET, FILM COATED ORAL
Refills: 0 | Status: DISCONTINUED | OUTPATIENT
Start: 2024-04-23 | End: 2024-04-23

## 2024-04-23 RX ORDER — SODIUM CHLORIDE 9 MG/ML
500 INJECTION INTRAMUSCULAR; INTRAVENOUS; SUBCUTANEOUS ONCE
Refills: 0 | Status: COMPLETED | OUTPATIENT
Start: 2024-04-23 | End: 2024-04-23

## 2024-04-23 RX ADMIN — SODIUM CHLORIDE 125 MILLILITER(S): 9 INJECTION INTRAMUSCULAR; INTRAVENOUS; SUBCUTANEOUS at 22:22

## 2024-04-23 RX ADMIN — MUPIROCIN 1 APPLICATION(S): 20 OINTMENT TOPICAL at 05:13

## 2024-04-23 RX ADMIN — Medication 25 MILLIGRAM(S): at 18:57

## 2024-04-23 RX ADMIN — HEPARIN SODIUM 0 UNIT(S)/HR: 5000 INJECTION INTRAVENOUS; SUBCUTANEOUS at 02:28

## 2024-04-23 RX ADMIN — Medication 5 MILLIGRAM(S): at 15:12

## 2024-04-23 RX ADMIN — MUPIROCIN 1 APPLICATION(S): 20 OINTMENT TOPICAL at 19:09

## 2024-04-23 RX ADMIN — SODIUM CHLORIDE 333.33 MILLILITER(S): 9 INJECTION INTRAMUSCULAR; INTRAVENOUS; SUBCUTANEOUS at 19:51

## 2024-04-23 RX ADMIN — HEPARIN SODIUM 1300 UNIT(S)/HR: 5000 INJECTION INTRAVENOUS; SUBCUTANEOUS at 08:11

## 2024-04-23 RX ADMIN — HEPARIN SODIUM 1300 UNIT(S)/HR: 5000 INJECTION INTRAVENOUS; SUBCUTANEOUS at 03:34

## 2024-04-23 RX ADMIN — HEPARIN SODIUM 0 UNIT(S)/HR: 5000 INJECTION INTRAVENOUS; SUBCUTANEOUS at 10:07

## 2024-04-23 RX ADMIN — Medication 16.7 MICROGRAM(S)/KG/MIN: at 22:36

## 2024-04-23 RX ADMIN — PANTOPRAZOLE SODIUM 40 MILLIGRAM(S): 20 TABLET, DELAYED RELEASE ORAL at 18:58

## 2024-04-23 RX ADMIN — APIXABAN 10 MILLIGRAM(S): 2.5 TABLET, FILM COATED ORAL at 11:41

## 2024-04-23 RX ADMIN — LINEZOLID 600 MILLIGRAM(S): 600 INJECTION, SOLUTION INTRAVENOUS at 15:39

## 2024-04-23 RX ADMIN — Medication 25 MILLIGRAM(S): at 11:41

## 2024-04-23 RX ADMIN — SODIUM CHLORIDE 500 MILLILITER(S): 9 INJECTION INTRAMUSCULAR; INTRAVENOUS; SUBCUTANEOUS at 15:12

## 2024-04-23 NOTE — CHART NOTE - NSCHARTNOTEFT_GEN_A_CORE
Provider called to the bedside for concerns of episode of melena on bowel movement. Patient currently on heparin drip for PE and Afib, Hgb this AM stable at 15.6. Patient now s/p LHC and RHC with mild disease no PCI. Stat CBC drawn and Hgb remaining stable. Stool was thrown out, no fecal occult obtained. Continue to monitor bowel movement as concern for new melena. Eliquis was given this morning as was planning to set up for discharge. Patient with again another large watery melenonic BM. Concern for acute bleed, protonix IV started BID, GI consulted concern for melena and planning for Scope tomr. CCU Fellow made aware. STAT labs ordered as concern for acute bleed and patient appearing more diaphoretic. Lactate normal cbc with 1.8 drop in Hgb. Heart rates still elevated in the 130s-140s. CCU recommending ICU consult. Consult to see patient. Given 250mg Bolus. Repeat chem stable. Repeat labs again ordered for 6pm with PTT INR and PT

## 2024-04-23 NOTE — PROVIDER CONTACT NOTE (CRITICAL VALUE NOTIFICATION) - SITUATION
patients aptt came back at 199.9
pt .3 on normogram heparin; follow normogram per GALO Aguilera
Pt tpn 52

## 2024-04-23 NOTE — PROGRESS NOTE ADULT - ASSESSMENT
74M with no known PMH, initially admitted to cardiac telemetry s/p fall for rapid a fib. Course complicated by new diagnosis of new CHF (LVEF 20-25%), PE, SILKE on CKD, and melena. GI consulted for melena when transitioning from heparin gtt to Eliquis, and patient is planned for EGD tomorrow.     Neuro   ARNULFO    CV  #Acute decompensated HF  TTE 4/15/24: Severely reduced LVSF and RVSF, LVEF 20-25%, mild biatrial enlargement, mild TR, PASP 68, dilated IVC  -CTA chest: Neg for PE. Small right and trace left pleural effusions.   -Diuresis: s/p daily IV Lasix 60   -GDMT: Valsartan 40 BID, spironolactone 25 mg QD, metoprolol tartrate 25 mg BID   -Core measures, strict I/O's daily weights, 1.2L PO restriction, strict I/Os, cont tele/pulse ox  -Advanced HF team following;  -Now holding lasix i/s/o excessive diuresis, SILKE, contraction alkalosis & subsequent AHRF  -Holding valsartan and spironolactone i/s/o SILKE  -c/w metoprolol     #Afib  HR 130s, unclear whether patient has hx of afib or is new. Never f/u with Drs.  - Rate control: c/w metoprolol tartrate 25 mg BID    #Aneurysm of thoracic aorta   - CT Abd/Pelvis 04/13/24: No thoracoabdominal dissection, no thoracic aorta hematoma. Ascending thoracic aortic aneurysm 4.0 cm  - Consider CTS follow up in outpatient setting for surveillance.    #HTN (hypertension).   - hold valsartan 40 mg BID and metoprolol 25 mg BID i/s/o SILKE    Pulm   #AHRF 74M with no known PMH, initially admitted to cardiac telemetry s/p fall for rapid a fib. Course complicated by new diagnosis of new CHF (LVEF 20-25%), PE, SILKE on CKD, and melena. GI consulted for melena when transitioning from heparin gtt to Eliquis, and patient is planned for EGD tomorrow.     Neuro   ARNULFO    CV  #Acute decompensated HF  TTE 4/15/24: Severely reduced LVSF and RVSF, LVEF 20-25%, mild biatrial enlargement, mild TR, PASP 68, dilated IVC  RHC/LHC: focal RCA lesion w/ evidence of hypovolemia   -CTA chest: Neg for PE. Small right and trace left pleural effusions.   -Diuresis: s/p daily IV Lasix 60   -GDMT: Valsartan 40 BID, spironolactone 25 mg QD, metoprolol tartrate 25 mg BID   -Core measures, strict I/O's daily weights, 1.2L PO restriction, strict I/Os, cont tele/pulse ox  -Advanced HF team following;  -Now holding lasix i/s/o aggressive diuresis, SILKE, contraction alkalosis & subsequent AHRF  -Holding valsartan and spironolactone i/s/o SILKE  -c/w metoprolol     #Afib  HR 130s, unclear whether patient has hx of afib or is new. Never f/u with Drs.  - Rate control: c/w metoprolol tartrate 25 mg BID    #Aneurysm of thoracic aorta   - CT Abd/Pelvis 04/13/24: No thoracoabdominal dissection, no thoracic aorta hematoma. Ascending thoracic aortic aneurysm 4.0 cm  - Consider CTS follow up in outpatient setting for surveillance.    #HTN (hypertension).   - hold valsartan 40 mg BID and metoprolol 25 mg BID i/s/o SILKE    Pulm   #AHRF w/ hypercapnia   Likely 2/2 contraction alkalosis vs aspiration  s/p BiPAP, now on 5L NC  - c to trend CO2 and O2 sat  - wean NC as tolerated     #Subsegmental PE   CTPE w/ evidence of small subsegmental PE in RLL   transitioned from heparin gtt to eliquis today but not discontinued i/s/o melena      GI  #UGI Bleed  Pt w/ large  melena when transitioning from heparin gtt to Eliquis today  Hgb drop of 1.8   GI consulted  - c/w IV protonix BID  - will undergo EGD on 4/24 pending cardiac risk assessment and rate control  - hold AC, restart as appropriate   - NPO at midnight   - maintain active type & screen   - 2 large bore IVs      #SILKE   Initial Cr of 0.8, now 2.12  Likely 2/2 aggressive diuresis & volume loss from melena   - careful volume repletion i/s/o acute HF   - holding valsartan and spironolactone   - hold further diuresis     #Hypernatremia   hypovolemic hypernatremia i/s/o diuresis   - CTM   - hold further diuresis    ID  #Lower extremity wounds   -c/w linezolid 600mg BID x5 days (4/24)  -wound care     #prophylactic measures  F: None   E: Replete as necessary K>4 Mg>2  N: NPO for EGD    DVT Prophylaxis: hold i/s/o melena  GI prophylaxis: Protonix  CODE STATUS: FULL       74M with no known PMH, initially admitted to cardiac telemetry s/p fall for rapid a fib. Course complicated by new diagnosis of new CHF (LVEF 20-25%), PE, SILKE on CKD, and melena. GI consulted for melena when transitioning from heparin gtt to Eliquis, and patient is planned for EGD tomorrow.     Neuro   #Fever.   Now Resolved   ·  Plan: - Intermittent fevers, highest temp rectal 101.6F 4/17, now rectal 100. 6 4/19  - Per ID, favor community acquired aspiration PNA (given poor dentition, L side opacity on CXR),  Ceftriaxone course completed, Last Dose of Linezolid 4/24/2024   - HIV -, HEP -, UStrep -, Ulegionella -, Procal -  - BCx 3 NGTD for 48 hours, BC collected 4/19   - CTA PE negative for PE at [facility]  - was indeterminate d/t motion artifact  - CTA pe rule out on 4/20 with improvement in study showing Multiple small subsegmental emboli identified within the right lower lobe pulmonary artery branches. Small plueral effusions with atelectasis.   - B/L LE dopplers negative for DVT.    CV  #Acute decompensated HF  TTE 4/15/24: Severely reduced LVSF and RVSF, LVEF 20-25%, mild biatrial enlargement, mild TR, PASP 68, dilated IVC  RHC/LHC: focal RCA lesion w/ evidence of hypovolemia   -CTA chest: Neg for PE. Small right and trace left pleural effusions.   -Diuresis: s/p daily IV Lasix 60   -GDMT: Valsartan 40 BID, spironolactone 25 mg QD, metoprolol tartrate 25 mg BID   -Core measures, strict I/O's daily weights, 1.2L PO restriction, strict I/Os, cont tele/pulse ox  -Advanced HF team following;  -Now holding lasix i/s/o aggressive diuresis, SILKE, contraction alkalosis & subsequent AHRF  -Holding valsartan and spironolactone i/s/o SILKE  -c/w metoprolol     #Afib  HR 130s, unclear whether patient has hx of afib or is new. Never f/u with Drs.  - Rate control: c/w metoprolol tartrate 25 mg BID    #Aneurysm of thoracic aorta   - CT Abd/Pelvis 04/13/24: No thoracoabdominal dissection, no thoracic aorta hematoma. Ascending thoracic aortic aneurysm 4.0 cm  - Consider CTS follow up in outpatient setting for surveillance.    # Rhabdomyolysis.   Patient had mechanical fall and was down on the ground x 2 days.   - CK improving, 2336 -> 2002-> 777,   - CTM      #HTN (hypertension).   - hold valsartan 40 mg BID and metoprolol 25 mg BID i/s/o SILKE    Pulm   #AHRF w/ hypercapnia   Likely 2/2 contraction alkalosis vs aspiration  s/p BiPAP, now on 5L NC  - c to trend CO2 and O2 sat  - wean NC as tolerated     #Subsegmental PE   CTPE w/ evidence of small subsegmental PE in RLL   transitioned from heparin gtt to eliquis today but not discontinued i/s/o melena      GI  #UGI Bleed  Pt w/ large  melena when transitioning from heparin gtt to Eliquis today  Hgb drop of 1.8   GI consulted  - c/w IV protonix BID  - will undergo EGD on 4/24 pending cardiac risk assessment and rate control  - hold AC, restart as appropriate   - NPO at midnight   - maintain active type & screen   - 2 large bore IVs      #SILKE   Initial Cr of 0.8, now 2.12  Likely 2/2 aggressive diuresis & volume loss from melena   - careful volume repletion i/s/o acute HF   - holding valsartan and spironolactone   - hold further diuresis     #Hypernatremia   hypovolemic hypernatremia i/s/o diuresis   - CTM   - hold further diuresis    ID  #Lower extremity wounds   -c/w linezolid 600mg BID x5 days (4/24)  -wound care     #prophylactic measures  F: None   E: Replete as necessary K>4 Mg>2  N: NPO for EGD    DVT Prophylaxis: hold i/s/o melena  GI prophylaxis: Protonix  CODE STATUS: FULL       74M with no known PMH, initially admitted to cardiac telemetry s/p fall for rapid a fib. Course complicated by new diagnosis of new CHF (LVEF 20-25%), PE, SILKE on CKD, and melena. GI consulted for melena when transitioning from heparin gtt to Eliquis, and patient is planned for EGD tomorrow.     Neuro   #Fever.   Now Resolved   ·  Plan: - Intermittent fevers, highest temp rectal 101.6F 4/17, now rectal 100. 6 4/19  - Per ID, favor community acquired aspiration PNA (given poor dentition, L side opacity on CXR),  Ceftriaxone course completed, Last Dose of Linezolid 4/24/2024   - HIV -, HEP -, UStrep -, Ulegionella -, Procal -  - BCx 3 NGTD for 48 hours, BC collected 4/19   - [facility] PE negative for PE at [facility]  - was indeterminate d/t motion artifact  - CTA pe rule out on 4/20 with improvement in study showing Multiple small subsegmental emboli identified within the right lower lobe pulmonary artery branches. Small plueral effusions with atelectasis.   - B/L LE dopplers negative for DVT.    CV  #Hypotension  #Hypovolemic shock  BP 70s/40s on 4/24 iso multiple episodes melena. Levo gtt started, 2U pRBC and 1U FFP given iso active GIB and shock. Cordis, R IJ TLC, R axillary a line placed for close BP monitoring and access for transfusions.   INR 1.6, renal function worsening, BUN 90s. Consider component of uremic platelet dysfunction.   - C/w levo gtt  - f/u c-scope  - If requiring more pRBC transfusions, consider 1:1:1 transfusion with massive transfusion protocol    #Acute decompensated HF  TTE 4/15/24: Severely reduced LVSF and RVSF, LVEF 20-25%, mild biatrial enlargement, mild TR, PASP 68, dilated IVC  RHC/LHC: focal RCA lesion w/ evidence of hypovolemia   -CTA chest: Neg for PE. Small right and trace left pleural effusions.   -Diuresis: s/p daily IV Lasix 60   -GDMT: Valsartan 40 BID, spironolactone 25 mg QD, metoprolol tartrate 25 mg BID   -Core measures, strict I/O's daily weights, 1.2L PO restriction, strict I/Os, cont tele/pulse ox  -Advanced HF team following;  -Now holding lasix i/s/o aggressive diuresis, SILKE, contraction alkalosis & subsequent AHRF  -Holding valsartan and spironolactone i/s/o SILKE  -c/w metoprolol     #Afib  HR 130s, unclear whether patient has hx of afib or is new. Never f/u with Drs.  - Rate control: c/w metoprolol tartrate 25 mg BID    #Aneurysm of thoracic aorta   - CT Abd/Pelvis 04/13/24: No thoracoabdominal dissection, no thoracic aorta hematoma. Ascending thoracic aortic aneurysm 4.0 cm  - Consider CTS follow up in outpatient setting for surveillance.    # Rhabdomyolysis.   Patient had mechanical fall and was down on the ground x 2 days.   - CK improving, 2336 -> 2002-> 777,   - CTM      #HTN (hypertension).   - hold valsartan 40 mg BID and metoprolol 25 mg BID i/s/o SILKE    Pulm   #AHRF w/ hypercapnia   Likely 2/2 persistent tachypnea 2/2 compensatory respiratory acidosis in reaction to contraction alkalosis 2/2 aggressive diuresis vs aspiration  s/p BiPAP -->5L NC --> HFNC 40/40.   - c to trend CO2 and O2 sat    #Subsegmental PE   CTPE w/ evidence of small subsegmental PE in RLL   transitioned from heparin gtt to eliquis 4/24 but not discontinued i/s/o melena    GI  #UGI Bleed  Pt w/ large  melena when transitioning from heparin gtt to Eliquis today  Hgb drop of 1.8   GI consulted  - c/w IV protonix BID  - will undergo EGD on 4/24 pending cardiac risk assessment and rate control  - hold AC, restart as appropriate   - NPO at midnight   - maintain active type & screen   - 2 large bore IVs      #SILKE   Initial Cr of 0.8, now 2.12  Likely 2/2 aggressive diuresis & volume loss from melena   - careful volume repletion i/s/o acute HF   - holding valsartan and spironolactone   - hold further diuresis     #Hypernatremia   hypovolemic hypernatremia i/s/o diuresis   - CTM   - hold further diuresis    ID  #Lower extremity wounds   -c/w linezolid 600mg BID x5 days (4/24)  -wound care     #prophylactic measures  F: None   E: Replete as necessary K>4 Mg>2  N: NPO for EGD    DVT Prophylaxis: hold i/s/o melena  GI prophylaxis: Protonix  CODE STATUS: FULL

## 2024-04-23 NOTE — SWALLOW BEDSIDE ASSESSMENT ADULT - PHARYNGEAL PHASE
Laryngeal rise palpated during the swallow - judged to be timely. No overt s/s of airway invasion./Within functional limits

## 2024-04-23 NOTE — PROGRESS NOTE ADULT - SUBJECTIVE AND OBJECTIVE BOX
***Transfer from cardiac tele to CCU*    Course:   4M with no known PMH, who presented to [facility] on 24 after mechanical fall, was found down on floor x 2 days by karen. Labs significant for Trop T 52->54, CK 2k BNP 7k . CTH without acute bleed. CT A/P neg for PE, + ascites. Pt admitted to cardiac tele for newly dx rapid a fib, rhabdo, and suspected ADHF exacerbation. Course c/b hypernatremia, s/p lactated ringers. Echo revealing LVEF 20-25%, reduced RVSF. Advanced HF team consulted plan for R/LHC however postponed given new fevers (fever peak  pm rectal 101). ID consulted, rec ceftriaxone 2 g IV daily for possible aspiration PNA + Linezolid 500 mg BID x 5 days(started ) for wounds at b/l LE, medicine team following, pulm consulted for mosaicism and air trapping on CT. CT PE on  with small PE right side. s/p R/LHC on  where patient became alkalotic and hypercapnic requiring brief BiPAP use. Diuretics and all nephrotoxic medications on hold pending resolution of SILKE. Course complicated by two episodes of melena on , for which GI was consulted. Patient developed melena when transitioned from heparin gtt to Eliquis for newly diagnosed PE, so was started on pantoprazole 40 mg IV BID. Plan for EGD on .      [facility] Course   -788/  HR 90 -118 RR 18 20 SpO2 98% on 4L NC  WBC 8.32 H/H 16.7/52.9 Plt 219 PT/PTT 14.8/41.2 INR 1.36  Na 148 K 4.2 Cl 111 Co2 25 AG 12 BUN/Cr 22/1.14 TBili 2.1 ALP 60 AST/ALT 86/58 Mg 1.8   Lactate  3.0 -> 2.2. 0> 1.7 BCx collcted   TSH 3.092   Trop I 91.9 CK 2k  BNP 7k   UA +ketones, +proteinuria, +blood. Negative for LE/Nitrites   Respiratory Panel: SARS-CoV/Influenza A/B, RSV not detected   EKG per ED documentation Afib w/ HR 100s     Imaging  o CXR 24: No acute infiltrates, cardiomegaly   o CTA Chest 24:  No central, main, or lobar pulmonary embolus. Remainder of the pulmonary arterial tree cannot be assessed due to respiratory motion. This represents a change from the preliminary report. Enlarged main pulmonary   artery may reflect pulmonary arterial hypertension. Cardiac enlargement. Coronary artery calcifications. Small right and trace left pleural effusions.  Mid ascending thoracic aorta is aneurysmal measuring 4 cm. No aortic dissection identified within the limitations of this non-ECG gated study.  o CT ABDOMEN/PELVIS 24: Small volume abdominopelvic ascites, simple appearing in the pelvis. Upper abdomen ascites evaluation is limited by significant streak  artifact, particularly surrounding the liver and spleen. Short-term   follow-up reevaluation can be considered in the setting of trauma to exclude solid organ injury. Abdominal wall soft tissue edema, left greater than right, as well as at the umbilicus. Cholelithiasis.  o CTH 24: No acute intracranial hemorrhage or calvarial fracture.    ER Interventions: Lasix 80mg IVP x1 (400cc ouput), Zoysn 3.375mg IVPx1, and Vancomycin 1250mg IVPB x1      ICU Vital Signs Last 24 Hrs  T(C): 36.9 (2024 15:27), Max: 37.1 (2024 00:14)  T(F): 98.5 (2024 15:27), Max: 98.7 (2024 00:14)  HR: 63 (2024 16:19) (63 - 144)  BP: 94/50 (2024 15:27) (94/50 - 130/71)  BP(mean): 83 (2024 04:33) (81 - 83)  ABP: --  ABP(mean): --  RR: 20 (2024 16:19) (20 - 31)  SpO2: 98% (2024 16:19) (94% - 98%)    O2 Parameters below as of 2024 16:19  Patient On (Oxygen Delivery Method): nasal cannula  O2 Flow (L/min): 5        I&O's Summary    2024 07:01  -  2024 07:00  --------------------------------------------------------  IN: 161 mL / OUT: 1850 mL / NET: -1689 mL    2024 07:01  -  2024 20:32  --------------------------------------------------------  IN: 13 mL / OUT: 250 mL / NET: -237 mL          Daily     Daily Weight in k.2 (2024 06:02)    Adult Advanced Hemodynamics Last 24 Hrs  CVP(mm Hg): --  CVP(cm H2O): --  CO: --  CI: --  PA: --  PA(mean): --  PCWP: --  SVR: --  SVRI: --  PVR: --  PVRI: --    EKG/Telemetry Events:    MEDICATIONS  (STANDING):  influenza  Vaccine (HIGH DOSE) 0.7 milliLiter(s) IntraMuscular once  linezolid    Tablet 600 milliGRAM(s) Oral every 12 hours  metoprolol tartrate 25 milliGRAM(s) Oral two times a day  mupirocin 2% Ointment 1 Application(s) Topical two times a day  pantoprazole  Injectable 40 milliGRAM(s) IV Push two times a day  polyethylene glycol 3350 17 Gram(s) Oral two times a day  senna 2 Tablet(s) Oral at bedtime    MEDICATIONS  (PRN):  ipratropium    for Nebulization 500 MICROGram(s) Nebulizer every 6 hours PRN Shortness of Breath and/or Wheezing      PHYSICAL EXAM:    General: AO, NAD, lying in bed,   HEENT: AT/NC, no facial asymmetry   Lungs: CTA, no crackles, no wheezes   Heart: irregular  Abdomen: soft, non-tender  Extremities:  warm, leg ulcerations with chronic skin changes, minimal LE edema      LABS:                        13.1   8.95  )-----------( 210      ( 2024 20:11 )             41.3     04-23    151<H>  |  107  |  84<H>  ----------------------------<  140<H>  4.6   |  33<H>  |  1.72<H>    Ca    9.2      2024 17:24  Mg     2.4     04-    TPro  6.0  /  Alb  3.0<L>  /  TBili  0.7  /  DBili  x   /  AST  See Note  /  ALT  See Note  /  AlkPhos  See Note      LIVER FUNCTIONS - ( 2024 16:12 )  Alb: 3.0 g/dL / Pro: 6.0 g/dL / ALK PHOS: See Note / ALT: See Note / AST: See Note / GGT: x           PT/INR - ( 2024 17:24 )   PT: 18.5 sec;   INR: 1.65          PTT - ( 2024 17:24 )  PTT:37.4 sec  CAPILLARY BLOOD GLUCOSE        ABG - ( 2024 21:26 )  pH, Arterial: 7.49  pH, Blood: x     /  pCO2: 55    /  pO2: 120   / HCO3: 42    / Base Excess: 15.8  /  SaO2: 99.7                    Urinalysis Basic - ( 2024 17:24 )    Color: x / Appearance: x / SG: x / pH: x  Gluc: 140 mg/dL / Ketone: x  / Bili: x / Urobili: x   Blood: x / Protein: x / Nitrite: x   Leuk Esterase: x / RBC: x / WBC x   Sq Epi: x / Non Sq Epi: x / Bacteria: x          RADIOLOGY & ADDITIONAL TESTS:  CXR:        Care Discussed with Consultants/Other Providers [ x] YES  [ ] NO         ***Transfer from cardiac tele to CCU***    Course:   4M with no known PMH, who presented to [facility] on 24 after mechanical fall, was found down on floor x 2 days by karen. Labs significant for Trop T 52->54, CK 2k BNP 7k . CTH without acute bleed. CT A/P neg for PE, + ascites. Pt admitted to cardiac tele for newly dx rapid a fib, rhabdo, and suspected ADHF exacerbation. Course c/b hypernatremia, s/p lactated ringers. Echo revealing LVEF 20-25%, reduced RVSF. Advanced HF team consulted plan for R/LHC however postponed given new fevers (fever peak  pm rectal 101). ID consulted, rec ceftriaxone 2 g IV daily for possible aspiration PNA + Linezolid 500 mg BID x 5 days(started ) for wounds at b/l LE, medicine team following, pulm consulted for mosaicism and air trapping on CT. CT PE on  with small PE right side. s/p R/LHC on  where patient became alkalotic and hypercapnic requiring brief BiPAP use. Diuretics and all nephrotoxic medications on hold pending resolution of SILKE. Course complicated by two episodes of melena on , for which GI was consulted. Patient developed melena when transitioned from heparin gtt to Eliquis for newly diagnosed PE, so was started on pantoprazole 40 mg IV BID. Plan for EGD on .      [facility] Course   -847/  HR 90 -118 RR 18 20 SpO2 98% on 4L NC  WBC 8.32 H/H 16.7/52.9 Plt 219 PT/PTT 14.8/41.2 INR 1.36  Na 148 K 4.2 Cl 111 Co2 25 AG 12 BUN/Cr 22/1.14 TBili 2.1 ALP 60 AST/ALT 86/58 Mg 1.8   Lactate  3.0 -> 2.2. 0> 1.7 BCx collcted   TSH 3.092   Trop I 91.9 CK 2k  BNP 7k   UA +ketones, +proteinuria, +blood. Negative for LE/Nitrites   Respiratory Panel: SARS-CoV/Influenza A/B, RSV not detected   EKG per ED documentation Afib w/ HR 100s     Imaging  o CXR 24: No acute infiltrates, cardiomegaly   o CTA Chest 24:  No central, main, or lobar pulmonary embolus. Remainder of the pulmonary arterial tree cannot be assessed due to respiratory motion. This represents a change from the preliminary report. Enlarged main pulmonary   artery may reflect pulmonary arterial hypertension. Cardiac enlargement. Coronary artery calcifications. Small right and trace left pleural effusions.  Mid ascending thoracic aorta is aneurysmal measuring 4 cm. No aortic dissection identified within the limitations of this non-ECG gated study.  o CT ABDOMEN/PELVIS 24: Small volume abdominopelvic ascites, simple appearing in the pelvis. Upper abdomen ascites evaluation is limited by significant streak  artifact, particularly surrounding the liver and spleen. Short-term   follow-up reevaluation can be considered in the setting of trauma to exclude solid organ injury. Abdominal wall soft tissue edema, left greater than right, as well as at the umbilicus. Cholelithiasis.  o CTH 24: No acute intracranial hemorrhage or calvarial fracture.    ER Interventions: Lasix 80mg IVP x1 (400cc ouput), Zoysn 3.375mg IVPx1, and Vancomycin 1250mg IVPB x1      ICU Vital Signs Last 24 Hrs  T(C): 36.9 (2024 15:27), Max: 37.1 (2024 00:14)  T(F): 98.5 (2024 15:27), Max: 98.7 (2024 00:14)  HR: 63 (2024 16:19) (63 - 144)  BP: 94/50 (2024 15:27) (94/50 - 130/71)  BP(mean): 83 (2024 04:33) (81 - 83)  ABP: --  ABP(mean): --  RR: 20 (2024 16:19) (20 - 31)  SpO2: 98% (2024 16:19) (94% - 98%)    O2 Parameters below as of 2024 16:19  Patient On (Oxygen Delivery Method): nasal cannula  O2 Flow (L/min): 5        I&O's Summary    2024 07:01  -  2024 07:00  --------------------------------------------------------  IN: 161 mL / OUT: 1850 mL / NET: -1689 mL    2024 07:01  -  2024 20:32  --------------------------------------------------------  IN: 13 mL / OUT: 250 mL / NET: -237 mL          Daily     Daily Weight in k.2 (2024 06:02)    Adult Advanced Hemodynamics Last 24 Hrs  CVP(mm Hg): --  CVP(cm H2O): --  CO: --  CI: --  PA: --  PA(mean): --  PCWP: --  SVR: --  SVRI: --  PVR: --  PVRI: --    EKG/Telemetry Events:    MEDICATIONS  (STANDING):  influenza  Vaccine (HIGH DOSE) 0.7 milliLiter(s) IntraMuscular once  linezolid    Tablet 600 milliGRAM(s) Oral every 12 hours  metoprolol tartrate 25 milliGRAM(s) Oral two times a day  mupirocin 2% Ointment 1 Application(s) Topical two times a day  pantoprazole  Injectable 40 milliGRAM(s) IV Push two times a day  polyethylene glycol 3350 17 Gram(s) Oral two times a day  senna 2 Tablet(s) Oral at bedtime    MEDICATIONS  (PRN):  ipratropium    for Nebulization 500 MICROGram(s) Nebulizer every 6 hours PRN Shortness of Breath and/or Wheezing      PHYSICAL EXAM:    General: AO, NAD, lying in bed,   HEENT: AT/NC, no facial asymmetry   Lungs: CTA, no crackles, no wheezes   Heart: irregular  Abdomen: soft, non-tender  Extremities:  warm, leg ulcerations with chronic skin changes, minimal LE edema      LABS:                        13.1   8.95  )-----------( 210      ( 2024 20:11 )             41.3     04-23    151<H>  |  107  |  84<H>  ----------------------------<  140<H>  4.6   |  33<H>  |  1.72<H>    Ca    9.2      2024 17:24  Mg     2.4     04    TPro  6.0  /  Alb  3.0<L>  /  TBili  0.7  /  DBili  x   /  AST  See Note  /  ALT  See Note  /  AlkPhos  See Note      LIVER FUNCTIONS - ( 2024 16:12 )  Alb: 3.0 g/dL / Pro: 6.0 g/dL / ALK PHOS: See Note / ALT: See Note / AST: See Note / GGT: x           PT/INR - ( 2024 17:24 )   PT: 18.5 sec;   INR: 1.65          PTT - ( 2024 17:24 )  PTT:37.4 sec  CAPILLARY BLOOD GLUCOSE        ABG - ( 2024 21:26 )  pH, Arterial: 7.49  pH, Blood: x     /  pCO2: 55    /  pO2: 120   / HCO3: 42    / Base Excess: 15.8  /  SaO2: 99.7                    Urinalysis Basic - ( 2024 17:24 )    Color: x / Appearance: x / SG: x / pH: x  Gluc: 140 mg/dL / Ketone: x  / Bili: x / Urobili: x   Blood: x / Protein: x / Nitrite: x   Leuk Esterase: x / RBC: x / WBC x   Sq Epi: x / Non Sq Epi: x / Bacteria: x          RADIOLOGY & ADDITIONAL TESTS:  CXR:        Care Discussed with Consultants/Other Providers [ x] YES  [ ] NO

## 2024-04-23 NOTE — CONSULT NOTE ADULT - ATTENDING COMMENTS
Agree w above - asked about pulmonary disease in this never smoker admitted in heart failure. No evidence for primary pulmonary disease though may have BETTINA. Agree w continued diuresis.
Patient seen, examined, and discussed with Dr. Rubin. Agree with above. 74M with no known PMH, initially admitted to cardiac telemetry s/p fall for rapid a fib. Course complicated by new diagnosis of new CHF (LVEF 20-25%), PE, SILKE on CKD, and melena. GI consulted for melena when transitioning from heparin gtt to Eliquis, and patient is planned for EGD tomorrow. Continue PPI IV BID. Needs rate control for Afib. Tentative plan for EGD tomorrow, NPO except meds past MN. If Hgb stable, can continue AC.     Cesar Jerome MD  Gastroenterology
73 yo man admitted after being on the floor for ~2 days having fallen from his couch and unable to stand up due to LE weakness.  He had no significant PMH.   Initial work-up showed hypernatremia, anasarca and Afib.  TTE later showed severe biventricular dysfunction  Normal Creatinine, very high CPK, mild Troponin elevation    - Recommend diuresis despite hypernatremia (hypervolemic) - encourage PO intake of water   - Start low dose Valsartan  - Agree with R+C to work-up for etiology and severity of CMP    Lenin Ortiz MD
Patient seen on [facility]. Medicine consulted for help with rhabdomyolysis.    74M with no known PMHx (admittedly doesn't see physicians) and only on a baby aspirin as a preventive measure presenting for mechanical fall. Per patient he had a mechanical fall and then couldn't get up for two days. While on the floor he was able to shift his body around and eventually able to call for help. He arrived at [facility] and found to have atrial fibrillation w/ RVR. Admitted to cardiology service. He was pan-scanned with no fractures.    Currently has no complaints. He has been having lower extremity edema since late 2023. He periodically gets dyspnea with exertion. Poor exercise tolerance, though can walk up a flight of stairs. Denies palpitations. NO gross SOB at rest. On exam patient with bilateral lower extremity edema to mid-thighs. He has chronic skin changes of shin which make me think this is more chronic than he is letting on.    Labs personally reviewed   CK 2336 --> 1890  Lactate cleared  UA unremarkable  Mild transaminitis    Plan:    #Rhabdomyolysis - very low level and patient w/o gross myalgia. In fact, CK is clearing without much intervention. UA without signs of kidney dysfunction of rhabdo. Typically mainstay of therapy is IV hydration. He does appear intravascularly depleted (he had no access to food or water for 2 days). Though if there is concern for volume overload I think the most conservative action is to stop diuresis and fluid restriction for today and allow him to eat and drink to thirst. CK should improve or at least lateralize tomorrow. He had dry membranes, likely hemoconcentration and hypernatremia indicating free water deficit    #Hypernatremia - allow to eat & drink to thirst    #LE Edema - possibly component of venous stasis, cardiology to perform TTE to r/o HF component. Would diurese starting tomorrow. Not decompensated, hemodynamically stable, clinically dry    #AFIB - Management per cardiology, rate control per cardiology, on a heparin drip    #PE?? - On initial CT read, but not on current one. Probably would benefit from AC from an AFIB standpoint    #Transaminitis - very mild. can monitor, but favor outpatient work up of possible hepatomegaly as well.    Medicine to follow. Can reach out to me on MS Teams with any questions and concerns.

## 2024-04-23 NOTE — CONSULT NOTE ADULT - ASSESSMENT
74M with no known PMH, initially admitted to cardiac telemetry s/p fall for rapid a fib. Course complicated by new diagnosis of new CHF (LVEF 20-25%), PE, SILKE on CKD, and melena. GI consulted for melena when transitioning from heparin gtt to Eliquis, and patient is planned for EGD tomorrow.     Recommendations:   - plan for EGD in endoscopy unit tomorrow   - trend Hb and maintain active type and screen   - start pantoprazole 40 mg IV BID   - currently holding AC but okay to resume if repeat Hb stable   - NPO except medications after midnight tonight   - clear liquid diet today     Case discussed with Dr. Jerome. GI Team will continue to follow.     Teresa Rubin D.O.   Gastroenterology Fellow  Weekday 7am-5pm Pager: [phone]  Weeknights/Weekend/Holiday Coverage: Please call the  for contact info     74M with no known PMH, initially admitted to cardiac telemetry s/p fall for rapid a fib. Course complicated by new diagnosis of new CHF (LVEF 20-25%), PE, SILKE on CKD, and melena. GI consulted for melena when transitioning from heparin gtt to Eliquis, and patient is planned for EGD tomorrow.     Recommendations:   - plan for EGD in endoscopy unit tomorrow   - will need cardiac risk assessment/optimization and a fib rate control for EGD tomorrow   - trend Hb and maintain active type and screen   - start pantoprazole 40 mg IV BID   - currently holding AC but okay to resume if repeat Hb stable   - NPO except medications after midnight tonight   - clear liquid diet today     Case discussed with Dr. Jerome. GI Team will continue to follow.     Teresa Rubin D.O.   Gastroenterology Fellow  Weekday 7am-5pm Pager: [phone]  Weeknights/Weekend/Holiday Coverage: Please call the  for contact info

## 2024-04-23 NOTE — PROVIDER CONTACT NOTE (OTHER) - SITUATION
Respiration rate 37/min and shallow.
pt had moderate loose BM, tarry in color.    update: pt had total of 3 episodes of loose tarry stool throughout shift.
Patient had 50m of urine since the beginning of the shift. Bladder scan shows 295ml. Patient required increased in O2 during night
Known

## 2024-04-23 NOTE — PROGRESS NOTE ADULT - NS ATTEND AMEND GEN_ALL_CORE FT
74M with no known PMHx presented to [facility] on 04/14/24 after being found down on the floor x2 days by karen. Labs significant for Trop T CK 2k BNP 7k . Imaging signifcant for segmental PE and ascites. CTH without acute bleed. Patient now admitted to cardiac tele for further management of acute CHF exacerbation, Afib, and rhabdomyolysis.    1. 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  Check Cr and HCO3 today, resume valsaratan and aldactone.    2. PAF  RVR. Start eliquis 10 mg bid for PE, than transition to 5 mg bid.  Disucss with EP if CIELO/DCCV.    3. Rhabdo  Resolved.    4. Hypernatremia - resolving w iv fluid  Resolved.    5. fever  CTPA with PE, no PNA.    6. SILKE  Dry, off lasix, check CR.    7. Acute hypoxemic hypercapneic respiratory failure  Likely from diuresis - led to contraction alkalosis, compensatory respiratory acidosis leading to turpor and poor ventilation/oxygenation.  Responded to BIPAP and acetalozamide x.1    During non face-to-face time, I reviewed relevant portions of the patient’s medical record; including vitals, labs, medications, cardiac studies, remaining additional imaging and consultant recommendations. During face-to-face time, I took a relevant history and examined the patient. I also explained to the patient their diagnoses, and specific cardiopulmonary management plan, which required a high level of medical decision making.  I answered all questions related to the patient's medical conditions. I spent 50 minutes on total encounter; more than 50% of the visit was spent counseling and/or coordinating care by the attending physician, with plan of care discussed with the patient. 74M with no known PMHx presented to [facility] on 04/14/24 after being found down on the floor x2 days by karen. Labs significant for Trop T CK 2k BNP 7k . Imaging signifcant for segmental PE and ascites. CTH without acute bleed. Patient now admitted to cardiac tele for further management of acute CHF exacerbation, Afib, and rhabdomyolysis.    1. 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  Check Cr and HCO3 today, resume valsaratan and aldactone.    2. PAF  RVR. Start eliquis 10 mg bid for PE, than transition to 5 mg bid.  Disucss with EP if CIELO/DCCV.    3. Rhabdo  Resolved.    4. Hypernatremia - resolving w iv fluid  Resolved.    5. fever  CTPA with PE, no PNA.  Resolved.    6. SILKE  Dry, off lasix, check CR.    7. Acute hypoxemic hypercapnic respiratory failure  Likely from diuresis - led to contraction alkalosis, compensatory respiratory acidosis leading to turpor and poor ventilation/oxygenation.  Responded to BIPAP and acetalozamide x.1    8. PE  Stop heparin IV, start eliquis.    During non face-to-face time, I reviewed relevant portions of the patient’s medical record; including vitals, labs, medications, cardiac studies, remaining additional imaging and consultant recommendations. During face-to-face time, I took a relevant history and examined the patient. I also explained to the patient their diagnoses, and specific cardiopulmonary management plan, which required a high level of medical decision making.  I answered all questions related to the patient's medical conditions. I spent 50 minutes on total encounter; more than 50% of the visit was spent counseling and/or coordinating care by the attending physician, with plan of care discussed with the patient. 74M with no known PMHx presented to [facility] on 04/14/24 after being found down on the floor x2 days by karen. Labs significant for Trop T CK 2k BNP 7k . Imaging signifcant for segmental PE and ascites. CTH without acute bleed. Patient now admitted to cardiac tele for further management of acute CHF exacerbation, Afib, and rhabdomyolysis.    1. 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  Check Cr and HCO3 today, resume valsaratan and aldactone.    2. PAF  RVR. Start eliquis 10 mg bid for PE, than transition to 5 mg bid.  Disucss with EP if CIELO/DCCV.    3. Rhabdo  Resolved.    4. Hypernatremia - resolving w iv fluid  Resolved.    5. fever  CTPA with PE, no PNA.  Linezolide per medicine.  Resolved.    6. SILKE  Dry, off lasix, check CR.    7. Acute hypoxemic hypercapnic respiratory failure  Likely from diuresis - led to contraction alkalosis, compensatory respiratory acidosis leading to turpor and poor ventilation/oxygenation.  Responded to BIPAP and acetalozamide x.1    8. PE  Stop heparin IV, start eliquis.    During non face-to-face time, I reviewed relevant portions of the patient’s medical record; including vitals, labs, medications, cardiac studies, remaining additional imaging and consultant recommendations. During face-to-face time, I took a relevant history and examined the patient. I also explained to the patient their diagnoses, and specific cardiopulmonary management plan, which required a high level of medical decision making.  I answered all questions related to the patient's medical conditions. I spent 50 minutes on total encounter; more than 50% of the visit was spent counseling and/or coordinating care by the attending physician, with plan of care discussed with the patient.

## 2024-04-23 NOTE — PROGRESS NOTE ADULT - SUBJECTIVE AND OBJECTIVE BOX
INCOMPLETE    OVERNIGHT EVENTS:  No acute events overnight.    SUBJECTIVE / INTERVAL HPI: Patient seen and examined at bedside.    Denies CP, SOB, dizziness/lightheadedness, palpitations    12 point ROS otherwise negative    VITAL SIGNS:  Vital Signs Last 24 Hrs  T(C): 36.7 (23 Apr 2024 04:33), Max: 37.1 (22 Apr 2024 20:20)  T(F): 98.1 (23 Apr 2024 04:33), Max: 98.8 (22 Apr 2024 20:20)  HR: 123 (23 Apr 2024 05:40) (100 - 123)  BP: 129/58 (23 Apr 2024 04:33) (106/67 - 153/74)  BP(mean): 83 (23 Apr 2024 04:33) (81 - 83)  RR: 31 (23 Apr 2024 05:40) (19 - 31)  SpO2: 95% (23 Apr 2024 05:40) (93% - 97%)    Parameters below as of 23 Apr 2024 05:40  Patient On (Oxygen Delivery Method): BiPAP/CPAP    O2 Concentration (%): 30      I&O's Summary    22 Apr 2024 07:01  -  23 Apr 2024 07:00  --------------------------------------------------------  IN: 161 mL / OUT: 1850 mL / NET: -1689 mL    23 Apr 2024 07:01  -  23 Apr 2024 07:49  --------------------------------------------------------  IN: 13 mL / OUT: 0 mL / NET: 13 mL        Weight (kg): 88.9 (04-22 @ 08:33)    PHYSICAL EXAM:    General: sitting up in bed, NAD  HEENT: conjunctiva clear; MMM  Neck: supple, no JVD  Cardiovascular: RRR, no murmurs  Respiratory: CTA B/L  Gastrointestinal: soft, NT/ND, +BS  Extremities: WWP, no edema or cyanosis  Vascular: Peripheral pulses palpable 2+ bilaterally/ carotid 2+ b/l, no bruits; radial 2+ b/l; femoral 2+ b/l no bruits; DP/PT 2+ b/l  Neurological: AAOx3, no focal deficits    MEDICATIONS:  MEDICATIONS  (STANDING):  heparin  Infusion.  Unit(s)/Hr (16 mL/Hr) IV Continuous <Continuous>  influenza  Vaccine (HIGH DOSE) 0.7 milliLiter(s) IntraMuscular once  linezolid    Tablet 600 milliGRAM(s) Oral every 12 hours  metoprolol tartrate 25 milliGRAM(s) Oral two times a day  mupirocin 2% Ointment 1 Application(s) Topical two times a day  polyethylene glycol 3350 17 Gram(s) Oral two times a day  senna 2 Tablet(s) Oral at bedtime    MEDICATIONS  (PRN):  heparin   Injectable 3500 Unit(s) IV Push every 6 hours PRN For aPTT between 40 - 57  heparin   Injectable 7000 Unit(s) IV Push every 6 hours PRN For aPTT less than 40  ipratropium    for Nebulization 500 MICROGram(s) Nebulizer every 6 hours PRN Shortness of Breath and/or Wheezing      LABS:                        15.6   7.93  )-----------( 212      ( 23 Apr 2024 05:30 )             48.7       04-22    145  |  100  |  38<H>  ----------------------------<  128<H>  3.8   |  34<H>  |  1.53<H>    Ca    10.0      22 Apr 2024 05:30  Mg     2.4     04-22    TPro  6.4  /  Alb  3.2<L>  /  TBili  0.5  /  DBili  x   /  AST  39  /  ALT  45  /  AlkPhos  47  04-22      PTT - ( 23 Apr 2024 01:47 )  PTT:182.3 sec          TELEMETRY:    RADIOLOGY & ADDITIONAL TESTS: Reviewed.

## 2024-04-23 NOTE — SWALLOW BEDSIDE ASSESSMENT ADULT - SLP PERTINENT HISTORY OF CURRENT PROBLEM
75 y/o M with no known PMHx presented to [facility] on 04/14/24 after mechanical fall, was found down on floor x 2 days by landlord. Labs significant for Trop T 52->54, CK 2k BNP 7k . CTH without acute bleed. CT A/P neg for PE, + ascites. Pt admitted to cardiac tele for newly dx rapid Afib, rhabdo and suspected ADHF exacerbation. Course c/b hypernatremia, s/p lactated ringers. Echo revealing LVEF 20-25%, reduced RVSF. Advanced HF team consulted plan for R/LHC however postponed given new fevers (fever peak 4/18 pm rectal 101). 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 CT PE ordered 4/19 to r/o PE d/t continued fever on IV Abx.

## 2024-04-23 NOTE — SWALLOW BEDSIDE ASSESSMENT ADULT - SWALLOW EVAL: DIAGNOSIS
Pt clinically p/w intact swallow skills for a regular diet with thin liquids. SLP will follow up x1.

## 2024-04-23 NOTE — SWALLOW BEDSIDE ASSESSMENT ADULT - COMMENTS
Pt was reportedly coughing with thin liquids prior to cath procedure with (+) fever, concern for aspiration PNA vs fluid overload. Chest x-ray (date 4/19/24):  impression: Bilateral opacities/pleural effusions, unchanged. Stable cardiomegaly and bony structures. After cath lab, pt was retaining CO2 but responded well to BiPAP. He is currently on NC and stable.

## 2024-04-23 NOTE — PROGRESS NOTE ADULT - SUBJECTIVE AND OBJECTIVE BOX
Patient is a 74y old  Male who presents with a chief complaint of CHF, AFib (23 Apr 2024 07:49)    INTERVAL EVENTS:  Patient with stupor yesterday in setting of hypercapnea thought to be secondary  to contraction alkalosis, improved with Acetazolamide and Bipap.    SUBJECTIVE:  Patient was seen and examined at bedside. This morning, patient awake, reports feeling fine, denies complaints. Telemetry reviewed, remains fast     Review of systems: Rest of 12 point Review of systems negative unless otherwise documented elsewhere in note.     Diet, NPO after Midnight:      NPO Start Date: 21-Apr-2024,   NPO Start Time: 23:59 (04-21-24 @ 14:15) [Active]  Diet, DASH/TLC:   Sodium & Cholesterol Restricted (04-16-24 @ 10:45) [Active]      MEDICATIONS:  MEDICATIONS  (STANDING):  apixaban 10 milliGRAM(s) Oral every 12 hours  influenza  Vaccine (HIGH DOSE) 0.7 milliLiter(s) IntraMuscular once  linezolid    Tablet 600 milliGRAM(s) Oral every 12 hours  metoprolol tartrate 25 milliGRAM(s) Oral two times a day  mupirocin 2% Ointment 1 Application(s) Topical two times a day  polyethylene glycol 3350 17 Gram(s) Oral two times a day  senna 2 Tablet(s) Oral at bedtime    MEDICATIONS  (PRN):  ipratropium    for Nebulization 500 MICROGram(s) Nebulizer every 6 hours PRN Shortness of Breath and/or Wheezing      Allergies    No Known Allergies    Intolerances        OBJECTIVE:  Vital Signs Last 24 Hrs  T(C): 36.4 (23 Apr 2024 08:50), Max: 37.1 (22 Apr 2024 20:20)  T(F): 97.5 (23 Apr 2024 08:50), Max: 98.8 (22 Apr 2024 20:20)  HR: 86 (23 Apr 2024 10:42) (86 - 123)  BP: 130/71 (23 Apr 2024 08:50) (106/67 - 130/71)  BP(mean): 83 (23 Apr 2024 04:33) (81 - 83)  RR: 20 (23 Apr 2024 10:42) (20 - 31)  SpO2: 97% (23 Apr 2024 10:42) (93% - 97%)    Parameters below as of 23 Apr 2024 10:42  Patient On (Oxygen Delivery Method): nasal cannula  O2 Flow (L/min): 5    I&O's Summary    22 Apr 2024 07:01  -  23 Apr 2024 07:00  --------------------------------------------------------  IN: 161 mL / OUT: 1850 mL / NET: -1689 mL    23 Apr 2024 07:01  -  23 Apr 2024 11:38  --------------------------------------------------------  IN: 13 mL / OUT: 0 mL / NET: 13 mL        PHYSICAL EXAM:  General: AO, NAD, lying in bed, no labored breathing on Bipap  HEENT: AT/NC, no facial asymmetry   Lungs: limited exam, no crackles, no wheezes   Heart: irregular  Abdomen: soft, non-tender  Extremities:  warm, leg ulcerations with chronic skin changes, edema improved, no focal deficit, urine bag with clear urine.    LABS:                        15.6   7.93  )-----------( 212      ( 23 Apr 2024 05:30 )             48.7     04-22    145  |  100  |  38<H>  ----------------------------<  128<H>  3.8   |  34<H>  |  1.53<H>    Ca    10.0      22 Apr 2024 05:30  Mg     2.4     04-22    TPro  6.4  /  Alb  3.2<L>  /  TBili  0.5  /  DBili  x   /  AST  39  /  ALT  45  /  AlkPhos  47  04-22    LIVER FUNCTIONS - ( 22 Apr 2024 05:30 )  Alb: 3.2 g/dL / Pro: 6.4 g/dL / ALK PHOS: 47 U/L / ALT: 45 U/L / AST: 39 U/L / GGT: x           PTT - ( 23 Apr 2024 09:05 )  PTT:194.9 sec  CAPILLARY BLOOD GLUCOSE        Urinalysis Basic - ( 22 Apr 2024 05:30 )    Color: x / Appearance: x / SG: x / pH: x  Gluc: 128 mg/dL / Ketone: x  / Bili: x / Urobili: x   Blood: x / Protein: x / Nitrite: x   Leuk Esterase: x / RBC: x / WBC x   Sq Epi: x / Non Sq Epi: x / Bacteria: x        MICRODATA:      RADIOLOGY/OTHER STUDIES:

## 2024-04-23 NOTE — PROVIDER CONTACT NOTE (OTHER) - ASSESSMENT
Denies pain/discomfort. Lethargic, but arousable.
Patient's vital signs are stable. Reports mild shortness of breath.
pt A&Ox4, h/h stable at this time. BP stable. HR in 130-140's at this time, pt asymptomatic.

## 2024-04-23 NOTE — PROGRESS NOTE ADULT - SUBJECTIVE AND OBJECTIVE BOX
TRANSFER NOTE FROM [facility] TO [facility]    75 y/o M with no known PMHx presented to [facility] on 04/14/24 after mechanical fall, was found down on floor x 2 days by landlord. Labs significant for Trop T 52->54, CK 2k BNP 7k . CTH without acute bleed. CT A/P neg for PE, + ascites. Pt admitted to cardiac tele for newly dx rapid Afib, rhabdo and suspected ADHF exacerbation. Course c/b hypernatremia, s/p lactated ringers. Echo revealing LVEF 20-25%, reduced RVSF. Advanced HF team consulted plan for R/LHC however postponed given new fevers (fever peak 4/18 pm rectal 101). 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 CT PE on 4/20 with small PE right side, s/p RHC/LHC 4/22/24 w/ Dr. 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 R TR access, R brachial vein 14G. Patient prior to cath becoming more obtunded patient became alkalotic and hypercapnic requiring brief BiPAP use. Diuretics and all nephrotoxic medications on hold pending resolution of SILKE.         On 4/23/2024 Patient taken off Bipap, S+S consulted as concern for failed dysphagia screen. Patient passed S+S. Heparin gtt was discontinued and Eliquis started. Course was than complicated by an episode of melena in the morning approximately where stat CBC was ordered and T+S. Patient Hgb remained stable from 15.6 @ 5:30 AM to 15.8 @ 11AM. BMP at that time repeated Cr slightly elevated, Concern for rising BUN. Patient again in the afternoon with a very large episode of melena in the bed saturating sheets. Stat Labs ordered once again and GI consulted for concern for acute bleed. GI with concern of melena in bed  planning to scope tomr given melena at bed to trend Hgb. Protonix IV ordered, Eliquis DC. CCU fellow evaluated patient as concerns appearing diaphoretic and Rates in the 140s and 150s sustaining. Stat labs again obtained as well as lactate. ICU consulted for concerns for acute bleed and upgrade for closer monitoring. Patient around 6-7 pm with another episode of melena. Labs again ordered. ICU spoke to CCU in regards to dispo and patient given concern of acute bleed, risk of stroke and known PE, requiring closer monitoring and tenuous hemodynamic stablity, patient was upgraded to CCU.          Problem/Plan - 1:  ·  Problem: Congestive heart failure (CHF).   ·  Plan: -TTE 4/15/24: Severely reduced LVSF and RVSF, LVEF 20-25%, mild biatrial enlargement, mild TR, PASP 68, dilated IVC  -CTA chest at [facility]: Neg for PE. Small right and trace left pleural effusions. repeat on 4/20 with small subsegmental   -Diuresis: s/p IV diuresis now with concern for overdiuresis and contraction alkalosis   -GDMT: Holding diuretics and Valsartan 40 BID, spironolactone 25 mg QD   -Core measures, strict I/O's daily weights, 1.2L PO restriction, strict I/Os, cont tele/pulse ox  - S/p RHC/LHC 4/22/24 w/ Dr. 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 R TR access, R brachial vein 14G   - patient put on BiPAP machine for CO2 retention and given diamox 250 mg IVP x1 and  x1       SILKE   Concern for overdiuresis and contraction Alkalosis   Cr .86---1.78--1.53--->1.72   Holding Diuretics and ACE   s/p Acetazolamide, and s/p 250ns IV bolus x2   Concern for minimal PO intake as well     Melena:  3 episode of Melena on 4/23/2024   Concern for acute bleed given large volume, and GI consulted with plan for scope tomr  - IV Protonix 40mg BID  - Trend Hgb, T+S x2   - Clear liquid diet, npo after midnight    PE:   Found to have PE on CT repeat   Holding AC in setting of concerning acute GI bleed     Problem/Plan - 2:  ·  Problem: Fever. Now Resolved   ·  Plan: - Intermittent fevers, highest temp rectal 101.6F 4/17, now rectal 100. 6 4/19  - Per ID, favor community acquired aspiration PNA (given poor dentition, L side opacity on CXR),  Ceftriaxone course completed, Last Dose of Linezolid 4/24/2024   - HIV -, HEP -, UStrep -, Ulegionella -, Procal -  - BCx 3 NGTD for 48 hours, BC collected 4/19   - CTA PE negative for PE at [facility]  - was indeterminate d/t motion artifact  - CTA pe rule out on 4/20 with improvement in study showing Multiple small subsegmental emboli identified within the right lower lobe pulmonary artery branches. Small plueral effusions with atelectasis.   - B/L LE dopplers negative for DVT.     Problem/Plan - 3:  ·  Problem: Atrial fibrillation.   ·  Plan: - HR 100s, unclear whether patient has hx of afib or is new. Never f/u with Drs.  - EKG  BPM   - Rate control: c/w metoprolol tartrate 25 mg BID  - AC: was on heparin gtt, changed to Eliquis 10mg on 4/23 Am however devloped melena and DC.   None current in setting of Acute GI Bleed  c/w Heparin gtt now stopped transitioned to Eliquis 10mg on 4/23 AM but      Problem/Plan - 4:  ·  Problem: Transaminitis.   ·  Plan: - On admission TBili 1.4 ALP 54 AST/ALT 91/54-> improving  - Hold hepatotoxic agents  - CT Pelvis: Liver measures near 18 cm in craniocaudal dimension. Subcentimeter hepatic hypodensities are too small to characterize.  - Hep panel negative  - RUQ US no acute findings Unremarkable liver. No biliary dilatation. 1.4 cm gallstone within the gallbladder neck similar to CT abdomen/pelvis from 4/14/2024. No evidence of acute cholecystitis.     Problem/Plan - 5:  ·  Problem: Hypernatremia.   ·  Plan: - On admission Na 152->148  - Initially received IV NS hydration, then IV lactate ringers  - Remains hypernatremic, monitor closely. Asx.  - medicine following, appreciate recs     Problem/Plan - 6:  ·  Problem: Rhabdomyolysis.   ·  Plan: - Patient had mechanical fall and was down on the ground x 2 days.   - CK improving, 2336 -> 2002-> 777,   - Medicine following.  - Consider Renal Consult     Problem/Plan - 7:  ·  Problem: Constipation.   ·  Plan: Last BM was 7 days ago.     - CONT miralax BID, senna qHS, enema x2      Problem/Plan - 8:  ·  Problem: Aneurysm of thoracic aorta.   ·  Plan: - CT Abd/Pelvis 04/13/24: No thoracoabdominal dissection, no thoracic aorta hematoma. Ascending thoracic aortic aneurysm 4.0 cm  - Consider CTS follow up in outpatient setting for surveillance.     Problem/Plan - 9:  ·  Problem: HTN (hypertension).   ·  Plan: - -140s.  Holding ACE in setting of SILKE

## 2024-04-23 NOTE — CONSULT NOTE ADULT - TIME BILLING
Evaluating fever
Time spent includes direct patient care  (interview and examination of patient), discussion with other providers, support staff and/or patient's family members, review of medical records, ordering diagnostic tests and analyzing results, and documentation.

## 2024-04-23 NOTE — PROVIDER CONTACT NOTE (OTHER) - BACKGROUND
Patient admitted for CHF exacerbation and aspiration pneumonia.
pt s/p cardiac cath 4/22. heparin gtt infusing.
Pt on 4L NC.

## 2024-04-23 NOTE — PROGRESS NOTE ADULT - ASSESSMENT
73 y/o man with minimal medical hx in our EMR; who presented to [facility] after a fall at home. Found to have acute HFrEF,   Afib w/ RVR, hypoxemia (still requiring O2 Nasal cannula ), planned for left heart cath later  this admission. Cath delayed due to fever.     # Fever  Resolved, s/p Ceftriaxone course  Continue on Linezolid for total 5 days     # Acute kidney injury   Creatinine 0.8>1.7>1.5. Patient warm, perfused on exam. Monitor UOP, bladder scan q6h. Diuresis on hold  Get CHEM today to monitor creatinine  Will defer GDMT and diuresis resumption to primary team    # Acute hypercapnic respiratory failure  Thought to be secondary to contraction alkalosis  Get CHEM to monitor Bicarb, dc Bipap if resolved      #Pulmonary emboli   On Heparin gtt, PTT supratherapeutic. Patient denies headache, no neuro focal deficit, Hb stable. Consider DOACs initiation if no procedure planned per EP    # Acute decompensated HF  Diuresis per primary team. Pt is currently on furosemide 60mg IV q24hrs. Monitor UOP and creatinine   GDMT per primary team and pt is currently on  valsartan 40 mg q12 and  spironolactone 25mg qd   -Pt is for L+RHC potentially Today    #Hypernatremia (downtrending)  -Na 148 ->147   -Will continue to monitor Na     # Atrial fibrillation with RVR   On AC per above, deferring rate control to primary team    # Leg wounds   -Will continue linezolid 600mg BID x 5 days  (EOT 4/24)  -wound care per nursing     # Constipation (resolved)  -Pt s/p BM   -Continue miralax BID and senna qHS   -avoid diarrhea     # Incidental finding of Thoracic aortic aneurysm  Thoracic aortic aneurysm measuring 4cm.   -Pt to f/u with CT surgery as outpatient for interval imaging if consistent with patient's wishes    # Morbid Obesity  BMI: BMI (kg/m2): 42 (04-14-24 @ 16:05)  Affects all aspects of care. Dose medications by weight       DVT ppx: Heparin gtt  Discussed with primary team             Electronic Signatures:  Roque Cerna)  (Signed 22-Apr-2024 11:44)  	Authored: Progress Note, Reason for Admission, Subjective and Objective, Assessment and Plan

## 2024-04-23 NOTE — CONSULT NOTE ADULT - SUBJECTIVE AND OBJECTIVE BOX
Initial GI Consult Note:     HPI:  74M with no known PMH, who presented to [facility] on 04/14/24 after mechanical fall, was found down on floor x 2 days by landlordennis. Labs significant for Trop T 52->54, CK 2k BNP 7k . CTH without acute bleed. CT A/P neg for PE, + ascites. Pt admitted to cardiac tele for newly dx rapid a fib, rhabdo, and suspected ADHF exacerbation. Course c/b hypernatremia, s/p lactated ringers. Echo revealing LVEF 20-25%, reduced RVSF. Advanced HF team consulted plan for R/LHC however postponed given new fevers (fever peak 4/18 pm rectal 101). 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 CT PE on 4/20 with small PE right side. s/p R/LHC on 4/22 where patient became alkalotic and hypercapnic requiring brief BiPAP use. Diuretics and all nephrotoxic medications on hold pending resolution of SILKE. Course complicated by two episodes of melena on 04/23, for which GI was consulted. Patient developed melena when transitioned from heparin gtt to Eliquis for newly diagnosed PE, so was started on pantoprazole 40 mg IV BID. Patient seen and examined at bedside. Currently tachycardic in a fib (HR 120s) with melena on bed but overall appears comfortable. Amenable to plan for EGD tomorrow.       Allergies  No Known Allergies  Intolerances    Home Medications:  Aspirin EC 81 mg oral delayed release tablet: 1 tab(s) orally once a day (14 Apr 2024 12:00)    MEDICATIONS:  MEDICATIONS  (STANDING):  apixaban 10 milliGRAM(s) Oral every 12 hours  influenza  Vaccine (HIGH DOSE) 0.7 milliLiter(s) IntraMuscular once  linezolid    Tablet 600 milliGRAM(s) Oral every 12 hours  metoprolol tartrate 25 milliGRAM(s) Oral two times a day  mupirocin 2% Ointment 1 Application(s) Topical two times a day  pantoprazole  Injectable 40 milliGRAM(s) IV Push two times a day  polyethylene glycol 3350 17 Gram(s) Oral two times a day  senna 2 Tablet(s) Oral at bedtime    MEDICATIONS  (PRN):  ipratropium    for Nebulization 500 MICROGram(s) Nebulizer every 6 hours PRN Shortness of Breath and/or Wheezing    PAST MEDICAL & SURGICAL HISTORY:  Hiatal hernia    FAMILY HISTORY:  Family history of MI (myocardial infarction) (Mother)  FH: pulmonary embolism (Father)  FH: multiple myeloma (Sibling)      SOCIAL HISTORY:  [place]: [ ] Current, [ ] Former, [ ] Never; Pack Years:  Alcohol:  Illicit Drugs:      Vital Signs Last 24 Hrs  T(C): 36.4 (23 Apr 2024 08:50), Max: 37.1 (22 Apr 2024 20:20)  T(F): 97.5 (23 Apr 2024 08:50), Max: 98.8 (22 Apr 2024 20:20)  HR: 86 (23 Apr 2024 10:42) (86 - 123)  BP: 130/71 (23 Apr 2024 08:50) (106/67 - 130/71)  BP(mean): 83 (23 Apr 2024 04:33) (81 - 83)  RR: 20 (23 Apr 2024 10:42) (20 - 31)  SpO2: 97% (23 Apr 2024 10:42) (93% - 97%)    Parameters below as of 23 Apr 2024 10:42  Patient On (Oxygen Delivery Method): nasal cannula  O2 Flow (L/min): 5      04-22 @ 07:01 - 04-23 @ 07:00  --------------------------------------------------------  IN: 161 mL / OUT: 1850 mL / NET: -1689 mL    04-23 @ 07:01  -  04-23 @ 15:21  --------------------------------------------------------  IN: 13 mL / OUT: 250 mL / NET: -237 mL      PHYSICAL EXAM:  General: No acute distress  Lungs: Normal respiratory effort and no intercostal retractions  Cardiovascular: tachycardic, irregular   Abdomen: Soft, non-tender, non-distended  Neurological: Alert and oriented x3  Skin: Warm and dry. No obvious rash      LABS:                        15.8   8.06  )-----------( 226      ( 23 Apr 2024 10:55 )             49.6     04-23    148<H>  |  101  |  79<H>  ----------------------------<  137<H>  4.4   |  36<H>  |  1.66<H>    Ca    9.9      23 Apr 2024 10:20  Mg     2.4     04-22    TPro  6.4  /  Alb  3.2<L>  /  TBili  0.5  /  DBili  x   /  AST  39  /  ALT  45  /  AlkPhos  47  04-22        PTT - ( 23 Apr 2024 09:05 )  PTT:194.9 sec    RADIOLOGY & ADDITIONAL STUDIES:     Reviewed
  Med Consult Note  ==============================================================================================================    "HPI: 74M no known significant PMHx presented to [facility] on 24 after mechanical fall and was down on the ground x2 days. Patient states he attempted to crawl to phone to make call and chest fell down on him and he was unable to move and 911 was called to help get patient out. Patient endorses cramp in LLE and difficulty walking with L leg since 2023, MATHEWS, and episodes of hematochezia.   On arrival to cardiac tele patient denies head trauma,  CP, SOB, palpitations, dizziness, lightheadedness, orthopnea, PND, abd pain, N/V, hematuria,  melena, BRPR, LE edema, fever,  chills, cough, recent travel, or sick contacts. Patient has not seen medical doctor in many years.  ER Interventions: Lasix 80mg IVP x1 (400cc ouput), Zoysn 3.375mg IVPx1, and Vancomycin 1250mg IVPB x1. Patient now transferred to cardiac tele for further management of CHF exacerbation, Afib, rhabdomyolysis and segmental PE."   (2024 10:49)      PAST MEDICAL & SURGICAL HISTORY:  Hiatal hernia        Home Meds: Home Medications:  Aspirin EC 81 mg oral delayed release tablet: 1 tab(s) orally once a day (2024 12:00)    Allergies: Allergies    No Known Allergies    Intolerances      Soc:   Advanced Directives: Presumed Full Code     CURRENT MEDICATIONS:   --------------------------------------------------------------------------------------  Neurologic Medications    Respiratory Medications    Cardiovascular Medications  valsartan 20 milliGRAM(s) Oral two times a day    Gastrointestinal Medications    Genitourinary Medications    Hematologic/Oncologic Medications  heparin   Injectable 9500 Unit(s) IV Push every 6 hours PRN For aPTT less than 40  heparin   Injectable 4500 Unit(s) IV Push every 6 hours PRN For aPTT between 40 - 57  heparin  Infusion.  Unit(s)/Hr IV Continuous <Continuous>    Antimicrobial/Immunologic Medications    Endocrine/Metabolic Medications    Topical/Other Medications    --------------------------------------------------------------------------------------    VITAL SIGNS, INS/OUTS (last 24 hours):  --------------------------------------------------------------------------------------  ICU Vital Signs Last 24 Hrs  T(C): 36.5 (2024 10:56), Max: 36.7 (2024 04:12)  T(F): 97.7 (2024 10:56), Max: 98 (2024 04:12)  HR: 105 (2024 10:56) (87 - 118)  BP: 154/93 (2024 10:56) (135/92 - 168/97)  BP(mean): 112 (2024 10:56) (112 - 113)  ABP: --  ABP(mean): --  RR: 18 (2024 10:56) (18 - 20)  SpO2: 97% (2024 10:56) (93% - 98%)    O2 Parameters below as of 2024 10:56  Patient On (Oxygen Delivery Method): room air          I&O's Summary    --------------------------------------------------------------------------------------    EXAM:  General: NAD  Head: NC/AT; MMM; PERRL; EOMI;  Neck: Supple; no JVD  Respiratory: CTAB; no wheezes/rales/rhonchi  Cardiovascular: Regular rhythm/rate; S1/S2+, no murmurs, rubs gallops   Gastrointestinal: Soft; NTND; bowel sounds normal and present  Extremities: WWP; no edema/cyanosis  Neurological: A&Ox3, CNII-XII grossly intact; no obvious focal deficits  Integument: intact; normal turgor, texture, temperature, color for age    LABS  --------------------------------------------------------------------------------------  Labs:  CAPILLARY BLOOD GLUCOSE      POCT Blood Glucose.: 83 mg/dL (2024 04:40)                          16.1   9.53  )-----------( 223      ( 2024 10:47 )             51.2       Auto Neutrophil %: 84.7 % (24 @ 04:13)  Auto Immature Granulocyte %: 0.2 % (24 @ 04:13)        148<H>  |  111<H>  |  22  ----------------------------<  94  4.2   |  25  |  1.14      Calcium: 8.9 mg/dL (24 @ 04:13)      LFTs:             7.2  | 1.6  | 86       ------------------[60      ( 2024 04:13 )  3.1  | x    | 58          Lipase:x      Amylase:x         Blood Gas Venous - Lactate: 1.7 mmol/L (24 @ 08:55)  Blood Gas Venous - Lactate: 2.2 mmol/L (24 @ 05:32)  Blood Gas Venous - Lactate: 3.0 mmol/L (24 @ 04:13)      Coags:     14.8   ----< 1.36    ( 2024 04:13 )     41.2        CARDIAC MARKERS ( 2024 08:55 )  x     / x     / 2002 U/L / x     / x      CARDIAC MARKERS ( 2024 04:13 )  x     / x     / 2336 U/L / x     / x              Urinalysis Basic - ( 2024 04:13 )    Color: Yellow / Appearance: Clear / S.032 / pH: x  Gluc: 94 mg/dL / Ketone: 15 mg/dL  / Bili: Negative / Urobili: 1.0 mg/dL   Blood: x / Protein: 300 mg/dL / Nitrite: Negative   Leuk Esterase: Negative / RBC: 14 /HPF / WBC 1 /HPF   Sq Epi: x / Non Sq Epi: x / Bacteria: Few /HPF          --------------------------------------------------------------------------------------    OTHER LABS    IMAGING RESULTS  ****************      
Heart Failure Consult Note     Olu Garcia MD ELIZA   Cardiology Fellow    Pager [phone]     Patient is a 74y old  Male who presents with a chief complaint of CHF, AFib (16 Apr 2024 10:45)    HPI:  74M no known significant PMHx presented to [facility] on 04/14/24 after mechanical fall and was down on the ground x2 days. Patient states he attempted to crawl to phone to make call and chest fell down on him and he was unable to move and 911 was called to help get patient out. Patient endorses cramp in LLE and difficulty walking with L leg since Nov 2023, MATHEWS, and episodes of hematochezia.     On arrival to cardiac tele patient denies head trauma,  CP, SOB, palpitations, dizziness, lightheadedness, orthopnea, PND, abd pain, N/V, hematuria,  melena, BRPR, LE edema, fever,  chills, cough, recent travel, or sick contacts. Patient has not seen medical doctor in many years.    [facility] Course   -610/  HR 90 -118 RR 18 20 SpO2 98% on 4L NC  WBC 8.32 H/H 16.7/52.9 Plt 219 PT/PTT 14.8/41.2 INR 1.36  Na 148 K 4.2 Cl 111 Co2 25 AG 12 BUN/Cr 22/1.14 TBili 2.1 ALP 60 AST/ALT 86/58 Mg 1.8   Lactate  3.0 -> 2.2. 0> 1.7 BCx collcted   TSH 3.092   Trop I 91.9 CK 2k  BNP 7k   UA +ketones, +proteinuria, +blood. Negative for LE/Nitrites   Respiratory Panel: SARS-CoV/Influenza A/B, RSV not detected   EKG per ED documentation Afib w/ HR 100s     Imaging  o CXR 4/14/24: No acute infiltrates, cardiomegaly   o CTA Chest 4/14/24:  No central, main, or lobar pulmonary embolus. Remainder of the pulmonary arterial tree cannot be assessed due to respiratory motion. This represents a change from the preliminary report. Enlarged main pulmonary   artery may reflect pulmonary arterial hypertension. Cardiac enlargement. Coronary artery calcifications. Small right and trace left pleural effusions.  Mid ascending thoracic aorta is aneurysmal measuring 4 cm. No aortic dissection identified within the limitations of this non-ECG gated study.  o CT ABDOMEN/PELVIS 4/14/24: Small volume abdominopelvic ascites, simple appearing in the pelvis. Upper abdomen ascites evaluation is limited by significant streak  artifact, particularly surrounding the liver and spleen. Short-term   follow-up reevaluation can be considered in the setting of trauma to exclude solid organ injury. Abdominal wall soft tissue edema, left greater than right, as well as at the umbilicus. Cholelithiasis.  o CTH 4/14/24: No acute intracranial hemorrhage or calvarial fracture.    ER Interventions: Lasix 80mg IVP x1 (400cc ouput), Zoysn 3.375mg IVPx1, and Vancomycin 1250mg IVPB x1    Patient now transferred to cardiac tele for further management of CHF exacerbation, Afib, rhabdomyolysis and segmental PE.    (14 Apr 2024 10:49)    REVIEW OF SYSTEMS:   12 pt ROS otherwise negative     PAST MEDICAL & SURGICAL HISTORY:  Hiatal hernia    SOCIAL HISTORY:  Allergies  No Known Allergies    HOME MEDICATIONS:  Aspirin EC 81 mg oral delayed release tablet: 1 tab(s) orally once a day (14 Apr 2024 12:00)    Vital Signs Last 24 Hrs  T(C): 37.3 (16 Apr 2024 11:20), Max: 37.4 (16 Apr 2024 08:41)  T(F): 99.1 (16 Apr 2024 11:20), Max: 99.4 (16 Apr 2024 08:41)  HR: 119 (16 Apr 2024 11:20) (90 - 123)  BP: 138/90 (16 Apr 2024 11:20) (128/99 - 144/91)  BP(mean): 106 (16 Apr 2024 06:38) (106 - 113)  RR: 19 (16 Apr 2024 11:20) (18 - 22)  SpO2: 93% (16 Apr 2024 11:20) (92% - 96%)    Parameters below as of 16 Apr 2024 11:20  Patient On (Oxygen Delivery Method): nasal cannula  O2 Flow (L/min): 4    I&O's Summary    15 Apr 2024 07:01  -  16 Apr 2024 07:00  --------------------------------------------------------  IN: 1290 mL / OUT: 900 mL / NET: 390 mL    16 Apr 2024 07:01  -  16 Apr 2024 17:00  --------------------------------------------------------  IN: 360 mL / OUT: 300 mL / NET: 60 mL    PHYSICAL EXAM:  GENERAL: NAD  HEAD:  Atraumatic, Normocephalic  EYES: EOMI, conjunctiva and sclera clear  NECK: Supple, +JVD  CHEST/LUNG: Clear to auscultation bilaterally; No wheeze  HEART: Regular rate and rhythm; No murmurs  ABDOMEN: Soft, Nontender, Nondistended; Bowel sounds present  EXTREMITIES:  2+ Peripheral Pulses, +3 pitting edema  PSYCH: AAOx3  NEUROLOGY: non-focal  SKIN: No rashes or lesions    LABS                        14.7   6.61  )-----------( 227      ( 16 Apr 2024 05:30 )             47.7                         15.4   7.72  )-----------( 220      ( 15 Apr 2024 09:34 )             49.6     04-16    149<H>  |  110<H>  |  19  ----------------------------<  94  4.1   |  28  |  0.81  04-16    150<H>  |  110<H>  |  19  ----------------------------<  105<H>  4.0   |  34<H>  |  0.80    Ca    9.0      16 Apr 2024 05:30  Ca    9.2      16 Apr 2024 03:06  Mg     2.1     04-16    TPro  6.3  /  Alb  3.3  /  TBili  0.7  /  DBili  x   /  AST  55<H>  /  ALT  43  /  AlkPhos  54  04-16  TPro  6.0  /  Alb  3.3  /  TBili  0.7  /  DBili  x   /  AST  63<H>  /  ALT  48<H>  /  AlkPhos  48  04-15    PT/INR - ( 15 Apr 2024 09:34 )   PT: 15.7 sec;   INR: 1.39     PTT - ( 16 Apr 2024 05:30 )  PTT:70.4 sec   15 Apr 2024 05:30 Troponin x     /  U/L / CKMB x     / CKMB Units x       14 Apr 2024 18:00 Troponin x     / CK 1545 U/L / CKMB x     / CKMB Units 13.1 ng/mL   14 Apr 2024 10:47 Troponin x     / CK 1890 U/L / CKMB x     / CKMB Units 18.5 ng/mL    MEDICATIONS  (STANDING):  albuterol/ipratropium for Nebulization 3 milliLiter(s) Nebulizer every 6 hours  furosemide   Injectable 40 milliGRAM(s) IV Push daily  heparin  Infusion. 1700 Unit(s)/Hr (17 mL/Hr) IV Continuous <Continuous>  metoprolol tartrate 25 milliGRAM(s) Oral two times a day  polyethylene glycol 3350 17 Gram(s) Oral daily  senna 2 Tablet(s) Oral at bedtime  spironolactone 25 milliGRAM(s) Oral daily  valsartan 20 milliGRAM(s) Oral two times a day    MEDICATIONS  (PRN):  heparin   Injectable 4500 Unit(s) IV Push every 6 hours PRN For aPTT between 40 - 57  heparin   Injectable 9500 Unit(s) IV Push every 6 hours PRN For aPTT less than 40
PULMONARY SERVICE INITIAL CONSULT NOTE    HPI:  74M no known significant PMHx presented to [facility] on 04/14/24 after mechanical fall and was down on the ground x2 days. Patient states he attempted to crawl to phone to make call and chest fell down on him and he was unable to move and 911 was called to help get patient out. Patient endorses cramp in LLE and difficulty walking with L leg since Nov 2023, MATHEWS, and episodes of hematochezia.     On arrival to cardiac tele patient denies head trauma,  CP, SOB, palpitations, dizziness, lightheadedness, orthopnea, PND, abd pain, N/V, hematuria,  melena, BRPR, LE edema, fever,  chills, cough, recent travel, or sick contacts. Patient has not seen medical doctor in many years.    Patient with worsening hypoxia felt to be volume overload but concern for other process leading to pulmonary consultation.    [facility] Course   -344/  HR 90 -118 RR 18 20 SpO2 98% on 4L NC  WBC 8.32 H/H 16.7/52.9 Plt 219 PT/PTT 14.8/41.2 INR 1.36  Na 148 K 4.2 Cl 111 Co2 25 AG 12 BUN/Cr 22/1.14 TBili 2.1 ALP 60 AST/ALT 86/58 Mg 1.8   Lactate  3.0 -> 2.2. 0> 1.7 BCx collcted   TSH 3.092   Trop I 91.9 CK 2k  BNP 7k   UA +ketones, +proteinuria, +blood. Negative for LE/Nitrites   Respiratory Panel: SARS-CoV/Influenza A/B, RSV not detected   EKG per ED documentation Afib w/ HR 100s     Imaging  o CXR 4/14/24: No acute infiltrates, cardiomegaly   o CTA Chest 4/14/24:  No central, main, or lobar pulmonary embolus. Remainder of the pulmonary arterial tree cannot be assessed due to respiratory motion. This represents a change from the preliminary report. Enlarged main pulmonary   artery may reflect pulmonary arterial hypertension. Cardiac enlargement. Coronary artery calcifications. Small right and trace left pleural effusions.  Mid ascending thoracic aorta is aneurysmal measuring 4 cm. No aortic dissection identified within the limitations of this non-ECG gated study.  o CT ABDOMEN/PELVIS 4/14/24: Small volume abdominopelvic ascites, simple appearing in the pelvis. Upper abdomen ascites evaluation is limited by significant streak  artifact, particularly surrounding the liver and spleen. Short-term   follow-up reevaluation can be considered in the setting of trauma to exclude solid organ injury. Abdominal wall soft tissue edema, left greater than right, as well as at the umbilicus. Cholelithiasis.  o CTH 4/14/24: No acute intracranial hemorrhage or calvarial fracture.    ER Interventions: Lasix 80mg IVP x1 (400cc ouput), Zoysn 3.375mg IVPx1, and Vancomycin 1250mg IVPB x1    Patient now transferred to cardiac tele for further management of CHF exacerbation, Afib, rhabdomyolysis and segmental PE.    (14 Apr 2024 10:49)      REVIEW OF SYSTEMS:  All additional ROS negative.    PAST MEDICAL & SURGICAL HISTORY:  Hiatal hernia          FAMILY HISTORY:  Family history of MI (myocardial infarction) (Mother)    FH: pulmonary embolism (Father)    FH: multiple myeloma (Sibling)        SOCIAL HISTORY:  Smoking Status: [ ] Current, [ ] Former, [ ] Never  Pack Years:    MEDICATIONS:  Pulmonary:  albuterol/ipratropium for Nebulization 3 milliLiter(s) Nebulizer every 6 hours    Antimicrobials:  cefTRIAXone   IVPB 2000 milliGRAM(s) IV Intermittent every 24 hours    Anticoagulants:  heparin   Injectable 4500 Unit(s) IV Push every 6 hours PRN  heparin   Injectable 9500 Unit(s) IV Push every 6 hours PRN  heparin  Infusion. 1700 Unit(s)/Hr IV Continuous <Continuous>    Onc:    GI/:  polyethylene glycol 3350 17 Gram(s) Oral daily  senna 2 Tablet(s) Oral at bedtime    Endocrine:    Cardiac:  furosemide   Injectable 40 milliGRAM(s) IV Push every 12 hours  metoprolol tartrate 25 milliGRAM(s) Oral two times a day  spironolactone 25 milliGRAM(s) Oral daily  valsartan 40 milliGRAM(s) Oral every 12 hours    Other Medications:  influenza  Vaccine (HIGH DOSE) 0.7 milliLiter(s) IntraMuscular once      Allergies    No Known Allergies    Intolerances        Vital Signs Last 24 Hrs  T(C): 38.2 (18 Apr 2024 19:31), Max: 38.2 (18 Apr 2024 19:31)  T(F): 100.8 (18 Apr 2024 19:31), Max: 100.8 (18 Apr 2024 19:31)  HR: 118 (18 Apr 2024 18:00) (102 - 119)  BP: 144/99 (18 Apr 2024 18:00) (122/79 - 166/94)  BP(mean): 118 (18 Apr 2024 04:10) (96 - 118)  RR: 20 (18 Apr 2024 18:00) (18 - 20)  SpO2: 93% (18 Apr 2024 18:00) (93% - 99%)    Parameters below as of 18 Apr 2024 18:00    O2 Flow (L/min): 4      04-17 @ 07:01  -  04-18 @ 07:00  --------------------------------------------------------  IN: 600 mL / OUT: 1100 mL / NET: -500 mL    04-18 @ 07:01  -  04-18 @ 20:10  --------------------------------------------------------  IN: 374 mL / OUT: 0 mL / NET: 374 mL          PHYSICAL EXAM:    Head: NC/AT  EENT: PERRL, anicteric sclera; oropharynx clear, MMM  Neck: supple, no appreciable JVD  Respiratory: CTA B/L; no W/R/R  Cardiovascular: +S1/S2, RRR  Gastrointestinal: soft, NT/ND  Extremities: WWP; no edema, clubbing or cyanosis  Vascular: 2+ radial pulses B/L  Neurological: awake and alert; ACOSTA    LABS:      CBC Full  -  ( 18 Apr 2024 05:30 )  WBC Count : 6.31 K/uL  RBC Count : 5.35 M/uL  Hemoglobin : 15.8 g/dL  Hematocrit : 51.6 %  Platelet Count - Automated : 209 K/uL  Mean Cell Volume : 96.4 fl  Mean Cell Hemoglobin : 29.5 pg  Mean Cell Hemoglobin Concentration : 30.6 gm/dL  Auto Neutrophil # : 4.63 K/uL  Auto Lymphocyte # : 1.03 K/uL  Auto Monocyte # : 0.54 K/uL  Auto Eosinophil # : 0.06 K/uL  Auto Basophil # : 0.02 K/uL  Auto Neutrophil % : 73.3 %  Auto Lymphocyte % : 16.3 %  Auto Monocyte % : 8.6 %  Auto Eosinophil % : 1.0 %  Auto Basophil % : 0.3 %    04-18    148<H>  |  108  |  23  ----------------------------<  125<H>  4.2   |  32<H>  |  0.92    Ca    9.6      18 Apr 2024 05:30  Mg     2.1     04-18    TPro  6.3  /  Alb  3.2<L>  /  TBili  0.5  /  DBili  x   /  AST  36  /  ALT  38  /  AlkPhos  44  04-18    PTT - ( 18 Apr 2024 13:58 )  PTT:55.2 sec      Urinalysis Basic - ( 18 Apr 2024 05:30 )    Color: x / Appearance: x / SG: x / pH: x  Gluc: 125 mg/dL / Ketone: x  / Bili: x / Urobili: x   Blood: x / Protein: x / Nitrite: x   Leuk Esterase: x / RBC: x / WBC x   Sq Epi: x / Non Sq Epi: x / Bacteria: x                RADIOLOGY & ADDITIONAL STUDIES:
INFECTIOUS DISEASES INITIAL CONSULT NOTE    HPI:  74M without known PMHx (had not seen doctor in many years), who presented to [facility] ED on 4/14 after fall 2 days prior, was stuck under shelf at home for two days, found to have decompensated heart failure, A.fib, and mild rhabdomyolysis. He has been underoing diuresis, and is pending ischemic workup with cardiology. He had received a dose of vanc/zosyn in ED but was afebrile with normal WBC on presentation, neg UA, BCx ngtd, labs with mild hypernatremia and high bili (likely congestive, improved), CT chest/abdomen/pelvis with contrast with  volume overload (small b/l effusions, small ascites, abdominal wall edema), and antibiotics were discontinued. Then on 4/16 patient developed fever up to 101.6, without change in mild tachycardia or change in 4L O2 requirement. Repeat CXR with increased effusions (mod R, small L) and increased L side opacity compared to prior CXR, RVP neg, repeat UA neg, repeat BCx pending. Patient was started on empiric ceftriaxone 2g IV q24h and doxy 100mg IV q12h for PNA coverage.     On interview patient declines any acute symptoms other than fever in the past 24h. He did not have any infectious symptoms preceding his fall. He currently has no headache, rhinorrhea, sore throat, cough, chest pain, SOB, nausea/vomiting/diarrhea or dysuria. He denies any history of serious or recurrent infections. Never hospitalized for infection. Never had surgery. Never international travel.      PAST MEDICAL & SURGICAL HISTORY:  Hiatal hernia          Review of Systems:   Constitutional, eyes, ENT, cardiovascular, respiratory, gastrointestinal, genitourinary, integumentary, neurological, psychiatric and heme/lymph are otherwise negative other than noted above       ANTIBIOTICS:  MEDICATIONS  (STANDING):  albuterol/ipratropium for Nebulization 3 milliLiter(s) Nebulizer every 6 hours  cefTRIAXone   IVPB 2000 milliGRAM(s) IV Intermittent every 24 hours  doxycycline IVPB 100 milliGRAM(s) IV Intermittent once  doxycycline IVPB      furosemide   Injectable 40 milliGRAM(s) IV Push daily  heparin  Infusion. 1700 Unit(s)/Hr (17 mL/Hr) IV Continuous <Continuous>  metoprolol tartrate 25 milliGRAM(s) Oral two times a day  polyethylene glycol 3350 17 Gram(s) Oral daily  senna 2 Tablet(s) Oral at bedtime  spironolactone 25 milliGRAM(s) Oral daily  valsartan 20 milliGRAM(s) Oral two times a day    MEDICATIONS  (PRN):  heparin   Injectable 4500 Unit(s) IV Push every 6 hours PRN For aPTT between 40 - 57  heparin   Injectable 9500 Unit(s) IV Push every 6 hours PRN For aPTT less than 40      Allergies    No Known Allergies    Intolerances        SOCIAL HISTORY:  Lives in [place], alone  No pets  No EtOH/tobacco/drug use  No travel outside of [facility] [place]    FAMILY HISTORY:  Family history of MI (myocardial infarction) (Mother)    FH: pulmonary embolism (Father)    FH: multiple myeloma (Sibling)     no FH leading to current infection    Vital Signs Last 24 Hrs  T(C): 38.6 (17 Apr 2024 11:24), Max: 38.7 (17 Apr 2024 00:00)  T(F): 101.4 (17 Apr 2024 11:24), Max: 101.6 (17 Apr 2024 00:00)  HR: 102 (17 Apr 2024 11:24) (96 - 118)  BP: 124/77 (17 Apr 2024 11:24) (114/78 - 149/70)  BP(mean): 100 (17 Apr 2024 05:37) (100 - 130)  RR: 20 (17 Apr 2024 11:24) (18 - 22)  SpO2: 93% (17 Apr 2024 11:24) (92% - 100%)    Parameters below as of 17 Apr 2024 11:24  Patient On (Oxygen Delivery Method): nasal cannula w/ humidification  O2 Flow (L/min): 4      04-16-24 @ 07:01  -  04-17-24 @ 07:00  --------------------------------------------------------  IN: 900 mL / OUT: 3000 mL / NET: -2100 mL    04-17-24 @ 07:01  -  04-17-24 @ 15:49  --------------------------------------------------------  IN: 0 mL / OUT: 300 mL / NET: -300 mL        PHYSICAL EXAM:  Constitutional: alert, NAD  Eyes: the sclera and conjunctiva were normal.   ENT: poor dentition  Neck: the appearance of the neck was normal and the neck was supple.   Pulmonary: no respiratory distress on 4L NC. Clear in anterior fields  Heart: mild tachycardia  Abdomen: soft, non-tender  : Condom cath with clear yellow urine  MSK: Bilateral lower extremity edema, with small ulcerations/scabs over anterior shin, without surrounding erythema or tenderness      LABS:                        15.8   6.38  )-----------( 213      ( 17 Apr 2024 07:11 )             51.5     04-17    148<H>  |  106  |  21  ----------------------------<  109<H>  3.8   |  33<H>  |  0.89    Ca    9.7      17 Apr 2024 07:11  Mg     2.1     04-17    TPro  6.5  /  Alb  3.4  /  TBili  0.7  /  DBili  x   /  AST  44<H>  /  ALT  43  /  AlkPhos  50  04-17    PTT - ( 17 Apr 2024 07:11 )  PTT:73.7 sec  Urinalysis Basic - ( 17 Apr 2024 07:11 )    Color: x / Appearance: x / SG: x / pH: x  Gluc: 109 mg/dL / Ketone: x  / Bili: x / Urobili: x   Blood: x / Protein: x / Nitrite: x   Leuk Esterase: x / RBC: x / WBC x   Sq Epi: x / Non Sq Epi: x / Bacteria: x        MICROBIOLOGY:  Reviewed    RADIOLOGY & ADDITIONAL STUDIES:  Reviewed

## 2024-04-24 ENCOUNTER — TRANSCRIPTION ENCOUNTER (OUTPATIENT)
Age: 74
End: 2024-04-24

## 2024-04-24 LAB
ALBUMIN SERPL ELPH-MCNC: 3.1 G/DL — LOW (ref 3.3–5)
ALBUMIN SERPL ELPH-MCNC: 3.3 G/DL — SIGNIFICANT CHANGE UP (ref 3.3–5)
ALBUMIN SERPL ELPH-MCNC: 3.4 G/DL — SIGNIFICANT CHANGE UP (ref 3.3–5)
ALBUMIN SERPL ELPH-MCNC: 3.4 G/DL — SIGNIFICANT CHANGE UP (ref 3.3–5)
ALP SERPL-CCNC: 39 U/L — LOW (ref 40–120)
ALP SERPL-CCNC: 39 U/L — LOW (ref 40–120)
ALP SERPL-CCNC: 42 U/L — SIGNIFICANT CHANGE UP (ref 40–120)
ALP SERPL-CCNC: 43 U/L — SIGNIFICANT CHANGE UP (ref 40–120)
ALT FLD-CCNC: 54 U/L — HIGH (ref 10–45)
ALT FLD-CCNC: 55 U/L — HIGH (ref 10–45)
ALT FLD-CCNC: 57 U/L — HIGH (ref 10–45)
ALT FLD-CCNC: 60 U/L — HIGH (ref 10–45)
ANION GAP SERPL CALC-SCNC: 10 MMOL/L — SIGNIFICANT CHANGE UP (ref 5–17)
ANION GAP SERPL CALC-SCNC: 9 MMOL/L — SIGNIFICANT CHANGE UP (ref 5–17)
APPEARANCE UR: CLEAR — SIGNIFICANT CHANGE UP
AST SERPL-CCNC: 47 U/L — HIGH (ref 10–40)
AST SERPL-CCNC: 48 U/L — HIGH (ref 10–40)
AST SERPL-CCNC: 50 U/L — HIGH (ref 10–40)
AST SERPL-CCNC: 52 U/L — HIGH (ref 10–40)
BACTERIA # UR AUTO: ABNORMAL /HPF
BASE EXCESS BLDA CALC-SCNC: 10.6 MMOL/L — HIGH (ref -2–3)
BASE EXCESS BLDA CALC-SCNC: 11.1 MMOL/L — HIGH (ref -2–3)
BASE EXCESS BLDA CALC-SCNC: 8.3 MMOL/L — HIGH (ref -2–3)
BASE EXCESS BLDA CALC-SCNC: 9.3 MMOL/L — HIGH (ref -2–3)
BASE EXCESS BLDV CALC-SCNC: 10.4 MMOL/L — HIGH (ref -2–3)
BASOPHILS # BLD AUTO: 0.03 K/UL — SIGNIFICANT CHANGE UP (ref 0–0.2)
BASOPHILS NFR BLD AUTO: 0.3 % — SIGNIFICANT CHANGE UP (ref 0–2)
BILIRUB SERPL-MCNC: 0.7 MG/DL — SIGNIFICANT CHANGE UP (ref 0.2–1.2)
BILIRUB SERPL-MCNC: 1 MG/DL — SIGNIFICANT CHANGE UP (ref 0.2–1.2)
BILIRUB SERPL-MCNC: 1.2 MG/DL — SIGNIFICANT CHANGE UP (ref 0.2–1.2)
BILIRUB SERPL-MCNC: 1.8 MG/DL — HIGH (ref 0.2–1.2)
BILIRUB UR-MCNC: NEGATIVE — SIGNIFICANT CHANGE UP
BUN SERPL-MCNC: 100 MG/DL — HIGH (ref 7–23)
BUN SERPL-MCNC: 101 MG/DL — HIGH (ref 7–23)
BUN SERPL-MCNC: 98 MG/DL — HIGH (ref 7–23)
BUN SERPL-MCNC: 98 MG/DL — HIGH (ref 7–23)
CALCIUM SERPL-MCNC: 8.9 MG/DL — SIGNIFICANT CHANGE UP (ref 8.4–10.5)
CALCIUM SERPL-MCNC: 9.1 MG/DL — SIGNIFICANT CHANGE UP (ref 8.4–10.5)
CALCIUM SERPL-MCNC: 9.2 MG/DL — SIGNIFICANT CHANGE UP (ref 8.4–10.5)
CALCIUM SERPL-MCNC: 9.2 MG/DL — SIGNIFICANT CHANGE UP (ref 8.4–10.5)
CAST: 22 /LPF — HIGH (ref 0–4)
CHLORIDE SERPL-SCNC: 107 MMOL/L — SIGNIFICANT CHANGE UP (ref 96–108)
CHLORIDE SERPL-SCNC: 109 MMOL/L — HIGH (ref 96–108)
CHLORIDE SERPL-SCNC: 109 MMOL/L — HIGH (ref 96–108)
CHLORIDE SERPL-SCNC: 110 MMOL/L — HIGH (ref 96–108)
CO2 BLDA-SCNC: 35 MMOL/L — HIGH (ref 19–24)
CO2 BLDA-SCNC: 37 MMOL/L — HIGH (ref 19–24)
CO2 BLDA-SCNC: 38 MMOL/L — HIGH (ref 19–24)
CO2 BLDA-SCNC: 39 MMOL/L — HIGH (ref 19–24)
CO2 SERPL-SCNC: 30 MMOL/L — SIGNIFICANT CHANGE UP (ref 22–31)
CO2 SERPL-SCNC: 32 MMOL/L — HIGH (ref 22–31)
CO2 SERPL-SCNC: 32 MMOL/L — HIGH (ref 22–31)
CO2 SERPL-SCNC: 33 MMOL/L — HIGH (ref 22–31)
COHGB MFR BLDV: 1.5 % — SIGNIFICANT CHANGE UP
COLOR SPEC: YELLOW — SIGNIFICANT CHANGE UP
CREAT ?TM UR-MCNC: 145 MG/DL — SIGNIFICANT CHANGE UP
CREAT SERPL-MCNC: 1.73 MG/DL — HIGH (ref 0.5–1.3)
CREAT SERPL-MCNC: 1.89 MG/DL — HIGH (ref 0.5–1.3)
CREAT SERPL-MCNC: 2.04 MG/DL — HIGH (ref 0.5–1.3)
CREAT SERPL-MCNC: 2.09 MG/DL — HIGH (ref 0.5–1.3)
CULTURE RESULTS: SIGNIFICANT CHANGE UP
CULTURE RESULTS: SIGNIFICANT CHANGE UP
DIFF PNL FLD: NEGATIVE — SIGNIFICANT CHANGE UP
EGFR: 32 ML/MIN/1.73M2 — LOW
EGFR: 33 ML/MIN/1.73M2 — LOW
EGFR: 37 ML/MIN/1.73M2 — LOW
EGFR: 41 ML/MIN/1.73M2 — LOW
EOSINOPHIL # BLD AUTO: 0.01 K/UL — SIGNIFICANT CHANGE UP (ref 0–0.5)
EOSINOPHIL NFR BLD AUTO: 0.1 % — SIGNIFICANT CHANGE UP (ref 0–6)
FINE GRAN CASTS #/AREA URNS AUTO: PRESENT
GI PCR PANEL: SIGNIFICANT CHANGE UP
GLUCOSE BLDC GLUCOMTR-MCNC: 139 MG/DL — HIGH (ref 70–99)
GLUCOSE BLDC GLUCOMTR-MCNC: 144 MG/DL — HIGH (ref 70–99)
GLUCOSE SERPL-MCNC: 142 MG/DL — HIGH (ref 70–99)
GLUCOSE SERPL-MCNC: 154 MG/DL — HIGH (ref 70–99)
GLUCOSE SERPL-MCNC: 175 MG/DL — HIGH (ref 70–99)
GLUCOSE SERPL-MCNC: 188 MG/DL — HIGH (ref 70–99)
GLUCOSE UR QL: NEGATIVE MG/DL — SIGNIFICANT CHANGE UP
HCO3 BLDA-SCNC: 33 MMOL/L — HIGH (ref 21–28)
HCO3 BLDA-SCNC: 36 MMOL/L — HIGH (ref 21–28)
HCO3 BLDA-SCNC: 36 MMOL/L — HIGH (ref 21–28)
HCO3 BLDA-SCNC: 37 MMOL/L — HIGH (ref 21–28)
HCO3 BLDV-SCNC: 40 MMOL/L — HIGH (ref 22–29)
HCT VFR BLD CALC: 34.3 % — LOW (ref 39–50)
HCT VFR BLD CALC: 35.2 % — LOW (ref 39–50)
HCT VFR BLD CALC: 37.7 % — LOW (ref 39–50)
HCT VFR BLD CALC: 41.1 % — SIGNIFICANT CHANGE UP (ref 39–50)
HGB BLD CALC-MCNC: 12.2 G/DL — LOW (ref 12.6–17.4)
HGB BLD-MCNC: 11 G/DL — LOW (ref 13–17)
HGB BLD-MCNC: 11 G/DL — LOW (ref 13–17)
HGB BLD-MCNC: 12.3 G/DL — LOW (ref 13–17)
HGB BLD-MCNC: 13.7 G/DL — SIGNIFICANT CHANGE UP (ref 13–17)
IMM GRANULOCYTES NFR BLD AUTO: 0.4 % — SIGNIFICANT CHANGE UP (ref 0–0.9)
KETONES UR-MCNC: NEGATIVE MG/DL — SIGNIFICANT CHANGE UP
LACTATE SERPL-SCNC: 1 MMOL/L — SIGNIFICANT CHANGE UP (ref 0.5–2)
LACTATE SERPL-SCNC: 1.2 MMOL/L — SIGNIFICANT CHANGE UP (ref 0.5–2)
LACTATE SERPL-SCNC: 1.6 MMOL/L — SIGNIFICANT CHANGE UP (ref 0.5–2)
LACTATE SERPL-SCNC: 1.7 MMOL/L — SIGNIFICANT CHANGE UP (ref 0.5–2)
LEUKOCYTE ESTERASE UR-ACNC: ABNORMAL
LYMPHOCYTES # BLD AUTO: 1.3 K/UL — SIGNIFICANT CHANGE UP (ref 1–3.3)
LYMPHOCYTES # BLD AUTO: 12.2 % — LOW (ref 13–44)
MAGNESIUM SERPL-MCNC: 2.3 MG/DL — SIGNIFICANT CHANGE UP (ref 1.6–2.6)
MAGNESIUM SERPL-MCNC: 2.4 MG/DL — SIGNIFICANT CHANGE UP (ref 1.6–2.6)
MCHC RBC-ENTMCNC: 29.6 PG — SIGNIFICANT CHANGE UP (ref 27–34)
MCHC RBC-ENTMCNC: 29.7 PG — SIGNIFICANT CHANGE UP (ref 27–34)
MCHC RBC-ENTMCNC: 30 PG — SIGNIFICANT CHANGE UP (ref 27–34)
MCHC RBC-ENTMCNC: 30.1 PG — SIGNIFICANT CHANGE UP (ref 27–34)
MCHC RBC-ENTMCNC: 31.3 GM/DL — LOW (ref 32–36)
MCHC RBC-ENTMCNC: 32.1 GM/DL — SIGNIFICANT CHANGE UP (ref 32–36)
MCHC RBC-ENTMCNC: 32.6 GM/DL — SIGNIFICANT CHANGE UP (ref 32–36)
MCHC RBC-ENTMCNC: 33.3 GM/DL — SIGNIFICANT CHANGE UP (ref 32–36)
MCV RBC AUTO: 89.9 FL — SIGNIFICANT CHANGE UP (ref 80–100)
MCV RBC AUTO: 91.1 FL — SIGNIFICANT CHANGE UP (ref 80–100)
MCV RBC AUTO: 94 FL — SIGNIFICANT CHANGE UP (ref 80–100)
MCV RBC AUTO: 94.9 FL — SIGNIFICANT CHANGE UP (ref 80–100)
METHGB MFR BLDV: 1 % — SIGNIFICANT CHANGE UP
MONOCYTES # BLD AUTO: 0.77 K/UL — SIGNIFICANT CHANGE UP (ref 0–0.9)
MONOCYTES NFR BLD AUTO: 7.2 % — SIGNIFICANT CHANGE UP (ref 2–14)
NEUTROPHILS # BLD AUTO: 8.52 K/UL — HIGH (ref 1.8–7.4)
NEUTROPHILS NFR BLD AUTO: 79.8 % — HIGH (ref 43–77)
NITRITE UR-MCNC: NEGATIVE — SIGNIFICANT CHANGE UP
NRBC # BLD: 0 /100 WBCS — SIGNIFICANT CHANGE UP (ref 0–0)
OSMOLALITY UR: 567 MOSM/KG — SIGNIFICANT CHANGE UP (ref 300–900)
OTHER CELLS CSF MANUAL: 9 ML/DL — LOW (ref 18–22)
PCO2 BLDA: 47 MMHG — SIGNIFICANT CHANGE UP (ref 35–48)
PCO2 BLDA: 51 MMHG — HIGH (ref 35–48)
PCO2 BLDA: 55 MMHG — HIGH (ref 35–48)
PCO2 BLDA: 55 MMHG — HIGH (ref 35–48)
PCO2 BLDV: 64 MMHG — HIGH (ref 42–55)
PH BLDA: 7.42 — SIGNIFICANT CHANGE UP (ref 7.35–7.45)
PH BLDA: 7.44 — SIGNIFICANT CHANGE UP (ref 7.35–7.45)
PH BLDA: 7.46 — HIGH (ref 7.35–7.45)
PH BLDA: 7.46 — HIGH (ref 7.35–7.45)
PH BLDV: 7.38 — SIGNIFICANT CHANGE UP (ref 7.32–7.43)
PH UR: 7 — SIGNIFICANT CHANGE UP (ref 5–8)
PHOSPHATE SERPL-MCNC: 3.5 MG/DL — SIGNIFICANT CHANGE UP (ref 2.5–4.5)
PHOSPHATE SERPL-MCNC: 3.7 MG/DL — SIGNIFICANT CHANGE UP (ref 2.5–4.5)
PLATELET # BLD AUTO: 172 K/UL — SIGNIFICANT CHANGE UP (ref 150–400)
PLATELET # BLD AUTO: 188 K/UL — SIGNIFICANT CHANGE UP (ref 150–400)
PLATELET # BLD AUTO: 188 K/UL — SIGNIFICANT CHANGE UP (ref 150–400)
PLATELET # BLD AUTO: 189 K/UL — SIGNIFICANT CHANGE UP (ref 150–400)
PO2 BLDA: 125 MMHG — HIGH (ref 83–108)
PO2 BLDA: 128 MMHG — HIGH (ref 83–108)
PO2 BLDA: 191 MMHG — HIGH (ref 83–108)
PO2 BLDA: 206 MMHG — HIGH (ref 83–108)
PO2 BLDV: 33 MMHG — SIGNIFICANT CHANGE UP (ref 25–45)
POTASSIUM SERPL-MCNC: 4 MMOL/L — SIGNIFICANT CHANGE UP (ref 3.5–5.3)
POTASSIUM SERPL-MCNC: 4 MMOL/L — SIGNIFICANT CHANGE UP (ref 3.5–5.3)
POTASSIUM SERPL-MCNC: 4.2 MMOL/L — SIGNIFICANT CHANGE UP (ref 3.5–5.3)
POTASSIUM SERPL-MCNC: 4.3 MMOL/L — SIGNIFICANT CHANGE UP (ref 3.5–5.3)
POTASSIUM SERPL-SCNC: 4 MMOL/L — SIGNIFICANT CHANGE UP (ref 3.5–5.3)
POTASSIUM SERPL-SCNC: 4 MMOL/L — SIGNIFICANT CHANGE UP (ref 3.5–5.3)
POTASSIUM SERPL-SCNC: 4.2 MMOL/L — SIGNIFICANT CHANGE UP (ref 3.5–5.3)
POTASSIUM SERPL-SCNC: 4.3 MMOL/L — SIGNIFICANT CHANGE UP (ref 3.5–5.3)
POTASSIUM UR-SCNC: 91 MMOL/L — SIGNIFICANT CHANGE UP
PROT ?TM UR-MCNC: 21 MG/DL — HIGH (ref 0–12)
PROT SERPL-MCNC: 5.4 G/DL — LOW (ref 6–8.3)
PROT SERPL-MCNC: 5.5 G/DL — LOW (ref 6–8.3)
PROT SERPL-MCNC: 5.8 G/DL — LOW (ref 6–8.3)
PROT SERPL-MCNC: 5.8 G/DL — LOW (ref 6–8.3)
PROT UR-MCNC: SIGNIFICANT CHANGE UP MG/DL
PROT/CREAT UR-RTO: 0.1 RATIO — SIGNIFICANT CHANGE UP (ref 0–0.2)
RBC # BLD: 3.65 M/UL — LOW (ref 4.2–5.8)
RBC # BLD: 3.71 M/UL — LOW (ref 4.2–5.8)
RBC # BLD: 4.14 M/UL — LOW (ref 4.2–5.8)
RBC # BLD: 4.57 M/UL — SIGNIFICANT CHANGE UP (ref 4.2–5.8)
RBC # FLD: 15.9 % — HIGH (ref 10.3–14.5)
RBC # FLD: 16.4 % — HIGH (ref 10.3–14.5)
RBC CASTS # UR COMP ASSIST: 4 /HPF — SIGNIFICANT CHANGE UP (ref 0–4)
SAO2 % BLDA: 99.5 % — HIGH (ref 94–98)
SAO2 % BLDA: 99.6 % — HIGH (ref 94–98)
SAO2 % BLDA: 99.7 % — HIGH (ref 94–98)
SAO2 % BLDA: 99.8 % — HIGH (ref 94–98)
SAO2 % BLDV: 52 % — LOW (ref 67–88)
SODIUM SERPL-SCNC: 148 MMOL/L — HIGH (ref 135–145)
SODIUM SERPL-SCNC: 149 MMOL/L — HIGH (ref 135–145)
SODIUM SERPL-SCNC: 151 MMOL/L — HIGH (ref 135–145)
SODIUM SERPL-SCNC: 151 MMOL/L — HIGH (ref 135–145)
SODIUM UR-SCNC: 51 MMOL/L — SIGNIFICANT CHANGE UP
SP GR SPEC: 1.02 — SIGNIFICANT CHANGE UP (ref 1–1.03)
SPECIMEN SOURCE: SIGNIFICANT CHANGE UP
SPECIMEN SOURCE: SIGNIFICANT CHANGE UP
SQUAMOUS # UR AUTO: 2 /HPF — SIGNIFICANT CHANGE UP (ref 0–5)
UROBILINOGEN FLD QL: 1 MG/DL — SIGNIFICANT CHANGE UP (ref 0.2–1)
UUN UR-MCNC: 891 MG/DL — SIGNIFICANT CHANGE UP
WBC # BLD: 10.67 K/UL — HIGH (ref 3.8–10.5)
WBC # BLD: 11.76 K/UL — HIGH (ref 3.8–10.5)
WBC # BLD: 11.98 K/UL — HIGH (ref 3.8–10.5)
WBC # BLD: 9.61 K/UL — SIGNIFICANT CHANGE UP (ref 3.8–10.5)
WBC # FLD AUTO: 10.67 K/UL — HIGH (ref 3.8–10.5)
WBC # FLD AUTO: 11.76 K/UL — HIGH (ref 3.8–10.5)
WBC # FLD AUTO: 11.98 K/UL — HIGH (ref 3.8–10.5)
WBC # FLD AUTO: 9.61 K/UL — SIGNIFICANT CHANGE UP (ref 3.8–10.5)
WBC CLUMPS # UR AUTO: PRESENT
WBC UR QL: 7 /HPF — HIGH (ref 0–5)

## 2024-04-24 PROCEDURE — 36620 INSERTION CATHETER ARTERY: CPT

## 2024-04-24 PROCEDURE — 99291 CRITICAL CARE FIRST HOUR: CPT

## 2024-04-24 PROCEDURE — 45382 COLONOSCOPY W/CONTROL BLEED: CPT | Mod: GC

## 2024-04-24 PROCEDURE — 93010 ELECTROCARDIOGRAM REPORT: CPT

## 2024-04-24 PROCEDURE — 71045 X-RAY EXAM CHEST 1 VIEW: CPT | Mod: 26

## 2024-04-24 PROCEDURE — 99291 CRITICAL CARE FIRST HOUR: CPT | Mod: 25

## 2024-04-24 PROCEDURE — 86078 PHYS BLOOD BANK SERV REACTJ: CPT

## 2024-04-24 PROCEDURE — 43235 EGD DIAGNOSTIC BRUSH WASH: CPT | Mod: GC

## 2024-04-24 RX ORDER — FENTANYL CITRATE 50 UG/ML
0.5 INJECTION INTRAVENOUS
Qty: 2500 | Refills: 0 | Status: DISCONTINUED | OUTPATIENT
Start: 2024-04-24 | End: 2024-04-24

## 2024-04-24 RX ORDER — VASOPRESSIN 20 [USP'U]/ML
0.04 INJECTION INTRAVENOUS
Qty: 40 | Refills: 0 | Status: DISCONTINUED | OUTPATIENT
Start: 2024-04-24 | End: 2024-04-26

## 2024-04-24 RX ORDER — PROPOFOL 10 MG/ML
1.5 INJECTION, EMULSION INTRAVENOUS
Qty: 1000 | Refills: 0 | Status: DISCONTINUED | OUTPATIENT
Start: 2024-04-24 | End: 2024-04-25

## 2024-04-24 RX ORDER — SODIUM CHLORIDE 9 MG/ML
250 INJECTION, SOLUTION INTRAVENOUS ONCE
Refills: 0 | Status: COMPLETED | OUTPATIENT
Start: 2024-04-24 | End: 2024-04-24

## 2024-04-24 RX ORDER — PROPOFOL 10 MG/ML
1.5 INJECTION, EMULSION INTRAVENOUS
Qty: 1000 | Refills: 0 | Status: DISCONTINUED | OUTPATIENT
Start: 2024-04-24 | End: 2024-04-24

## 2024-04-24 RX ORDER — DIGOXIN 250 MCG
500 TABLET ORAL ONCE
Refills: 0 | Status: COMPLETED | OUTPATIENT
Start: 2024-04-24 | End: 2024-04-24

## 2024-04-24 RX ORDER — PHENYLEPHRINE HYDROCHLORIDE 10 MG/ML
0.5 INJECTION INTRAVENOUS
Qty: 160 | Refills: 0 | Status: DISCONTINUED | OUTPATIENT
Start: 2024-04-24 | End: 2024-04-24

## 2024-04-24 RX ORDER — PANTOPRAZOLE SODIUM 20 MG/1
8 TABLET, DELAYED RELEASE ORAL
Qty: 80 | Refills: 0 | Status: DISCONTINUED | OUTPATIENT
Start: 2024-04-24 | End: 2024-04-27

## 2024-04-24 RX ORDER — SODIUM CHLORIDE 9 MG/ML
250 INJECTION INTRAMUSCULAR; INTRAVENOUS; SUBCUTANEOUS ONCE
Refills: 0 | Status: COMPLETED | OUTPATIENT
Start: 2024-04-24 | End: 2024-04-24

## 2024-04-24 RX ORDER — PANTOPRAZOLE SODIUM 20 MG/1
40 TABLET, DELAYED RELEASE ORAL EVERY 12 HOURS
Refills: 0 | Status: DISCONTINUED | OUTPATIENT
Start: 2024-04-24 | End: 2024-04-24

## 2024-04-24 RX ORDER — METOPROLOL TARTRATE 50 MG
12.5 TABLET ORAL EVERY 12 HOURS
Refills: 0 | Status: DISCONTINUED | OUTPATIENT
Start: 2024-04-24 | End: 2024-04-25

## 2024-04-24 RX ORDER — PHENYLEPHRINE HYDROCHLORIDE 10 MG/ML
0.5 INJECTION INTRAVENOUS
Qty: 40 | Refills: 0 | Status: DISCONTINUED | OUTPATIENT
Start: 2024-04-24 | End: 2024-04-26

## 2024-04-24 RX ORDER — FENTANYL CITRATE 50 UG/ML
0.5 INJECTION INTRAVENOUS
Qty: 2500 | Refills: 0 | Status: DISCONTINUED | OUTPATIENT
Start: 2024-04-24 | End: 2024-04-25

## 2024-04-24 RX ORDER — FENTANYL CITRATE 50 UG/ML
0.5 INJECTION INTRAVENOUS
Qty: 5000 | Refills: 0 | Status: DISCONTINUED | OUTPATIENT
Start: 2024-04-24 | End: 2024-04-24

## 2024-04-24 RX ORDER — CHLORHEXIDINE GLUCONATE 213 G/1000ML
15 SOLUTION TOPICAL EVERY 12 HOURS
Refills: 0 | Status: DISCONTINUED | OUTPATIENT
Start: 2024-04-24 | End: 2024-04-26

## 2024-04-24 RX ORDER — CHLORHEXIDINE GLUCONATE 213 G/1000ML
1 SOLUTION TOPICAL
Refills: 0 | Status: DISCONTINUED | OUTPATIENT
Start: 2024-04-24 | End: 2024-05-04

## 2024-04-24 RX ADMIN — MUPIROCIN 1 APPLICATION(S): 20 OINTMENT TOPICAL at 17:54

## 2024-04-24 RX ADMIN — PROPOFOL 0.8 MICROGRAM(S)/KG/MIN: 10 INJECTION, EMULSION INTRAVENOUS at 13:51

## 2024-04-24 RX ADMIN — SODIUM CHLORIDE 1000 MILLILITER(S): 9 INJECTION, SOLUTION INTRAVENOUS at 17:53

## 2024-04-24 RX ADMIN — PANTOPRAZOLE SODIUM 10 MG/HR: 20 TABLET, DELAYED RELEASE ORAL at 17:53

## 2024-04-24 RX ADMIN — SODIUM CHLORIDE 83.33 MILLILITER(S): 9 INJECTION INTRAMUSCULAR; INTRAVENOUS; SUBCUTANEOUS at 04:03

## 2024-04-24 RX ADMIN — Medication 25 MILLIGRAM(S): at 06:22

## 2024-04-24 RX ADMIN — MUPIROCIN 1 APPLICATION(S): 20 OINTMENT TOPICAL at 06:32

## 2024-04-24 RX ADMIN — LINEZOLID 600 MILLIGRAM(S): 600 INJECTION, SOLUTION INTRAVENOUS at 01:11

## 2024-04-24 RX ADMIN — PANTOPRAZOLE SODIUM 40 MILLIGRAM(S): 20 TABLET, DELAYED RELEASE ORAL at 06:23

## 2024-04-24 RX ADMIN — VASOPRESSIN 6 UNIT(S)/MIN: 20 INJECTION INTRAVENOUS at 11:22

## 2024-04-24 RX ADMIN — Medication 500 MICROGRAM(S): at 06:22

## 2024-04-24 RX ADMIN — PROPOFOL 0.8 MICROGRAM(S)/KG/MIN: 10 INJECTION, EMULSION INTRAVENOUS at 16:05

## 2024-04-24 RX ADMIN — SODIUM CHLORIDE 1000 MILLILITER(S): 9 INJECTION, SOLUTION INTRAVENOUS at 09:50

## 2024-04-24 RX ADMIN — CHLORHEXIDINE GLUCONATE 1 APPLICATION(S): 213 SOLUTION TOPICAL at 10:50

## 2024-04-24 RX ADMIN — PHENYLEPHRINE HYDROCHLORIDE 16.7 MICROGRAM(S)/KG/MIN: 10 INJECTION INTRAVENOUS at 20:50

## 2024-04-24 RX ADMIN — PROPOFOL 0.8 MICROGRAM(S)/KG/MIN: 10 INJECTION, EMULSION INTRAVENOUS at 20:50

## 2024-04-24 RX ADMIN — CHLORHEXIDINE GLUCONATE 15 MILLILITER(S): 213 SOLUTION TOPICAL at 17:52

## 2024-04-24 RX ADMIN — FENTANYL CITRATE 4.45 MICROGRAM(S)/KG/HR: 50 INJECTION INTRAVENOUS at 13:51

## 2024-04-24 RX ADMIN — PROPOFOL 0.8 MICROGRAM(S)/KG/MIN: 10 INJECTION, EMULSION INTRAVENOUS at 23:56

## 2024-04-24 NOTE — PROCEDURE NOTE - NSINDICATIONS_GEN_A_CORE
critical patient/monitoring purposes
arterial puncture to obtain ABG's
critical illness/emergency venous access/hemodynamic monitoring/venous access/volume resuscitation
critical illness/venous access

## 2024-04-24 NOTE — CHART NOTE - NSCHARTNOTEFT_GEN_A_CORE
No contact information for patient's family members or other potential legal surrogates noted in chart. Patient now with altered mental status, not able to make his own medical decisions. 2 units of blood and one unit of FFP transfused iso clinically significant active UGIB and symptomatic anemia (Likely iso significant hemoconcentration 2/2 profound hypovolemia). These interventions are lifesaving and thus cannot be deferred to further attempt to contact legal surrogate/family members. Other than HCP/Niece Opal Ag (who we are unable to contact so far), no contact information for patient's family members or other potential legal surrogates noted in chart. Patient now with altered mental status, not able to make his own medical decisions. 2 units of blood and one unit of FFP transfused iso clinically significant active UGIB and symptomatic anemia (Likely iso significant hemoconcentration 2/2 profound hypovolemia). These interventions are lifesaving and thus cannot be deferred to further attempt to contact legal surrogate/family members. Other than HCP/Niece Opal Ag ([phone]) (who we are unable to contact so far), no contact information for patient's family members or other potential legal surrogates noted in chart. Patient now with altered mental status, not able to make his own medical decisions. 2 units of blood and one unit of FFP transfused iso clinically significant active UGIB and symptomatic anemia (Likely iso significant hemoconcentration 2/2 profound hypovolemia). These interventions are lifesaving and thus cannot be deferred to further attempt to contact legal surrogate/family members.

## 2024-04-24 NOTE — CHART NOTE - NSCHARTNOTEFT_GEN_A_CORE
Patient is s/p EGD in CCU for melena.     EGD findings:   - Normal mucosa was noted in the whole esophagus.  - Normal mucosa was noted in the whole stomach.  - An a single oozing ulcer was found in the duodenal bulb. Additional findings include An ulcer with a visible vessel was seen in the duodenal sweep. This ulcer was a Heraclio Class IIa. Four endoclips were successfully applied to the duodenal bulb for the purpose of hemostasis. Bi-cap electrocautery was successfully applied for hemostasis.    Recommendations:   - Trend Hb and maintain active type and screen   - Start pantoprazole gtt for 72 hours (04/24 at 4 PM to 04/27 at 4PM)   - Keep patient NPO and intubated overnight    Case discussed with Dr. Jerome. GI Team will continue to follow.     Teresa Rubin D.O.   Gastroenterology Fellow  Weekday 7am-5pm Pager: [phone]  Weeknights/Weekend/Holiday Coverage: Please call the  for contact info

## 2024-04-24 NOTE — PROGRESS NOTE ADULT - SUBJECTIVE AND OBJECTIVE BOX
INTERVAL EVENTS:  -Patient had melena requiring 2u pRBC after switching from heparin gtt to eliquis on 4/23  -Also received 1u FFP  -Now moved to CCU, pending GI evaluation  -Required levophed initially, now weaned off s/p pRBC and IVF (1.5L total IVF)    Cardiac medications administered in the last 48h:  metoprolol tartrate: 25 milliGRAM(s) Oral (04-24-24 @ 06:22)  digoxin  Injectable: 500 MICROGram(s) IV Push (04-24-24 @ 06:22)  metoprolol tartrate: 25 milliGRAM(s) Oral (04-23-24 @ 18:57)  metoprolol tartrate Injectable: 5 milliGRAM(s) IV Push (04-23-24 @ 15:12)  metoprolol tartrate: 25 milliGRAM(s) Oral (04-23-24 @ 11:41)  acetaZOLAMIDE  IVPB: 105 mL/Hr IV Intermittent (04-22-24 @ 15:57)  furosemide   Injectable: 60 milliGRAM(s) IV Push (04-22-24 @ 09:31)      MEDICATIONS  (STANDING):  influenza  Vaccine (HIGH DOSE) 0.7 milliLiter(s) IntraMuscular once  metoprolol tartrate 25 milliGRAM(s) Oral two times a day  mupirocin 2% Ointment 1 Application(s) Topical two times a day  norepinephrine Infusion 0.1 MICROgram(s)/kG/Min (16.7 mL/Hr) IV Continuous <Continuous>  pantoprazole  Injectable 40 milliGRAM(s) IV Push every 12 hours    MEDICATIONS  (PRN):  ipratropium    for Nebulization 500 MICROGram(s) Nebulizer every 6 hours PRN Shortness of Breath and/or Wheezing      VITALS 24H  T(F): 99.7 (04-24-24 @ 05:12), Max: 100.4 (04-24-24 @ 01:12)  HR: 126 (04-24-24 @ 07:00) (63 - 144)  BP: 116/77 (04-23-24 @ 23:00) (72/43 - 223/140)  BP(mean): 93 (04-23-24 @ 23:00) (51 - 163)  ABP: 98/54 (04-24-24 @ 07:00) (91/57 - 137/80)  ABP(mean): 67 (04-24-24 @ 07:00) (67 - 97)  RR: 29 (04-24-24 @ 07:00) (16 - 39)  SpO2: 98% (04-24-24 @ 07:00) (90% - 100%)    I/O Detail 24H 23 Apr 2024 07:01  -  24 Apr 2024 07:00  --------------------------------------------------------  IN:    Heparin Infusion: 13 mL    Norepinephrine: 80 mL    Plasma: 300 mL    PRBCs (Packed Red Blood Cells): 650 mL    Sodium Chloride 0.9% Bolus: 83.3 mL  Total IN: 1126.3 mL    OUT:    Indwelling Catheter - Urethral (mL): 650 mL    Oral Fluid: 0 mL    Voided (mL): 530 mL  Total OUT: 1180 mL    Total NET: -53.7 mL      24 Apr 2024 07:01  -  24 Apr 2024 07:45  --------------------------------------------------------  IN:  Total IN: 0 mL    OUT:    Norepinephrine: 0 mL  Total OUT: 0 mL    Total NET: 0 mL        Daily Weight Trend (kg):   88.9 (04-24-24 @ 06:00)  86.2 (04-23-24 @ 06:02)  88.9 (04-22-24 @ 06:49)  79.8 (04-21-24 @ 06:45)      PHYSICAL EXAM:  GEN: NAD  HEENT: EOMI   RESP: CTA b/l  CV: RRR. Normal S1/S2. No m/r/g.  ABD: soft, non-distended  EXT: No edema   NEURO: alert and attentive    LABS:  CBC 04-24-24 @ 05:32                        12.3   10.67 )-----------( 172                   37.7       Hgb trend: 12.3 <-- , 13.7 <-- , 12.6 <-- , 13.1 <-- , 14.0 <-- , 14.2 <-- , 15.8 <-- , 15.6 <-- , 16.6 <-- , 16.4 <--   WBC trend: 10.67 <-- , 9.61 <-- , 9.27 <-- , 8.95 <-- , 8.83 <-- , 8.48 <-- , 8.06 <-- , 7.93 <-- , 6.97 <-- , 6.83 <--       CMP 04-24-24 @ 05:32    151<H>  |  110<H>  |  98<H>  ----------------------------<  142<H>  4.0   |  32<H>  |  1.89<H>    Ca    9.1      04-24-24 @ 05:32  Phos  3.7     04-24  Mg     2.4     04-24    TPro  5.8<L>  /  Alb  3.4  /  TBili  1.8<H>  /  DBili  x   /  AST  47<H>  /  ALT  54<H>  /  AlkPhos  43     04-24      Serum Cr trend: 1.89 <-- , 2.09 <-- , 2.37 <-- , 2.12 <-- , 1.72 <-- , 1.64 <-- , 1.66 <-- , 1.53 <-- , 1.78 <--     PT/INR - ( 23 Apr 2024 23:04 )   PT: 17.7 sec;   INR: 1.57          PTT - ( 23 Apr 2024 23:04 ):34.5 sec    Cardiac Markers

## 2024-04-24 NOTE — PROCEDURE NOTE - NSPROCDETAILS_GEN_ALL_CORE
guidewire recovered/lumen(s) aspirated and flushed/sterile dressing applied/sterile technique, catheter placed/ultrasound guidance with use of sterile gel and probe cove
location identified, draped/prepped, sterile technique used, needle inserted/introduced/positive blood return obtained via catheter/connected to a pressurized flush line/sutured in place/hemostasis with direct pressure, dressing applied/Seldinger technique/all materials/supplies accounted for at end of procedure
positive blood return obtained via catheter/hemostasis with direct pressure, dressing applied/all materials/supplies accounted for at end of procedure
guidewire recovered/lumen(s) aspirated and flushed/sterile dressing applied/sterile technique, catheter placed/ultrasound guidance with use of sterile gel and probe cove

## 2024-04-24 NOTE — PROGRESS NOTE ADULT - ASSESSMENT
74M with no known PMH, initially admitted to cardiac telemetry s/p fall for rapid a fib. Course complicated by new diagnosis of new CHF (LVEF 20-25%), PE, SILKE on CKD, and melena. GI consulted for melena when transitioning from heparin gtt to Eliquis, and patient is planned for EGD today.     NEURO  #Fever.   Now Resolved   - Intermittent fevers, highest temp rectal 101.6F 4/17, now rectal 100. 6 4/19  - Per ID, favor community acquired aspiration PNA (given poor dentition, L side opacity on CXR),  Ceftriaxone course completed, Last Dose of Linezolid 4/24/2024   - HIV -, HEP -, UStrep -, Ulegionella -, Procal -  - BCx 3 NGTD for 48 hours, BC collected 4/19   - CTA PE negative for PE at [facility]  - was indeterminate d/t motion artifact  - CTA pe rule out on 4/20 with improvement in study showing Multiple small subsegmental emboli identified within the right lower lobe pulmonary artery branches. Small plueral effusions with atelectasis.   - B/L LE dopplers negative for DVT.    CV  #Hypotension  #Hypovolemic shock  BP 70s/40s on 4/24 iso multiple episodes melena. Levo gtt started, 2U pRBC and 1U FFP given iso active GIB and shock. Cordis, R IJ TLC, R axillary a line placed for close BP monitoring and access for transfusions.   INR 1.6, renal function worsening, BUN 90s. Consider component of uremic platelet dysfunction.   - off levophed  - f/u EGD  - started on vasopressin due to soft BPs prior to scope. currently sedated and intubated    #Acute decompensated HF  TTE 4/15/24: Severely reduced LVSF and RVSF, LVEF 20-25%, mild biatrial enlargement, mild TR, PASP 68, dilated IVC  RHC/LHC: focal RCA lesion w/ evidence of hypovolemia   -CTA chest: Neg for PE. Small right and trace left pleural effusions.   -Diuresis: s/p daily IV Lasix 60   -GDMT: Valsartan 40 BID, spironolactone 25 mg QD, metoprolol tartrate 25 mg BID   -Core measures, strict I/O's daily weights, 1.2L PO restriction, strict I/Os, cont tele/pulse ox  -Advanced HF team following;  -Now holding lasix i/s/o aggressive diuresis, SILKE, contraction alkalosis & subsequent AHRF  -Holding valsartan and spironolactone i/s/o SILKE  -c/w metoprolol 12.5mg BID    #Afib  HR 130s, unclear whether patient has hx of afib or is new. Never f/u with Drs.  - Rate control: c/w metoprolol tartrate 12.5 mg BID    #Aneurysm of thoracic aorta   - CT Abd/Pelvis 04/13/24: No thoracoabdominal dissection, no thoracic aorta hematoma. Ascending thoracic aortic aneurysm 4.0 cm  - Consider CTS follow up in outpatient setting for surveillance.    # Rhabdomyolysis.   Patient had mechanical fall and was down on the ground x 2 days.   - CK improving, 2336 -> 2002-> 777,   - CTM    #HTN (hypertension).   - hold valsartan 40 mg BID and metoprolol 25 mg BID i/s/o SILKE    PULMONARY  #AHRF w/ hypercapnia   Likely 2/2 persistent tachypnea 2/2 compensatory respiratory acidosis in reaction to contraction alkalosis 2/2 aggressive diuresis vs aspiration  s/p BiPAP -->5L NC --> HFNC 40/40.   - c to trend CO2 and O2 sat    #Subsegmental PE   CTPE w/ evidence of small subsegmental PE in RLL   transitioned from heparin gtt to eliquis 4/24 but discontinued i/s/o melena    GI  #UGI Bleed  Pt w/ large  melena when transitioning from heparin gtt to Eliquis today  Hgb drop of 1.8   GI consulted  - c/w IV protonix BID  - will undergo EGD on 4/24  - hold AC, restart as appropriate   - elective intubation. extubate   - maintain active type & screen   - 2 large bore IVs    RENAL  #SILKE   Initial Cr of 0.8 -->2.12. downtrending to 1.89  Likely 2/2 aggressive diuresis & volume loss from melena   - careful volume repletion i/s/o acute HF   - holding valsartan and spironolactone   - hold further diuresis     #Hypernatremia   hypovolemic hypernatremia i/s/o diuresis   - CTM   - hold further diuresis    ID  #Lower extremity wounds   -c/w linezolid 600mg BID x5 days (4/24)  -wound care     #prophylactic measures  F: None   E: Replete as necessary K>4 Mg>2  N: NPO for EGD    DVT Prophylaxis: hold i/s/o melena  GI prophylaxis: Protonix  CODE STATUS: FULL

## 2024-04-24 NOTE — PROGRESS NOTE ADULT - SUBJECTIVE AND OBJECTIVE BOX
Patient is a 74y old  Male who presents with a chief complaint of CHF, AFib (2024 07:44)    HPI:  73 yo M with no reported PMHx presents after a mechanical fall to [facility] and found to have rhabdomyolysis, also found to have acute decompensated systolic heart failure, new atrial fibrillation and segmental PE. Course complicated by hypervolemic hypernatremia and 4+ episodes of melena. Patient to have endoscopy today .    INTERVAL HPI/OVERNIGHT EVENTS:   As per overnight team, Persistently tachycardic to 120s. Afib RVR. BP unreadable however pt conscious, answering questions, following commands. However, cool on exam. Radial A line attempted for close BP monitoring, unsuccessful. Pt anemia extent likely masked iso profound hypovolemia and hemoconcentration. Manual BP 75/40, taken 3 times. +Levo gtt. +2U pRBC. Cordis placed. Axiallary a line placed. Central line placd. ABG mild respiroartoy acidosis with compensated metabolic alkalosis. pO2 low at 71, NC increased to 6LNC. 1U FFP ordered iso elevated INR and active bleed. Hgb 12.6, 0.5 drop from 4 hours prior. WBC/Plt WNL. Cr mild uptrend, LFT lateral. Persistent hypernatremia.   +Fields anyway for critical illness monitoring. Transitioned to HFNC for some positive pressure iso hypercapnea, not mentating well enough for Bipap. Niece updated, blood transfusion consent obtained. See GOC chart note. Maintains full code but niece wants a call if condition deteriorates to further discuss GOC. CVP 6, HR still 120s, additional 250 NS IVF ordered over 3 hours. Overall: 4x episode black melena mixed with red blood overnight. Hgb only improved 1 point after 2U pRBC. Dig 0.5mg IVP x1 given iso persistent afib RVR.     Subjective: Patient seen and examined at bedside. Currently AAOx3. Patient extremely tired this morning. Denies chest pain, shortness of breath, abdominal pain. Patient given 250cc bolus of LR this morning and weaned of levophed. Patient NPO for EGD this morning. Resumed vasopressin due to soft BPs for elective intubation.     CCU Vital Signs Last 24 Hrs  T(C): 36.5 (2024 12:00), Max: 38 (2024 01:12)  T(F): 97.7 (2024 12:00), Max: 100.4 (2024 01:12)  HR: 97 (2024 14:00) (63 - 139)  BP: 92/51 (2024 11:00) (72/43 - 223/140)  BP(mean): 62 (2024 11:00) (51 - 163)  ABP: 84/49 (2024 14:00) (80/51 - 137/80)  ABP(mean): 60 (2024 14:00) (60 - 97)  RR: 12 (2024 14:00) (12 - 39)  SpO2: 97% (2024 14:00) (90% - 100%)    O2 Parameters below as of 2024 15:00  Patient On (Oxygen Delivery Method): ventilator    O2 Concentration (%): 50      I&O's Summary    2024 07:01  -  2024 07:00  --------------------------------------------------------  IN: 1293 mL / OUT: 1180 mL / NET: 113 mL    2024 07:01  -  2024 15:04  --------------------------------------------------------  IN: 350.2 mL / OUT: 216 mL / NET: 134.2 mL    Mode: AC/ CMV (Assist Control/ Continuous Mandatory Ventilation)  RR (machine): 12  TV (machine): 500  FiO2: 50  PEEP: 5  ITime: 1  MAP: 8  PIP: 17    Daily Weight in k.9 (2024 06:00)    MEDICATIONS  (STANDING):  chlorhexidine 0.12% Liquid 15 milliLiter(s) Oral Mucosa every 12 hours  chlorhexidine 2% Cloths 1 Application(s) Topical <User Schedule>  [id number]   Infusion... 0.5 MICROgram(s)/kG/Hr (2.22 mL/Hr) IV Continuous <Continuous>  influenza  Vaccine (HIGH DOSE) 0.7 milliLiter(s) IntraMuscular once  metoprolol tartrate 12.5 milliGRAM(s) Oral every 12 hours  mupirocin 2% Ointment 1 Application(s) Topical two times a day  pantoprazole  Injectable 40 milliGRAM(s) IV Push every 12 hours  phenylephrine    Infusion 0.5 MICROgram(s)/kG/Min (16.7 mL/Hr) IV Continuous <Continuous>  propofol Infusion 1.5 MICROgram(s)/kG/Min (0.8 mL/Hr) IV Continuous <Continuous>  vasopressin Infusion 0.04 Unit(s)/Min (6 mL/Hr) IV Continuous <Continuous>    MEDICATIONS  (PRN):  ipratropium    for Nebulization 500 MICROGram(s) Nebulizer every 6 hours PRN Shortness of Breath and/or Wheezing    PHYSICAL EXAM:  GENERAL: NAD  HEAD:  Atraumatic, Normocephalic  EYES: EOMI, PERRLA, conjunctiva and sclera clear  NECK: Supple, No JVD, Normal thyroid, no enlarged nodes  NERVOUS SYSTEM:  Alert & Awake.   CHEST/LUNG: B/L good air entry; No rales, rhonchi, or wheezing  HEART: S1S2 normal, no S3, Regular rate and rhythm; No murmurs  ABDOMEN: Soft, Nontender, Nondistended; Bowel sounds present  EXTREMITIES:  2+ Peripheral Pulses, No clubbing, cyanosis, or edema  LYMPH: No lymphadenopathy noted  SKIN: Warm, leg ulcerations with chronic skin changes,    LABS:                        12.3   10.67 )-----------( 172      ( 2024 05:32 )             37.7     04-24    151<H>  |  110<H>  |  98<H>  ----------------------------<  142<H>  4.0   |  32<H>  |  1.89<H>    Ca    9.1      2024 05:32  Phos  3.7     04-24  Mg     2.4     04-24    TPro  5.8<L>  /  Alb  3.4  /  TBili  1.8<H>  /  DBili  x   /  AST  47<H>  /  ALT  54<H>  /  AlkPhos  43  04-24    LIVER FUNCTIONS - ( 2024 05:32 )  Alb: 3.4 g/dL / Pro: 5.8 g/dL / ALK PHOS: 43 U/L / ALT: 54 U/L / AST: 47 U/L / GGT: x           PT/INR - ( 2024 23:04 )   PT: 17.7 sec;   INR: 1.57          PTT - ( 2024 23:04 )  PTT:34.5 sec  CAPILLARY BLOOD GLUCOSE      POCT Blood Glucose.: 139 mg/dL (2024 05:29)  POCT Blood Glucose.: 144 mg/dL (2024 01:10)    ABG - ( 2024 05:41 )  pH, Arterial: 7.42  pH, Blood: x     /  pCO2: 55    /  pO2: 125   / HCO3: 36    / Base Excess: 9.3   /  SaO2: 99.7      Urinalysis Basic - ( 2024 05:32 )    Color: x / Appearance: x / SG: x / pH: x  Gluc: 142 mg/dL / Ketone: x  / Bili: x / Urobili: x   Blood: x / Protein: x / Nitrite: x   Leuk Esterase: x / RBC: x / WBC x   Sq Epi: x / Non Sq Epi: x / Bacteria: x      RADIOLOGY & ADDITIONAL TESTS:  CXR:   IMPRESSION:    1. Interval placement of left IJV approach central venous catheter   terminating in the cavoatrial junction, right IJV approach central venous   catheter sheath terminating in the region of the right brachiocephalic   vein, and right arm catheter. No pneumothorax.  2. Small bilateral pleural effusions, which have decreased in size from   prior. Otherwise, no significant interval change..        Care Discussed with Consultants/Other Providers [ x] YES  [ ] NO

## 2024-04-24 NOTE — PROGRESS NOTE ADULT - ASSESSMENT
73 yo M with no reported PMHx presents after a mechanical fall to [facility], also found to have acute decompensated systolic heart failure and new atrial fibrillation. Course complicated by hypervolemic hypernatremia     Review of studies  CTA chest 4/20/24: Multiple small subsegmental PE RLL  TTE 4/25/24: LVEF 20-25% (global). Diastolic dysfunction likely. Reduced RV systolic function. Biatrial dilation. Mild TR. PASP 68.   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 Continue metoprolol tartrate 25 BID.   - Diuretics: Hold diuretics in setting of bleeding. Check CVP  - Device: premature, will need to reassess after 3months of maximal GDMT    #Atrial Fibrillation  [id number]: 3   Has-Bled: 1  AC: On hold       75 yo M with no reported PMHx presents after a mechanical fall to [facility], also found to have acute decompensated systolic heart failure and new atrial fibrillation. Course complicated by hypervolemic hypernatremia     Review of studies  CTA chest 4/20/24: Multiple small subsegmental PE RLL  TTE 4/25/24: LVEF 20-25% (global). Diastolic dysfunction likely. Reduced RV systolic function. Biatrial dilation. Mild TR. PASP 68.   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 Continue metoprolol tartrate 12.5mg BID  - Diuretics: Hold diuretics in setting of bleeding. CVP 0 - would give additional IVF/albumin and consider PRN diuretics afterwards  - Device: premature, will need to reassess after 3months of maximal GDMT  - Check central venous saturation    #Atrial Fibrillation  [id number]: 3   Has-Bled: 1  AC: On hold

## 2024-04-24 NOTE — PROCEDURE NOTE - NSAPPROACH_GEN_A_CORE
HPI


Chief Complaint


contractions


Date Seen:  2018


Time Seen:  09:26


Travel History


International Travel<30 Days:  No


Contact w/Intl Traveler<30Days:  No





History of Present Illness


HPI


30 y/o  at 38/1 weeks presents with contractions. 


She states that she started having contractions last night around 430pm. Stated 

they were initially every 15 minutes or so and have now increased to every 10 

minutes or so. Denies any vaginal bleeding or gush of fluids. Has had leakage 

of urine the last few weeks, but nothing new. Endorses fetal movement. States 

she has had some vomiting overnight as well, 2x, non-bloody. Also having more 

frequent diarrhea, but has had loose stools throughout the pregnancy from 

taking metformin. She was diagnosed with GDM during this pregnancy and during 

last pregnancy as well. She is scheduled for repeat  in one week. 

Otherwise, denies any chest pain, SOB, blurry vision, dysuria, leg pain/

swelling. She receives care from Dr. Tran.


Weeks Gestation:  38


Para:  2


:  3





History


Past Medical History


*** Narrative Medical


GDM





Obstetric History


Obstetric History





2009: emergent 


2014: C/S at 39 weeks





Past Surgical History


*** Narrative Surgical


 x2


Cholecystectomy


Mouth extraction





Family History


Family History:  Negative





Social History


Alcohol Use:  No


Tobacco Use:  No


Substance Abuse:  No





Allergies-Medications


(Allergen,Severity, Reaction):  


Coded Allergies:  


     naproxen (Unverified  Allergy, Severe, 18)


     ceftriaxone (Verified  Allergy, Unknown, 18)


Home Meds


Active Scripts


Metformin ER (Metformin ER) 1,000 Mg Talia, 1000 MG PO DAILY for Blood Sugar 

Management, #30 TAB 0 Refills


   With evening meal


   Prov:Yasmin Tran MD, R3         18


Breast Pump (Breast Pump) 1 Mis Mis, EA .ROUTE AS DIRECTED, #1


   Prov:Yasmin Tran MD, R3         18


Lancets (Lancets) 1 Mis Mis, UNIT .ROUTE AS DIRECTED for Blood Sugar Management

, #1 1 Refill


   Prov:Roberto Robles MD, R3         17


Blood Glucose Test Strips (Blood Glucose Test Strips) Strips Strip, UNIT .ROUTE 

AS DIRECTED for Blood Sugar Management, #1 1 Refill


   Prov:Roberto Robles MD, R3         17


Blood Glucose Monitoring W/Device (Blood Glucose Monitoring W/Device) 1 Kit Kit

, KIT .ROUTE AS DIRECTED for Blood Sugar Management, #1 0 Refills


   Prov:Yasmin Tran MD, R3         17


Ferrous Sulfate (Ferrous Sulfate) 325 Mg (65 Mg Iron) Tablet, 325 MG PO BIDPC 

for Nutritional Supplement, #60 TAB 6 Refills


   Prov:Yasmin Tran MD, R3         17


Prenatal Vit-Iron Carbonyl (Prenatal Plus Iron 29-1 mg) 1 Tab Tab, 1 TAB PO 

DAILY for Nutritional Supplement, #30 TAB 11 Refills


   Prov:Yasmin Tran MD, R3         17





Review of Systems


General / Constitutional:  Weight Gain, No: Fever, Weight Loss, Chills, Other


Eyes:  No: Diploplia, Blurred Vision, Visual changes, Pain, Photophobia


HENT:  No: Headaches, Vertigo, Lightheadedness


Cardiovascular:  No: Irregular Rhythm, Chest Pain or Discomfort, Palpitations, 

Tachycardia, Syncope, Varicosities, Edema, Cyanosis


Respiratory:  No: Cough, Short of Breath, Other


Gastrointestinal:  Nausea, Vomiting, No: Diarrhea, Abdominal Pain


Genitourinary:  No: Frequency, Dysuria, Decreased Urinary Output, Oliguria, 

Pelvic Pain, Discharge, Menorrhagia, Vaginal Bleeding


Musculoskeletal:  No: Limited ROM, Weakness, Cramping, Edema, Pain


Skin:  No Rash, No Itching, No Dryness, No Lumps, No Change in Pigmentation, No 

Change in Nails, No Alopecia, No Lesions


Neurologic:  No: Weakness, Dizziness, Syncope, Focal Abnormalities, 

Coordination Problem, Headache, Slurred Speech, Seizures


Psychiatric:  No: Depression, Suicidal Ideations, Homicidal Ideation


Endocrine:  No: Heat Intolerance, Cold Intolerance, Polydipsia, Polyuria, Other





Physical Exam


Narrative


GENERAL: Well-nourished, well-developed patient.


SKIN: Warm and dry.


HEAD: Normocephalic and atraumatic.


EYES: No scleral icterus. No injection or drainage. 


ENT: No nasal drainage noted. Mucous membranes pink. Airway patent.


NECK: Supple, trachea midline. No JVD.


CARDIOVASCULAR: Regular rate and rhythm without murmurs, gallops, or rubs. 


RESPIRATORY: Breath sounds equal bilaterally. No accessory muscle use.


ABDOMEN/GI: Abdomen soft, non-tender, bowel sounds present, no rebound, no 

guarding 


   Gravid to 38 weeks size


GENITOURINARY: 


   External Genitalia: intact and normal in appearance


   Cervix: thick, posterior


   Dilatation: 0          


   Effacement: 0          


   Station: -3  


   Presentation: vertex        


   Membranes: intact


   Uterine Contractions: q5-7 minutes


FHT's: 


   Category: 1   


   Baseline: 130   


   Reactive: yes   


   Variability: moderate  


   Decels: none  


EXTREMITIES: No cyanosis or edema.


BACK: Nontender without obvious deformity. No CVA tenderness.


NEUROLOGICAL: Awake and alert. Motor and sensory grossly within normal limits. 

Five out of 5 muscle strength in all muscle groups. Normal speech.





Data


Data


Vital Signs Reviewed:  Yes


Group B Strep:  Positive





MDM


Medical Record Reviewed:  Yes


Interpretation(s)


30 y/o  at 38/1 weeks presents with contractions.


Closed cervix


Category 1 FHT with contractions q5-10 minutes, mildly painful





-Monitor FHT


-Oral hydration


-Monitor vitals


Narrative Course / MDM


After oral hydration, pt states contractions becoming more frequent and painful


Category 1 FHT with contractions q3-5 minutes





-Admit for repeat  today with BTL


-Pre-op orders and precautions


-Continuous FHT


Diagnosis


Diagnosis:  


 Primary Impression:  


 39 weeks gestation of pregnancy











Pelon Benz MD 2018 10:15
percutaneous
percutaneous

## 2024-04-24 NOTE — PROCEDURE NOTE - NSPROCNAME_GEN_A_CORE
Central Line Insertion
Arterial Puncture/Cannulation
Central Line Insertion
Arterial Puncture/Cannulation
No

## 2024-04-24 NOTE — CHART NOTE - NSCHARTNOTEFT_GEN_A_CORE
Admitting Diagnosis:   Patient is a 74y old  Male who presents with a chief complaint of CHF, AFib (24 Apr 2024 14:54)      PAST MEDICAL & SURGICAL HISTORY:  Hiatal hernia          Current Nutrition Order:  NPO    PO Intake: Good (%) [   ]  Fair (50-75%) [   ] Poor (<25%) [   ]- NA NPO    GI Issues: Unable to assess at this time 2/2 vent  See subjective below on details, EGD today     Pain: Unable to assess at this time 2/2 vent, sedated    Skin Integrity: Alvaro 14, generalized trace edema  B/L shins cellulitis     Labs:   04-24    151<H>  |  110<H>  |  98<H>  ----------------------------<  142<H>  4.0   |  32<H>  |  1.89<H>    Ca    9.1      24 Apr 2024 05:32  Phos  3.7     04-24  Mg     2.4     04-24    TPro  5.8<L>  /  Alb  3.4  /  TBili  1.8<H>  /  DBili  x   /  AST  47<H>  /  ALT  54<H>  /  AlkPhos  43  04-24    CAPILLARY BLOOD GLUCOSE      POCT Blood Glucose.: 139 mg/dL (24 Apr 2024 05:29)  POCT Blood Glucose.: 144 mg/dL (24 Apr 2024 01:10)      Medications:  MEDICATIONS  (STANDING):  chlorhexidine 0.12% Liquid 15 milliLiter(s) Oral Mucosa every 12 hours  chlorhexidine 2% Cloths 1 Application(s) Topical <User Schedule>  fentaNYL   Infusion... 0.5 MICROgram(s)/kG/Hr (2.22 mL/Hr) IV Continuous <Continuous>  influenza  Vaccine (HIGH DOSE) 0.7 milliLiter(s) IntraMuscular once  metoprolol tartrate 12.5 milliGRAM(s) Oral every 12 hours  mupirocin 2% Ointment 1 Application(s) Topical two times a day  pantoprazole  Injectable 40 milliGRAM(s) IV Push every 12 hours  phenylephrine    Infusion 0.5 MICROgram(s)/kG/Min (16.7 mL/Hr) IV Continuous <Continuous>  propofol Infusion 1.5 MICROgram(s)/kG/Min (0.8 mL/Hr) IV Continuous <Continuous>  vasopressin Infusion 0.04 Unit(s)/Min (6 mL/Hr) IV Continuous <Continuous>    MEDICATIONS  (PRN):  ipratropium    for Nebulization 500 MICROGram(s) Nebulizer every 6 hours PRN Shortness of Breath and/or Wheezing      5'6''  pounds+-10%  Weight 259 pounds BMI 42 %IBW>120%    4/24 195.6 pounds   4/22 195.9 pounds   4/21 175.9 pounds   4/20 179 pounds  4/16 200.4 pounds     [PCM}  - Unable to DX at this time, however pt with Risk d/t Being found down PTA x at least 2 days     Estimated energy needs:   IBW used to calculate energy needs due to pt's current body weight exceeding 120% of IBW   Adjusted for age/BMI, HF/Renal, Fevers, Malnutrition Risk - fluids per team    Estimated Energy Needs From (juventino/kg)	25  Estimated Energy Needs To (juventino/kg)	30  Estimated Energy Needs Calculated From (juventino/kg)	1610  Estimated Energy Needs Calculated To (juventino/kg)	1932    Estimated Protein Needs From (g/kg)	1.25  Estimated Protein Needs To (g/kg)	1.5  Estimated Protein Needs Calculated From (g/kg)	80.5  Estimated Protein Needs Calculated To (g/kg)	96.6    Subjective: 74M with no known PMH, who [facility] 04[facility]14/[facility] after mechanical fall, was found down on floor x 2 days by landlord. Labs significant for Trop T 52->54, CK 2k BNP 7k . CTH without acute bleed. CT A/P neg for PE, + ascites. Pt admitted to cardiac tele for newly dx rapid a fib, rhabdo, and suspected ADHF exacerbation. Course c/b hypernatremia, s/p lactated ringers. Echo revealing LVEF 20-25%, reduced RVSF. Advanced HF team consulted plan for R/LHC however postponed given new fevers (fever peak 4/18 pm rectal 101). 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 CT PE on 4/20 with small PE right side. s/p R/LHC on 4/22 where patient became alkalotic and hypercapnic requiring brief BiPAP use. Diuretics and all nephrotoxic medications on hold pending resolution of SILKE. Course complicated by two episodes of melena on 04/23, for which GI was consulted. Patient developed melena when transitioned from heparin gtt to Eliquis for newly diagnosed PE, so was started on pantoprazole 40 mg IV BID. Plan for EGD on 4/24.     Pt seen in room, intubated for EGD. On VC/AC mode, sedated on propofol @ 18.7ml/hr (494kcal/d from lipids). MAP 62- on vaso and mariama for BP support. NPO at this time for EGD, but was seen by SLP on 4/23 and recommended for regular/thin liquid diet. Afebrile. Nutritionally pertinent labs: WBC 10.67 (H), Na 151 (H), BUN 98/Cr 1.89, POC , 144mg/dL. Will continue to follow per RD protocol.       Prior PES: Increased Nutrient Needs related to Increased Demands as evidenced by: Fevers, HF  >>ongoing at this time  GOAL: Pt will meet at least 75% of nutrient needs via feasible route    Recommendations:  1. After extubation, please reconsult SLP prior to PO diet advancement. If cleared, recommend DASH diet with Ensure 1x/day and appropriate texture modifications  >>If unable to be extubated w/in 48hrs, please reconsult for EN recs   2. Labs: monitor BMP, CBC, glucose, lytes, LFTs.  3. Pain/GI per team. Optimize regime PRN.   4. Monitor Skin, Wt, GOC.  5. Recommend MVI, B1, FA    Education: N/A at this time     Risk Level: High [ XX ] Moderate [   ] Low [   ]

## 2024-04-24 NOTE — PROCEDURE NOTE - ADDITIONAL PROCEDURE DETAILS
US guided R axillary A line placed
Modified Mateus Technique applied before procedure and patient had adequate collateral flow

## 2024-04-24 NOTE — GOALS OF CARE CONVERSATION - ADVANCED CARE PLANNING - CONVERSATION DETAILS
Spoke by phone with patient's Niece and next of kind/legal surrogate: Opal Ag [phone]. She is an ICU nurse.   Updates given regarding developments, including melena, PE, hemorrhagic/hypovolemic shock, pressor initiation.     Opal expressed understanding, and states that as per her last conversation with the patient, he wants "everything," including resuscitation, intubation, hemodialysis if medically necessary.     She states that she understands that if his condition worsens and prognosis becomes more guarded, the situation may have changed. At that point, she would like a call to further discuss GOC.

## 2024-04-24 NOTE — PROCEDURE NOTE - NSPOSTPRCRAD_GEN_A_CORE
no pneumothorax/post-procedure radiography performed
central line located in the superior vena cava/post-procedure radiography performed

## 2024-04-25 LAB
ALBUMIN SERPL ELPH-MCNC: 3.2 G/DL — LOW (ref 3.3–5)
ALP SERPL-CCNC: 38 U/L — LOW (ref 40–120)
ALT FLD-CCNC: 51 U/L — HIGH (ref 10–45)
ANION GAP SERPL CALC-SCNC: 11 MMOL/L — SIGNIFICANT CHANGE UP (ref 5–17)
APPEARANCE UR: CLEAR — SIGNIFICANT CHANGE UP
AST SERPL-CCNC: 38 U/L — SIGNIFICANT CHANGE UP (ref 10–40)
BACTERIA # UR AUTO: NEGATIVE /HPF — SIGNIFICANT CHANGE UP
BASOPHILS # BLD AUTO: 0.03 K/UL — SIGNIFICANT CHANGE UP (ref 0–0.2)
BASOPHILS # BLD AUTO: 0.03 K/UL — SIGNIFICANT CHANGE UP (ref 0–0.2)
BASOPHILS NFR BLD AUTO: 0.2 % — SIGNIFICANT CHANGE UP (ref 0–2)
BASOPHILS NFR BLD AUTO: 0.2 % — SIGNIFICANT CHANGE UP (ref 0–2)
BILIRUB SERPL-MCNC: 0.4 MG/DL — SIGNIFICANT CHANGE UP (ref 0.2–1.2)
BILIRUB UR-MCNC: NEGATIVE — SIGNIFICANT CHANGE UP
BLD GP AB SCN SERPL QL: NEGATIVE — SIGNIFICANT CHANGE UP
BUN SERPL-MCNC: 103 MG/DL — HIGH (ref 7–23)
CALCIUM SERPL-MCNC: 9.3 MG/DL — SIGNIFICANT CHANGE UP (ref 8.4–10.5)
CAST: 4 /LPF — SIGNIFICANT CHANGE UP (ref 0–4)
CHLORIDE SERPL-SCNC: 111 MMOL/L — HIGH (ref 96–108)
CO2 SERPL-SCNC: 30 MMOL/L — SIGNIFICANT CHANGE UP (ref 22–31)
COLOR SPEC: YELLOW — SIGNIFICANT CHANGE UP
CREAT SERPL-MCNC: 2.01 MG/DL — HIGH (ref 0.5–1.3)
DIFF PNL FLD: ABNORMAL
EGFR: 34 ML/MIN/1.73M2 — LOW
EOSINOPHIL # BLD AUTO: 0.01 K/UL — SIGNIFICANT CHANGE UP (ref 0–0.5)
EOSINOPHIL # BLD AUTO: 0.03 K/UL — SIGNIFICANT CHANGE UP (ref 0–0.5)
EOSINOPHIL NFR BLD AUTO: 0.1 % — SIGNIFICANT CHANGE UP (ref 0–6)
EOSINOPHIL NFR BLD AUTO: 0.2 % — SIGNIFICANT CHANGE UP (ref 0–6)
GLUCOSE SERPL-MCNC: 207 MG/DL — HIGH (ref 70–99)
GLUCOSE UR QL: NEGATIVE MG/DL — SIGNIFICANT CHANGE UP
HCT VFR BLD CALC: 32.1 % — LOW (ref 39–50)
HGB BLD-MCNC: 10.1 G/DL — LOW (ref 13–17)
IMM GRANULOCYTES NFR BLD AUTO: 0.2 % — SIGNIFICANT CHANGE UP (ref 0–0.9)
IMM GRANULOCYTES NFR BLD AUTO: 0.4 % — SIGNIFICANT CHANGE UP (ref 0–0.9)
KETONES UR-MCNC: NEGATIVE MG/DL — SIGNIFICANT CHANGE UP
LEUKOCYTE ESTERASE UR-ACNC: ABNORMAL
LYMPHOCYTES # BLD AUTO: 1.64 K/UL — SIGNIFICANT CHANGE UP (ref 1–3.3)
LYMPHOCYTES # BLD AUTO: 1.88 K/UL — SIGNIFICANT CHANGE UP (ref 1–3.3)
LYMPHOCYTES # BLD AUTO: 12.6 % — LOW (ref 13–44)
LYMPHOCYTES # BLD AUTO: 15.4 % — SIGNIFICANT CHANGE UP (ref 13–44)
MAGNESIUM SERPL-MCNC: 2.5 MG/DL — SIGNIFICANT CHANGE UP (ref 1.6–2.6)
MCHC RBC-ENTMCNC: 29.7 PG — SIGNIFICANT CHANGE UP (ref 27–34)
MCHC RBC-ENTMCNC: 31.5 GM/DL — LOW (ref 32–36)
MCV RBC AUTO: 94.4 FL — SIGNIFICANT CHANGE UP (ref 80–100)
MONOCYTES # BLD AUTO: 0.98 K/UL — HIGH (ref 0–0.9)
MONOCYTES # BLD AUTO: 1.05 K/UL — HIGH (ref 0–0.9)
MONOCYTES NFR BLD AUTO: 8 % — SIGNIFICANT CHANGE UP (ref 2–14)
MONOCYTES NFR BLD AUTO: 8 % — SIGNIFICANT CHANGE UP (ref 2–14)
NEUTROPHILS # BLD AUTO: 10.27 K/UL — HIGH (ref 1.8–7.4)
NEUTROPHILS # BLD AUTO: 9.23 K/UL — HIGH (ref 1.8–7.4)
NEUTROPHILS NFR BLD AUTO: 76 % — SIGNIFICANT CHANGE UP (ref 43–77)
NEUTROPHILS NFR BLD AUTO: 78.7 % — HIGH (ref 43–77)
NITRITE UR-MCNC: NEGATIVE — SIGNIFICANT CHANGE UP
NRBC # BLD: 0 /100 WBCS — SIGNIFICANT CHANGE UP (ref 0–0)
PH UR: 6 — SIGNIFICANT CHANGE UP (ref 5–8)
PHOSPHATE SERPL-MCNC: 4.2 MG/DL — SIGNIFICANT CHANGE UP (ref 2.5–4.5)
PLATELET # BLD AUTO: 195 K/UL — SIGNIFICANT CHANGE UP (ref 150–400)
POTASSIUM SERPL-MCNC: 4 MMOL/L — SIGNIFICANT CHANGE UP (ref 3.5–5.3)
POTASSIUM SERPL-SCNC: 4 MMOL/L — SIGNIFICANT CHANGE UP (ref 3.5–5.3)
PROT SERPL-MCNC: 5.5 G/DL — LOW (ref 6–8.3)
PROT UR-MCNC: SIGNIFICANT CHANGE UP MG/DL
RAPID RVP RESULT: SIGNIFICANT CHANGE UP
RBC # BLD: 3.4 M/UL — LOW (ref 4.2–5.8)
RBC # FLD: 16.2 % — HIGH (ref 10.3–14.5)
RBC CASTS # UR COMP ASSIST: 16 /HPF — HIGH (ref 0–4)
RH IG SCN BLD-IMP: POSITIVE — SIGNIFICANT CHANGE UP
SARS-COV-2 RNA SPEC QL NAA+PROBE: SIGNIFICANT CHANGE UP
SODIUM SERPL-SCNC: 152 MMOL/L — HIGH (ref 135–145)
SP GR SPEC: 1.02 — SIGNIFICANT CHANGE UP (ref 1–1.03)
SQUAMOUS # UR AUTO: 2 /HPF — SIGNIFICANT CHANGE UP (ref 0–5)
TRANSFUSION REACTION INTERP 1: NEGATIVE — SIGNIFICANT CHANGE UP
UROBILINOGEN FLD QL: 0.2 MG/DL — SIGNIFICANT CHANGE UP (ref 0.2–1)
WBC # BLD: 13.05 K/UL — HIGH (ref 3.8–10.5)
WBC # FLD AUTO: 13.05 K/UL — HIGH (ref 3.8–10.5)
WBC UR QL: 4 /HPF — SIGNIFICANT CHANGE UP (ref 0–5)

## 2024-04-25 PROCEDURE — 71045 X-RAY EXAM CHEST 1 VIEW: CPT | Mod: 26

## 2024-04-25 PROCEDURE — 99232 SBSQ HOSP IP/OBS MODERATE 35: CPT | Mod: GC

## 2024-04-25 PROCEDURE — 99291 CRITICAL CARE FIRST HOUR: CPT

## 2024-04-25 PROCEDURE — 93010 ELECTROCARDIOGRAM REPORT: CPT

## 2024-04-25 RX ORDER — METOPROLOL TARTRATE 50 MG
5 TABLET ORAL EVERY 12 HOURS
Refills: 0 | Status: DISCONTINUED | OUTPATIENT
Start: 2024-04-25 | End: 2024-04-25

## 2024-04-25 RX ORDER — PIPERACILLIN AND TAZOBACTAM 4; .5 G/20ML; G/20ML
3.38 INJECTION, POWDER, LYOPHILIZED, FOR SOLUTION INTRAVENOUS ONCE
Refills: 0 | Status: DISCONTINUED | OUTPATIENT
Start: 2024-04-25 | End: 2024-04-25

## 2024-04-25 RX ORDER — SODIUM CHLORIDE 9 MG/ML
1000 INJECTION, SOLUTION INTRAVENOUS
Refills: 0 | Status: DISCONTINUED | OUTPATIENT
Start: 2024-04-25 | End: 2024-04-26

## 2024-04-25 RX ORDER — SODIUM CHLORIDE 9 MG/ML
1000 INJECTION, SOLUTION INTRAVENOUS
Refills: 0 | Status: DISCONTINUED | OUTPATIENT
Start: 2024-04-25 | End: 2024-04-25

## 2024-04-25 RX ORDER — VANCOMYCIN HCL 1 G
1250 VIAL (EA) INTRAVENOUS EVERY 24 HOURS
Refills: 0 | Status: DISCONTINUED | OUTPATIENT
Start: 2024-04-25 | End: 2024-04-25

## 2024-04-25 RX ORDER — ACETAMINOPHEN 500 MG
1000 TABLET ORAL ONCE
Refills: 0 | Status: COMPLETED | OUTPATIENT
Start: 2024-04-25 | End: 2024-04-25

## 2024-04-25 RX ORDER — PIPERACILLIN AND TAZOBACTAM 4; .5 G/20ML; G/20ML
3.38 INJECTION, POWDER, LYOPHILIZED, FOR SOLUTION INTRAVENOUS EVERY 6 HOURS
Refills: 0 | Status: DISCONTINUED | OUTPATIENT
Start: 2024-04-25 | End: 2024-04-25

## 2024-04-25 RX ORDER — METOPROLOL TARTRATE 50 MG
2.5 TABLET ORAL EVERY 12 HOURS
Refills: 0 | Status: DISCONTINUED | OUTPATIENT
Start: 2024-04-25 | End: 2024-04-28

## 2024-04-25 RX ORDER — PIPERACILLIN AND TAZOBACTAM 4; .5 G/20ML; G/20ML
4.5 INJECTION, POWDER, LYOPHILIZED, FOR SOLUTION INTRAVENOUS EVERY 12 HOURS
Refills: 0 | Status: DISCONTINUED | OUTPATIENT
Start: 2024-04-25 | End: 2024-04-25

## 2024-04-25 RX ORDER — SODIUM CHLORIDE 9 MG/ML
250 INJECTION, SOLUTION INTRAVENOUS
Refills: 0 | Status: DISCONTINUED | OUTPATIENT
Start: 2024-04-25 | End: 2024-04-25

## 2024-04-25 RX ORDER — METOPROLOL TARTRATE 50 MG
2.5 TABLET ORAL EVERY 6 HOURS
Refills: 0 | Status: DISCONTINUED | OUTPATIENT
Start: 2024-04-25 | End: 2024-04-25

## 2024-04-25 RX ADMIN — PANTOPRAZOLE SODIUM 10 MG/HR: 20 TABLET, DELAYED RELEASE ORAL at 19:35

## 2024-04-25 RX ADMIN — PANTOPRAZOLE SODIUM 10 MG/HR: 20 TABLET, DELAYED RELEASE ORAL at 12:34

## 2024-04-25 RX ADMIN — SODIUM CHLORIDE 125 MILLILITER(S): 9 INJECTION, SOLUTION INTRAVENOUS at 19:46

## 2024-04-25 RX ADMIN — CHLORHEXIDINE GLUCONATE 1 APPLICATION(S): 213 SOLUTION TOPICAL at 07:09

## 2024-04-25 RX ADMIN — MUPIROCIN 1 APPLICATION(S): 20 OINTMENT TOPICAL at 07:10

## 2024-04-25 RX ADMIN — PROPOFOL 0.8 MICROGRAM(S)/KG/MIN: 10 INJECTION, EMULSION INTRAVENOUS at 04:21

## 2024-04-25 RX ADMIN — PIPERACILLIN AND TAZOBACTAM 25 GRAM(S): 4; .5 INJECTION, POWDER, LYOPHILIZED, FOR SOLUTION INTRAVENOUS at 07:08

## 2024-04-25 RX ADMIN — Medication 2.5 MILLIGRAM(S): at 07:12

## 2024-04-25 RX ADMIN — Medication 166.67 MILLIGRAM(S): at 04:18

## 2024-04-25 RX ADMIN — Medication 400 MILLIGRAM(S): at 07:13

## 2024-04-25 RX ADMIN — CHLORHEXIDINE GLUCONATE 15 MILLILITER(S): 213 SOLUTION TOPICAL at 07:10

## 2024-04-25 RX ADMIN — MUPIROCIN 1 APPLICATION(S): 20 OINTMENT TOPICAL at 17:05

## 2024-04-25 RX ADMIN — VASOPRESSIN 6 UNIT(S)/MIN: 20 INJECTION INTRAVENOUS at 17:10

## 2024-04-25 RX ADMIN — CHLORHEXIDINE GLUCONATE 15 MILLILITER(S): 213 SOLUTION TOPICAL at 17:02

## 2024-04-25 RX ADMIN — SODIUM CHLORIDE 250 MILLILITER(S): 9 INJECTION, SOLUTION INTRAVENOUS at 08:20

## 2024-04-25 RX ADMIN — VASOPRESSIN 6 UNIT(S)/MIN: 20 INJECTION INTRAVENOUS at 00:11

## 2024-04-25 RX ADMIN — Medication 2.5 MILLIGRAM(S): at 17:02

## 2024-04-25 NOTE — PROGRESS NOTE ADULT - ASSESSMENT
74M with no known PMH, initially admitted to cardiac telemetry s/p fall for rapid a fib. Course complicated by new diagnosis of new CHF (LVEF 20-25%), PE, SILKE on CKD, and melena. GI consulted for melena when transitioning from heparin gtt to Eliquis, and patient is planned for EGD today.     NEURO  #Fever.   Now Resolved   - Intermittent fevers, highest temp rectal 101.6F 4/17, now rectal 100. 6 4/19  - Per ID, favor community acquired aspiration PNA (given poor dentition, L side opacity on CXR),  Ceftriaxone course completed, Last Dose of Linezolid 4/24/2024   - HIV -, HEP -, UStrep -, Ulegionella -, Procal -  - BCx 3 NGTD for 48 hours, BC collected 4/19   - CTA PE negative for PE at [facility]  - was indeterminate d/t motion artifact  - CTA pe rule out on 4/20 with improvement in study showing Multiple small subsegmental emboli identified within the right lower lobe pulmonary artery branches. Small plueral effusions with atelectasis.   - B/L LE dopplers negative for DVT.    CV  #Hypotension  #Hypovolemic shock  BP 70s/40s on 4/24 iso multiple episodes melena. Levo gtt started, 2U pRBC and 1U FFP given iso active GIB and shock. Cordis, R IJ TLC, R axillary a line placed for close BP monitoring and access for transfusions.   INR 1.6, renal function worsening, BUN 90s. Consider component of uremic platelet dysfunction.   - off levophed  - f/u EGD  - started on vasopressin due to soft BPs prior to scope. currently sedated and intubated    #Acute decompensated HF  TTE 4/15/24: Severely reduced LVSF and RVSF, LVEF 20-25%, mild biatrial enlargement, mild TR, PASP 68, dilated IVC  RHC/LHC: focal RCA lesion w/ evidence of hypovolemia   -CTA chest: Neg for PE. Small right and trace left pleural effusions.   -Diuresis: s/p daily IV Lasix 60   -GDMT: Valsartan 40 BID, spironolactone 25 mg QD, metoprolol tartrate 25 mg BID   -Core measures, strict I/O's daily weights, 1.2L PO restriction, strict I/Os, cont tele/pulse ox  -Advanced HF team following;  -Now holding lasix i/s/o aggressive diuresis, SILKE, contraction alkalosis & subsequent AHRF  -Holding valsartan and spironolactone i/s/o SILKE  -c/w metoprolol 12.5mg BID    #Afib  HR 130s, unclear whether patient has hx of afib or is new. Never f/u with Drs.  - Rate control: c/w metoprolol tartrate 12.5 mg BID    #Aneurysm of thoracic aorta   - CT Abd/Pelvis 04/13/24: No thoracoabdominal dissection, no thoracic aorta hematoma. Ascending thoracic aortic aneurysm 4.0 cm  - Consider CTS follow up in outpatient setting for surveillance.    # Rhabdomyolysis.   Patient had mechanical fall and was down on the ground x 2 days.   - CK improving, 2336 -> 2002-> 777,   - CTM    #HTN (hypertension).   - hold valsartan 40 mg BID and metoprolol 25 mg BID i/s/o SILKE    PULMONARY  #AHRF w/ hypercapnia   Likely 2/2 persistent tachypnea 2/2 compensatory respiratory acidosis in reaction to contraction alkalosis 2/2 aggressive diuresis vs aspiration  s/p BiPAP -->5L NC --> HFNC 40/40.   - c to trend CO2 and O2 sat    #Subsegmental PE   CTPE w/ evidence of small subsegmental PE in RLL   transitioned from heparin gtt to eliquis 4/24 but discontinued i/s/o melena    GI  #UGI Bleed  Pt w/ large  melena when transitioning from heparin gtt to Eliquis today  Hgb drop of 1.8   GI consulted  - c/w IV protonix BID  - will undergo EGD on 4/24  - hold AC, restart as appropriate   - elective intubation. extubate   - maintain active type & screen   - 2 large bore IVs    RENAL  #SILKE   Initial Cr of 0.8 -->2.12. downtrending to 1.89  Likely 2/2 aggressive diuresis & volume loss from melena   - careful volume repletion i/s/o acute HF   - holding valsartan and spironolactone   - hold further diuresis     #Hypernatremia   hypovolemic hypernatremia i/s/o diuresis   - CTM   - hold further diuresis    ID  #Lower extremity wounds   -c/w linezolid 600mg BID x5 days (4/24)  -wound care     #prophylactic measures  F: None   E: Replete as necessary K>4 Mg>2  N: NPO for EGD    DVT Prophylaxis: hold i/s/o melena  GI prophylaxis: Protonix  CODE STATUS: FULL   74M with no known PMH, initially admitted to cardiac telemetry s/p fall for rapid a fib. Course complicated by new diagnosis of new CHF (LVEF 20-25%), PE, SILKE on CKD, and melena. GI consulted for melena when transitioning from heparin gtt to Eliquis, with EGD showing bleeding duodenal ulcer s/p clipping.    NEURO  #Fever.   Intermittent fevers, highest temp rectal 101.6F 4/17. Febrile 4/25  Per ID, favor community acquired aspiration PNA (given poor dentition, L side opacity on CXR)  Ceftriaxone course completed, Last Dose of Linezolid 4/24/2024   HIV -, HEP -, UStrep -, Ulegionella -, Procal - BCx 3 NGTD for 48 hours, BC collected 4/19   - CTA pe rule out on 4/20 with improvement in study showing Multiple small subsegmental emboli identified within the right lower lobe pulmonary artery branches. Small plueral effusions with atelectasis.   - B/L LE dopplers negative for DVT.  - Monitor off Abx    CV  #Hypotension  #Hypovolemic shock  BP 70s/40s on 4/24 iso multiple episodes melena. Levo gtt started, 2U pRBC and 1U FFP given iso active GIB and shock. Cordis, R IJ TLC, R axillary a line placed for close BP monitoring and access for transfusions. EGD sowed bleeding duopdenal ulcer now s/p clipping. Off levophed. Now on phenylephrine and vaso drips.    - started on vasopressin due to soft BPs prior to scope.   - currently intubated. sedatioin weaned off with slow response. Plan for extubation 4/25    #Acute decompensated HF  TTE 4/15/24: Severely reduced LVSF and RVSF, LVEF 20-25%, mild biatrial enlargement, mild TR, PASP 68, dilated IVC  RHC/LHC: focal RCA lesion w/ evidence of hypovolemia  -CTA chest: Neg for PE. Small right and trace left pleural effusions.   -Diuresis: s/p daily IV Lasix 60   - GDMT (on hold while sedated and intubated): Valsartan 40 BID, spironolactone 25 mg QD, metoprolol tartrate 25 mg BID   -Core measures, strict I/O's daily weights, 1.2L PO restriction, strict I/Os, cont tele/pulse ox  -Advanced HF team following;  -Now holding lasix i/s/o aggressive diuresis, SILKE, contraction alkalosis & subsequent AHRF  -Holding valsartan and spironolactone i/s/o SILKE  -c/w lopressor 2.5 Q12    #AFib  HR 130s, unclear whether patient has hx of afib or is new. Never f/u with Drs.  - Rate control: c/w metoprolol tartrate 12.5 mg BID    #Aneurysm of thoracic aorta   - CT Abd/Pelvis 04/13/24: No thoracoabdominal dissection, no thoracic aorta hematoma. Ascending thoracic aortic aneurysm 4.0 cm  - Consider CTS follow up in outpatient setting for surveillance.    # Rhabdomyolysis.   Patient had mechanical fall and was down on the ground x 2 days.   - CK improving, 2336 -> 2002-> 777,   - CTM    #HTN (hypertension).   - hold valsartan 40 mg BID and metoprolol 25 mg BID i/s/o SILKE    PULMONARY  #AHRF w/ hypercapnia   Likely 2/2 persistent tachypnea 2/2 compensatory respiratory acidosis in reaction to contraction alkalosis 2/2 aggressive diuresis vs aspiration  s/p BiPAP -->5L NC --> HFNC 40/40.   - c to trend CO2 and O2 sat    #Subsegmental PE   CTPE w/ evidence of small subsegmental PE in RLL   transitioned from heparin gtt to eliquis 4/24 but discontinued i/s/o melena    GI  #UGI Bleed  Pt w/ large  melena when transitioning from heparin gtt to Eliquis today  Hgb drop of 1.8   GI consulted  - c/w IV protonix BID  - will undergo EGD on 4/24  - hold AC, restart as appropriate   - elective intubation. extubate   - maintain active type & screen   - 2 large bore IVs    RENAL  #SILKE   Initial Cr of 0.8 -->2.12. downtrending to 1.89  Likely 2/2 aggressive diuresis & volume loss from melena   - careful volume repletion i/s/o acute HF   - holding valsartan and spironolactone   - hold further diuresis     #Hypernatremia   hypovolemic hypernatremia i/s/o diuresis   - CTM   - hold further diuresis    ID  #Lower extremity wounds   -c/w linezolid 600mg BID x5 days (4/24)  -wound care     #prophylactic measures  F: None   E: Replete as necessary K>4 Mg>2  N: NPO for EGD    DVT Prophylaxis: hold i/s/o melena  GI prophylaxis: Protonix  CODE STATUS: FULL   74M with no known PMH, initially admitted to cardiac telemetry s/p fall for rapid a fib. Course complicated by new diagnosis of new CHF (LVEF 20-25%), PE, SILKE on CKD, and melena. GI consulted for melena when transitioning from heparin gtt to Eliquis, with EGD showing bleeding duodenal ulcer s/p clipping.    NEURO  #Fever.   Intermittent fevers, highest temp rectal 101.6F 4/17. Febrile 4/25  Per ID, favor community acquired aspiration PNA (given poor dentition, L side opacity on CXR)  Ceftriaxone course completed, Last Dose of Linezolid 4/24/2024   HIV -, HEP -, UStrep -, Ulegionella -, Procal - BCx 3 NGTD for 48 hours, BC collected 4/19   - CTA pe rule out on 4/20 with improvement in study showing Multiple small subsegmental emboli identified within the right lower lobe pulmonary artery branches. Small plueral effusions with atelectasis.   - B/L LE dopplers negative for DVT.  - Monitor off Abx    CV  #Hypotension  #Hypovolemic shock  BP 70s/40s on 4/24 iso multiple episodes melena. Levo gtt started, 2U pRBC and 1U FFP given iso active GIB and shock. Cordis, R IJ TLC, R axillary a line placed for close BP monitoring and access for transfusions. EGD sowed bleeding duopdenal ulcer now s/p clipping. Off levophed. Now on phenylephrine and vaso drips.    - started on vasopressin due to soft BPs prior to scope.   - currently intubated. sedatioin weaned off with slow response. Plan for extubation 4/25    #Acute decompensated HF  TTE 4/15/24: Severely reduced LVSF and RVSF, LVEF 20-25%, mild biatrial enlargement, mild TR, PASP 68, dilated IVC  RHC/LHC: focal RCA lesion w/ evidence of hypovolemia  -CTA chest: Neg for PE. Small right and trace left pleural effusions.   -Diuresis: s/p daily IV Lasix 60   - GDMT (on hold while sedated and intubated): Valsartan 40 BID, spironolactone 25 mg QD, metoprolol tartrate 25 mg BID   -Core measures, strict I/O's daily weights, 1.2L PO restriction, strict I/Os, cont tele/pulse ox  -Advanced HF team following;  -Now holding lasix i/s/o aggressive diuresis, SILKE, contraction alkalosis & subsequent AHRF  -Holding valsartan and spironolactone i/s/o SILKE  -c/w lopressor 2.5 IV Q12    #AFib  HR 130s, unclear whether patient has hx of afib or is new. Never f/u with Drs.  - BB as above  - Hold AC iso bleed    #Aneurysm of thoracic aorta   - CT Abd/Pelvis 04/13/24: No thoracoabdominal dissection, no thoracic aorta hematoma. Ascending thoracic aortic aneurysm 4.0 cm  - Consider CTS follow up in outpatient setting for surveillance.    # Rhabdomyolysis.   Patient had mechanical fall and was down on the ground x 2 days.   - CK improving, 2336 -> 2002-> 777,   - CTM    #HTN (hypertension).   - hold valsartan 40 mg BID and metoprolol 25 mg BID i/s/o SILKE and intubation/sedation    PULMONARY  #AHRF w/ hypercapnia   Likely 2/2 persistent tachypnea 2/2 compensatory respiratory acidosis in reaction to contraction alkalosis 2/2 aggressive diuresis vs aspiration  s/p BiPAP -->5L NC --> HFNC 40/40. Now intubated with plan for extubation 4/25  - Monitor respiratory status    #Subsegmental PE   CTPE w/ evidence of small subsegmental PE in RLL   transitioned from heparin gtt to eliquis 4/24 but discontinued i/s/o GIB    GI  #UGI Bleed  Pt w/ large  melena when transitioning from heparin gtt to Eliquis. EGD with GI 4/24 revealed bleeding duodenal ulcer s/p clipping.     - pantoprazole gtt for 72 hours (04/24 at 4 PM to 04/27 at 4PM)  - Ok to extubate   - hold AC, restart as appropriate   - maintain active type & screen   - 2 large-bore IVs    RENAL  #SILKE   Initial Cr of 0.8 -->2.12. Today 2.01  Likely 2/2 aggressive diuresis & volume loss from melena   - careful volume repletion i/s/o acute HF   - holding valsartan and spironolactone       #Hypernatremia   hypovolemic hypernatremia i/s/o diuresis   - CTM   - hold further diuresis    ID  #Lower extremity wounds   S/p linezolid  -wound care     #prophylactic measures  F: None   E: Replete as necessary K>4 Mg>2  N: NPO for EGD    DVT Prophylaxis: hold i/s/o melena  GI prophylaxis: Protonix  CODE STATUS: FULL

## 2024-04-25 NOTE — PROGRESS NOTE ADULT - SUBJECTIVE AND OBJECTIVE BOX
INTERVAL EVENTS:  -EGD yesterday showed oozing duodenal ulcer. Additional ulcer with visible vessel in duodenal sweep. S/p 4 endoclips and cautery. GI recommended to keep pt intubated overnight and start PPI gtt.   -Hgb dropped 1 unit overnight  -Pt remains intubated  -Febrile to 101.5. BCx obtained. Other infectious workup negative. Vanc/zosyn started.   -Lopressor converted to IV as no oral access  -S/p dig 0.5mg @ 6am  -No drip rates charted since 4am    Cardiac medications administered in the last 48h:  metoprolol tartrate Injectable: 2.5 milliGRAM(s) IV Push (04-25-24 @ 07:12)  metoprolol tartrate: 25 milliGRAM(s) Oral (04-24-24 @ 06:22)  digoxin  Injectable: 500 MICROGram(s) IV Push (04-24-24 @ 06:22)  metoprolol tartrate: 25 milliGRAM(s) Oral (04-23-24 @ 18:57)  metoprolol tartrate Injectable: 5 milliGRAM(s) IV Push (04-23-24 @ 15:12)  metoprolol tartrate: 25 milliGRAM(s) Oral (04-23-24 @ 11:41)      MEDICATIONS  (STANDING):  chlorhexidine 0.12% Liquid 15 milliLiter(s) Oral Mucosa every 12 hours  chlorhexidine 2% Cloths 1 Application(s) Topical <User Schedule>  fentaNYL   Infusion 0.501 MICROgram(s)/kG/Hr (4.45 mL/Hr) IV Continuous <Continuous>  influenza  Vaccine (HIGH DOSE) 0.7 milliLiter(s) IntraMuscular once  metoprolol tartrate Injectable 2.5 milliGRAM(s) IV Push every 6 hours  mupirocin 2% Ointment 1 Application(s) Topical two times a day  pantoprazole Infusion 8 mG/Hr (10 mL/Hr) IV Continuous <Continuous>  phenylephrine    Infusion 0.5 MICROgram(s)/kG/Min (16.7 mL/Hr) IV Continuous <Continuous>  piperacillin/tazobactam IVPB.. 4.5 Gram(s) IV Intermittent every 12 hours  propofol Infusion 1.5 MICROgram(s)/kG/Min (0.8 mL/Hr) IV Continuous <Continuous>  vancomycin  IVPB 1250 milliGRAM(s) IV Intermittent every 24 hours  vasopressin Infusion 0.04 Unit(s)/Min (6 mL/Hr) IV Continuous <Continuous>    MEDICATIONS  (PRN):  ipratropium    for Nebulization 500 MICROGram(s) Nebulizer every 6 hours PRN Shortness of Breath and/or Wheezing      VITALS 24H  T(F): 100.9 (04-25-24 @ 05:33), Max: 101.5 (04-25-24 @ 01:12)  HR: 92 (04-25-24 @ 08:00) (88 - 111)  BP: 92/51 (04-24-24 @ 11:00) (92/51 - 92/51)  BP(mean): 62 (04-24-24 @ 11:00) (62 - 62)  ABP: 85/55 (04-25-24 @ 08:00) (80/51 - 119/66)  ABP(mean): 65 (04-25-24 @ 08:00) (60 - 84)  RR: 12 (04-25-24 @ 08:00) (12 - 32)  SpO2: 100% (04-25-24 @ 08:00) (91% - 100%)    I/O Detail 24H    24 Apr 2024 07:01  -  25 Apr 2024 07:00  --------------------------------------------------------  IN:    FentaNYL: 22 mL    FentaNYL: 66 mL    Lactated Ringers Bolus: 500 mL    Norepinephrine: 1.7 mL    Pantoprazole: 140 mL    Phenylephrine: 507.3 mL    Propofol: 61.3 mL    Propofol: 299.2 mL    Vasopressin: 120 mL  Total IN: 1717.5 mL    OUT:    Indwelling Catheter - Urethral (mL): 1326 mL  Total OUT: 1326 mL    Total NET: 391.5 mL        Daily Weight Trend (kg):   92.7 (04-25-24 @ 05:33)  88.9 (04-24-24 @ 06:00)  86.2 (04-23-24 @ 06:02)  88.9 (04-22-24 @ 06:49)      PHYSICAL EXAM:  GEN: NAD  HEENT: EOMI   RESP: CTA b/l  CV: RRR. Normal S1/S2. No m/r/g.  ABD: soft, non-distended  EXT: No edema   NEURO: alert and attentive    LABS:  CBC 04-25-24 @ 05:30                        10.1   13.05 )-----------( 195                   32.1       Hgb trend: 10.1 <-- , 11.0 <-- , 11.0 <-- , 12.3 <-- , 13.7 <-- , 12.6 <-- , 13.1 <-- , 14.0 <-- , 14.2 <-- , 15.8 <-- , 15.6 <-- , 16.6 <--   WBC trend: 13.05 <-- , 11.98 <-- , 11.76 <-- , 10.67 <-- , 9.61 <-- , 9.27 <-- , 8.95 <-- , 8.83 <-- , 8.48 <-- , 8.06 <-- , 7.93 <-- , 6.97 <--       CMP 04-25-24 @ 05:30    152<H>  |  111<H>  |  103<H>  ----------------------------<  207<H>  4.0   |  30  |  2.01<H>    Ca    9.3      04-25-24 @ 05:30  Phos  4.2     04-25  Mg     2.5     04-25    TPro  5.5<L>  /  Alb  3.2<L>  /  TBili  0.4  /  DBili  x   /  AST  38  /  ALT  51<H>  /  AlkPhos  38<L>     04-25      Serum Cr trend: 2.01 <-- , 1.73 <-- , 2.04 <-- , 1.89 <-- , 2.09 <-- , 2.37 <-- , 2.12 <-- , 1.72 <-- , 1.64 <-- , 1.66 <--     PT/INR - ( 23 Apr 2024 23:04 )   PT: 17.7 sec;   INR: 1.57          PTT - ( 23 Apr 2024 23:04 ):34.5 sec    Cardiac Markers

## 2024-04-25 NOTE — PROGRESS NOTE ADULT - SUBJECTIVE AND OBJECTIVE BOX
GI Consult Progress Note:     OVERNIGHT EVENTS: JOVANNA.    SUBJECTIVE / INTERVAL HPI:   Patient seen and examined at bedside. Currently intubated and sedated so unable to obtain ROS. Had 1 episode of melena overnight and Hb today 10.1 with stable pressor requirements.,       VITAL SIGNS:  Vital Signs Last 24 Hrs  T(C): 37.7 (25 Apr 2024 09:00), Max: 38.6 (25 Apr 2024 01:12)  T(F): 99.8 (25 Apr 2024 09:00), Max: 101.5 (25 Apr 2024 01:12)  HR: 100 (25 Apr 2024 12:00) (88 - 100)  BP: 100/75 (25 Apr 2024 12:00) (100/75 - 100/75)  BP(mean): 83 (25 Apr 2024 12:00) (83 - 83)  RR: 12 (25 Apr 2024 12:00) (12 - 12)  SpO2: 99% (25 Apr 2024 12:00) (89% - 100%)    Parameters below as of 25 Apr 2024 13:00  Patient On (Oxygen Delivery Method): ventilator    O2 Concentration (%): 40    04-24-24 @ 07:01  -  04-25-24 @ 07:00  --------------------------------------------------------  IN: 1768.9 mL / OUT: 1356 mL / NET: 412.9 mL    04-25-24 @ 07:01  -  04-25-24 @ 12:28  --------------------------------------------------------  IN: 607.7 mL / OUT: 125 mL / NET: 482.7 mL      PHYSICAL EXAM:  General: No acute distress, intubated, sedated   Lungs: Normal respiratory effort and no intercostal retractions  Cardiovascular: RRR  Abdomen: Soft, non-tender, non-distended  Neurological: Unable to assess given on sedation   Skin: Warm and dry. No obvious rash      MEDICATIONS:  MEDICATIONS  (STANDING):  chlorhexidine 0.12% Liquid 15 milliLiter(s) Oral Mucosa every 12 hours  chlorhexidine 2% Cloths 1 Application(s) Topical <User Schedule>  fentaNYL   Infusion 0.501 MICROgram(s)/kG/Hr (4.45 mL/Hr) IV Continuous <Continuous>  influenza  Vaccine (HIGH DOSE) 0.7 milliLiter(s) IntraMuscular once  metoprolol tartrate Injectable 2.5 milliGRAM(s) IV Push every 12 hours  mupirocin 2% Ointment 1 Application(s) Topical two times a day  pantoprazole Infusion 8 mG/Hr (10 mL/Hr) IV Continuous <Continuous>  phenylephrine    Infusion 0.5 MICROgram(s)/kG/Min (16.7 mL/Hr) IV Continuous <Continuous>  propofol Infusion 1.5 MICROgram(s)/kG/Min (0.8 mL/Hr) IV Continuous <Continuous>  vasopressin Infusion 0.04 Unit(s)/Min (6 mL/Hr) IV Continuous <Continuous>    MEDICATIONS  (PRN):  ipratropium    for Nebulization 500 MICROGram(s) Nebulizer every 6 hours PRN Shortness of Breath and/or Wheezing      ALLERGIES:  Allergies    No Known Allergies    Intolerances        LABS:                        10.1   13.05 )-----------( 195      ( 25 Apr 2024 05:30 )             32.1     04-25    152<H>  |  111<H>  |  103<H>  ----------------------------<  207<H>  4.0   |  30  |  2.01<H>    Ca    9.3      25 Apr 2024 05:30  Phos  4.2     04-25  Mg     2.5     04-25    TPro  5.5<L>  /  Alb  3.2<L>  /  TBili  0.4  /  DBili  x   /  AST  38  /  ALT  51<H>  /  AlkPhos  38<L>  04-25    PT/INR - ( 23 Apr 2024 23:04 )   PT: 17.7 sec;   INR: 1.57          PTT - ( 23 Apr 2024 23:04 )  PTT:34.5 sec  Urinalysis Basic - ( 25 Apr 2024 05:30 )    Color: x / Appearance: x / SG: x / pH: x  Gluc: 207 mg/dL / Ketone: x  / Bili: x / Urobili: x   Blood: x / Protein: x / Nitrite: x   Leuk Esterase: x / RBC: x / WBC x   Sq Epi: x / Non Sq Epi: x / Bacteria: x      CAPILLARY BLOOD GLUCOSE      POCT Blood Glucose.: 139 mg/dL (24 Apr 2024 05:29)  Urinalysis with Rflx Culture (collected 04-25-24 @ 03:21)  RADIOLOGY & ADDITIONAL TESTS: Reviewed.

## 2024-04-25 NOTE — PROGRESS NOTE ADULT - SUBJECTIVE AND OBJECTIVE BOX
SUBJECTIVE/OVERNIGHT EVENTS: Overnight pt febrile to 101. Infectious workup ordered with UA and CXR unremarkable.  Pt seen in AM intubated and sedated. Over course of AM sedation was weaned off with slow response in pt's mental status. Patinet now opening eyes but not yet following commands.    VITAL SIGNS:  ICU Vital Signs Last 24 Hrs  T(C): 37.1 (25 Apr 2024 13:00), Max: 38.6 (25 Apr 2024 01:12)  T(F): 98.8 (25 Apr 2024 13:00), Max: 101.5 (25 Apr 2024 01:12)  HR: 91 (25 Apr 2024 13:00) (88 - 100)  BP: 100/75 (25 Apr 2024 12:00) (100/75 - 100/75)  BP(mean): 83 (25 Apr 2024 12:00) (83 - 83)  ABP: 100/56 (25 Apr 2024 13:00) (81/55 - 119/66)  ABP(mean): 70 (25 Apr 2024 13:00) (60 - 84)  RR: 12 (25 Apr 2024 13:00) (12 - 12)  SpO2: 99% (25 Apr 2024 13:00) (89% - 100%)    O2 Parameters below as of 25 Apr 2024 13:00  Patient On (Oxygen Delivery Method): ventilator    O2 Concentration (%): 40        O2 Concentration (%): 40  PHYSICAL EXAM:  General: NAD; intubated and sedated  HEENT: NC/AT; PERRL; EOMI; MMM  Neck: supple; no JVD  Cardiac: RRR; +S1/S2  Pulm: CTA B/L; no W/R/R  GI: soft, NT/ND, +BS  Extremities: WWP; no edema, clubbing or cyanosis  Vasc: 2+ radial, DP pulses B/L  Neuro: sedated. Opening eyes to sternal rub but not following commands; no focal deficits    MEDICATIONS:  MEDICATIONS  (STANDING):  chlorhexidine 0.12% Liquid 15 milliLiter(s) Oral Mucosa every 12 hours  chlorhexidine 2% Cloths 1 Application(s) Topical <User Schedule>  fentaNYL   Infusion 0.501 MICROgram(s)/kG/Hr (4.45 mL/Hr) IV Continuous <Continuous>  influenza  Vaccine (HIGH DOSE) 0.7 milliLiter(s) IntraMuscular once  metoprolol tartrate Injectable 2.5 milliGRAM(s) IV Push every 12 hours  mupirocin 2% Ointment 1 Application(s) Topical two times a day  pantoprazole Infusion 8 mG/Hr (10 mL/Hr) IV Continuous <Continuous>  phenylephrine    Infusion 0.5 MICROgram(s)/kG/Min (16.7 mL/Hr) IV Continuous <Continuous>  propofol Infusion 1.5 MICROgram(s)/kG/Min (0.8 mL/Hr) IV Continuous <Continuous>  vasopressin Infusion 0.04 Unit(s)/Min (6 mL/Hr) IV Continuous <Continuous>    MEDICATIONS  (PRN):  ipratropium    for Nebulization 500 MICROGram(s) Nebulizer every 6 hours PRN Shortness of Breath and/or Wheezing      ALLERGIES:  Allergies    No Known Allergies    Intolerances        LABS:                        10.1   13.05 )-----------( 195      ( 25 Apr 2024 05:30 )             32.1     04-25    152<H>  |  111<H>  |  103<H>  ----------------------------<  207<H>  4.0   |  30  |  2.01<H>    Ca    9.3      25 Apr 2024 05:30  Phos  4.2     04-25  Mg     2.5     04-25    TPro  5.5<L>  /  Alb  3.2<L>  /  TBili  0.4  /  DBili  x   /  AST  38  /  ALT  51<H>  /  AlkPhos  38<L>  04-25    PT/INR - ( 23 Apr 2024 23:04 )   PT: 17.7 sec;   INR: 1.57          PTT - ( 23 Apr 2024 23:04 )  PTT:34.5 sec    RADIOLOGY & ADDITIONAL TESTS: Reviewed.

## 2024-04-25 NOTE — PROVIDER CONTACT NOTE (CRITICAL VALUE NOTIFICATION) - DATE AND TIME:
Medicare Annual Wellness Visit    Ekaterina Aguayo is here for Medicare AWV and Hypertension      Recommendations for Preventive Services Due: see orders and patient instructions/AVS.  Recommended screening schedule for the next 5-10 years is provided to the patient in written form: see Patient Instructions/AVS.     Return in about 3 months (around 7/25/2024).     Subjective     LAST PHYSICAL > 1 YEAR    The following acute and/or chronic problems were also addressed today:    HTN - TAKING MEDS. BP NOTED.  + DIET / EXERCISE COMPLIANCE. NO HEADACHE , NO DIZZINESS  HLP - TAKING MED.  + EXERCISE / DIET COMPLIANCE . NO MUSCLE CRAMPS / NO MUSCLE ACHES. CONTROLLED -  PREVIOUS LABS D/W PT  PREDIABETES -  DIET / EXERCISE REVIEWED. LAB D/W PT.  MAJOR DEPRESSION -   TAKING MED - HELPING SOME. OCC INSOMNIA. DENIES SUICIDAL / NO HOMICIDAL THOUGHTS / IDEATION.  OCC LACK OF MOTIVATION,  NO LOSS OF INTEREST  HYPOTHYROIDISM  - TAKING MED. NO FATIGUE. NO COLD / NO HEAT INTOLERANCE. ABN LAB D/W PT  HYPOKALEMIA - TAKING MED. NO MUSCLE CRAMPS.   STILL SMOKING - CESSATION READDRESSED.    C/O HEARING LOSS - ? DURATION. WILL LIKE EVAL     DENIES CP, No SOB, No PALPITATIONS, NO COUGH, NO F/C  No ABD PAIN, No N/V, No DIARRHEA, No CONSTIPATION, No MELENA, No HEMATOCHEZIA.  No DYSURIA, No FREQ, No URGENCY, No HEMATURIA    Patient's complete Health Risk Assessment and screening values have been reviewed and are found in Flowsheets. The following problems were reviewed today and where indicated follow up appointments were made and/or referrals ordered.    Positive Risk Factor Screenings with Interventions:            Controlled Medication Review: PT FOLLOWING WITH PAIN MGT    Today's Pain Level: No data recorded   Opioid Risk: (Low risk score <55) Opioid risk score: 21    Patient is low risk for opioid use disorder or overdose.    Last PDMP Margarito as Reviewed:  Review User Review Instant Review Result                  General HRA  14-Apr-2024 13:12 14-Apr-2024 13:11

## 2024-04-25 NOTE — PROGRESS NOTE ADULT - ASSESSMENT
75 yo M with no reported PMHx presents after a mechanical fall to [facility], also found to have acute decompensated systolic heart failure and new atrial fibrillation. Course complicated by hypervolemic hypernatremia     Review of studies  CTA chest 4/20/24: Multiple small subsegmental PE RLL  TTE 4/25/24: LVEF 20-25% (global). Diastolic dysfunction likely. Reduced RV systolic function. Biatrial dilation. Mild TR. PASP 68.   [facility] 4/22/24: RA 5, RV 38/6, PA 35/16 (22), PCWP 5, PA Sat 73%  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 4/22/24  - GDMT: Hold valsartan and spironolactone until bleed/SILKE resolves. Continue metoprolol tartrate 12.5mg BID (converted to 2.5mg IV q6h while intubated)  - Diuretics: Hold diuretics in setting of bleeding. CVP:   - Device: premature, will need to reassess after 3months of maximal GDMT    #Atrial Fibrillation  [id number]: 3   Has-Bled: 1  AC: On hold

## 2024-04-25 NOTE — PROGRESS NOTE ADULT - ASSESSMENT
74M with no known PMH, initially admitted to cardiac telemetry s/p fall for rapid a fib. Course complicated by new diagnosis of new CHF (LVEF 20-25%), PE, SILKE on CKD, and melena. GI consulted for melena when transitioning from heparin gtt to Eliquis.     Underwent EGD on 04/24/24, which showed:   - Normal mucosa was noted in the whole esophagus.  - Normal mucosa was noted in the whole stomach.  - An a single oozing ulcer was found in the duodenal bulb. Additional findings include An ulcer with a visible vessel was seen in the duodenal sweep. This ulcer was a Heraclio Class IIa. Four Endoclips were successfully applied to the duodenal bulb for the purpose of hemostasis. Bi-cap electrocautery was successfully applied for hemostasis.    Recommendations:  - Trend Hb and maintain active type and screen   - Continue pantoprazole gtt for 72 hours (04/24 at 4 PM to 04/27 at 4PM)   - Okay to be extubate patient today but keep NPO for now given possible risk of re-bleed     Case discussed with Dr. Jerome. GI Team will continue to follow.     Teresa Rubin D.O.   Gastroenterology Fellow  Weekday 7am-5pm Pager: [phone]  Weeknights/Weekend/Holiday Coverage: Please call the  for contact info  Follow Up Questions welcome via [facility] [employer] Teams Messenger

## 2024-04-26 LAB
ALBUMIN SERPL ELPH-MCNC: 2.9 G/DL — LOW (ref 3.3–5)
ALBUMIN SERPL ELPH-MCNC: 3.2 G/DL — LOW (ref 3.3–5)
ALP SERPL-CCNC: 33 U/L — LOW (ref 40–120)
ALP SERPL-CCNC: 36 U/L — LOW (ref 40–120)
ALT FLD-CCNC: 35 U/L — SIGNIFICANT CHANGE UP (ref 10–45)
ALT FLD-CCNC: 36 U/L — SIGNIFICANT CHANGE UP (ref 10–45)
ANION GAP SERPL CALC-SCNC: 10 MMOL/L — SIGNIFICANT CHANGE UP (ref 5–17)
ANION GAP SERPL CALC-SCNC: 8 MMOL/L — SIGNIFICANT CHANGE UP (ref 5–17)
ANION GAP SERPL CALC-SCNC: 8 MMOL/L — SIGNIFICANT CHANGE UP (ref 5–17)
ANION GAP SERPL CALC-SCNC: SIGNIFICANT CHANGE UP MMOL/L (ref 5–17)
AST SERPL-CCNC: 31 U/L — SIGNIFICANT CHANGE UP (ref 10–40)
AST SERPL-CCNC: 35 U/L — SIGNIFICANT CHANGE UP (ref 10–40)
BASE EXCESS BLDA CALC-SCNC: 6.6 MMOL/L — HIGH (ref -2–3)
BASOPHILS # BLD AUTO: 0.03 K/UL — SIGNIFICANT CHANGE UP (ref 0–0.2)
BASOPHILS NFR BLD AUTO: 0.2 % — SIGNIFICANT CHANGE UP (ref 0–2)
BILIRUB SERPL-MCNC: 0.6 MG/DL — SIGNIFICANT CHANGE UP (ref 0.2–1.2)
BILIRUB SERPL-MCNC: 0.6 MG/DL — SIGNIFICANT CHANGE UP (ref 0.2–1.2)
BLD GP AB SCN SERPL QL: NEGATIVE — SIGNIFICANT CHANGE UP
BUN SERPL-MCNC: 102 MG/DL — HIGH (ref 7–23)
BUN SERPL-MCNC: 78 MG/DL — HIGH (ref 7–23)
BUN SERPL-MCNC: 96 MG/DL — HIGH (ref 7–23)
BUN SERPL-MCNC: SIGNIFICANT CHANGE UP (ref 7–23)
CALCIUM SERPL-MCNC: 8.5 MG/DL — SIGNIFICANT CHANGE UP (ref 8.4–10.5)
CALCIUM SERPL-MCNC: 8.5 MG/DL — SIGNIFICANT CHANGE UP (ref 8.4–10.5)
CALCIUM SERPL-MCNC: 8.6 MG/DL — SIGNIFICANT CHANGE UP (ref 8.4–10.5)
CALCIUM SERPL-MCNC: 8.8 MG/DL — SIGNIFICANT CHANGE UP (ref 8.4–10.5)
CHLORIDE SERPL-SCNC: 109 MMOL/L — HIGH (ref 96–108)
CHLORIDE SERPL-SCNC: 110 MMOL/L — HIGH (ref 96–108)
CHLORIDE SERPL-SCNC: 113 MMOL/L — HIGH (ref 96–108)
CHLORIDE SERPL-SCNC: SIGNIFICANT CHANGE UP (ref 96–108)
CO2 BLDA-SCNC: 33 MMOL/L — HIGH (ref 19–24)
CO2 SERPL-SCNC: 29 MMOL/L — SIGNIFICANT CHANGE UP (ref 22–31)
CO2 SERPL-SCNC: 29 MMOL/L — SIGNIFICANT CHANGE UP (ref 22–31)
CO2 SERPL-SCNC: 30 MMOL/L — SIGNIFICANT CHANGE UP (ref 22–31)
CO2 SERPL-SCNC: 30 MMOL/L — SIGNIFICANT CHANGE UP (ref 22–31)
CREAT SERPL-MCNC: 1.35 MG/DL — HIGH (ref 0.5–1.3)
CREAT SERPL-MCNC: 1.8 MG/DL — HIGH (ref 0.5–1.3)
CREAT SERPL-MCNC: 1.98 MG/DL — HIGH (ref 0.5–1.3)
CREAT SERPL-MCNC: SIGNIFICANT CHANGE UP MG/DL (ref 0.5–1.3)
EGFR: 35 ML/MIN/1.73M2 — LOW
EGFR: 39 ML/MIN/1.73M2 — LOW
EGFR: 55 ML/MIN/1.73M2 — LOW
EGFR: SIGNIFICANT CHANGE UP ML/MIN/1.73M2
EOSINOPHIL # BLD AUTO: 0.01 K/UL — SIGNIFICANT CHANGE UP (ref 0–0.5)
EOSINOPHIL NFR BLD AUTO: 0.1 % — SIGNIFICANT CHANGE UP (ref 0–6)
GLUCOSE BLDC GLUCOMTR-MCNC: 124 MG/DL — HIGH (ref 70–99)
GLUCOSE BLDC GLUCOMTR-MCNC: 135 MG/DL — HIGH (ref 70–99)
GLUCOSE BLDC GLUCOMTR-MCNC: 160 MG/DL — HIGH (ref 70–99)
GLUCOSE SERPL-MCNC: 104 MG/DL — HIGH (ref 70–99)
GLUCOSE SERPL-MCNC: 209 MG/DL — HIGH (ref 70–99)
GLUCOSE SERPL-MCNC: 210 MG/DL — HIGH (ref 70–99)
GLUCOSE SERPL-MCNC: 212 MG/DL — HIGH (ref 70–99)
HCO3 BLDA-SCNC: 31 MMOL/L — HIGH (ref 21–28)
HCT VFR BLD CALC: 24.4 % — LOW (ref 39–50)
HCT VFR BLD CALC: 26.1 % — LOW (ref 39–50)
HCT VFR BLD CALC: 26.6 % — LOW (ref 39–50)
HGB BLD-MCNC: 8.1 G/DL — LOW (ref 13–17)
HGB BLD-MCNC: 8.2 G/DL — LOW (ref 13–17)
HGB BLD-MCNC: 8.2 G/DL — LOW (ref 13–17)
IMM GRANULOCYTES NFR BLD AUTO: 0.7 % — SIGNIFICANT CHANGE UP (ref 0–0.9)
LACTATE SERPL-SCNC: 0.8 MMOL/L — SIGNIFICANT CHANGE UP (ref 0.5–2)
LYMPHOCYTES # BLD AUTO: 1.3 K/UL — SIGNIFICANT CHANGE UP (ref 1–3.3)
LYMPHOCYTES # BLD AUTO: 9.4 % — LOW (ref 13–44)
MAGNESIUM SERPL-MCNC: 2.2 MG/DL — SIGNIFICANT CHANGE UP (ref 1.6–2.6)
MCHC RBC-ENTMCNC: 29.4 PG — SIGNIFICANT CHANGE UP (ref 27–34)
MCHC RBC-ENTMCNC: 30.1 PG — SIGNIFICANT CHANGE UP (ref 27–34)
MCHC RBC-ENTMCNC: 30.5 PG — SIGNIFICANT CHANGE UP (ref 27–34)
MCHC RBC-ENTMCNC: 30.8 GM/DL — LOW (ref 32–36)
MCHC RBC-ENTMCNC: 31.4 GM/DL — LOW (ref 32–36)
MCHC RBC-ENTMCNC: 33.2 GM/DL — SIGNIFICANT CHANGE UP (ref 32–36)
MCV RBC AUTO: 91.7 FL — SIGNIFICANT CHANGE UP (ref 80–100)
MCV RBC AUTO: 95.3 FL — SIGNIFICANT CHANGE UP (ref 80–100)
MCV RBC AUTO: 96 FL — SIGNIFICANT CHANGE UP (ref 80–100)
MONOCYTES # BLD AUTO: 1.04 K/UL — HIGH (ref 0–0.9)
MONOCYTES NFR BLD AUTO: 7.5 % — SIGNIFICANT CHANGE UP (ref 2–14)
NEUTROPHILS # BLD AUTO: 11.32 K/UL — HIGH (ref 1.8–7.4)
NEUTROPHILS NFR BLD AUTO: 82.1 % — HIGH (ref 43–77)
NRBC # BLD: 0 /100 WBCS — SIGNIFICANT CHANGE UP (ref 0–0)
PCO2 BLDA: 44 MMHG — SIGNIFICANT CHANGE UP (ref 35–48)
PH BLDA: 7.46 — HIGH (ref 7.35–7.45)
PHOSPHATE SERPL-MCNC: 2.6 MG/DL — SIGNIFICANT CHANGE UP (ref 2.5–4.5)
PLATELET # BLD AUTO: 166 K/UL — SIGNIFICANT CHANGE UP (ref 150–400)
PLATELET # BLD AUTO: 173 K/UL — SIGNIFICANT CHANGE UP (ref 150–400)
PLATELET # BLD AUTO: 178 K/UL — SIGNIFICANT CHANGE UP (ref 150–400)
PO2 BLDA: 203 MMHG — HIGH (ref 83–108)
POTASSIUM SERPL-MCNC: 3.4 MMOL/L — LOW (ref 3.5–5.3)
POTASSIUM SERPL-MCNC: 3.4 MMOL/L — LOW (ref 3.5–5.3)
POTASSIUM SERPL-MCNC: 3.6 MMOL/L — SIGNIFICANT CHANGE UP (ref 3.5–5.3)
POTASSIUM SERPL-MCNC: 3.6 MMOL/L — SIGNIFICANT CHANGE UP (ref 3.5–5.3)
POTASSIUM SERPL-SCNC: 3.4 MMOL/L — LOW (ref 3.5–5.3)
POTASSIUM SERPL-SCNC: 3.4 MMOL/L — LOW (ref 3.5–5.3)
POTASSIUM SERPL-SCNC: 3.6 MMOL/L — SIGNIFICANT CHANGE UP (ref 3.5–5.3)
POTASSIUM SERPL-SCNC: 3.6 MMOL/L — SIGNIFICANT CHANGE UP (ref 3.5–5.3)
PROT SERPL-MCNC: 5.2 G/DL — LOW (ref 6–8.3)
PROT SERPL-MCNC: 5.6 G/DL — LOW (ref 6–8.3)
RBC # BLD: 2.66 M/UL — LOW (ref 4.2–5.8)
RBC # BLD: 2.72 M/UL — LOW (ref 4.2–5.8)
RBC # BLD: 2.79 M/UL — LOW (ref 4.2–5.8)
RBC # FLD: 16 % — HIGH (ref 10.3–14.5)
RBC # FLD: 16.2 % — HIGH (ref 10.3–14.5)
RBC # FLD: 16.3 % — HIGH (ref 10.3–14.5)
RH IG SCN BLD-IMP: POSITIVE — SIGNIFICANT CHANGE UP
SAO2 % BLDA: 100 % — HIGH (ref 94–98)
SODIUM SERPL-SCNC: 147 MMOL/L — HIGH (ref 135–145)
SODIUM SERPL-SCNC: 149 MMOL/L — HIGH (ref 135–145)
SODIUM SERPL-SCNC: 151 MMOL/L — HIGH (ref 135–145)
SODIUM SERPL-SCNC: SIGNIFICANT CHANGE UP (ref 135–145)
WBC # BLD: 13.55 K/UL — HIGH (ref 3.8–10.5)
WBC # BLD: 13.62 K/UL — HIGH (ref 3.8–10.5)
WBC # BLD: 13.79 K/UL — HIGH (ref 3.8–10.5)
WBC # FLD AUTO: 13.55 K/UL — HIGH (ref 3.8–10.5)
WBC # FLD AUTO: 13.62 K/UL — HIGH (ref 3.8–10.5)
WBC # FLD AUTO: 13.79 K/UL — HIGH (ref 3.8–10.5)

## 2024-04-26 PROCEDURE — 93010 ELECTROCARDIOGRAM REPORT: CPT

## 2024-04-26 PROCEDURE — 99232 SBSQ HOSP IP/OBS MODERATE 35: CPT | Mod: GC

## 2024-04-26 PROCEDURE — 71045 X-RAY EXAM CHEST 1 VIEW: CPT | Mod: 26

## 2024-04-26 PROCEDURE — 99291 CRITICAL CARE FIRST HOUR: CPT

## 2024-04-26 RX ORDER — DEXTROSE 50 % IN WATER 50 %
25 SYRINGE (ML) INTRAVENOUS ONCE
Refills: 0 | Status: DISCONTINUED | OUTPATIENT
Start: 2024-04-26 | End: 2024-05-04

## 2024-04-26 RX ORDER — INSULIN LISPRO 100/ML
VIAL (ML) SUBCUTANEOUS AT BEDTIME
Refills: 0 | Status: DISCONTINUED | OUTPATIENT
Start: 2024-04-26 | End: 2024-05-04

## 2024-04-26 RX ORDER — POTASSIUM CHLORIDE 20 MEQ
20 PACKET (EA) ORAL
Refills: 0 | Status: COMPLETED | OUTPATIENT
Start: 2024-04-26 | End: 2024-04-26

## 2024-04-26 RX ORDER — INSULIN LISPRO 100/ML
VIAL (ML) SUBCUTANEOUS
Refills: 0 | Status: DISCONTINUED | OUTPATIENT
Start: 2024-04-26 | End: 2024-05-04

## 2024-04-26 RX ORDER — DEXTROSE 10 % IN WATER 10 %
125 INTRAVENOUS SOLUTION INTRAVENOUS ONCE
Refills: 0 | Status: DISCONTINUED | OUTPATIENT
Start: 2024-04-26 | End: 2024-05-04

## 2024-04-26 RX ORDER — DEXTROSE 50 % IN WATER 50 %
15 SYRINGE (ML) INTRAVENOUS ONCE
Refills: 0 | Status: DISCONTINUED | OUTPATIENT
Start: 2024-04-26 | End: 2024-05-04

## 2024-04-26 RX ORDER — POTASSIUM CHLORIDE 20 MEQ
10 PACKET (EA) ORAL
Refills: 0 | Status: COMPLETED | OUTPATIENT
Start: 2024-04-26 | End: 2024-04-26

## 2024-04-26 RX ORDER — SODIUM CHLORIDE 9 MG/ML
1000 INJECTION, SOLUTION INTRAVENOUS
Refills: 0 | Status: DISCONTINUED | OUTPATIENT
Start: 2024-04-26 | End: 2024-05-04

## 2024-04-26 RX ORDER — GLUCAGON INJECTION, SOLUTION 0.5 MG/.1ML
1 INJECTION, SOLUTION SUBCUTANEOUS ONCE
Refills: 0 | Status: DISCONTINUED | OUTPATIENT
Start: 2024-04-26 | End: 2024-05-04

## 2024-04-26 RX ORDER — POTASSIUM CHLORIDE 20 MEQ
10 PACKET (EA) ORAL
Refills: 0 | Status: COMPLETED | OUTPATIENT
Start: 2024-04-26 | End: 2024-04-27

## 2024-04-26 RX ORDER — DEXTROSE 50 % IN WATER 50 %
12.5 SYRINGE (ML) INTRAVENOUS ONCE
Refills: 0 | Status: DISCONTINUED | OUTPATIENT
Start: 2024-04-26 | End: 2024-05-04

## 2024-04-26 RX ORDER — SODIUM CHLORIDE 9 MG/ML
1000 INJECTION, SOLUTION INTRAVENOUS
Refills: 0 | Status: DISCONTINUED | OUTPATIENT
Start: 2024-04-26 | End: 2024-04-26

## 2024-04-26 RX ADMIN — Medication 100 MILLIEQUIVALENT(S): at 03:21

## 2024-04-26 RX ADMIN — Medication 100 MILLIEQUIVALENT(S): at 23:44

## 2024-04-26 RX ADMIN — Medication 2.5 MILLIGRAM(S): at 17:16

## 2024-04-26 RX ADMIN — Medication 50 MILLIEQUIVALENT(S): at 07:58

## 2024-04-26 RX ADMIN — Medication 100 MILLIEQUIVALENT(S): at 05:18

## 2024-04-26 RX ADMIN — Medication 2: at 11:59

## 2024-04-26 RX ADMIN — PANTOPRAZOLE SODIUM 10 MG/HR: 20 TABLET, DELAYED RELEASE ORAL at 16:31

## 2024-04-26 RX ADMIN — PANTOPRAZOLE SODIUM 10 MG/HR: 20 TABLET, DELAYED RELEASE ORAL at 05:22

## 2024-04-26 RX ADMIN — CHLORHEXIDINE GLUCONATE 15 MILLILITER(S): 213 SOLUTION TOPICAL at 05:19

## 2024-04-26 RX ADMIN — SODIUM CHLORIDE 150 MILLILITER(S): 9 INJECTION, SOLUTION INTRAVENOUS at 03:23

## 2024-04-26 RX ADMIN — Medication 100 MILLIEQUIVALENT(S): at 04:15

## 2024-04-26 RX ADMIN — MUPIROCIN 1 APPLICATION(S): 20 OINTMENT TOPICAL at 05:20

## 2024-04-26 RX ADMIN — MUPIROCIN 1 APPLICATION(S): 20 OINTMENT TOPICAL at 18:02

## 2024-04-26 RX ADMIN — Medication 2.5 MILLIGRAM(S): at 04:57

## 2024-04-26 RX ADMIN — CHLORHEXIDINE GLUCONATE 1 APPLICATION(S): 213 SOLUTION TOPICAL at 05:19

## 2024-04-26 RX ADMIN — PHENYLEPHRINE HYDROCHLORIDE 16.7 MICROGRAM(S)/KG/MIN: 10 INJECTION INTRAVENOUS at 22:22

## 2024-04-26 RX ADMIN — Medication 50 MILLIEQUIVALENT(S): at 10:30

## 2024-04-26 RX ADMIN — VASOPRESSIN 6 UNIT(S)/MIN: 20 INJECTION INTRAVENOUS at 09:55

## 2024-04-26 NOTE — PROGRESS NOTE ADULT - ASSESSMENT
74M with no known PMH, initially admitted to cardiac telemetry s/p fall for rapid a fib. Course complicated by new diagnosis of new CHF (LVEF 20-25%), PE, SILKE on CKD, and melena. GI consulted for melena when transitioning from heparin gtt to Eliquis.     Underwent EGD on 04/24/24, which showed:   - Normal mucosa was noted in the whole esophagus.  - Normal mucosa was noted in the whole stomach.  - An a single oozing ulcer was found in the duodenal bulb. Additional findings include An ulcer with a visible vessel was seen in the duodenal sweep. This ulcer was a Heraclio Class IIa. Four Endoclips were successfully applied to the duodenal bulb for the purpose of hemostasis. Bi-cap electrocautery was successfully applied for hemostasis.    Recommendations:  - Trend Hb and maintain active type and screen   - Continue pantoprazole gtt for 72 hours (04/24 at 4 PM to 04/27 at 4PM)   - Okay to be extubate patient today given Hb stable and no further episodes of melena  - Keep NPO for today given possible risk of re-bleed, will consider advancing diet tomorrow if Hb remains stable     Case discussed with Dr. Jerome. GI Team will continue to follow.     Teresa Rubin D.O.   Gastroenterology Fellow  Weekday 7am-5pm Pager: [phone]  Weeknights/Weekend/Holiday Coverage: Please call the  for contact info  Follow Up Questions welcome via [facility] [employer] Teams Messenger

## 2024-04-26 NOTE — PROGRESS NOTE ADULT - SUBJECTIVE AND OBJECTIVE BOX
GI Consult Progress Note:     OVERNIGHT EVENTS: JOVANNA.    SUBJECTIVE / INTERVAL HPI:   Patient seen and examined at bedside. Still intubated and sedated. No further episodes of bleeding. Had one brown bowel movement overnight. Hb stable at 8.1 and 8.2 today.       VITAL SIGNS:  Vital Signs Last 24 Hrs  T(C): 37.6 (26 Apr 2024 08:15), Max: 38.4 (25 Apr 2024 16:58)  T(F): 99.7 (26 Apr 2024 08:15), Max: 101.2 (25 Apr 2024 16:58)  HR: 91 (26 Apr 2024 13:00) (89 - 115)  BP: 127/73 (25 Apr 2024 14:00) (127/73 - 127/73)  BP(mean): 92 (25 Apr 2024 14:00) (92 - 92)  RR: 23 (26 Apr 2024 13:00) (12 - 23)  SpO2: 93% (26 Apr 2024 13:00) (93% - 100%)    Parameters below as of 26 Apr 2024 13:00  Patient On (Oxygen Delivery Method): ventilator    O2 Concentration (%): 40    04-25-24 @ 07:01  -  04-26-24 @ 07:00  --------------------------------------------------------  IN: 2966.7 mL / OUT: 1440 mL / NET: 1526.7 mL    04-26-24 @ 07:01  -  04-26-24 @ 13:12  --------------------------------------------------------  IN: 440.7 mL / OUT: 450 mL / NET: -9.3 mL      PHYSICAL EXAM:  General: No acute distress, intubated, sedated   Lungs: Normal respiratory effort and no intercostal retractions  Cardiovascular: RRR  Abdomen: Soft, non-tender, non-distended  Neurological: Unable to assess given on sedation   Skin: Warm and dry. No obvious rash      MEDICATIONS:  MEDICATIONS  (STANDING):  chlorhexidine 0.12% Liquid 15 milliLiter(s) Oral Mucosa every 12 hours  chlorhexidine 2% Cloths 1 Application(s) Topical <User Schedule>  dextrose 10% Bolus 125 milliLiter(s) IV Bolus once  dextrose 5%. 1000 milliLiter(s) (50 mL/Hr) IV Continuous <Continuous>  dextrose 5%. 1000 milliLiter(s) (100 mL/Hr) IV Continuous <Continuous>  dextrose 50% Injectable 25 Gram(s) IV Push once  dextrose 50% Injectable 12.5 Gram(s) IV Push once  glucagon  Injectable 1 milliGRAM(s) IntraMuscular once  influenza  Vaccine (HIGH DOSE) 0.7 milliLiter(s) IntraMuscular once  insulin lispro (ADMELOG) corrective regimen sliding scale   SubCutaneous three times a day before meals  insulin lispro (ADMELOG) corrective regimen sliding scale   SubCutaneous at bedtime  metoprolol tartrate Injectable 2.5 milliGRAM(s) IV Push every 12 hours  mupirocin 2% Ointment 1 Application(s) Topical two times a day  pantoprazole Infusion 8 mG/Hr (10 mL/Hr) IV Continuous <Continuous>  phenylephrine    Infusion 0.5 MICROgram(s)/kG/Min (16.7 mL/Hr) IV Continuous <Continuous>  vasopressin Infusion 0.04 Unit(s)/Min (6 mL/Hr) IV Continuous <Continuous>    MEDICATIONS  (PRN):  dextrose Oral Gel 15 Gram(s) Oral once PRN Blood Glucose LESS THAN 70 milliGRAM(s)/deciliter  ipratropium    for Nebulization 500 MICROGram(s) Nebulizer every 6 hours PRN Shortness of Breath and/or Wheezing      ALLERGIES:  Allergies    No Known Allergies    Intolerances        LABS:                        8.1    13.55 )-----------( 166      ( 26 Apr 2024 10:20 )             24.4     04-26    147<H>  |  110<H>  |  96<H>  ----------------------------<  209<H>  3.6   |  29  |  1.80<H>    Ca    8.5      26 Apr 2024 05:30  Phos  2.6     04-26  Mg     2.2     04-26    TPro  5.2<L>  /  Alb  2.9<L>  /  TBili  0.6  /  DBili  x   /  AST  31  /  ALT  36  /  AlkPhos  33<L>  04-26      Urinalysis Basic - ( 26 Apr 2024 05:30 )    Color: x / Appearance: x / SG: x / pH: x  Gluc: 209 mg/dL / Ketone: x  / Bili: x / Urobili: x   Blood: x / Protein: x / Nitrite: x   Leuk Esterase: x / RBC: x / WBC x   Sq Epi: x / Non Sq Epi: x / Bacteria: x      CAPILLARY BLOOD GLUCOSE      POCT Blood Glucose.: 160 mg/dL (26 Apr 2024 11:21)  RADIOLOGY & ADDITIONAL TESTS: Reviewed.

## 2024-04-26 NOTE — PROGRESS NOTE ADULT - SUBJECTIVE AND OBJECTIVE BOX
INTERVAL EVENTS:  -Remains intubated due to lack of mental status off sedation over 12h  -S/p D5 250cc, D5 150cc x 7h, D5 125cc x 6 hrs (Net + 1500cc)    Cardiac medications administered in the last 48h:  metoprolol tartrate Injectable: 2.5 milliGRAM(s) IV Push (04-26-24 @ 04:57)  metoprolol tartrate Injectable: 2.5 milliGRAM(s) IV Push (04-25-24 @ 17:02)  metoprolol tartrate Injectable: 2.5 milliGRAM(s) IV Push (04-25-24 @ 07:12)      MEDICATIONS  (STANDING):  chlorhexidine 0.12% Liquid 15 milliLiter(s) Oral Mucosa every 12 hours  chlorhexidine 2% Cloths 1 Application(s) Topical <User Schedule>  dextrose 5%. 1000 milliLiter(s) (150 mL/Hr) IV Continuous <Continuous>  influenza  Vaccine (HIGH DOSE) 0.7 milliLiter(s) IntraMuscular once  metoprolol tartrate Injectable 2.5 milliGRAM(s) IV Push every 12 hours  mupirocin 2% Ointment 1 Application(s) Topical two times a day  pantoprazole Infusion 8 mG/Hr (10 mL/Hr) IV Continuous <Continuous>  phenylephrine    Infusion 0.5 MICROgram(s)/kG/Min (16.7 mL/Hr) IV Continuous <Continuous>  potassium chloride  20 mEq/100 mL IVPB 20 milliEquivalent(s) IV Intermittent every 2 hours  vasopressin Infusion 0.04 Unit(s)/Min (6 mL/Hr) IV Continuous <Continuous>    MEDICATIONS  (PRN):  ipratropium    for Nebulization 500 MICROGram(s) Nebulizer every 6 hours PRN Shortness of Breath and/or Wheezing      VITALS 24H  T(F): 99.6 (04-26-24 @ 06:27), Max: 101.2 (04-25-24 @ 16:58)  HR: 93 (04-26-24 @ 06:00) (90 - 115)  BP: 127/73 (04-25-24 @ 14:00) (100/75 - 127/73)  BP(mean): 92 (04-25-24 @ 14:00) (83 - 92)  ABP: 96/52 (04-26-24 @ 06:00) (81/55 - 116/66)  ABP(mean): 67 (04-26-24 @ 06:00) (62 - 82)  RR: 15 (04-26-24 @ 06:00) (12 - 22)  SpO2: 100% (04-26-24 @ 06:00) (89% - 100%)    I/O Detail 24H    25 Apr 2024 07:01  -  26 Apr 2024 07:00  --------------------------------------------------------  IN:    dextrose 5%: 250 mL    dextrose 5%: 1000 mL    dextrose 5%: 600 mL    FentaNYL: 17.6 mL    IV PiggyBack: 400 mL    Pantoprazole: 220 mL    Phenylephrine: 320.5 mL    Propofol: 26.6 mL    Vasopressin: 132 mL  Total IN: 2966.7 mL    OUT:    Indwelling Catheter - Urethral (mL): 1440 mL  Total OUT: 1440 mL    Total NET: 1526.7 mL      26 Apr 2024 07:01  -  26 Apr 2024 07:57  --------------------------------------------------------  IN:    dextrose 5%: 150 mL    IV PiggyBack: 100 mL    Pantoprazole: 10 mL    Phenylephrine: 6.7 mL    Vasopressin: 6 mL  Total IN: 272.7 mL    OUT:    Indwelling Catheter - Urethral (mL): 75 mL  Total OUT: 75 mL    Total NET: 197.7 mL        Daily Weight Trend (kg):   92.7 (04-25-24 @ 05:33)  88.9 (04-24-24 @ 06:00)  86.2 (04-23-24 @ 06:02)  88.9 (04-22-24 @ 06:49)      PHYSICAL EXAM:  GEN: NAD  HEENT: EOMI   RESP: CTA b/l  CV: RRR. Normal S1/S2. No m/r/g.  ABD: soft, non-distended  EXT: No edema   NEURO: alert and attentive    LABS:  CBC 04-26-24 @ 05:30                        8.2    13.79 )-----------( 178                   26.6       Hgb trend: 8.2 <-- , 10.1 <-- , 11.0 <-- , 11.0 <-- , 12.3 <-- , 13.7 <-- , 12.6 <-- , 13.1 <-- , 14.0 <-- , 14.2 <-- , 15.8 <--   WBC trend: 13.79 <-- , 13.05 <-- , 11.98 <-- , 11.76 <-- , 10.67 <-- , 9.61 <-- , 9.27 <-- , 8.95 <-- , 8.83 <-- , 8.48 <-- , 8.06 <--       CMP 04-26-24 @ 05:30    147<H>  |  110<H>  |  96<H>  ----------------------------<  209<H>  3.6   |  29  |  1.80<H>    Ca    8.5      04-26-24 @ 05:30  Phos  2.6     04-26  Mg     2.2     04-26    TPro  5.2<L>  /  Alb  2.9<L>  /  TBili  0.6  /  DBili  x   /  AST  31  /  ALT  36  /  AlkPhos  33<L>     04-26      Serum Cr trend: 1.80 <-- , 1.98 <-- , See Note <-- , 2.01 <-- , 1.73 <-- , 2.04 <-- , 1.89 <-- , 2.09 <-- , 2.37 <-- , 2.12 <-- , 1.72 <-- , 1.64 <-- , 1.66 <--         Cardiac Markers

## 2024-04-26 NOTE — PROGRESS NOTE ADULT - ASSESSMENT
75 yo M with no reported PMHx presents after a mechanical fall to [facility], also found to have acute decompensated systolic heart failure and new atrial fibrillation. Course complicated by hypervolemic hypernatremia     Review of studies  CTA chest 4/20/24: Multiple small subsegmental PE RLL  TTE 4/25/24: LVEF 20-25% (global). Diastolic dysfunction likely. Reduced RV systolic function. Biatrial dilation. Mild TR. PASP 68.   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 Monitor for rebleed  -PPI per GI   -Hold AC, eventually will need challenge    #HFrEF (EF 20-25%)   ACC/AHA: Stage C  NYHA: NYHA Class II-III   Etiology: Non ischemic based on [facility] 4/22/24  - GDMT: Hold valsartan and spironolactone until bleed/SILKE resolves. Continue metoprolol tartrate 12.5mg BID (converted to IV while intubated)  - Diuretics: CVP  - Device: premature, will need to reassess after 3months of maximal GDMT  - Check central venous saturation    #Atrial Fibrillation  [id number]: 3   Has-Bled: 1  AC: On hold       75 yo M with no reported PMHx presents after a mechanical fall to [facility], also found to have acute decompensated systolic heart failure and new atrial fibrillation. Course complicated by hypervolemic hypernatremia     Review of studies  CTA chest 4/20/24: Multiple small subsegmental PE RLL  TTE 4/25/24: LVEF 20-25% (global). Diastolic dysfunction likely. Reduced RV systolic function. Biatrial dilation. Mild TR. PASP 68.   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 Monitor for rebleed  -PPI per GI   -Hold AC.    #HFrEF (EF 20-25%)   ACC/AHA: Stage C  NYHA: NYHA Class II-III   Etiology: Non ischemic based on [facility] 4/22/24  - GDMT: Hold valsartan and spironolactone until bleed/SILKE resolves. Continue metoprolol tartrate 12.5mg BID (converted to IV while intubated)  - Diuretics: PRN lasix for goal net even today  - Device: premature, will need to reassess after 3months of maximal GDMT  - Check central venous saturation    #Atrial Fibrillation  [id number]: 3   Has-Bled: 1  AC: On hold

## 2024-04-26 NOTE — PROGRESS NOTE ADULT - ASSESSMENT
74M with no known PMH, initially admitted to cardiac telemetry s/p fall for rapid a fib. Course complicated by new diagnosis of new CHF (LVEF 20-25%), PE, SILKE on CKD, and melena. GI consulted for melena when transitioning from heparin gtt to Eliquis, with EGD showing bleeding duodenal ulcer s/p clipping.    NEURO  #Fever.   Intermittent fevers, highest temp rectal 101.6F 4/17. Febrile 4/25  Per ID, favor community acquired aspiration PNA (given poor dentition, L side opacity on CXR)  Ceftriaxone course completed, Last Dose of Linezolid 4/24/2024   HIV -, HEP -, UStrep -, Ulegionella -, Procal - BCx 3 NGTD for 48 hours, BC collected 4/19   - CTA pe rule out on 4/20 with improvement in study showing Multiple small subsegmental emboli identified within the right lower lobe pulmonary artery branches. Small plueral effusions with atelectasis.   - B/L LE dopplers negative for DVT.  - Monitor off Abx    CV  #Hypotension  #Hypovolemic shock  BP 70s/40s on 4/24 iso multiple episodes melena. Levo gtt started, 2U pRBC and 1U FFP given iso active GIB and shock. Cordis, R IJ TLC, R axillary a line placed for close BP monitoring and access for transfusions. EGD sowed bleeding duopdenal ulcer now s/p clipping. Off levophed. Now on phenylephrine and vaso drips.    - started on vasopressin due to soft BPs prior to scope.   - currently intubated. sedatioin weaned off with slow response. Plan for extubation 4/25    #Acute decompensated HF  TTE 4/15/24: Severely reduced LVSF and RVSF, LVEF 20-25%, mild biatrial enlargement, mild TR, PASP 68, dilated IVC  RHC/LHC: focal RCA lesion w/ evidence of hypovolemia  -CTA chest: Neg for PE. Small right and trace left pleural effusions.   -Diuresis: s/p daily IV Lasix 60   - GDMT (on hold while sedated and intubated): Valsartan 40 BID, spironolactone 25 mg QD, metoprolol tartrate 25 mg BID   -Core measures, strict I/O's daily weights, 1.2L PO restriction, strict I/Os, cont tele/pulse ox  -Advanced HF team following;  -Now holding lasix i/s/o aggressive diuresis, SILKE, contraction alkalosis & subsequent AHRF  -Holding valsartan and spironolactone i/s/o SILKE  -c/w lopressor 2.5 IV Q12    #AFib  HR 130s, unclear whether patient has hx of afib or is new. Never f/u with Drs.  - BB as above  - Hold AC iso bleed    #Aneurysm of thoracic aorta   - CT Abd/Pelvis 04/13/24: No thoracoabdominal dissection, no thoracic aorta hematoma. Ascending thoracic aortic aneurysm 4.0 cm  - Consider CTS follow up in outpatient setting for surveillance.    # Rhabdomyolysis.   Patient had mechanical fall and was down on the ground x 2 days.   - CK improving, 2336 -> 2002-> 777,   - CTM    #HTN (hypertension).   - hold valsartan 40 mg BID and metoprolol 25 mg BID i/s/o SILKE and intubation/sedation    PULMONARY  #AHRF w/ hypercapnia   Likely 2/2 persistent tachypnea 2/2 compensatory respiratory acidosis in reaction to contraction alkalosis 2/2 aggressive diuresis vs aspiration  s/p BiPAP -->5L NC --> HFNC 40/40. Now intubated with plan for extubation 4/25  - Monitor respiratory status    #Subsegmental PE   CTPE w/ evidence of small subsegmental PE in RLL   transitioned from heparin gtt to eliquis 4/24 but discontinued i/s/o GIB    GI  #UGI Bleed  Pt w/ large  melena when transitioning from heparin gtt to Eliquis. EGD with GI 4/24 revealed bleeding duodenal ulcer s/p clipping.     - pantoprazole gtt for 72 hours (04/24 at 4 PM to 04/27 at 4PM)  - Ok to extubate   - hold AC, restart as appropriate   - maintain active type & screen   - 2 large-bore IVs    RENAL  #SILKE   Initial Cr of 0.8 -->2.12. Today 2.01  Likely 2/2 aggressive diuresis & volume loss from melena   - careful volume repletion i/s/o acute HF   - holding valsartan and spironolactone       #Hypernatremia   hypovolemic hypernatremia i/s/o diuresis   - CTM   - hold further diuresis    ID  #Lower extremity wounds   S/p linezolid  -wound care     #prophylactic measures  F: None   E: Replete as necessary K>4 Mg>2  N: NPO for EGD    DVT Prophylaxis: hold i/s/o melena  GI prophylaxis: Protonix  CODE STATUS: FULL   74M with no known PMH, initially admitted to cardiac telemetry s/p fall for rapid a fib. Course complicated by new diagnosis of new CHF (LVEF 20-25%), PE, SILKE on CKD, and melena. GI consulted for melena when transitioning from heparin gtt to Eliquis, with EGD showing bleeding duodenal ulcer s/p clipping.    NEURO  #Fever.   Intermittent fevers, highest temp rectal 101.6F 4/17. Febrile 4/25  Per ID, favor community acquired aspiration PNA (given poor dentition, L side opacity on CXR)  Ceftriaxone course completed, Last Dose of Linezolid 4/24/2024   HIV -, HEP -, UStrep -, Ulegionella -, Procal - BCx 3 NGTD for 48 hours, BC collected 4/19   - CTA pe rule out on 4/20 with improvement in study showing Multiple small subsegmental emboli identified within the right lower lobe pulmonary artery branches. Small plueral effusions with atelectasis.   - B/L LE dopplers negative for DVT.  - Monitor off Abx    #Sedated  Weaned off propofol and  fentanyl drips 4/25, but slow to respond. Currently opening eyes and following commands  - monitor    CV  #Hypotension  #Hypovolemic shock  BP 70s/40s on 4/24 iso multiple episodes melena. Levo gtt started, 2U pRBC and 1U FFP given iso active GIB and shock. Cordis, R IJ TLC, R axillary a line placed for close BP monitoring and access for transfusions. EGD sowed bleeding duopdenal ulcer now s/p clipping. Off levophed. Now off phenylephrine drip. on vaso drip.    - currently intubated. sedatioin weaned off with slow response. Plan for extubation 4/26    #Acute decompensated HF  TTE 4/15/24: Severely reduced LVSF and RVSF, LVEF 20-25%, mild biatrial enlargement, mild TR, PASP 68, dilated IVC  RHC/LHC: focal RCA lesion w/ evidence of hypovolemia  -CTA chest: Neg for PE. Small right and trace left pleural effusions.   -Diuresis: s/p daily IV Lasix 60   - GDMT (on hold while sedated and intubated): Valsartan 40 BID, spironolactone 25 mg QD, metoprolol tartrate 25 mg BID   -Core measures, strict I/O's daily weights, 1.2L PO restriction, strict I/Os, cont tele/pulse ox  -Advanced HF team following;  -Now holding lasix i/s/o aggressive diuresis, SILKE, contraction alkalosis & subsequent AHRF  -Holding valsartan and spironolactone i/s/o SILKE  -c/w lopressor 2.5 IV Q12    #AFib  HR 130s, unclear whether patient has hx of afib or is new. Never f/u with Drs.  - BB as above  - Hold AC iso bleed    #Aneurysm of thoracic aorta   - CT Abd/Pelvis 04/13/24: No thoracoabdominal dissection, no thoracic aorta hematoma. Ascending thoracic aortic aneurysm 4.0 cm  - Consider CTS follow up in outpatient setting for surveillance.    # Rhabdomyolysis. - RESOLVED  Patient had mechanical fall and was down on the ground x 2 days.   - CK improving, 2336 -> 2002-> 777,       #HTN (hypertension).   - hold valsartan 40 mg BID and metoprolol 25 mg BID i/s/o SILKE and intubation/sedation    PULMONARY  #AHRF w/ hypercapnia   Likely 2/2 persistent tachypnea 2/2 compensatory respiratory acidosis in reaction to contraction alkalosis 2/2 aggressive diuresis vs aspiration  s/p BiPAP -->5L NC --> HFNC 40/40. Now intubated with plan for extubation 4/26  - Monitor respiratory status    #Subsegmental PE   CTPE w/ evidence of small subsegmental PE in RLL   transitioned from heparin gtt to eliquis 4/24 but discontinued i/s/o GIB    GI  #UGI Bleed  Pt w/ large  melena when transitioning from heparin gtt to Eliquis. EGD with GI 4/24 revealed bleeding duodenal ulcer s/p clipping.     - pantoprazole gtt for 72 hours (04/24 at 4 PM to 04/27 at 4PM)  - Ok to extubate   - hold AC, restart as appropriate   - maintain active type & screen   - 2 large-bore IVs    RENAL  #SILKE   Initial Cr of 0.8 -->2.12. Today 2.01  Likely 2/2 aggressive diuresis & volume loss from melena   - careful volume repletion i/s/o acute HF   - holding valsartan and spironolactone       #Hypernatremia   hypovolemic hypernatremia i/s/o diuresis   - CTM   - hold further diuresis    ID  #Lower extremity wounds   S/p linezolid  -wound care     #prophylactic measures  F: None   E: Replete as necessary K>4 Mg>2  N: NPO for EGD    DVT Prophylaxis: hold i/s/o melena  GI prophylaxis: Protonix  CODE STATUS: FULL

## 2024-04-27 LAB
ALBUMIN SERPL ELPH-MCNC: 3.1 G/DL — LOW (ref 3.3–5)
ALP SERPL-CCNC: 38 U/L — LOW (ref 40–120)
ALT FLD-CCNC: 32 U/L — SIGNIFICANT CHANGE UP (ref 10–45)
ANION GAP SERPL CALC-SCNC: 8 MMOL/L — SIGNIFICANT CHANGE UP (ref 5–17)
APPEARANCE UR: CLEAR — SIGNIFICANT CHANGE UP
AST SERPL-CCNC: 32 U/L — SIGNIFICANT CHANGE UP (ref 10–40)
BACTERIA # UR AUTO: NEGATIVE /HPF — SIGNIFICANT CHANGE UP
BASE EXCESS BLDA CALC-SCNC: 5.8 MMOL/L — HIGH (ref -2–3)
BASOPHILS # BLD AUTO: 0.01 K/UL — SIGNIFICANT CHANGE UP (ref 0–0.2)
BASOPHILS NFR BLD AUTO: 0.1 % — SIGNIFICANT CHANGE UP (ref 0–2)
BILIRUB SERPL-MCNC: 0.5 MG/DL — SIGNIFICANT CHANGE UP (ref 0.2–1.2)
BILIRUB UR-MCNC: NEGATIVE — SIGNIFICANT CHANGE UP
BUN SERPL-MCNC: 70 MG/DL — HIGH (ref 7–23)
CALCIUM SERPL-MCNC: 9.1 MG/DL — SIGNIFICANT CHANGE UP (ref 8.4–10.5)
CAST: 2 /LPF — SIGNIFICANT CHANGE UP (ref 0–4)
CHLORIDE SERPL-SCNC: 114 MMOL/L — HIGH (ref 96–108)
CO2 BLDA-SCNC: 33 MMOL/L — HIGH (ref 19–24)
CO2 SERPL-SCNC: 30 MMOL/L — SIGNIFICANT CHANGE UP (ref 22–31)
COLOR SPEC: YELLOW — SIGNIFICANT CHANGE UP
CREAT ?TM UR-MCNC: 59 MG/DL — SIGNIFICANT CHANGE UP
CREAT SERPL-MCNC: 1.28 MG/DL — SIGNIFICANT CHANGE UP (ref 0.5–1.3)
DIFF PNL FLD: NEGATIVE — SIGNIFICANT CHANGE UP
EGFR: 58 ML/MIN/1.73M2 — LOW
EOSINOPHIL # BLD AUTO: 0.04 K/UL — SIGNIFICANT CHANGE UP (ref 0–0.5)
EOSINOPHIL NFR BLD AUTO: 0.3 % — SIGNIFICANT CHANGE UP (ref 0–6)
GLUCOSE BLDC GLUCOMTR-MCNC: 112 MG/DL — HIGH (ref 70–99)
GLUCOSE BLDC GLUCOMTR-MCNC: 126 MG/DL — HIGH (ref 70–99)
GLUCOSE BLDC GLUCOMTR-MCNC: 96 MG/DL — SIGNIFICANT CHANGE UP (ref 70–99)
GLUCOSE BLDC GLUCOMTR-MCNC: 96 MG/DL — SIGNIFICANT CHANGE UP (ref 70–99)
GLUCOSE SERPL-MCNC: 117 MG/DL — HIGH (ref 70–99)
GLUCOSE UR QL: NEGATIVE MG/DL — SIGNIFICANT CHANGE UP
HCO3 BLDA-SCNC: 31 MMOL/L — HIGH (ref 21–28)
HCT VFR BLD CALC: 25.7 % — LOW (ref 39–50)
HGB BLD-MCNC: 8 G/DL — LOW (ref 13–17)
IMM GRANULOCYTES NFR BLD AUTO: 0.7 % — SIGNIFICANT CHANGE UP (ref 0–0.9)
KETONES UR-MCNC: NEGATIVE MG/DL — SIGNIFICANT CHANGE UP
LEUKOCYTE ESTERASE UR-ACNC: NEGATIVE — SIGNIFICANT CHANGE UP
LYMPHOCYTES # BLD AUTO: 0.86 K/UL — LOW (ref 1–3.3)
LYMPHOCYTES # BLD AUTO: 7.1 % — LOW (ref 13–44)
MAGNESIUM SERPL-MCNC: 2.2 MG/DL — SIGNIFICANT CHANGE UP (ref 1.6–2.6)
MCHC RBC-ENTMCNC: 30 PG — SIGNIFICANT CHANGE UP (ref 27–34)
MCHC RBC-ENTMCNC: 31.1 GM/DL — LOW (ref 32–36)
MCV RBC AUTO: 96.3 FL — SIGNIFICANT CHANGE UP (ref 80–100)
MONOCYTES # BLD AUTO: 0.84 K/UL — SIGNIFICANT CHANGE UP (ref 0–0.9)
MONOCYTES NFR BLD AUTO: 6.9 % — SIGNIFICANT CHANGE UP (ref 2–14)
NEUTROPHILS # BLD AUTO: 10.3 K/UL — HIGH (ref 1.8–7.4)
NEUTROPHILS NFR BLD AUTO: 84.9 % — HIGH (ref 43–77)
NITRITE UR-MCNC: NEGATIVE — SIGNIFICANT CHANGE UP
NRBC # BLD: 0 /100 WBCS — SIGNIFICANT CHANGE UP (ref 0–0)
OSMOLALITY UR: 607 MOSM/KG — SIGNIFICANT CHANGE UP (ref 300–900)
PCO2 BLDA: 47 MMHG — SIGNIFICANT CHANGE UP (ref 35–48)
PH BLDA: 7.43 — SIGNIFICANT CHANGE UP (ref 7.35–7.45)
PH UR: 7 — SIGNIFICANT CHANGE UP (ref 5–8)
PHOSPHATE SERPL-MCNC: 2.6 MG/DL — SIGNIFICANT CHANGE UP (ref 2.5–4.5)
PLATELET # BLD AUTO: 179 K/UL — SIGNIFICANT CHANGE UP (ref 150–400)
PO2 BLDA: 180 MMHG — HIGH (ref 83–108)
POTASSIUM SERPL-MCNC: 3.5 MMOL/L — SIGNIFICANT CHANGE UP (ref 3.5–5.3)
POTASSIUM SERPL-SCNC: 3.5 MMOL/L — SIGNIFICANT CHANGE UP (ref 3.5–5.3)
POTASSIUM UR-SCNC: 39 MMOL/L — SIGNIFICANT CHANGE UP
PROT ?TM UR-MCNC: 12 MG/DL — SIGNIFICANT CHANGE UP (ref 0–12)
PROT SERPL-MCNC: 5.7 G/DL — LOW (ref 6–8.3)
PROT UR-MCNC: 30 MG/DL
PROT/CREAT UR-RTO: 0.2 RATIO — SIGNIFICANT CHANGE UP (ref 0–0.2)
RBC # BLD: 2.67 M/UL — LOW (ref 4.2–5.8)
RBC # FLD: 16.1 % — HIGH (ref 10.3–14.5)
RBC CASTS # UR COMP ASSIST: 1 /HPF — SIGNIFICANT CHANGE UP (ref 0–4)
SAO2 % BLDA: 99.1 % — HIGH (ref 94–98)
SODIUM SERPL-SCNC: 152 MMOL/L — HIGH (ref 135–145)
SODIUM UR-SCNC: 26 MMOL/L — SIGNIFICANT CHANGE UP
SP GR SPEC: 1.02 — SIGNIFICANT CHANGE UP (ref 1–1.03)
SQUAMOUS # UR AUTO: 1 /HPF — SIGNIFICANT CHANGE UP (ref 0–5)
UROBILINOGEN FLD QL: 0.2 MG/DL — SIGNIFICANT CHANGE UP (ref 0.2–1)
UUN UR-MCNC: 1263 MG/DL — SIGNIFICANT CHANGE UP
WBC # BLD: 12.14 K/UL — HIGH (ref 3.8–10.5)
WBC # FLD AUTO: 12.14 K/UL — HIGH (ref 3.8–10.5)
WBC UR QL: 1 /HPF — SIGNIFICANT CHANGE UP (ref 0–5)

## 2024-04-27 PROCEDURE — 99291 CRITICAL CARE FIRST HOUR: CPT

## 2024-04-27 PROCEDURE — 71045 X-RAY EXAM CHEST 1 VIEW: CPT | Mod: 26

## 2024-04-27 PROCEDURE — 93010 ELECTROCARDIOGRAM REPORT: CPT

## 2024-04-27 PROCEDURE — 99233 SBSQ HOSP IP/OBS HIGH 50: CPT

## 2024-04-27 RX ORDER — PANTOPRAZOLE SODIUM 20 MG/1
40 TABLET, DELAYED RELEASE ORAL EVERY 12 HOURS
Refills: 0 | Status: DISCONTINUED | OUTPATIENT
Start: 2024-04-27 | End: 2024-04-29

## 2024-04-27 RX ORDER — POTASSIUM CHLORIDE 20 MEQ
10 PACKET (EA) ORAL
Refills: 0 | Status: COMPLETED | OUTPATIENT
Start: 2024-04-27 | End: 2024-04-27

## 2024-04-27 RX ORDER — SODIUM CHLORIDE 9 MG/ML
1000 INJECTION, SOLUTION INTRAVENOUS
Refills: 0 | Status: DISCONTINUED | OUTPATIENT
Start: 2024-04-27 | End: 2024-04-28

## 2024-04-27 RX ADMIN — PANTOPRAZOLE SODIUM 10 MG/HR: 20 TABLET, DELAYED RELEASE ORAL at 15:08

## 2024-04-27 RX ADMIN — PANTOPRAZOLE SODIUM 10 MG/HR: 20 TABLET, DELAYED RELEASE ORAL at 01:48

## 2024-04-27 RX ADMIN — Medication 100 MILLIEQUIVALENT(S): at 01:48

## 2024-04-27 RX ADMIN — Medication 2.5 MILLIGRAM(S): at 05:04

## 2024-04-27 RX ADMIN — PANTOPRAZOLE SODIUM 40 MILLIGRAM(S): 20 TABLET, DELAYED RELEASE ORAL at 17:21

## 2024-04-27 RX ADMIN — Medication 100 MILLIEQUIVALENT(S): at 10:16

## 2024-04-27 RX ADMIN — CHLORHEXIDINE GLUCONATE 1 APPLICATION(S): 213 SOLUTION TOPICAL at 05:05

## 2024-04-27 RX ADMIN — Medication 2.5 MILLIGRAM(S): at 17:21

## 2024-04-27 RX ADMIN — Medication 100 MILLIEQUIVALENT(S): at 06:52

## 2024-04-27 RX ADMIN — MUPIROCIN 1 APPLICATION(S): 20 OINTMENT TOPICAL at 17:21

## 2024-04-27 RX ADMIN — SODIUM CHLORIDE 125 MILLILITER(S): 9 INJECTION, SOLUTION INTRAVENOUS at 04:25

## 2024-04-27 RX ADMIN — Medication 100 MILLIEQUIVALENT(S): at 08:51

## 2024-04-27 RX ADMIN — Medication 100 MILLIEQUIVALENT(S): at 00:46

## 2024-04-27 RX ADMIN — MUPIROCIN 1 APPLICATION(S): 20 OINTMENT TOPICAL at 05:05

## 2024-04-27 NOTE — PROGRESS NOTE ADULT - SUBJECTIVE AND OBJECTIVE BOX
SUBJECTIVE/OVERNIGHT EVENTS: Patient seen in AM sedated but opening eyes and following commands. Attempted trial of spontaneous breathing on pressure support, but patient became apneic. Placed on VC-SIMV.    VITAL SIGNS:  Vital Signs Last 24 Hrs  T(C): 37.6 (26 Apr 2024 08:15), Max: 38.4 (25 Apr 2024 16:58)  T(F): 99.7 (26 Apr 2024 08:15), Max: 101.2 (25 Apr 2024 16:58)  HR: 91 (26 Apr 2024 13:00) (89 - 115)  BP: 127/73 (25 Apr 2024 14:00) (127/73 - 127/73)  BP(mean): 92 (25 Apr 2024 14:00) (92 - 92)  RR: 23 (26 Apr 2024 13:00) (12 - 23)  SpO2: 93% (26 Apr 2024 13:00) (93% - 100%)    Parameters below as of 26 Apr 2024 13:00  Patient On (Oxygen Delivery Method): ventilator    O2 Concentration (%): 40  PHYSICAL EXAM:  General: intubated and sedated  HEENT: NC/AT; PERRL; EOMI; MMM  Neck: supple; no JVD  Cardiac: RRR; +S1/S2  Pulm: CTA B/L; no W/R/R  GI: soft, NT/ND, +BS  Extremities: WWP; no edema, clubbing or cyanosis  Vasc: 2+ radial, DP pulses B/L  Neuro: Sedated, but opens eyes ad follows commands    MEDICATIONS:  MEDICATIONS  (STANDING):  chlorhexidine 0.12% Liquid 15 milliLiter(s) Oral Mucosa every 12 hours  chlorhexidine 2% Cloths 1 Application(s) Topical <User Schedule>  dextrose 10% Bolus 125 milliLiter(s) IV Bolus once  dextrose 5%. 1000 milliLiter(s) (50 mL/Hr) IV Continuous <Continuous>  dextrose 5%. 1000 milliLiter(s) (100 mL/Hr) IV Continuous <Continuous>  dextrose 50% Injectable 25 Gram(s) IV Push once  dextrose 50% Injectable 12.5 Gram(s) IV Push once  glucagon  Injectable 1 milliGRAM(s) IntraMuscular once  influenza  Vaccine (HIGH DOSE) 0.7 milliLiter(s) IntraMuscular once  insulin lispro (ADMELOG) corrective regimen sliding scale   SubCutaneous three times a day before meals  insulin lispro (ADMELOG) corrective regimen sliding scale   SubCutaneous at bedtime  metoprolol tartrate Injectable 2.5 milliGRAM(s) IV Push every 12 hours  mupirocin 2% Ointment 1 Application(s) Topical two times a day  pantoprazole Infusion 8 mG/Hr (10 mL/Hr) IV Continuous <Continuous>  phenylephrine    Infusion 0.5 MICROgram(s)/kG/Min (16.7 mL/Hr) IV Continuous <Continuous>  vasopressin Infusion 0.04 Unit(s)/Min (6 mL/Hr) IV Continuous <Continuous>    MEDICATIONS  (PRN):  dextrose Oral Gel 15 Gram(s) Oral once PRN Blood Glucose LESS THAN 70 milliGRAM(s)/deciliter  ipratropium    for Nebulization 500 MICROGram(s) Nebulizer every 6 hours PRN Shortness of Breath and/or Wheezing      ALLERGIES:  Allergies    No Known Allergies    Intolerances        LABS:                        8.1    13.55 )-----------( 166      ( 26 Apr 2024 10:20 )             24.4     04-26    147<H>  |  110<H>  |  96<H>  ----------------------------<  209<H>  3.6   |  29  |  1.80<H>    Ca    8.5      26 Apr 2024 05:30  Phos  2.6     04-26  Mg     2.2     04-26    TPro  5.2<L>  /  Alb  2.9<L>  /  TBili  0.6  /  DBili  x   /  AST  31  /  ALT  36  /  AlkPhos  33<L>  04-26        RADIOLOGY & ADDITIONAL TESTS: Reviewed. SUBJECTIVE/OVERNIGHT EVENTS: KCl 10x3. Iso 3.5L FWD, 125cc/hr of D5W ordered for 6 hours. Weaned to 6LNC. AM K repleted with 10mEQ x3. Found stable and in NAD while on NC 2L. Does not report chest pain, SOB, palpitations, dizziness, abdominal pain, N/V, fevers, or chills.      VITAL SIGNS:  Vital Signs Last 24 Hrs  T(C): 37.6 (26 Apr 2024 08:15), Max: 38.4 (25 Apr 2024 16:58)  T(F): 99.7 (26 Apr 2024 08:15), Max: 101.2 (25 Apr 2024 16:58)  HR: 91 (26 Apr 2024 13:00) (89 - 115)  BP: 127/73 (25 Apr 2024 14:00) (127/73 - 127/73)  BP(mean): 92 (25 Apr 2024 14:00) (92 - 92)  RR: 23 (26 Apr 2024 13:00) (12 - 23)  SpO2: 93% (26 Apr 2024 13:00) (93% - 100%)    Parameters below as of 26 Apr 2024 13:00  Patient On (Oxygen Delivery Method): ventilator    O2 Concentration (%): 40  PHYSICAL EXAM:  General: NAD while on NC  HEENT: NC/AT; PERRL; EOMI; MMM  Neck: supple; no JVD  Cardiac: RRR; +S1/S2  Pulm: CTA B/L; no W/R/R  GI: soft, NT/ND, +BS  Extremities: WWP; no edema, clubbing or cyanosis  Vasc: 2+ radial, DP pulses B/L  Neuro: AAOx2     MEDICATIONS:  MEDICATIONS  (STANDING):  chlorhexidine 0.12% Liquid 15 milliLiter(s) Oral Mucosa every 12 hours  chlorhexidine 2% Cloths 1 Application(s) Topical <User Schedule>  dextrose 10% Bolus 125 milliLiter(s) IV Bolus once  dextrose 5%. 1000 milliLiter(s) (50 mL/Hr) IV Continuous <Continuous>  dextrose 5%. 1000 milliLiter(s) (100 mL/Hr) IV Continuous <Continuous>  dextrose 50% Injectable 25 Gram(s) IV Push once  dextrose 50% Injectable 12.5 Gram(s) IV Push once  glucagon  Injectable 1 milliGRAM(s) IntraMuscular once  influenza  Vaccine (HIGH DOSE) 0.7 milliLiter(s) IntraMuscular once  insulin lispro (ADMELOG) corrective regimen sliding scale   SubCutaneous three times a day before meals  insulin lispro (ADMELOG) corrective regimen sliding scale   SubCutaneous at bedtime  metoprolol tartrate Injectable 2.5 milliGRAM(s) IV Push every 12 hours  mupirocin 2% Ointment 1 Application(s) Topical two times a day  pantoprazole Infusion 8 mG/Hr (10 mL/Hr) IV Continuous <Continuous>  phenylephrine    Infusion 0.5 MICROgram(s)/kG/Min (16.7 mL/Hr) IV Continuous <Continuous>  vasopressin Infusion 0.04 Unit(s)/Min (6 mL/Hr) IV Continuous <Continuous>    MEDICATIONS  (PRN):  dextrose Oral Gel 15 Gram(s) Oral once PRN Blood Glucose LESS THAN 70 milliGRAM(s)/deciliter  ipratropium    for Nebulization 500 MICROGram(s) Nebulizer every 6 hours PRN Shortness of Breath and/or Wheezing      ALLERGIES:  Allergies    No Known Allergies    Intolerances        LABS:                        8.1    13.55 )-----------( 166      ( 26 Apr 2024 10:20 )             24.4     04-26    147<H>  |  110<H>  |  96<H>  ----------------------------<  209<H>  3.6   |  29  |  1.80<H>    Ca    8.5      26 Apr 2024 05:30  Phos  2.6     04-26  Mg     2.2     04-26    TPro  5.2<L>  /  Alb  2.9<L>  /  TBili  0.6  /  DBili  x   /  AST  31  /  ALT  36  /  AlkPhos  33<L>  04-26        RADIOLOGY & ADDITIONAL TESTS: Reviewed.

## 2024-04-27 NOTE — PROGRESS NOTE ADULT - ASSESSMENT
74M with no known PMH, initially admitted to cardiac telemetry s/p fall for rapid a fib. Course complicated by new diagnosis of new CHF (LVEF 20-25%), PE, SILKE on CKD, and melena. GI consulted for melena when transitioning from heparin gtt to Eliquis.     Underwent EGD on 04/24/24, which showed:   - Normal mucosa was noted in the whole esophagus.  - Normal mucosa was noted in the whole stomach.  - An a single oozing ulcer was found in the duodenal bulb. Additional findings include An ulcer with a visible vessel was seen in the duodenal sweep. This ulcer was a Heraclio Class IIa. Four Endoclips were successfully applied to the duodenal bulb for the purpose of hemostasis. Bi-cap electrocautery was successfully applied for hemostasis.    Recommendations:  -Transition to protonix 40 mg IVP q12hrs at 1600 today.  Continue IV for now  -Repeat Hgb this afternoon; transfuse as indicated. Maintain active T&S  -When cleared from aspiration standpoint, may advance to CLD.    Olu Cobos, DO  Gastroenterology Fellow  After 5PM or on weekends, please contact Jaylen Hill  for fellow on call

## 2024-04-27 NOTE — PROGRESS NOTE ADULT - ASSESSMENT
74M with no known PMH, initially admitted to cardiac telemetry s/p fall for rapid a fib. Course complicated by new diagnosis of new CHF (LVEF 20-25%), PE, SILKE on CKD, and melena. GI consulted for melena when transitioning from heparin gtt to Eliquis, with EGD showing bleeding duodenal ulcer s/p clipping.    NEURO  #Fever.   Intermittent fevers, highest temp rectal 101.6F 4/17. Febrile 4/25  Per ID, favor community acquired aspiration PNA (given poor dentition, L side opacity on CXR)  Ceftriaxone course completed, Last Dose of Linezolid 4/24/2024   HIV -, HEP -, UStrep -, Ulegionella -, Procal - BCx 3 NGTD for 48 hours, BC collected 4/19   - CTA pe rule out on 4/20 with improvement in study showing Multiple small subsegmental emboli identified within the right lower lobe pulmonary artery branches. Small plueral effusions with atelectasis.   - B/L LE dopplers negative for DVT.  - Monitor off Abx    #Sedated  Weaned off propofol and  fentanyl drips 4/25, but slow to respond. Currently opening eyes and following commands  - monitor    CV  #Hypotension  #Hypovolemic shock  BP 70s/40s on 4/24 iso multiple episodes melena. Levo gtt started, 2U pRBC and 1U FFP given iso active GIB and shock. Cordis, R IJ TLC, R axillary a line placed for close BP monitoring and access for transfusions. EGD sowed bleeding duopdenal ulcer now s/p clipping. Off levophed. Now off phenylephrine drip. on vaso drip.    - currently intubated. sedatioin weaned off with slow response. Plan for extubation 4/26    #Acute decompensated HF  TTE 4/15/24: Severely reduced LVSF and RVSF, LVEF 20-25%, mild biatrial enlargement, mild TR, PASP 68, dilated IVC  RHC/LHC: focal RCA lesion w/ evidence of hypovolemia  -CTA chest: Neg for PE. Small right and trace left pleural effusions.   -Diuresis: s/p daily IV Lasix 60   - GDMT (on hold while sedated and intubated): Valsartan 40 BID, spironolactone 25 mg QD, metoprolol tartrate 25 mg BID   -Core measures, strict I/O's daily weights, 1.2L PO restriction, strict I/Os, cont tele/pulse ox  -Advanced HF team following;  -Now holding lasix i/s/o aggressive diuresis, SILKE, contraction alkalosis & subsequent AHRF  -Holding valsartan and spironolactone i/s/o SILKE  -c/w lopressor 2.5 IV Q12    #AFib  HR 130s, unclear whether patient has hx of afib or is new. Never f/u with Drs.  - BB as above  - Hold AC iso bleed    #Aneurysm of thoracic aorta   - CT Abd/Pelvis 04/13/24: No thoracoabdominal dissection, no thoracic aorta hematoma. Ascending thoracic aortic aneurysm 4.0 cm  - Consider CTS follow up in outpatient setting for surveillance.    # Rhabdomyolysis. - RESOLVED  Patient had mechanical fall and was down on the ground x 2 days.   - CK improving, 2336 -> 2002-> 777,       #HTN (hypertension).   - hold valsartan 40 mg BID and metoprolol 25 mg BID i/s/o SILKE and intubation/sedation    PULMONARY  #AHRF w/ hypercapnia   Likely 2/2 persistent tachypnea 2/2 compensatory respiratory acidosis in reaction to contraction alkalosis 2/2 aggressive diuresis vs aspiration  s/p BiPAP -->5L NC --> HFNC 40/40. Now intubated with plan for extubation 4/26  - Monitor respiratory status    #Subsegmental PE   CTPE w/ evidence of small subsegmental PE in RLL   transitioned from heparin gtt to eliquis 4/24 but discontinued i/s/o GIB    GI  #UGI Bleed  Pt w/ large  melena when transitioning from heparin gtt to Eliquis. EGD with GI 4/24 revealed bleeding duodenal ulcer s/p clipping.     - pantoprazole gtt for 72 hours (04/24 at 4 PM to 04/27 at 4PM)  - Ok to extubate   - hold AC, restart as appropriate   - maintain active type & screen   - 2 large-bore IVs    RENAL  #SILKE   Initial Cr of 0.8 -->2.12. Today 2.01  Likely 2/2 aggressive diuresis & volume loss from melena   - careful volume repletion i/s/o acute HF   - holding valsartan and spironolactone       #Hypernatremia   hypovolemic hypernatremia i/s/o diuresis   - CTM   - hold further diuresis    ID  #Lower extremity wounds   S/p linezolid  -wound care     #prophylactic measures  F: None   E: Replete as necessary K>4 Mg>2  N: NPO for EGD    DVT Prophylaxis: hold i/s/o melena  GI prophylaxis: Protonix  CODE STATUS: FULL   74M with no known PMH, initially admitted to cardiac telemetry s/p fall for rapid a fib. Course complicated by new diagnosis of new CHF (LVEF 20-25%), PE, SILKE on CKD, and melena. GI consulted for melena when transitioning from heparin gtt to Eliquis, with EGD showing bleeding duodenal ulcer s/p clipping.    NEURO  #Fever.   Intermittent fevers, highest temp rectal 101.6F 4/17. Febrile 4/25  Per ID, favor community acquired aspiration PNA (given poor dentition, L side opacity on CXR)  Ceftriaxone course completed, Last Dose of Linezolid 4/24/2024   HIV -, HEP -, UStrep -, Ulegionella -, Procal - BCx 3 NGTD for 48 hours, BC collected 4/19   - CTA pe rule out on 4/20 with improvement in study showing Multiple small subsegmental emboli identified within the right lower lobe pulmonary artery branches. Small plueral effusions with atelectasis.   - B/L LE dopplers negative for DVT.  - Monitor off Abx    #Sedated  Weaned off propofol and  fentanyl drips 4/25, but slow to respond. Currently opening eyes and following commands  - monitor    CV  #Hypotension  #Hypovolemic shock  BP 70s/40s on 4/24 iso multiple episodes melena. Levo gtt started, 2U pRBC and 1U FFP given iso active GIB and shock. Cordis, R IJ TLC, R axillary a line placed for close BP monitoring and access for transfusions. EGD sowed bleeding duopdenal ulcer now s/p clipping. Now off pressor support and HDS. No further signs or symptoms of active bleeding; Hgb lateral ~8.      #Acute decompensated HF  TTE 4/15/24: Severely reduced LVSF and RVSF, LVEF 20-25%, mild biatrial enlargement, mild TR, PASP 68, dilated IVC  RHC/LHC: focal RCA lesion w/ evidence of hypovolemia  -CTA chest: Neg for PE. Small right and trace left pleural effusions.   -Diuresis: s/p daily IV Lasix 60   - GDMT (on hold while sedated and intubated): Valsartan 40 BID, spironolactone 25 mg QD, metoprolol tartrate 25 mg BID   -Core measures, strict I/O's daily weights, 1.2L PO restriction, strict I/Os, cont tele/pulse ox  -Advanced HF team following;  -Now holding lasix i/s/o aggressive diuresis, SILKE, contraction alkalosis & subsequent AHRF; s/p D5 1L bolus  -Holding valsartan and spironolactone i/s/o SILKE  -c/w lopressor 2.5 IV Q12    #AFib  HR 130s, unclear whether patient has hx of afib or is new. Never f/u with Drs.  - BB as above  - Hold AC iso bleed    #Aneurysm of thoracic aorta   - CT Abd/Pelvis 04/13/24: No thoracoabdominal dissection, no thoracic aorta hematoma. Ascending thoracic aortic aneurysm 4.0 cm  - Consider CTS follow up in outpatient setting for surveillance.    # Rhabdomyolysis. - RESOLVED  Patient had mechanical fall and was down on the ground x 2 days.   - CK improving, 2336 -> 2002-> 777,       #HTN (hypertension).   - hold valsartan 40 mg BID and metoprolol 25 mg BID i/s/o SILKE and intubation/sedation    PULMONARY  #AHRF w/ hypercapnia   Likely 2/2 persistent tachypnea 2/2 compensatory respiratory acidosis in reaction to contraction alkalosis 2/2 aggressive diuresis vs aspiration  s/p BiPAP -->5L NC --> HFNC 40/40. Now intubated with plan for extubation 4/26  - Monitor respiratory status    #Subsegmental PE   CTPE w/ evidence of small subsegmental PE in RLL   transitioned from heparin gtt to eliquis 4/24 but discontinued i/s/o GIB    GI  #UGI Bleed  Pt w/ large  melena when transitioning from heparin gtt to Eliquis. EGD with GI 4/24 revealed bleeding duodenal ulcer s/p clipping.     - pantoprazole gtt for 72 hours (04/24 at 4 PM to 04/27 at 4PM)  - Ok to extubate   - hold AC, restart as appropriate   - maintain active type & screen   - 2 large-bore IVs    RENAL  #SILKE   Initial Cr of 0.8 -->2.12. Today 2.01  Likely 2/2 aggressive diuresis & volume loss from melena   - careful volume repletion i/s/o acute HF   - holding valsartan and spironolactone       #Hypernatremia   hypovolemic hypernatremia i/s/o diuresis; urine studies evidencing appropriate response to hypovolemia, low suspicion of DI  - CTM; s/p D5 1L bolus  - hold further diuresis    ID  #Lower extremity wounds   S/p linezolid  -wound care     #prophylactic measures  F: None   E: Replete as necessary K>4 Mg>2  N: NPO for EGD    DVT Prophylaxis: hold i/s/o melena  GI prophylaxis: Protonix  CODE STATUS: FULL

## 2024-04-27 NOTE — PROGRESS NOTE ADULT - SUBJECTIVE AND OBJECTIVE BOX
Pt seen and examined at bedside.  Extubated and conversational.  Some soreness in throat.  Aspiration noted on bedside swallow so remains NPO  No abd pain  No melena per CCU team/RN    Allergies    No Known Allergies    Intolerances        MEDICATIONS:  MEDICATIONS  (STANDING):  chlorhexidine 2% Cloths 1 Application(s) Topical <User Schedule>  dextrose 10% Bolus 125 milliLiter(s) IV Bolus once  dextrose 5%. 1000 milliLiter(s) (50 mL/Hr) IV Continuous <Continuous>  dextrose 5%. 1000 milliLiter(s) (100 mL/Hr) IV Continuous <Continuous>  dextrose 5%. 1000 milliLiter(s) (125 mL/Hr) IV Continuous <Continuous>  dextrose 50% Injectable 12.5 Gram(s) IV Push once  dextrose 50% Injectable 25 Gram(s) IV Push once  glucagon  Injectable 1 milliGRAM(s) IntraMuscular once  influenza  Vaccine (HIGH DOSE) 0.7 milliLiter(s) IntraMuscular once  insulin lispro (ADMELOG) corrective regimen sliding scale   SubCutaneous three times a day before meals  insulin lispro (ADMELOG) corrective regimen sliding scale   SubCutaneous at bedtime  metoprolol tartrate Injectable 2.5 milliGRAM(s) IV Push every 12 hours  mupirocin 2% Ointment 1 Application(s) Topical two times a day  pantoprazole Infusion 8 mG/Hr (10 mL/Hr) IV Continuous <Continuous>    MEDICATIONS  (PRN):  dextrose Oral Gel 15 Gram(s) Oral once PRN Blood Glucose LESS THAN 70 milliGRAM(s)/deciliter  ipratropium    for Nebulization 500 MICROGram(s) Nebulizer every 6 hours PRN Shortness of Breath and/or Wheezing      Vital Signs Last 24 Hrs  T(C): 37.4 (2024 12:00), Max: 37.4 (2024 16:00)  T(F): 99.3 (2024 12:00), Max: 99.3 (2024 16:00)  HR: 105 (2024 14:00) (89 - 108)  BP: 94/66 (2024 14:00) (94/66 - 130/61)  BP(mean): 76 (2024 14:00) (76 - 83)  RR: 20 (2024 14:00) (0 - 32)  SpO2: 93% (2024 14:00) (91% - 100%)    Parameters below as of 2024 14:00  Patient On (Oxygen Delivery Method): room air         @ 07: @ 07:00  --------------------------------------------------------  IN: 1399.7 mL / OUT: 2295 mL / NET: -895.3 mL     @ 07: @ 15:00  --------------------------------------------------------  IN: 510 mL / OUT: 395 mL / NET: 115 mL        PHYSICAL EXAM:    General: No acute distress  Cardiac: Tachy on tele  Pulm: Normal resp effort  Gastrointestinal: Soft non-tender non-distended. No rebound or guarding  Skin: Warm and dry. No obvious rash    LABS:  CBC Full  -  ( 2024 05:35 )  WBC Count : 12.14 K/uL  RBC Count : 2.67 M/uL  Hemoglobin : 8.0 g/dL  Hematocrit : 25.7 %  Platelet Count - Automated : 179 K/uL  Mean Cell Volume : 96.3 fl  Mean Cell Hemoglobin : 30.0 pg  Mean Cell Hemoglobin Concentration : 31.1 gm/dL  Auto Neutrophil # : 10.30 K/uL  Auto Lymphocyte # : 0.86 K/uL  Auto Monocyte # : 0.84 K/uL  Auto Eosinophil # : 0.04 K/uL  Auto Basophil # : 0.01 K/uL  Auto Neutrophil % : 84.9 %  Auto Lymphocyte % : 7.1 %  Auto Monocyte % : 6.9 %  Auto Eosinophil % : 0.3 %  Auto Basophil % : 0.1 %        152<H>  |  114<H>  |  70<H>  ----------------------------<  117<H>  3.5   |  30  |  1.28    Ca    9.1      2024 05:35  Phos  2.6       Mg     2.2         TPro  5.7<L>  /  Alb  3.1<L>  /  TBili  0.5  /  DBili  x   /  AST  32  /  ALT  32  /  AlkPhos  38<L>            Urinalysis Basic - ( 2024 09:20 )    Color: Yellow / Appearance: Clear / S.020 / pH: x  Gluc: x / Ketone: Negative mg/dL  / Bili: Negative / Urobili: 0.2 mg/dL   Blood: x / Protein: 30 mg/dL / Nitrite: Negative   Leuk Esterase: Negative / RBC: 1 /HPF / WBC 1 /HPF   Sq Epi: x / Non Sq Epi: 1 /HPF / Bacteria: Negative /HPF                RADIOLOGY & ADDITIONAL STUDIES:

## 2024-04-28 LAB
ALBUMIN SERPL ELPH-MCNC: 3.2 G/DL — LOW (ref 3.3–5)
ALP SERPL-CCNC: 40 U/L — SIGNIFICANT CHANGE UP (ref 40–120)
ALT FLD-CCNC: 29 U/L — SIGNIFICANT CHANGE UP (ref 10–45)
ANION GAP SERPL CALC-SCNC: 9 MMOL/L — SIGNIFICANT CHANGE UP (ref 5–17)
AST SERPL-CCNC: 31 U/L — SIGNIFICANT CHANGE UP (ref 10–40)
BASOPHILS # BLD AUTO: 0.01 K/UL — SIGNIFICANT CHANGE UP (ref 0–0.2)
BASOPHILS NFR BLD AUTO: 0.1 % — SIGNIFICANT CHANGE UP (ref 0–2)
BILIRUB SERPL-MCNC: 0.5 MG/DL — SIGNIFICANT CHANGE UP (ref 0.2–1.2)
BUN SERPL-MCNC: 44 MG/DL — HIGH (ref 7–23)
CALCIUM SERPL-MCNC: 9.3 MG/DL — SIGNIFICANT CHANGE UP (ref 8.4–10.5)
CHLORIDE SERPL-SCNC: 115 MMOL/L — HIGH (ref 96–108)
CO2 SERPL-SCNC: 30 MMOL/L — SIGNIFICANT CHANGE UP (ref 22–31)
CREAT SERPL-MCNC: 1.16 MG/DL — SIGNIFICANT CHANGE UP (ref 0.5–1.3)
EGFR: 66 ML/MIN/1.73M2 — SIGNIFICANT CHANGE UP
EOSINOPHIL # BLD AUTO: 0.04 K/UL — SIGNIFICANT CHANGE UP (ref 0–0.5)
EOSINOPHIL NFR BLD AUTO: 0.3 % — SIGNIFICANT CHANGE UP (ref 0–6)
GLUCOSE BLDC GLUCOMTR-MCNC: 103 MG/DL — HIGH (ref 70–99)
GLUCOSE BLDC GLUCOMTR-MCNC: 110 MG/DL — HIGH (ref 70–99)
GLUCOSE BLDC GLUCOMTR-MCNC: 119 MG/DL — HIGH (ref 70–99)
GLUCOSE BLDC GLUCOMTR-MCNC: 125 MG/DL — HIGH (ref 70–99)
GLUCOSE SERPL-MCNC: 102 MG/DL — HIGH (ref 70–99)
HCT VFR BLD CALC: 26.6 % — LOW (ref 39–50)
HGB BLD-MCNC: 8.1 G/DL — LOW (ref 13–17)
IMM GRANULOCYTES NFR BLD AUTO: 1 % — HIGH (ref 0–0.9)
LACTATE SERPL-SCNC: 1.2 MMOL/L — SIGNIFICANT CHANGE UP (ref 0.5–2)
LYMPHOCYTES # BLD AUTO: 1.05 K/UL — SIGNIFICANT CHANGE UP (ref 1–3.3)
LYMPHOCYTES # BLD AUTO: 9.2 % — LOW (ref 13–44)
MAGNESIUM SERPL-MCNC: 2.3 MG/DL — SIGNIFICANT CHANGE UP (ref 1.6–2.6)
MCHC RBC-ENTMCNC: 30.2 PG — SIGNIFICANT CHANGE UP (ref 27–34)
MCHC RBC-ENTMCNC: 30.5 GM/DL — LOW (ref 32–36)
MCV RBC AUTO: 99.3 FL — SIGNIFICANT CHANGE UP (ref 80–100)
MONOCYTES # BLD AUTO: 0.94 K/UL — HIGH (ref 0–0.9)
MONOCYTES NFR BLD AUTO: 8.2 % — SIGNIFICANT CHANGE UP (ref 2–14)
NEUTROPHILS # BLD AUTO: 9.29 K/UL — HIGH (ref 1.8–7.4)
NEUTROPHILS NFR BLD AUTO: 81.2 % — HIGH (ref 43–77)
NRBC # BLD: 0 /100 WBCS — SIGNIFICANT CHANGE UP (ref 0–0)
PHOSPHATE SERPL-MCNC: 3.1 MG/DL — SIGNIFICANT CHANGE UP (ref 2.5–4.5)
PLATELET # BLD AUTO: 201 K/UL — SIGNIFICANT CHANGE UP (ref 150–400)
POTASSIUM SERPL-MCNC: 3.6 MMOL/L — SIGNIFICANT CHANGE UP (ref 3.5–5.3)
POTASSIUM SERPL-SCNC: 3.6 MMOL/L — SIGNIFICANT CHANGE UP (ref 3.5–5.3)
PROT SERPL-MCNC: 5.8 G/DL — LOW (ref 6–8.3)
RBC # BLD: 2.68 M/UL — LOW (ref 4.2–5.8)
RBC # FLD: 16.7 % — HIGH (ref 10.3–14.5)
SODIUM SERPL-SCNC: 154 MMOL/L — HIGH (ref 135–145)
WBC # BLD: 11.44 K/UL — HIGH (ref 3.8–10.5)
WBC # FLD AUTO: 11.44 K/UL — HIGH (ref 3.8–10.5)

## 2024-04-28 PROCEDURE — 93010 ELECTROCARDIOGRAM REPORT: CPT

## 2024-04-28 PROCEDURE — 99291 CRITICAL CARE FIRST HOUR: CPT

## 2024-04-28 PROCEDURE — 71045 X-RAY EXAM CHEST 1 VIEW: CPT | Mod: 26

## 2024-04-28 RX ORDER — VALSARTAN 80 MG/1
20 TABLET ORAL EVERY 12 HOURS
Refills: 0 | Status: DISCONTINUED | OUTPATIENT
Start: 2024-04-28 | End: 2024-05-02

## 2024-04-28 RX ORDER — METOPROLOL TARTRATE 50 MG
12.5 TABLET ORAL EVERY 12 HOURS
Refills: 0 | Status: DISCONTINUED | OUTPATIENT
Start: 2024-04-28 | End: 2024-04-29

## 2024-04-28 RX ADMIN — Medication 2.5 MILLIGRAM(S): at 05:15

## 2024-04-28 RX ADMIN — PANTOPRAZOLE SODIUM 40 MILLIGRAM(S): 20 TABLET, DELAYED RELEASE ORAL at 18:45

## 2024-04-28 RX ADMIN — CHLORHEXIDINE GLUCONATE 1 APPLICATION(S): 213 SOLUTION TOPICAL at 05:15

## 2024-04-28 RX ADMIN — PANTOPRAZOLE SODIUM 40 MILLIGRAM(S): 20 TABLET, DELAYED RELEASE ORAL at 05:15

## 2024-04-28 RX ADMIN — Medication 12.5 MILLIGRAM(S): at 18:45

## 2024-04-28 RX ADMIN — MUPIROCIN 1 APPLICATION(S): 20 OINTMENT TOPICAL at 18:45

## 2024-04-28 RX ADMIN — VALSARTAN 20 MILLIGRAM(S): 80 TABLET ORAL at 18:45

## 2024-04-28 RX ADMIN — MUPIROCIN 1 APPLICATION(S): 20 OINTMENT TOPICAL at 05:16

## 2024-04-28 NOTE — SWALLOW FEES ASSESSMENT ADULT - DIAGNOSTIC IMPRESSIONS
Pt is deemed safe for a soft and bite size solids/ mildly thick liquids - SINGLE CUP SIPS (no straw, no consecutive sips). Pt requires feeding assistance to provide single cup sips.  Case d/w medical team who reported plan to initiate CLD due to GI concerns, however agreed to mildly thick liquids.  Therefore, initiate clear liquid- MILDLY THICK liquids, and advance to soft and bite size per GI/medical team recs. Pt presents with moderate pharyngeal dysphagia with primary impact to safety and mild impact to efficiency. Silent aspiration occurred with thin liquids and with mildly thick via straw and consecutive cup sip. Airway protection optimized with mildly thick via SINGLE CUP SIP. Pt benefitted from intermittent secondary swallows to reduce pharyngeal residue.   Pt remains at risk for aspiration given need for feeding assistance. Case d/w medical team.   Pt is deemed safe for soft and bite size solids/ mildly thick liquids via SINGLE CUP SIPS (no straw, no consecutive sips). Pt requires feeding assistance to provide single cup sips.   Medical team reported plan to initiate CLD due to GI concerns, however agreed to mildly thick liquids.  Therefore, recommend initiate clear - MILDLY THICK liquids diet, and advance to soft and bite size per GI/medical team recs.

## 2024-04-28 NOTE — SWALLOW FEES ASSESSMENT ADULT - PHARYNGEAL PHASE COMMENTS
Clinician fed liquid trials, purees, and solid boli at the base of tongue during pharyngeal swallow initiation. Reduced bolus control and delayed swallow initiation with self fed cup sips of mildly thick liquids, which spilled to the pyriforms/ laryngeal vestibule via the left arytenoid. Inconsistent white out period noted, which suggests inconsistent epiglottic inversion/ reduced pharyngeal squeeze. This, along with incomplete laryngeal vestibule closure resulted in trace SILENT aspiration of thin liquids, and mildly thick liquids via straw or self fed cup sip. Airway protection improved with mildly thick liquids via SINGLE CUP SIP (RN fed cup sips), however at times difficult to assess if aspirate was new or from previous trials. A cued strong cough was effective in reducing, and at times clearing the aspiration. Reduced pharyngeal efficiency resulted in mild vallecular and trace posterior pharyngeal wall residue, worst with liquids. A cued secondary swallow was effective in reducing pharyngeal residue. After the swallow, oral residue spilled to the valleculae but cleared with an independent swallow. Initiation of the pharyngeal swallow was imaged with clinician fed liquids, purees, and solid boli at the base of tongue. Reduced bolus control and delayed swallow initiation with self fed cup sips of mildly thick liquids, which spilled to the pyriforms/ laryngeal vestibule via the left arytenoid prior to swallow initiaion. Incomplete white out period with smaller boli (tsp trials), which suggests inconsistent epiglottic inversion/ reduced pharyngeal squeeze. This, along with incomplete laryngeal vestibule closure resulted in chronic trace aspiration of thin liquids, and mildly thick liquids via straw or self fed cup sip. Airway protection improved with mildly thick liquids via SINGLE CUP SIP (RN fed cup sips), however at times difficult to assess if aspirate was new or from previous trials. Occasional weak throat clear noted in response to aspiration of thin liquids, however aspiration was most often silent. A cued strong cough was effective in reducing, and at times clearing the aspiration. Reduced pharyngeal efficiency resulted in mild vallecular and trace posterior pharyngeal wall residue, worst with liquids. A cued secondary swallow was effective in reducing pharyngeal residue. After the swallow, oral residue spilled to the valleculae but cleared with an independent swallow.

## 2024-04-28 NOTE — SWALLOW FEES ASSESSMENT ADULT - SLP GENERAL OBSERVATIONS
Pt awake and alert with 2L O2 via NC sitting upright OOB in chair. Pt able to follow commands and participate in simple conversatin

## 2024-04-28 NOTE — PROGRESS NOTE ADULT - SUBJECTIVE AND OBJECTIVE BOX
***Transfer from CCU to cardiology***  74M with no known PMH, who presented to [facility] on 04/14/24 after mechanical fall, was found down on floor x 2 days by karen. Labs significant for Trop T 52->54, CK 2k BNP 7k . CTH without acute bleed. CT A/P neg for PE, + ascites. Pt admitted to cardiac tele for newly dx rapid a fib, rhabdo, and suspected ADHF exacerbation. Course c/b hypernatremia, s/p lactated ringers. Echo revealing LVEF 20-25%, reduced RVSF. Advanced HF team consulted plan for R/LHC however postponed given new fevers (fever peak 4/18 pm rectal 101). 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 CT PE on 4/20 with small PE right side. s/p R/LHC on 4/22 where patient became alkalotic and hypercapnic requiring brief BiPAP use. Diuretics and all nephrotoxic medications on hold pending resolution of SILKE. Course complicated by two episodes of melena on 04/23, for which GI was consulted. Patient developed melena when transitioned from heparin gtt to Eliquis for newly diagnosed PE, so was started on pantoprazole 40 mg IV BID, which was transitioned top Drip. Patient transfered to CCU in hypovolemic shock requiring phenylephrine and vasopressin. Pateint was electively intubated and sedated for EGD. On 4/24 EGD showed oozing duodenal ulcer, which was clipped x4 with good hemostasis. Patient was weaned off of sedation following procedure, but with lagging mental sttaus. He was extubated 26, when his mental status allowed, and has had good respiratory status since on NC. Pateint weaned off pressors. Pateint failed speech and swallow evaluation and had FEES with silent aspiration. Recommended for minced and moist with mildly thich liquids. Diet advanced to clear liquids mildly thick in setting of recent UGIB. Patient stable for stepdown.    SUBJECTIVE/OVERNIGHT EVENTS: No acute overnight events. Pt seen in AM at bedside, resting comfortably in bed, and does not appear to be in any acute distress. When asked, pt denies any recent or active fever, chills, nausea, vomiting, headache, acute sob, chest pain, abdominal pain, genitourinary sx.    VITAL SIGNS:  Vital Signs Last 24 Hrs  T(C): 37.1 (28 Apr 2024 10:00), Max: 38 (27 Apr 2024 17:00)  T(F): 98.8 (28 Apr 2024 10:00), Max: 100.4 (27 Apr 2024 17:00)  HR: 112 (28 Apr 2024 12:00) (85 - 112)  BP: 115/60 (28 Apr 2024 12:00) (94/66 - 139/97)  BP(mean): 83 (28 Apr 2024 12:00) (73 - 104)  RR: 21 (28 Apr 2024 12:00) (10 - 34)  SpO2: 99% (28 Apr 2024 12:00) (90% - 100%)    Parameters below as of 28 Apr 2024 12:00  Patient On (Oxygen Delivery Method): nasal cannula  O2 Flow (L/min): 2    PHYSICAL EXAM:  PHYSICAL EXAM:  General: NAD while on NC  HEENT: NC/AT; PERRL; EOMI; MMM  Neck: supple; no JVD  Cardiac: RRR; +S1/S2  Pulm: CTA B/L; no W/R/R  GI: soft, NT/ND, +BS  Extremities: WWP; no edema, clubbing or cyanosis  Vasc: 2+ radial, DP pulses B/L  Neuro: AAOx2     MEDICATIONS:  MEDICATIONS  (STANDING):  chlorhexidine 2% Cloths 1 Application(s) Topical <User Schedule>  dextrose 10% Bolus 125 milliLiter(s) IV Bolus once  dextrose 5%. 1000 milliLiter(s) (50 mL/Hr) IV Continuous <Continuous>  dextrose 5%. 1000 milliLiter(s) (100 mL/Hr) IV Continuous <Continuous>  dextrose 50% Injectable 25 Gram(s) IV Push once  dextrose 50% Injectable 12.5 Gram(s) IV Push once  glucagon  Injectable 1 milliGRAM(s) IntraMuscular once  influenza  Vaccine (HIGH DOSE) 0.7 milliLiter(s) IntraMuscular once  insulin lispro (ADMELOG) corrective regimen sliding scale   SubCutaneous three times a day before meals  insulin lispro (ADMELOG) corrective regimen sliding scale   SubCutaneous at bedtime  metoprolol tartrate Injectable 2.5 milliGRAM(s) IV Push every 12 hours  mupirocin 2% Ointment 1 Application(s) Topical two times a day  pantoprazole  Injectable 40 milliGRAM(s) IV Push every 12 hours    MEDICATIONS  (PRN):  dextrose Oral Gel 15 Gram(s) Oral once PRN Blood Glucose LESS THAN 70 milliGRAM(s)/deciliter  ipratropium    for Nebulization 500 MICROGram(s) Nebulizer every 6 hours PRN Shortness of Breath and/or Wheezing      ALLERGIES:  Allergies    No Known Allergies    Intolerances        LABS:                        8.1    11.44 )-----------( 201      ( 28 Apr 2024 05:30 )             26.6     04-28    154<H>  |  115<H>  |  44<H>  ----------------------------<  102<H>  3.6   |  30  |  1.16    Ca    9.3      28 Apr 2024 05:30  Phos  3.1     04-28  Mg     2.3     04-28    TPro  5.8<L>  /  Alb  3.2<L>  /  TBili  0.5  /  DBili  x   /  AST  31  /  ALT  29  /  AlkPhos  40  04-28        RADIOLOGY & ADDITIONAL TESTS: Reviewed. ***Transfer from CCU to cardiology***  74M with no known PMH, who presented to [facility] on 04/14/[facility] after mechanical fall, was found down on floor x 2 days by karen. Labs significant for Trop T 52->54, CK 2k BNP 7k . CTH without acute bleed. CT A/P neg for PE, + ascites. Pt admitted to cardiac tele for newly dx rapid a fib, rhabdo, and suspected ADHF exacerbation. Course c/b hypernatremia, s/p lactated ringers. Echo revealing LVEF 20-25%, reduced RVSF. Advanced HF team consulted plan for R/LHC however postponed given new fevers (fever peak 4/18 pm rectal 101). 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 CT PE on 4/20 with small PE right side. s/p R/LHC on 4/22 where patient became alkalotic and hypercapnic requiring brief BiPAP use. Diuretics and all nephrotoxic medications on hold pending resolution of SILKE. Course complicated by two episodes of melena on 04/23, for which GI was consulted. Patient developed melena when transitioned from heparin gtt to Eliquis for newly diagnosed PE, so was started on pantoprazole 40 mg IV BID, which was transitioned top Drip. Patient transfered to CCU in hypovolemic shock requiring phenylephrine and vasopressin. Pateint was electively intubated and sedated for EGD. On 4/24 EGD showed oozing duodenal ulcer, which was clipped x4 with good hemostasis. Patient was weaned off of sedation following procedure, but with lagging mental sttaus. He was extubated 26, when his mental status allowed, and has had good respiratory status since on NC. Pateint weaned off pressors. Pateint failed speech and swallow evaluation and had FEES with silent aspiration. Recommended for soft and bite sized with mildly thich liquids. Diet advanced to clear liquids mildly thick in setting of recent UGIB. Patient stable for stepdown.    SUBJECTIVE/OVERNIGHT EVENTS: No acute overnight events. Pt seen in AM at bedside, resting comfortably in bed, and does not appear to be in any acute distress. When asked, pt denies any recent or active fever, chills, nausea, vomiting, headache, acute sob, chest pain, abdominal pain, genitourinary sx.    VITAL SIGNS:  Vital Signs Last 24 Hrs  T(C): 37.1 (28 Apr 2024 10:00), Max: 38 (27 Apr 2024 17:00)  T(F): 98.8 (28 Apr 2024 10:00), Max: 100.4 (27 Apr 2024 17:00)  HR: 112 (28 Apr 2024 12:00) (85 - 112)  BP: 115/60 (28 Apr 2024 12:00) (94/66 - 139/97)  BP(mean): 83 (28 Apr 2024 12:00) (73 - 104)  RR: 21 (28 Apr 2024 12:00) (10 - 34)  SpO2: 99% (28 Apr 2024 12:00) (90% - 100%)    Parameters below as of 28 Apr 2024 12:00  Patient On (Oxygen Delivery Method): nasal cannula  O2 Flow (L/min): 2    PHYSICAL EXAM:  PHYSICAL EXAM:  General: NAD while on NC  HEENT: NC/AT; PERRL; EOMI; MMM  Neck: supple; no JVD  Cardiac: RRR; +S1/S2  Pulm: CTA B/L; no W/R/R  GI: soft, NT/ND, +BS  Extremities: WWP; no edema, clubbing or cyanosis  Vasc: 2+ radial, DP pulses B/L  Neuro: AAOx2     MEDICATIONS:  MEDICATIONS  (STANDING):  chlorhexidine 2% Cloths 1 Application(s) Topical <User Schedule>  dextrose 10% Bolus 125 milliLiter(s) IV Bolus once  dextrose 5%. 1000 milliLiter(s) (50 mL/Hr) IV Continuous <Continuous>  dextrose 5%. 1000 milliLiter(s) (100 mL/Hr) IV Continuous <Continuous>  dextrose 50% Injectable 25 Gram(s) IV Push once  dextrose 50% Injectable 12.5 Gram(s) IV Push once  glucagon  Injectable 1 milliGRAM(s) IntraMuscular once  influenza  Vaccine (HIGH DOSE) 0.7 milliLiter(s) IntraMuscular once  insulin lispro (ADMELOG) corrective regimen sliding scale   SubCutaneous three times a day before meals  insulin lispro (ADMELOG) corrective regimen sliding scale   SubCutaneous at bedtime  metoprolol tartrate Injectable 2.5 milliGRAM(s) IV Push every 12 hours  mupirocin 2% Ointment 1 Application(s) Topical two times a day  pantoprazole  Injectable 40 milliGRAM(s) IV Push every 12 hours    MEDICATIONS  (PRN):  dextrose Oral Gel 15 Gram(s) Oral once PRN Blood Glucose LESS THAN 70 milliGRAM(s)/deciliter  ipratropium    for Nebulization 500 MICROGram(s) Nebulizer every 6 hours PRN Shortness of Breath and/or Wheezing      ALLERGIES:  Allergies    No Known Allergies    Intolerances        LABS:                        8.1    11.44 )-----------( 201      ( 28 Apr 2024 05:30 )             26.6     04-28    154<H>  |  115<H>  |  44<H>  ----------------------------<  102<H>  3.6   |  30  |  1.16    Ca    9.3      28 Apr 2024 05:30  Phos  3.1     04-28  Mg     2.3     04-28    TPro  5.8<L>  /  Alb  3.2<L>  /  TBili  0.5  /  DBili  x   /  AST  31  /  ALT  29  /  AlkPhos  40  04-28        RADIOLOGY & ADDITIONAL TESTS: Reviewed.

## 2024-04-28 NOTE — SWALLOW FEES ASSESSMENT ADULT - RECOMMENDED CONSISTENCY
Clear liquid- MILDLY THICK- diet Clear liquid- MILDLY THICK- diet via SINGLE SIPS. Pt required feeding assistance

## 2024-04-28 NOTE — SWALLOW FEES ASSESSMENT ADULT - CONSISTENCIES ADMINISTERED
Thin liquids via tsp x3, cup sip x1, Mildly thick via tsp x2, cup sip x4, consecutive cup sip x1, straw sip x1, puree via tsp x3, regular solids x2 bites/thin liquid/mildly thick/pureed/regular solid

## 2024-04-28 NOTE — SWALLOW FEES ASSESSMENT ADULT - ORAL PHASE COMMENTS
Adequate mastication and bolus manipulation. Mild oral residue of regular solids, with eventual clearance, supported by a liquid wash.

## 2024-04-28 NOTE — SWALLOW FEES ASSESSMENT ADULT - MODE OF PRESENTATION
cup/spoon/straw/self fed/fed by clinician Patient with reduced UE strength, resulting in reduced control when self feeding liquids. Patient noted to take multiple small, consecutive sips. Unable to administer one sip at a time. Benefitted from feeding assistance to increase control./cup/spoon/straw/self fed/fed by clinician

## 2024-04-28 NOTE — SWALLOW FEES ASSESSMENT ADULT - COMMENTS
INCOMPLETE NOTE Risks/benefits/alternatives of FEES were explained to the patient who provided verbal consent to participate. He tolerated the passing and presence of the scope without difficulty. Left vocal fold appeared thin, however bilateral VF movement upon phonation. Hooding of the R arytenoid.   Trace diffuse secretions noted in the pharynx, cleared after a few PO trials.

## 2024-04-28 NOTE — PROGRESS NOTE ADULT - ASSESSMENT
74M with no known PMH, initially admitted to cardiac telemetry s/p fall for rapid a fib. Course complicated by new diagnosis of new CHF (LVEF 20-25%), PE, SILKE on CKD, and melena. GI consulted for melena when transitioning from heparin gtt to Eliquis, with EGD showing bleeding duodenal ulcer s/p clipping.    NEURO  #Fever.   Intermittent fevers, highest temp rectal 101.6F 4/17. Febrile 4/25  Per ID, favor community acquired aspiration PNA (given poor dentition, L side opacity on CXR)  Ceftriaxone course completed, Last Dose of Linezolid 4/24/2024   HIV -, HEP -, UStrep -, Ulegionella -, Procal - BCx 3 NGTD for 48 hours, BC collected 4/19   - CTA pe rule out on 4/20 with improvement in study showing Multiple small subsegmental emboli identified within the right lower lobe pulmonary artery branches. Small plueral effusions with atelectasis.   - B/L LE dopplers negative for DVT.  - Monitor off Abx    #Sedated  Weaned off propofol and  fentanyl drips 4/25, but slow to respond. Currently opening eyes and following commands  - monitor    CV  #Hypotension  #Hypovolemic shock  BP 70s/40s on 4/24 iso multiple episodes melena. Levo gtt started, 2U pRBC and 1U FFP given iso active GIB and shock. Cordis, R IJ TLC, R axillary a line placed for close BP monitoring and access for transfusions. EGD sowed bleeding duopdenal ulcer now s/p clipping. Now off pressor support and HDS. No further signs or symptoms of active bleeding; Hgb lateral ~8.      #Acute decompensated HF  TTE 4/15/24: Severely reduced LVSF and RVSF, LVEF 20-25%, mild biatrial enlargement, mild TR, PASP 68, dilated IVC  RHC/LHC: focal RCA lesion w/ evidence of hypovolemia  -CTA chest: Neg for PE. Small right and trace left pleural effusions.   -Diuresis: s/p daily IV Lasix 60   - GDMT (on hold while sedated and intubated): Valsartan 40 BID, spironolactone 25 mg QD, metoprolol tartrate 25 mg BID   -Core measures, strict I/O's daily weights, 1.2L PO restriction, strict I/Os, cont tele/pulse ox  -Advanced HF team following;  -Now holding lasix i/s/o aggressive diuresis, SILKE, contraction alkalosis & subsequent AHRF; s/p D5 1L bolus  -Holding valsartan and spironolactone i/s/o SILKE  -c/w lopressor 2.5 IV Q12    #AFib  HR 130s, unclear whether patient has hx of afib or is new. Never f/u with Drs.  - BB as above  - Hold AC iso bleed    #Aneurysm of thoracic aorta   - CT Abd/Pelvis 04/13/24: No thoracoabdominal dissection, no thoracic aorta hematoma. Ascending thoracic aortic aneurysm 4.0 cm  - Consider CTS follow up in outpatient setting for surveillance.    # Rhabdomyolysis. - RESOLVED  Patient had mechanical fall and was down on the ground x 2 days.   - CK improving, 2336 -> 2002-> 777,       #HTN (hypertension).   - hold valsartan 40 mg BID and metoprolol 25 mg BID i/s/o SILKE and intubation/sedation    PULMONARY  #AHRF w/ hypercapnia   Likely 2/2 persistent tachypnea 2/2 compensatory respiratory acidosis in reaction to contraction alkalosis 2/2 aggressive diuresis vs aspiration  s/p BiPAP -->5L NC --> HFNC 40/40. Now intubated with plan for extubation 4/26  - Monitor respiratory status    #Subsegmental PE   CTPE w/ evidence of small subsegmental PE in RLL   transitioned from heparin gtt to eliquis 4/24 but discontinued i/s/o GIB    GI  #UGI Bleed  Pt w/ large  melena when transitioning from heparin gtt to Eliquis. EGD with GI 4/24 revealed bleeding duodenal ulcer s/p clipping.     - pantoprazole gtt for 72 hours (04/24 at 4 PM to 04/27 at 4PM)  - Ok to extubate   - hold AC, restart as appropriate   - maintain active type & screen   - 2 large-bore IVs    RENAL  #SILKE   Initial Cr of 0.8 -->2.12. Today 2.01  Likely 2/2 aggressive diuresis & volume loss from melena   - careful volume repletion i/s/o acute HF   - holding valsartan and spironolactone       #Hypernatremia   hypovolemic hypernatremia i/s/o diuresis; urine studies evidencing appropriate response to hypovolemia, low suspicion of DI  - CTM; s/p D5 1L bolus  - hold further diuresis    ID  #Lower extremity wounds   S/p linezolid  -wound care     #prophylactic measures  F: None   E: Replete as necessary K>4 Mg>2  N: NPO for EGD    DVT Prophylaxis: hold i/s/o melena  GI prophylaxis: Protonix  CODE STATUS: FULL   74M with no known PMH, initially admitted to cardiac telemetry s/p fall for rapid a fib. Course complicated by new diagnosis of new CHF (LVEF 20-25%), PE, SILKE on CKD, and melena. GI consulted for melena when transitioning from heparin gtt to Eliquis, with EGD showing bleeding duodenal ulcer s/p clipping.    NEURO  #Fever.   Intermittent fevers, highest temp rectal 101.6F 4/17. Febrile 4/25  Per ID, favor community acquired aspiration PNA (given poor dentition, L side opacity on CXR)  Ceftriaxone course completed, Last Dose of Linezolid 4/24/2024   HIV -, HEP -, UStrep -, Ulegionella -, Procal - BCx 3 NGTD for 48 hours, BC collected 4/19   - CTA pe rule out on 4/20 with improvement in study showing Multiple small subsegmental emboli identified within the right lower lobe pulmonary artery branches. Small plueral effusions with atelectasis.   - B/L LE dopplers negative for DVT.  - Monitor off Abx      CV  #Hypotension  #Hypovolemic shock  BP 70s/40s on 4/24 iso multiple episodes melena. Levo gtt started, 2U pRBC and 1U FFP given iso active GIB and shock. Cordis, R IJ TLC, R axillary a line placed for close BP monitoring and access for transfusions. EGD sowed bleeding duopdenal ulcer now s/p clipping. Now off pressor support and HDS. No further signs or symptoms of active bleeding; Hgb lateral ~8.      #Acute decompensated HF  TTE 4/15/24: Severely reduced LVSF and RVSF, LVEF 20-25%, mild biatrial enlargement, mild TR, PASP 68, dilated IVC  RHC/LHC: focal RCA lesion w/ evidence of hypovolemia  -CTA chest: Neg for PE. Small right and trace left pleural effusions.   -Diuresis: s/p daily IV Lasix 60   - GDMT (on hold while sedated and intubated): Valsartan 40 BID, spironolactone 25 mg QD, metoprolol tartrate 25 mg BID   -Core measures, strict I/O's daily weights, 1.2L PO restriction, strict I/Os, cont tele/pulse ox  -Advanced HF team following;  -Now holding lasix i/s/o aggressive diuresis, SILKE, contraction alkalosis & subsequent AHRF; s/p D5 1L bolus  -Holding valsartan and spironolactone i/s/o SILKE  -c/w lopressor 2.5 IV Q12    #AFib  HR 130s, unclear whether patient has hx of afib or is new. Never f/u with Drs.  - BB as above  - Hold AC iso bleed    #Aneurysm of thoracic aorta   - CT Abd/Pelvis 04/13/24: No thoracoabdominal dissection, no thoracic aorta hematoma. Ascending thoracic aortic aneurysm 4.0 cm  - Consider CTS follow up in outpatient setting for surveillance.    # Rhabdomyolysis. - RESOLVED  Patient had mechanical fall and was down on the ground x 2 days.   - CK improving, 2336 -> 2002-> 777,       #HTN (hypertension).   - hold valsartan 40 mg BID and metoprolol 25 mg BID i/s/o SILKE and intubation/sedation    PULMONARY  #AHRF w/ hypercapnia   Likely 2/2 persistent tachypnea 2/2 compensatory respiratory acidosis in reaction to contraction alkalosis 2/2 aggressive diuresis vs aspiration  s/p BiPAP -->5L NC --> HFNC 40/40. Now intubated with plan for extubation 4/26  - Monitor respiratory status    #Subsegmental PE   CTPE w/ evidence of small subsegmental PE in RLL   transitioned from heparin gtt to eliquis 4/24 but discontinued i/s/o GIB    GI  #UGI Bleed  Pt w/ large  melena when transitioning from heparin gtt to Eliquis. EGD with GI 4/24 revealed bleeding duodenal ulcer s/p clipping.     - pantoprazole gtt for 72 hours (04/24 at 4 PM to 04/27 at 4PM)  - Ok to extubate   - hold AC, restart as appropriate   - maintain active type & screen   - 2 large-bore IVs    RENAL  #SILKE   Initial Cr of 0.8 -->2.12. Today 2.01  Likely 2/2 aggressive diuresis & volume loss from melena   - careful volume repletion i/s/o acute HF   - holding valsartan and spironolactone       #Hypernatremia   hypovolemic hypernatremia i/s/o diuresis; urine studies evidencing appropriate response to hypovolemia, low suspicion of DI  - CTM; s/p D5 1L bolus  - hold further diuresis    ID  #Lower extremity wounds   S/p linezolid  -wound care     #prophylactic measures  F: None   E: Replete as necessary K>4 Mg>2  N: NPO for EGD    DVT Prophylaxis: hold i/s/o melena  GI prophylaxis: Protonix  CODE STATUS: FULL   74M with no known PMH, initially admitted to cardiac telemetry s/p fall for rapid a fib. Course complicated by new diagnosis of new CHF (LVEF 20-25%), PE, SILKE on CKD, and melena. GI consulted for melena when transitioning from heparin gtt to Eliquis, with EGD showing bleeding duodenal ulcer s/p clipping.    NEURO  #Fever.   Intermittent fevers, highest temp rectal 101.6F 4/17. Febrile 4/25  Per ID, favor community acquired aspiration PNA (given poor dentition, L side opacity on CXR)  Ceftriaxone course completed, Last Dose of Linezolid 4/24/2024   HIV -, HEP -, UStrep -, Ulegionella -, Procal - BCx 3 NGTD for 48 hours, BC collected 4/19   - CTA pe rule out on 4/20 with improvement in study showing Multiple small subsegmental emboli identified within the right lower lobe pulmonary artery branches. Small plueral effusions with atelectasis.   - B/L LE dopplers negative for DVT.  - Monitor off Abx      CV  #Hypotension  #Hypovolemic shock  BP 70s/40s on 4/24 iso multiple episodes melena. Levo gtt started, 2U pRBC and 1U FFP given iso active GIB and shock. Cordis, R IJ TLC, R axillary a line placed for close BP monitoring and access for transfusions. EGD sowed bleeding duopdenal ulcer now s/p clipping. Now off pressor support and HDS. No further signs or symptoms of active bleeding; Hgb lateral ~8.      #Acute decompensated HF  TTE 4/15/24: Severely reduced LVSF and RVSF, LVEF 20-25%, mild biatrial enlargement, mild TR, PASP 68, dilated IVC  RHC/LHC: focal RCA lesion w/ evidence of hypovolemia  -CTA chest: Neg for PE. Small right and trace left pleural effusions.   -Diuresis: s/p daily IV Lasix 60   - GDMT (on hold while sedated and intubated): Valsartan 40 BID, spironolactone 25 mg QD, metoprolol tartrate 25 mg BID   -Core measures, strict I/O's daily weights, 1.2L PO restriction, strict I/Os, cont tele/pulse ox  -Advanced HF team following  -holding spironolactone for now  - Lopressor 12.5 BID and Valsartan 20 mg BID    #AFib  HR 130s, unclear whether patient has hx of afib or is new. Never f/u with Drs.  - CRISTAL as above  - Hold AC iso bleed    #Aneurysm of thoracic aorta   - CT Abd/Pelvis 04/13/24: No thoracoabdominal dissection, no thoracic aorta hematoma. Ascending thoracic aortic aneurysm 4.0 cm  - Consider CTS follow up in outpatient setting for surveillance.    # Rhabdomyolysis. - RESOLVED  Patient had mechanical fall and was down on the ground x 2 days.   - CK improving, 2336 -> 2002-> 777,       #HTN (hypertension).   - hold valsartan 40 mg BID and metoprolol 25 mg BID i/s/o SILKE and intubation/sedation    PULMONARY  #AHRF w/ hypercapnia   Likely 2/2 persistent tachypnea 2/2 compensatory respiratory acidosis in reaction to contraction alkalosis 2/2 aggressive diuresis vs aspiration  s/p BiPAP -->5L NC --> HFNC 40/40. Now intubated with plan for extubation 4/26  - Monitor respiratory status    #Subsegmental PE   CTPE w/ evidence of small subsegmental PE in RLL   transitioned from heparin gtt to eliquis 4/24 but discontinued i/s/o GIB  -Hold AC    GI  #UGI Bleed  Pt w/ large  melena when transitioning from heparin gtt to Eliquis. EGD with GI 4/24 revealed bleeding duodenal ulcer s/p clipping.     - Continue pantoprazole IVP BID - switch to oral when appropriate  - Clear liquid diet - advance as appropriate  - hold AC   - maintain active type & screen   - 2 large-bore IVs    RENAL  #SILKE - RESOLVED  Initial Cr of 0.8 -->2.12. Today 2.01  Likely 2/2 aggressive diuresis & volume loss from melena       #Hypernatremia   hypovolemic hypernatremia i/s/o diuresis; urine studies evidencing appropriate response to hypovolemia, low suspicion of DI    - hold diuresis  - Encourage oral FW,  - IVF with caution, given HF    ID  #Lower extremity wounds   S/p linezolid  -wound care     #prophylactic measures  F: None   E: Replete as necessary K>4 Mg>2  N: Clears mildly thick  DVT Prophylaxis: hold i/s/o melena  GI prophylaxis: Protonix  CODE STATUS: FULL

## 2024-04-29 ENCOUNTER — RESULT REVIEW (OUTPATIENT)
Age: 74
End: 2024-04-29

## 2024-04-29 LAB
ALBUMIN SERPL ELPH-MCNC: 3.2 G/DL — LOW (ref 3.3–5)
ALP SERPL-CCNC: 44 U/L — SIGNIFICANT CHANGE UP (ref 40–120)
ALT FLD-CCNC: 25 U/L — SIGNIFICANT CHANGE UP (ref 10–45)
ANION GAP SERPL CALC-SCNC: 11 MMOL/L — SIGNIFICANT CHANGE UP (ref 5–17)
ANION GAP SERPL CALC-SCNC: 9 MMOL/L — SIGNIFICANT CHANGE UP (ref 5–17)
ANION GAP SERPL CALC-SCNC: 9 MMOL/L — SIGNIFICANT CHANGE UP (ref 5–17)
AST SERPL-CCNC: 27 U/L — SIGNIFICANT CHANGE UP (ref 10–40)
BASOPHILS # BLD AUTO: 0.02 K/UL — SIGNIFICANT CHANGE UP (ref 0–0.2)
BASOPHILS NFR BLD AUTO: 0.2 % — SIGNIFICANT CHANGE UP (ref 0–2)
BILIRUB SERPL-MCNC: 0.5 MG/DL — SIGNIFICANT CHANGE UP (ref 0.2–1.2)
BLD GP AB SCN SERPL QL: NEGATIVE — SIGNIFICANT CHANGE UP
BUN SERPL-MCNC: 38 MG/DL — HIGH (ref 7–23)
BUN SERPL-MCNC: 40 MG/DL — HIGH (ref 7–23)
BUN SERPL-MCNC: 44 MG/DL — HIGH (ref 7–23)
CALCIUM SERPL-MCNC: 9.2 MG/DL — SIGNIFICANT CHANGE UP (ref 8.4–10.5)
CALCIUM SERPL-MCNC: 9.5 MG/DL — SIGNIFICANT CHANGE UP (ref 8.4–10.5)
CALCIUM SERPL-MCNC: 9.7 MG/DL — SIGNIFICANT CHANGE UP (ref 8.4–10.5)
CHLORIDE SERPL-SCNC: 116 MMOL/L — HIGH (ref 96–108)
CHLORIDE SERPL-SCNC: 119 MMOL/L — HIGH (ref 96–108)
CHLORIDE SERPL-SCNC: 120 MMOL/L — HIGH (ref 96–108)
CO2 SERPL-SCNC: 28 MMOL/L — SIGNIFICANT CHANGE UP (ref 22–31)
CO2 SERPL-SCNC: 29 MMOL/L — SIGNIFICANT CHANGE UP (ref 22–31)
CO2 SERPL-SCNC: 29 MMOL/L — SIGNIFICANT CHANGE UP (ref 22–31)
CREAT SERPL-MCNC: 1.12 MG/DL — SIGNIFICANT CHANGE UP (ref 0.5–1.3)
CREAT SERPL-MCNC: 1.24 MG/DL — SIGNIFICANT CHANGE UP (ref 0.5–1.3)
CREAT SERPL-MCNC: 1.33 MG/DL — HIGH (ref 0.5–1.3)
CULTURE RESULTS: SIGNIFICANT CHANGE UP
CULTURE RESULTS: SIGNIFICANT CHANGE UP
EGFR: 56 ML/MIN/1.73M2 — LOW
EGFR: 61 ML/MIN/1.73M2 — SIGNIFICANT CHANGE UP
EGFR: 69 ML/MIN/1.73M2 — SIGNIFICANT CHANGE UP
EOSINOPHIL # BLD AUTO: 0.04 K/UL — SIGNIFICANT CHANGE UP (ref 0–0.5)
EOSINOPHIL NFR BLD AUTO: 0.4 % — SIGNIFICANT CHANGE UP (ref 0–6)
GLUCOSE BLDC GLUCOMTR-MCNC: 113 MG/DL — HIGH (ref 70–99)
GLUCOSE BLDC GLUCOMTR-MCNC: 134 MG/DL — HIGH (ref 70–99)
GLUCOSE BLDC GLUCOMTR-MCNC: 142 MG/DL — HIGH (ref 70–99)
GLUCOSE BLDC GLUCOMTR-MCNC: 169 MG/DL — HIGH (ref 70–99)
GLUCOSE SERPL-MCNC: 107 MG/DL — HIGH (ref 70–99)
GLUCOSE SERPL-MCNC: 123 MG/DL — HIGH (ref 70–99)
GLUCOSE SERPL-MCNC: 138 MG/DL — HIGH (ref 70–99)
HCT VFR BLD CALC: 25.8 % — LOW (ref 39–50)
HGB BLD-MCNC: 7.9 G/DL — LOW (ref 13–17)
IMM GRANULOCYTES NFR BLD AUTO: 0.6 % — SIGNIFICANT CHANGE UP (ref 0–0.9)
LACTATE SERPL-SCNC: 0.8 MMOL/L — SIGNIFICANT CHANGE UP (ref 0.5–2)
LYMPHOCYTES # BLD AUTO: 1.01 K/UL — SIGNIFICANT CHANGE UP (ref 1–3.3)
LYMPHOCYTES # BLD AUTO: 9.9 % — LOW (ref 13–44)
MAGNESIUM SERPL-MCNC: 2.1 MG/DL — SIGNIFICANT CHANGE UP (ref 1.6–2.6)
MAGNESIUM SERPL-MCNC: 2.2 MG/DL — SIGNIFICANT CHANGE UP (ref 1.6–2.6)
MCHC RBC-ENTMCNC: 30 PG — SIGNIFICANT CHANGE UP (ref 27–34)
MCHC RBC-ENTMCNC: 30.6 GM/DL — LOW (ref 32–36)
MCV RBC AUTO: 98.1 FL — SIGNIFICANT CHANGE UP (ref 80–100)
MONOCYTES # BLD AUTO: 0.72 K/UL — SIGNIFICANT CHANGE UP (ref 0–0.9)
MONOCYTES NFR BLD AUTO: 7.1 % — SIGNIFICANT CHANGE UP (ref 2–14)
NEUTROPHILS # BLD AUTO: 8.36 K/UL — HIGH (ref 1.8–7.4)
NEUTROPHILS NFR BLD AUTO: 81.8 % — HIGH (ref 43–77)
NRBC # BLD: 0 /100 WBCS — SIGNIFICANT CHANGE UP (ref 0–0)
PHOSPHATE SERPL-MCNC: 2.6 MG/DL — SIGNIFICANT CHANGE UP (ref 2.5–4.5)
PHOSPHATE SERPL-MCNC: 3.3 MG/DL — SIGNIFICANT CHANGE UP (ref 2.5–4.5)
PLATELET # BLD AUTO: 228 K/UL — SIGNIFICANT CHANGE UP (ref 150–400)
POTASSIUM SERPL-MCNC: 3.4 MMOL/L — LOW (ref 3.5–5.3)
POTASSIUM SERPL-MCNC: 3.6 MMOL/L — SIGNIFICANT CHANGE UP (ref 3.5–5.3)
POTASSIUM SERPL-MCNC: 3.9 MMOL/L — SIGNIFICANT CHANGE UP (ref 3.5–5.3)
POTASSIUM SERPL-SCNC: 3.4 MMOL/L — LOW (ref 3.5–5.3)
POTASSIUM SERPL-SCNC: 3.6 MMOL/L — SIGNIFICANT CHANGE UP (ref 3.5–5.3)
POTASSIUM SERPL-SCNC: 3.9 MMOL/L — SIGNIFICANT CHANGE UP (ref 3.5–5.3)
PROT SERPL-MCNC: 6 G/DL — SIGNIFICANT CHANGE UP (ref 6–8.3)
RBC # BLD: 2.63 M/UL — LOW (ref 4.2–5.8)
RBC # FLD: 17.1 % — HIGH (ref 10.3–14.5)
RH IG SCN BLD-IMP: POSITIVE — SIGNIFICANT CHANGE UP
SODIUM SERPL-SCNC: 153 MMOL/L — HIGH (ref 135–145)
SODIUM SERPL-SCNC: 158 MMOL/L — HIGH (ref 135–145)
SODIUM SERPL-SCNC: 159 MMOL/L — HIGH (ref 135–145)
SPECIMEN SOURCE: SIGNIFICANT CHANGE UP
SPECIMEN SOURCE: SIGNIFICANT CHANGE UP
TRANSFUSION REACTION INTERP 2: NEGATIVE — SIGNIFICANT CHANGE UP
WBC # BLD: 10.21 K/UL — SIGNIFICANT CHANGE UP (ref 3.8–10.5)
WBC # FLD AUTO: 10.21 K/UL — SIGNIFICANT CHANGE UP (ref 3.8–10.5)

## 2024-04-29 PROCEDURE — 93308 TTE F-UP OR LMTD: CPT | Mod: 26

## 2024-04-29 PROCEDURE — 99291 CRITICAL CARE FIRST HOUR: CPT

## 2024-04-29 RX ORDER — METOPROLOL TARTRATE 50 MG
25 TABLET ORAL EVERY 12 HOURS
Refills: 0 | Status: DISCONTINUED | OUTPATIENT
Start: 2024-04-29 | End: 2024-04-30

## 2024-04-29 RX ORDER — POTASSIUM CHLORIDE 20 MEQ
40 PACKET (EA) ORAL EVERY 4 HOURS
Refills: 0 | Status: COMPLETED | OUTPATIENT
Start: 2024-04-29 | End: 2024-04-29

## 2024-04-29 RX ORDER — SPIRONOLACTONE 25 MG/1
25 TABLET, FILM COATED ORAL EVERY 24 HOURS
Refills: 0 | Status: DISCONTINUED | OUTPATIENT
Start: 2024-04-29 | End: 2024-05-04

## 2024-04-29 RX ORDER — SODIUM CHLORIDE 9 MG/ML
1000 INJECTION, SOLUTION INTRAVENOUS
Refills: 0 | Status: DISCONTINUED | OUTPATIENT
Start: 2024-04-29 | End: 2024-04-30

## 2024-04-29 RX ORDER — POTASSIUM CHLORIDE 20 MEQ
40 PACKET (EA) ORAL ONCE
Refills: 0 | Status: COMPLETED | OUTPATIENT
Start: 2024-04-29 | End: 2024-04-29

## 2024-04-29 RX ORDER — SODIUM CHLORIDE 9 MG/ML
500 INJECTION, SOLUTION INTRAVENOUS
Refills: 0 | Status: COMPLETED | OUTPATIENT
Start: 2024-04-29 | End: 2024-04-29

## 2024-04-29 RX ORDER — SODIUM CHLORIDE 9 MG/ML
1000 INJECTION, SOLUTION INTRAVENOUS
Refills: 0 | Status: DISCONTINUED | OUTPATIENT
Start: 2024-04-29 | End: 2024-04-29

## 2024-04-29 RX ORDER — PANTOPRAZOLE SODIUM 20 MG/1
40 TABLET, DELAYED RELEASE ORAL EVERY 12 HOURS
Refills: 0 | Status: DISCONTINUED | OUTPATIENT
Start: 2024-04-29 | End: 2024-04-30

## 2024-04-29 RX ORDER — ACETAMINOPHEN 500 MG
1000 TABLET ORAL ONCE
Refills: 0 | Status: COMPLETED | OUTPATIENT
Start: 2024-04-29 | End: 2024-04-29

## 2024-04-29 RX ADMIN — Medication 400 MILLIGRAM(S): at 06:35

## 2024-04-29 RX ADMIN — VALSARTAN 20 MILLIGRAM(S): 80 TABLET ORAL at 06:35

## 2024-04-29 RX ADMIN — PANTOPRAZOLE SODIUM 40 MILLIGRAM(S): 20 TABLET, DELAYED RELEASE ORAL at 17:55

## 2024-04-29 RX ADMIN — CHLORHEXIDINE GLUCONATE 1 APPLICATION(S): 213 SOLUTION TOPICAL at 06:38

## 2024-04-29 RX ADMIN — SODIUM CHLORIDE 75 MILLILITER(S): 9 INJECTION, SOLUTION INTRAVENOUS at 13:03

## 2024-04-29 RX ADMIN — Medication 2: at 11:43

## 2024-04-29 RX ADMIN — SODIUM CHLORIDE 165 MILLILITER(S): 9 INJECTION, SOLUTION INTRAVENOUS at 16:53

## 2024-04-29 RX ADMIN — Medication 25 MILLIGRAM(S): at 17:54

## 2024-04-29 RX ADMIN — MUPIROCIN 1 APPLICATION(S): 20 OINTMENT TOPICAL at 06:49

## 2024-04-29 RX ADMIN — Medication 12.5 MILLIGRAM(S): at 06:36

## 2024-04-29 RX ADMIN — Medication 40 MILLIEQUIVALENT(S): at 21:32

## 2024-04-29 RX ADMIN — PANTOPRAZOLE SODIUM 40 MILLIGRAM(S): 20 TABLET, DELAYED RELEASE ORAL at 06:39

## 2024-04-29 RX ADMIN — SODIUM CHLORIDE 165 MILLILITER(S): 9 INJECTION, SOLUTION INTRAVENOUS at 09:50

## 2024-04-29 RX ADMIN — SPIRONOLACTONE 25 MILLIGRAM(S): 25 TABLET, FILM COATED ORAL at 13:02

## 2024-04-29 RX ADMIN — Medication 40 MILLIEQUIVALENT(S): at 08:45

## 2024-04-29 RX ADMIN — MUPIROCIN 1 APPLICATION(S): 20 OINTMENT TOPICAL at 17:55

## 2024-04-29 RX ADMIN — Medication 40 MILLIEQUIVALENT(S): at 17:55

## 2024-04-29 RX ADMIN — VALSARTAN 20 MILLIGRAM(S): 80 TABLET ORAL at 19:27

## 2024-04-29 NOTE — OCCUPATIONAL THERAPY INITIAL EVALUATION ADULT - MODIFIED CLINICAL TEST OF SENSORY INTEGRATION IN BALANCE TEST
Pt performed functional mobility approx 3ft x 2 with RW and min Ax1, +chair follow for safety. Pt with decreased step length noted. Tachycardic up to 139bpm during activity on 2L O2, no c/o SOB.

## 2024-04-29 NOTE — OCCUPATIONAL THERAPY INITIAL EVALUATION ADULT - DIAGNOSIS, OT EVAL
Pt presents with generalized weakness, impaired activity tolerance, decreased balance, and decreased cardiovascular endurance impacting their ability to independently complete ADLs, functional transfers, and functional mobility.

## 2024-04-29 NOTE — OCCUPATIONAL THERAPY INITIAL EVALUATION ADULT - GENERAL OBSERVATIONS, REHAB EVAL
OT IE Completed. Pt's RN Bailey cleared pt for OT. Pt received seated in bedside chair, +tele, +heplock, +2L O2 via NC, NAD, agreeable to OT. Pt tolerated session fairly however tachycardic up to 139bpm with activity. Rehab Tech Hola present. Pt left as found, all lines in tact, needs in reach, ANGI Avalos aware.

## 2024-04-29 NOTE — PROGRESS NOTE ADULT - ASSESSMENT
74M with no known PMH, initially admitted to cardiac telemetry s/p fall for rapid a fib. Course complicated by new diagnosis of new CHF (LVEF 20-25%), PE, SILKE on CKD, and melena. GI consulted for melena when transitioning from heparin gtt to Eliquis, with EGD showing bleeding duodenal ulcer s/p clipping.    NEURO  #Fever.   Intermittent fevers, highest temp rectal 101.6F 4/17. Febrile 4/25  Per ID, favor community acquired aspiration PNA (given poor dentition, L side opacity on CXR)  Ceftriaxone course completed, Last Dose of Linezolid 4/24/2024   HIV -, HEP -, UStrep -, Ulegionella -, Procal - BCx 3 NGTD for 48 hours, BC collected 4/19   - CTA pe rule out on 4/20 with improvement in study showing Multiple small subsegmental emboli identified within the right lower lobe pulmonary artery branches. Small plueral effusions with atelectasis.   - B/L LE dopplers negative for DVT.  - Monitor off Abx      CV  #Hypotension  #Hypovolemic shock  BP 70s/40s on 4/24 iso multiple episodes melena. Levo gtt started, 2U pRBC and 1U FFP given iso active GIB and shock. Cordis, R IJ TLC, R axillary a line placed for close BP monitoring and access for transfusions. EGD sowed bleeding duopdenal ulcer now s/p clipping. Now off pressor support and HDS. No further signs or symptoms of active bleeding; Hgb lateral ~8.      #Acute decompensated HF  TTE 4/15/24: Severely reduced LVSF and RVSF, LVEF 20-25%, mild biatrial enlargement, mild TR, PASP 68, dilated IVC  RHC/LHC: focal RCA lesion w/ evidence of hypovolemia  -CTA chest: Neg for PE. Small right and trace left pleural effusions.   -Diuresis: s/p daily IV Lasix 60   - GDMT (on hold while sedated and intubated): Valsartan 40 BID, spironolactone 25 mg QD, metoprolol tartrate 25 mg BID   -Core measures, strict I/O's daily weights, 1.2L PO restriction, strict I/Os, cont tele/pulse ox  -Advanced HF team following  -holding spironolactone for now  - Lopressor 12.5 BID and Valsartan 20 mg BID    #AFib  HR 130s, unclear whether patient has hx of afib or is new. Never f/u with Drs.  - CRISTAL as above  - Hold AC iso bleed    #Aneurysm of thoracic aorta   - CT Abd/Pelvis 04/13/24: No thoracoabdominal dissection, no thoracic aorta hematoma. Ascending thoracic aortic aneurysm 4.0 cm  - Consider CTS follow up in outpatient setting for surveillance.    # Rhabdomyolysis. - RESOLVED  Patient had mechanical fall and was down on the ground x 2 days.   - CK improving, 2336 -> 2002-> 777,       #HTN (hypertension).   - hold valsartan 40 mg BID and metoprolol 25 mg BID i/s/o SILKE and intubation/sedation    PULMONARY  #AHRF w/ hypercapnia   Likely 2/2 persistent tachypnea 2/2 compensatory respiratory acidosis in reaction to contraction alkalosis 2/2 aggressive diuresis vs aspiration  s/p BiPAP -->5L NC --> HFNC 40/40. Now intubated with plan for extubation 4/26  - Monitor respiratory status    #Subsegmental PE   CTPE w/ evidence of small subsegmental PE in RLL   transitioned from heparin gtt to eliquis 4/24 but discontinued i/s/o GIB  -Hold AC    GI  #UGI Bleed  Pt w/ large  melena when transitioning from heparin gtt to Eliquis. EGD with GI 4/24 revealed bleeding duodenal ulcer s/p clipping.     - Continue pantoprazole IVP BID - switch to oral when appropriate  - Clear liquid diet - advance as appropriate  - hold AC   - maintain active type & screen   - 2 large-bore IVs    RENAL  #SILKE - RESOLVED  Initial Cr of 0.8 -->2.12. Today 2.01  Likely 2/2 aggressive diuresis & volume loss from melena       #Hypernatremia   hypovolemic hypernatremia i/s/o diuresis; urine studies evidencing appropriate response to hypovolemia, low suspicion of DI    - hold diuresis  - Encourage oral FW,  - IVF with caution, given HF    ID  #Lower extremity wounds   S/p linezolid  -wound care     #prophylactic measures  F: None   E: Replete as necessary K>4 Mg>2  N: Clears mildly thick  DVT Prophylaxis: hold i/s/o melena  GI prophylaxis: Protonix  CODE STATUS: FULL   74M with no known PMH, initially admitted to cardiac telemetry s/p fall for rapid a fib. Course complicated by new diagnosis of new CHF (LVEF 20-25%), PE, SILKE on CKD, and melena. GI consulted for melena when transitioning from heparin gtt to Eliquis, with EGD showing bleeding duodenal ulcer s/p clipping.    NEURO  #Fever.   Intermittent fevers, highest temp rectal 101.6F 4/17. Febrile 4/25  Per ID, favor community acquired aspiration PNA (given poor dentition, L side opacity on CXR)  Ceftriaxone course completed, Last Dose of Linezolid 4/24/2024   HIV -, HEP -, UStrep -, Ulegionella -, Procal - BCx 3 NGTD for 48 hours, BC collected 4/19   - CTA pe rule out on 4/20 with improvement in study showing Multiple small subsegmental emboli identified within the right lower lobe pulmonary artery branches. Small plueral effusions with atelectasis.   - B/L LE dopplers negative for DVT.  - Monitor off Abx      CV  #Hypotension  #Hypovolemic shock  BP 70s/40s on 4/24 iso multiple episodes melena. Levo gtt started, 2U pRBC and 1U FFP given iso active GIB and shock. Cordis, R IJ TLC, R axillary a line placed for close BP monitoring and access for transfusions. EGD sowed bleeding duopdenal ulcer now s/p clipping. Now off pressor support and HDS. No further signs or symptoms of active bleeding; Hgb lateral ~8.      #Acute decompensated HF  TTE 4/15/24: Severely reduced LVSF and RVSF, LVEF 20-25%, mild biatrial enlargement, mild TR, PASP 68, dilated IVC  RHC/LHC: focal RCA lesion w/ evidence of hypovolemia  -CTA chest: Neg for PE. Small right and trace left pleural effusions.   -Diuresis: s/p daily IV Lasix 60   - GDMT: Valsartan 20 BID, spironolactone 25 mg QD, metoprolol tartrate 25 mg BID   -Core measures, strict I/O's daily weights, 1.2L PO restriction, strict I/Os, cont tele/pulse ox  -Advanced HF team following  -holding spironolactone for now  - Lopressor 12.5 BID and Valsartan 20 mg BID    #AFib  HR 130s, unclear whether patient has hx of afib or is new. Never f/u with Drs.  - BB as above  - Hold AC iso bleed    #Aneurysm of thoracic aorta   - CT Abd/Pelvis 04/13/24: No thoracoabdominal dissection, no thoracic aorta hematoma. Ascending thoracic aortic aneurysm 4.0 cm  - Consider CTS follow up in outpatient setting for surveillance.    # Rhabdomyolysis. - RESOLVED  Patient had mechanical fall and was down on the ground x 2 days.   - CK improving, 2336 -> 2002-> 777,       #HTN (hypertension).   - hold valsartan 40 mg BID and metoprolol 25 mg BID i/s/o SILKE and intubation/sedation    PULMONARY  #AHRF w/ hypercapnia   Likely 2/2 persistent tachypnea 2/2 compensatory respiratory acidosis in reaction to contraction alkalosis 2/2 aggressive diuresis vs aspiration  s/p BiPAP -->5L NC --> HFNC 40/40. Now intubated with plan for extubation 4/26  - Monitor respiratory status    #Subsegmental PE   CTPE w/ evidence of small subsegmental PE in RLL   transitioned from heparin gtt to eliquis 4/24 but discontinued i/s/o GIB  -Hold AC    GI  #UGI Bleed  Pt w/ large  melena when transitioning from heparin gtt to Eliquis. EGD with GI 4/24 revealed bleeding duodenal ulcer s/p clipping.     - Continue pantoprazole IVP BID - switch to oral when appropriate  - Clear liquid diet - advance as appropriate  - hold AC   - maintain active type & screen   - 2 large-bore IVs    RENAL  #SILKE - RESOLVED  Initial Cr of 0.8 -->2.12. Today 2.01  Likely 2/2 aggressive diuresis & volume loss from melena       #Hypernatremia   hypovolemic hypernatremia i/s/o diuresis; urine studies evidencing appropriate response to hypovolemia, low suspicion of DI    - hold diuresis  - Encourage oral FW,  - IVF with caution, given HF    ID  #Lower extremity wounds   S/p linezolid  -wound care     #prophylactic measures  F: None   E: Replete as necessary K>4 Mg>2  N: Clears mildly thick  DVT Prophylaxis: hold i/s/o melena  GI prophylaxis: Protonix  CODE STATUS: FULL   74M with no known PMH, initially admitted to cardiac telemetry s/p fall for rapid a fib. Course complicated by new diagnosis of new CHF (LVEF 20-25%), PE, SILKE on CKD, and melena. GI consulted for melena when transitioning from heparin gtt to Eliquis, with EGD showing bleeding duodenal ulcer s/p clipping.    NEURO  #Fever.   Intermittent fevers, highest temp rectal 101.6F 4/17. Febrile 4/25  Per ID, favor community acquired aspiration PNA (given poor dentition, L side opacity on CXR)  Ceftriaxone course completed, Last Dose of Linezolid 4/24/2024   HIV -, HEP -, UStrep -, Ulegionella -, Procal - BCx 3 NGTD for 48 hours, BC collected 4/19   - CTA pe rule out on 4/20 with improvement in study showing Multiple small subsegmental emboli identified within the right lower lobe pulmonary artery branches. Small plueral effusions with atelectasis.   - B/L LE dopplers negative for DVT.  - Monitor off Abx      CV  #Hypotension  #Hypovolemic shock  BP 70s/40s on 4/24 iso multiple episodes melena. Levo gtt started, 2U pRBC and 1U FFP given iso active GIB and shock. Cordis, R IJ TLC, R axillary a line placed for close BP monitoring and access for transfusions. EGD sowed bleeding duopdenal ulcer now s/p clipping. Now off pressor support and HDS. No further signs or symptoms of active bleeding; Hgb lateral ~8.      #Acute decompensated HF  TTE 4/15/24: Severely reduced LVSF and RVSF, LVEF 20-25%, mild biatrial enlargement, mild TR, PASP 68, dilated IVC  RHC/LHC: focal RCA lesion w/ evidence of hypovolemia  -CTA chest: Neg for PE. Small right and trace left pleural effusions.   -Diuresis: s/p daily IV Lasix 60   - GDMT: Valsartan 20 BID, spironolactone 25 mg QD, metoprolol tartrate 25 mg BID   -Core measures, strict I/O's daily weights, 1.2L PO restriction, strict I/Os, cont tele/pulse ox  -Advanced HF team following  - F/u repeat TTE now that pt is out of shock    #AFib  HR 130s, unclear whether patient has hx of afib or is new. Never f/u with Drs.  - BB as above  - Hold AC iso bleed    #Aneurysm of thoracic aorta   - CT Abd/Pelvis 04/13/24: No thoracoabdominal dissection, no thoracic aorta hematoma. Ascending thoracic aortic aneurysm 4.0 cm  - Consider CTS follow up in outpatient setting for surveillance.    # Rhabdomyolysis. - RESOLVED  Patient had mechanical fall and was down on the ground x 2 days.   - CK improving, 2336 -> 2002-> 777,       #HTN (hypertension).   - hold valsartan 40 mg BID and metoprolol 25 mg BID i/s/o SILKE and intubation/sedation    PULMONARY  #AHRF w/ hypercapnia   Likely 2/2 persistent tachypnea 2/2 compensatory respiratory acidosis in reaction to contraction alkalosis 2/2 aggressive diuresis vs aspiration  s/p BiPAP -->5L NC --> HFNC 40/40. Patient electively intubated for EGD now extubated to 2LNC and satting well.  - Monitor respiratory status  -Wean O2    #Subsegmental PE   CTPE w/ evidence of small subsegmental PE in RLL   transitioned from heparin gtt to eliquis 4/24 but discontinued i/s/o GIB  -Hold AC    GI  #UGI Bleed  Pt w/ large  melena when transitioning from heparin gtt to Eliquis. EGD with GI 4/24 revealed bleeding duodenal ulcer s/p clipping.     - Continue pantoprazole IVP BID - switch to oral when appropriate  - hold AC   - maintain active type & screen   - 2 large-bore IVs    RENAL  #SILKE - RESOLVED  Initial Cr of 0.8 -->2.12. Today 2.01  Likely 2/2 aggressive diuresis & volume loss from melena       #Hypernatremia   hypovolemic hypernatremia i/s/o diuresis; urine studies evidencing appropriate response to hypovolemia, low suspicion of DI    - hold diuresis  - Encourage oral FW,  - IVF with caution, given HF    ID  #Lower extremity wounds   S/p linezolid  -wound care     #prophylactic measures  F: None   E: Replete as necessary K>4 Mg>2  N: Clears mildly thick  DVT Prophylaxis: hold i/s/o melena  GI prophylaxis: Protonix  CODE STATUS: FULL

## 2024-04-29 NOTE — CHART NOTE - NSCHARTNOTEFT_GEN_A_CORE
Admitting Diagnosis:   Patient is a 74y old  Male who presents with a chief complaint of CHF, AFib (29 Apr 2024 07:32)      PAST MEDICAL & SURGICAL HISTORY:  Hiatal hernia          Current Nutrition Order: Low Sodium - Soft & bite sized diet with mildly thick liquids     PO Intake: Good (%) [   ]  Fair (50-75%) [   ] Poor (<25%) [   ]    GI Issues:     Pain:    Skin Integrity:    I&Os:   04-28-24 @ 07:01 - 04-29-24 @ 07:00  --------------------------------------------------------  IN: 0 mL / OUT: 900 mL / NET: -900 mL    04-29-24 @ 07:01 - 04-29-24 @ 14:37  --------------------------------------------------------  IN: 1125 mL / OUT: 0 mL / NET: 1125 mL        Labs:   04-29    159<H>  |  119<H>  |  40<H>  ----------------------------<  107<H>  3.4<L>   |  29  |  1.12    Ca    9.5      29 Apr 2024 05:30  Phos  3.3     04-29  Mg     2.1     04-29    TPro  6.0  /  Alb  3.2<L>  /  TBili  0.5  /  DBili  x   /  AST  27  /  ALT  25  /  AlkPhos  44  04-29    CAPILLARY BLOOD GLUCOSE      POCT Blood Glucose.: 169 mg/dL (29 Apr 2024 11:22)  POCT Blood Glucose.: 113 mg/dL (29 Apr 2024 06:43)  POCT Blood Glucose.: 119 mg/dL (28 Apr 2024 22:07)  POCT Blood Glucose.: 125 mg/dL (28 Apr 2024 17:34)      Medications:  MEDICATIONS  (STANDING):  chlorhexidine 2% Cloths 1 Application(s) Topical <User Schedule>  dextrose 10% Bolus 125 milliLiter(s) IV Bolus once  dextrose 5%. 1000 milliLiter(s) (50 mL/Hr) IV Continuous <Continuous>  dextrose 5%. 500 milliLiter(s) (165 mL/Hr) IV Continuous <Continuous>  dextrose 5%. 1000 milliLiter(s) (100 mL/Hr) IV Continuous <Continuous>  dextrose 5%. 1000 milliLiter(s) (75 mL/Hr) IV Continuous <Continuous>  dextrose 50% Injectable 12.5 Gram(s) IV Push once  dextrose 50% Injectable 25 Gram(s) IV Push once  glucagon  Injectable 1 milliGRAM(s) IntraMuscular once  influenza  Vaccine (HIGH DOSE) 0.7 milliLiter(s) IntraMuscular once  insulin lispro (ADMELOG) corrective regimen sliding scale   SubCutaneous three times a day before meals  insulin lispro (ADMELOG) corrective regimen sliding scale   SubCutaneous at bedtime  metoprolol tartrate 25 milliGRAM(s) Oral every 12 hours  mupirocin 2% Ointment 1 Application(s) Topical two times a day  pantoprazole    Tablet 40 milliGRAM(s) Oral every 12 hours  spironolactone 25 milliGRAM(s) Oral every 24 hours  valsartan 20 milliGRAM(s) Oral every 12 hours    MEDICATIONS  (PRN):  dextrose Oral Gel 15 Gram(s) Oral once PRN Blood Glucose LESS THAN 70 milliGRAM(s)/deciliter  ipratropium    for Nebulization 500 MICROGram(s) Nebulizer every 6 hours PRN Shortness of Breath and/or Wheezing      Weight:      Weight Change:     Estimated energy needs:   Ideal body weight (64.4kg) used for calculations as pt >120% IBW per [facility] Standards of Care. Needs estimated for age and adjusted for current clinical status, HF, renal function, BMI. Fluid needs per team*    Calories: 1697-4547 kcals based on 25-30 kcals/kg  Protein: 80.5-96.6g based on 1.25-1.5g protein/kg  Fluid needs per team*    Subjective:     Chart reviewed. Pt seen at bedside on 5 EA. On clear liquid diet [mildly thick] 4/23-4/29. Diet advanced to soft & bite sized diet w/ mildly thick liquids today per MD.     Previous Nutrition Diagnosis:    Active [   ]  Resolved [   ]    If resolved, new PES:     Goal:    Recommendations:    Education:     Risk Level: High [   ] Moderate [   ] Low [   ] Admitting Diagnosis:   Patient is a 74y old  Male who presents with a chief complaint of CHF, AFib (29 Apr 2024 07:32)      PAST MEDICAL & SURGICAL HISTORY:  Hiatal hernia          Current Nutrition Order: Low Sodium - Soft & bite sized diet with mildly thick liquids     PO Intake: Good (%) [   ]  Fair (50-75%) [   ] Poor (<25%) [   ] - to be determined, diet advanced today    GI Issues: soft, nontender, distended    Pain: no pain/discomfort noted    Skin Integrity: R shin cellulitis, L shin abrasion. Alvaro score 14    I&Os:   04-28-24 @ 07:01  -  04-29-24 @ 07:00  --------------------------------------------------------  IN: 0 mL / OUT: 900 mL / NET: -900 mL    04-29-24 @ 07:01 - 04-29-24 @ 14:37  --------------------------------------------------------  IN: 1125 mL / OUT: 0 mL / NET: 1125 mL        Labs:   04-29    159<H>  |  119<H>  |  40<H>  ----------------------------<  107<H>  3.4<L>   |  29  |  1.12    Ca    9.5      29 Apr 2024 05:30  Phos  3.3     04-29  Mg     2.1     04-29    TPro  6.0  /  Alb  3.2<L>  /  TBili  0.5  /  DBili  x   /  AST  27  /  ALT  25  /  AlkPhos  44  04-29    CAPILLARY BLOOD GLUCOSE      POCT Blood Glucose.: 169 mg/dL (29 Apr 2024 11:22)  POCT Blood Glucose.: 113 mg/dL (29 Apr 2024 06:43)  POCT Blood Glucose.: 119 mg/dL (28 Apr 2024 22:07)  POCT Blood Glucose.: 125 mg/dL (28 Apr 2024 17:34)      Medications:  MEDICATIONS  (STANDING):  chlorhexidine 2% Cloths 1 Application(s) Topical <User Schedule>  dextrose 10% Bolus 125 milliLiter(s) IV Bolus once  dextrose 5%. 1000 milliLiter(s) (50 mL/Hr) IV Continuous <Continuous>  dextrose 5%. 500 milliLiter(s) (165 mL/Hr) IV Continuous <Continuous>  dextrose 5%. 1000 milliLiter(s) (100 mL/Hr) IV Continuous <Continuous>  dextrose 5%. 1000 milliLiter(s) (75 mL/Hr) IV Continuous <Continuous>  dextrose 50% Injectable 12.5 Gram(s) IV Push once  dextrose 50% Injectable 25 Gram(s) IV Push once  glucagon  Injectable 1 milliGRAM(s) IntraMuscular once  influenza  Vaccine (HIGH DOSE) 0.7 milliLiter(s) IntraMuscular once  insulin lispro (ADMELOG) corrective regimen sliding scale   SubCutaneous three times a day before meals  insulin lispro (ADMELOG) corrective regimen sliding scale   SubCutaneous at bedtime  metoprolol tartrate 25 milliGRAM(s) Oral every 12 hours  mupirocin 2% Ointment 1 Application(s) Topical two times a day  pantoprazole    Tablet 40 milliGRAM(s) Oral every 12 hours  spironolactone 25 milliGRAM(s) Oral every 24 hours  valsartan 20 milliGRAM(s) Oral every 12 hours    MEDICATIONS  (PRN):  dextrose Oral Gel 15 Gram(s) Oral once PRN Blood Glucose LESS THAN 70 milliGRAM(s)/deciliter  ipratropium    for Nebulization 500 MICROGram(s) Nebulizer every 6 hours PRN Shortness of Breath and/or Wheezing      Weight:  4/25 92.7kg  4/24 88.9kg  4/23 86.2kg  4/22 88.9kg  4/21 79.8kg  4/20 81.2kg  4/18 87.6kg  4/17 89.4kg  4/16 90.9kg  4/14 93.5kg    Weight Change: wt fluctuations noted, continue daily/weekly weights for trending    Estimated energy needs:   Ideal body weight (64.4kg) used for calculations as pt >120% IBW per [facility] Standards of Care. Needs estimated for age and adjusted for current clinical status, HF, renal function, BMI. Fluid needs per team*    Calories: 2267-7249 kcals based on 25-30 kcals/kg  Protein: 80.5-96.6g based on 1.25-1.5g protein/kg  Fluid needs per team*    Subjective:   74M with no known PMH, initially admitted to cardiac telemetry s/p fall for rapid a fib. Course complicated by new diagnosis of new CHF (LVEF 20-25%), PE, SILKE on CKD, and melena. GI consulted for melena when transitioning from heparin gtt to Eliquis, with EGD showing bleeding duodenal ulcer s/p clipping.    Chart reviewed. Pt seen at bedside on 5 EA, on 2L NC s/p extubation. On clear liquid diet [mildly thick] 4/23-4/29. Diet advanced to soft & bite sized diet w/ mildly thick liquids today per MD. EGD showed bleeding duodenal ulcer s/p clipping. Low fiber diet recommended for risk for rebleed, MD made aware. Labs reviewed- fingersticks trending 103-169 mg/dL x 24hrs, Na 159 <H> [estimated 6L fluid deficit], K+ 3.4 <L> [needs repletion], chloride 119 <H>, BUN 40 <H>, Creat WNL [additional fluids per MD discretion]. RDN will continue to monitor, reassess, and intervene as appropriate.     Previous Nutrition Diagnosis: Increased Nutrient Needs related to Increased Demands as evidenced by: Fevers, HF    Active [ X  ]  Resolved [   ]    If resolved, new PES:     Goal:   Pt will meet at least 75% of protein & energy needs via most appropriate route for nutrition     Recommendations:  1. Recommend Low fiber, soft and bite sized diet with mildly thick liquids  - appreciate SLP recs  - monitor intake and tolerance  2. Consider oral nutrition supplement to assist in meeting needs if <50% at meals  3. Hydration per team  - hypernatremia noted, additional fluids per MD discretion  4. Monitor lytes & replete prn  - K+ low  5. Monitor chemistry, GI function, skin integrity   6. Pain & bowel regimen per team    Education: diet education prn    Risk Level: High [ X  ] Moderate [   ] Low [   ]

## 2024-04-29 NOTE — OCCUPATIONAL THERAPY INITIAL EVALUATION ADULT - ADDITIONAL COMMENTS
Pt lives alone with 1FOS to enter (no elevator access). Pt reports that prior to admission, pt was independent in all ADLs and IADLs, and ambulates with no device.

## 2024-04-29 NOTE — CHART NOTE - NSCHARTNOTEFT_GEN_A_CORE
Patient's chart reviewed. Hb stable at 7.9 with no further instances of bleeding. Patient is now off of PPI gtt and on PPI IV BID.     Underwent EGD on 04/24/24, which showed:   - Normal mucosa was noted in the whole esophagus.  - Normal mucosa was noted in the whole stomach.  - An a single oozing ulcer was found in the duodenal bulb. Additional findings include An ulcer with a visible vessel was seen in the duodenal sweep. This ulcer was a Heraclio Class IIa. Four Endoclips were successfully applied to the duodenal bulb for the purpose of hemostasis. Bi-cap electrocautery was successfully applied for hemostasis.    Recommendations:  - continue pantoprazole 40 mg IV BID x 2 weeks, followed by daily (can be changed to PO at time of discharge)  - start carafate suspension 1g PO QID now that patient is on PO diet   - currently tolerating soft and bite sized diet   - trend Hb with AM labs and maintain active type and screen     Case discussed with Dr. Sutton. GI Team will sign off. Please re-consult with any questions or concerns.     Teresa Rubin D.O.   Gastroenterology Fellow  Weekday 7am-5pm Pager: [phone]  Weeknights/Weekend/Holiday Coverage: Please call the  for contact info

## 2024-04-29 NOTE — PROGRESS NOTE ADULT - SUBJECTIVE AND OBJECTIVE BOX
SUBJECTIVE/OVERNIGHT EVENTS: No acute overnight events. Pt seen in AM at bedside, resting comfortably in bed, and does not appear to be in any acute distress. When asked, pt denies any recent or active fever, chills, nausea, vomiting, headache, acute sob, chest pain, abdominal pain, genitourinary sx, extremity pain or swelling.    VITAL SIGNS:  Vital Signs Last 24 Hrs  T(C): 36.9 (29 Apr 2024 13:06), Max: 38.2 (29 Apr 2024 05:01)  T(F): 98.4 (29 Apr 2024 13:06), Max: 100.7 (29 Apr 2024 05:01)  HR: 108 (29 Apr 2024 14:00) (97 - 118)  BP: 102/59 (29 Apr 2024 14:00) (92/53 - 157/63)  BP(mean): 76 (29 Apr 2024 14:00) (67 - 99)  RR: 15 (29 Apr 2024 14:00) (15 - 37)  SpO2: 100% (29 Apr 2024 14:00) (86% - 100%)    Parameters below as of 29 Apr 2024 14:00  Patient On (Oxygen Delivery Method): nasal cannula  O2 Flow (L/min): 2    PHYSICAL EXAM:  General: NAD; speaking in full sentences  HEENT: NC/AT; PERRL; EOMI; MMM  Neck: supple; no JVD  Cardiac: RRR; +S1/S2  Pulm: CTA B/L; no W/R/R  GI: soft, NT/ND, +BS  Extremities: WWP; no edema, clubbing or cyanosis  Vasc: 2+ radial, DP pulses B/L  Neuro: AAOx3; no focal deficits    MEDICATIONS:  MEDICATIONS  (STANDING):  chlorhexidine 2% Cloths 1 Application(s) Topical <User Schedule>  dextrose 10% Bolus 125 milliLiter(s) IV Bolus once  dextrose 5%. 1000 milliLiter(s) (50 mL/Hr) IV Continuous <Continuous>  dextrose 5%. 1000 milliLiter(s) (100 mL/Hr) IV Continuous <Continuous>  dextrose 5%. 1000 milliLiter(s) (75 mL/Hr) IV Continuous <Continuous>  dextrose 5%. 500 milliLiter(s) (165 mL/Hr) IV Continuous <Continuous>  dextrose 50% Injectable 25 Gram(s) IV Push once  dextrose 50% Injectable 12.5 Gram(s) IV Push once  glucagon  Injectable 1 milliGRAM(s) IntraMuscular once  influenza  Vaccine (HIGH DOSE) 0.7 milliLiter(s) IntraMuscular once  insulin lispro (ADMELOG) corrective regimen sliding scale   SubCutaneous three times a day before meals  insulin lispro (ADMELOG) corrective regimen sliding scale   SubCutaneous at bedtime  metoprolol tartrate 25 milliGRAM(s) Oral every 12 hours  mupirocin 2% Ointment 1 Application(s) Topical two times a day  pantoprazole    Tablet 40 milliGRAM(s) Oral every 12 hours  spironolactone 25 milliGRAM(s) Oral every 24 hours  valsartan 20 milliGRAM(s) Oral every 12 hours    MEDICATIONS  (PRN):  dextrose Oral Gel 15 Gram(s) Oral once PRN Blood Glucose LESS THAN 70 milliGRAM(s)/deciliter  ipratropium    for Nebulization 500 MICROGram(s) Nebulizer every 6 hours PRN Shortness of Breath and/or Wheezing      ALLERGIES:  Allergies    No Known Allergies    Intolerances        LABS:                        7.9    10.21 )-----------( 228      ( 29 Apr 2024 05:30 )             25.8     04-29    159<H>  |  119<H>  |  40<H>  ----------------------------<  107<H>  3.4<L>   |  29  |  1.12    Ca    9.5      29 Apr 2024 05:30  Phos  3.3     04-29  Mg     2.1     04-29    TPro  6.0  /  Alb  3.2<L>  /  TBili  0.5  /  DBili  x   /  AST  27  /  ALT  25  /  AlkPhos  44  04-29        RADIOLOGY & ADDITIONAL TESTS: Reviewed.

## 2024-04-29 NOTE — OCCUPATIONAL THERAPY INITIAL EVALUATION ADULT - PERTINENT HX OF CURRENT PROBLEM, REHAB EVAL
74M with no known PMH, initially admitted to cardiac telemetry s/p fall for rapid a fib. Course complicated by new diagnosis of new CHF (LVEF 20-25%), PE, SILKE on CKD, and melena. GI consulted for melena when transitioning from heparin gtt to Eliquis, with EGD showing bleeding duodenal ulcer s/p clipping.

## 2024-04-30 DIAGNOSIS — R13.10 DYSPHAGIA, UNSPECIFIED: ICD-10-CM

## 2024-04-30 DIAGNOSIS — K92.2 GASTROINTESTINAL HEMORRHAGE, UNSPECIFIED: ICD-10-CM

## 2024-04-30 LAB
ALBUMIN SERPL ELPH-MCNC: 3.1 G/DL — LOW (ref 3.3–5)
ALP SERPL-CCNC: 45 U/L — SIGNIFICANT CHANGE UP (ref 40–120)
ALT FLD-CCNC: 26 U/L — SIGNIFICANT CHANGE UP (ref 10–45)
ANION GAP SERPL CALC-SCNC: 10 MMOL/L — SIGNIFICANT CHANGE UP (ref 5–17)
ANION GAP SERPL CALC-SCNC: 8 MMOL/L — SIGNIFICANT CHANGE UP (ref 5–17)
ANION GAP SERPL CALC-SCNC: 9 MMOL/L — SIGNIFICANT CHANGE UP (ref 5–17)
AST SERPL-CCNC: 28 U/L — SIGNIFICANT CHANGE UP (ref 10–40)
BASOPHILS # BLD AUTO: 0.01 K/UL — SIGNIFICANT CHANGE UP (ref 0–0.2)
BASOPHILS NFR BLD AUTO: 0.1 % — SIGNIFICANT CHANGE UP (ref 0–2)
BILIRUB SERPL-MCNC: 0.6 MG/DL — SIGNIFICANT CHANGE UP (ref 0.2–1.2)
BUN SERPL-MCNC: 34 MG/DL — HIGH (ref 7–23)
BUN SERPL-MCNC: 39 MG/DL — HIGH (ref 7–23)
BUN SERPL-MCNC: 42 MG/DL — HIGH (ref 7–23)
CALCIUM SERPL-MCNC: 8.4 MG/DL — SIGNIFICANT CHANGE UP (ref 8.4–10.5)
CALCIUM SERPL-MCNC: 9 MG/DL — SIGNIFICANT CHANGE UP (ref 8.4–10.5)
CALCIUM SERPL-MCNC: 9.1 MG/DL — SIGNIFICANT CHANGE UP (ref 8.4–10.5)
CHLORIDE SERPL-SCNC: 112 MMOL/L — HIGH (ref 96–108)
CHLORIDE SERPL-SCNC: 114 MMOL/L — HIGH (ref 96–108)
CHLORIDE SERPL-SCNC: 117 MMOL/L — HIGH (ref 96–108)
CO2 SERPL-SCNC: 25 MMOL/L — SIGNIFICANT CHANGE UP (ref 22–31)
CO2 SERPL-SCNC: 26 MMOL/L — SIGNIFICANT CHANGE UP (ref 22–31)
CO2 SERPL-SCNC: 28 MMOL/L — SIGNIFICANT CHANGE UP (ref 22–31)
CREAT SERPL-MCNC: 1 MG/DL — SIGNIFICANT CHANGE UP (ref 0.5–1.3)
CREAT SERPL-MCNC: 1.4 MG/DL — HIGH (ref 0.5–1.3)
CREAT SERPL-MCNC: 1.67 MG/DL — HIGH (ref 0.5–1.3)
CULTURE RESULTS: SIGNIFICANT CHANGE UP
CULTURE RESULTS: SIGNIFICANT CHANGE UP
EGFR: 42 ML/MIN/1.73M2 — LOW
EGFR: 52 ML/MIN/1.73M2 — LOW
EGFR: 78 ML/MIN/1.73M2 — SIGNIFICANT CHANGE UP
EOSINOPHIL # BLD AUTO: 0.2 K/UL — SIGNIFICANT CHANGE UP (ref 0–0.5)
EOSINOPHIL NFR BLD AUTO: 2.1 % — SIGNIFICANT CHANGE UP (ref 0–6)
GLUCOSE BLDC GLUCOMTR-MCNC: 108 MG/DL — HIGH (ref 70–99)
GLUCOSE BLDC GLUCOMTR-MCNC: 121 MG/DL — HIGH (ref 70–99)
GLUCOSE BLDC GLUCOMTR-MCNC: 142 MG/DL — HIGH (ref 70–99)
GLUCOSE BLDC GLUCOMTR-MCNC: 99 MG/DL — SIGNIFICANT CHANGE UP (ref 70–99)
GLUCOSE SERPL-MCNC: 109 MG/DL — HIGH (ref 70–99)
GLUCOSE SERPL-MCNC: 121 MG/DL — HIGH (ref 70–99)
GLUCOSE SERPL-MCNC: 127 MG/DL — HIGH (ref 70–99)
HCT VFR BLD CALC: 27.1 % — LOW (ref 39–50)
HCT VFR BLD CALC: 28.1 % — LOW (ref 39–50)
HGB BLD-MCNC: 8.4 G/DL — LOW (ref 13–17)
HGB BLD-MCNC: 8.5 G/DL — LOW (ref 13–17)
IL6 FLD-MCNC: 57 PG/ML — HIGH (ref 0–13)
IMM GRANULOCYTES NFR BLD AUTO: 0.5 % — SIGNIFICANT CHANGE UP (ref 0–0.9)
LACTATE SERPL-SCNC: 1.6 MMOL/L — SIGNIFICANT CHANGE UP (ref 0.5–2)
LACTATE SERPL-SCNC: 2.2 MMOL/L — HIGH (ref 0.5–2)
LYMPHOCYTES # BLD AUTO: 0.93 K/UL — LOW (ref 1–3.3)
LYMPHOCYTES # BLD AUTO: 9.6 % — LOW (ref 13–44)
Lab: 0 % — SIGNIFICANT CHANGE UP
Lab: 6 % — SIGNIFICANT CHANGE UP
Lab: NEGATIVE — SIGNIFICANT CHANGE UP
Lab: POSITIVE — SIGNIFICANT CHANGE UP
MAGNESIUM SERPL-MCNC: 2.1 MG/DL — SIGNIFICANT CHANGE UP (ref 1.6–2.6)
MCHC RBC-ENTMCNC: 30.2 GM/DL — LOW (ref 32–36)
MCHC RBC-ENTMCNC: 30.2 PG — SIGNIFICANT CHANGE UP (ref 27–34)
MCHC RBC-ENTMCNC: 31 GM/DL — LOW (ref 32–36)
MCHC RBC-ENTMCNC: 31 PG — SIGNIFICANT CHANGE UP (ref 27–34)
MCV RBC AUTO: 100 FL — SIGNIFICANT CHANGE UP (ref 80–100)
MCV RBC AUTO: 100 FL — SIGNIFICANT CHANGE UP (ref 80–100)
MONOCYTES # BLD AUTO: 0.53 K/UL — SIGNIFICANT CHANGE UP (ref 0–0.9)
MONOCYTES NFR BLD AUTO: 5.5 % — SIGNIFICANT CHANGE UP (ref 2–14)
NEUTROPHILS # BLD AUTO: 8 K/UL — HIGH (ref 1.8–7.4)
NEUTROPHILS NFR BLD AUTO: 82.2 % — HIGH (ref 43–77)
NRBC # BLD: 0 /100 WBCS — SIGNIFICANT CHANGE UP (ref 0–0)
NRBC # BLD: 0 /100 WBCS — SIGNIFICANT CHANGE UP (ref 0–0)
PHOSPHATE SERPL-MCNC: 2.6 MG/DL — SIGNIFICANT CHANGE UP (ref 2.5–4.5)
PLATELET # BLD AUTO: 225 K/UL — SIGNIFICANT CHANGE UP (ref 150–400)
PLATELET # BLD AUTO: 226 K/UL — SIGNIFICANT CHANGE UP (ref 150–400)
POTASSIUM SERPL-MCNC: 4 MMOL/L — SIGNIFICANT CHANGE UP (ref 3.5–5.3)
POTASSIUM SERPL-MCNC: 4.1 MMOL/L — SIGNIFICANT CHANGE UP (ref 3.5–5.3)
POTASSIUM SERPL-MCNC: 4.3 MMOL/L — SIGNIFICANT CHANGE UP (ref 3.5–5.3)
POTASSIUM SERPL-SCNC: 4 MMOL/L — SIGNIFICANT CHANGE UP (ref 3.5–5.3)
POTASSIUM SERPL-SCNC: 4.1 MMOL/L — SIGNIFICANT CHANGE UP (ref 3.5–5.3)
POTASSIUM SERPL-SCNC: 4.3 MMOL/L — SIGNIFICANT CHANGE UP (ref 3.5–5.3)
PROT SERPL-MCNC: 6 G/DL — SIGNIFICANT CHANGE UP (ref 6–8.3)
RBC # BLD: 2.71 M/UL — LOW (ref 4.2–5.8)
RBC # BLD: 2.81 M/UL — LOW (ref 4.2–5.8)
RBC # FLD: 17.7 % — HIGH (ref 10.3–14.5)
RBC # FLD: 17.8 % — HIGH (ref 10.3–14.5)
SODIUM SERPL-SCNC: 146 MMOL/L — HIGH (ref 135–145)
SODIUM SERPL-SCNC: 150 MMOL/L — HIGH (ref 135–145)
SODIUM SERPL-SCNC: 153 MMOL/L — HIGH (ref 135–145)
SPECIMEN SOURCE: SIGNIFICANT CHANGE UP
SPECIMEN SOURCE: SIGNIFICANT CHANGE UP
WBC # BLD: 11.65 K/UL — HIGH (ref 3.8–10.5)
WBC # BLD: 9.72 K/UL — SIGNIFICANT CHANGE UP (ref 3.8–10.5)
WBC # FLD AUTO: 11.65 K/UL — HIGH (ref 3.8–10.5)
WBC # FLD AUTO: 9.72 K/UL — SIGNIFICANT CHANGE UP (ref 3.8–10.5)

## 2024-04-30 PROCEDURE — 93010 ELECTROCARDIOGRAM REPORT: CPT

## 2024-04-30 PROCEDURE — 99291 CRITICAL CARE FIRST HOUR: CPT

## 2024-04-30 RX ORDER — OLANZAPINE 15 MG/1
2.5 TABLET, FILM COATED ORAL ONCE
Refills: 0 | Status: DISCONTINUED | OUTPATIENT
Start: 2024-04-30 | End: 2024-04-30

## 2024-04-30 RX ORDER — SUCRALFATE 1 G
1 TABLET ORAL EVERY 6 HOURS
Refills: 0 | Status: DISCONTINUED | OUTPATIENT
Start: 2024-04-30 | End: 2024-05-04

## 2024-04-30 RX ORDER — METOPROLOL TARTRATE 50 MG
50 TABLET ORAL EVERY 24 HOURS
Refills: 0 | Status: DISCONTINUED | OUTPATIENT
Start: 2024-05-01 | End: 2024-05-04

## 2024-04-30 RX ORDER — PANTOPRAZOLE SODIUM 20 MG/1
40 TABLET, DELAYED RELEASE ORAL EVERY 12 HOURS
Refills: 0 | Status: DISCONTINUED | OUTPATIENT
Start: 2024-04-30 | End: 2024-05-04

## 2024-04-30 RX ORDER — METOPROLOL TARTRATE 50 MG
25 TABLET ORAL ONCE
Refills: 0 | Status: COMPLETED | OUTPATIENT
Start: 2024-04-30 | End: 2024-04-30

## 2024-04-30 RX ORDER — SODIUM CHLORIDE 9 MG/ML
1000 INJECTION, SOLUTION INTRAVENOUS
Refills: 0 | Status: DISCONTINUED | OUTPATIENT
Start: 2024-04-30 | End: 2024-04-30

## 2024-04-30 RX ORDER — SODIUM CHLORIDE 9 MG/ML
1000 INJECTION INTRAMUSCULAR; INTRAVENOUS; SUBCUTANEOUS ONCE
Refills: 0 | Status: DISCONTINUED | OUTPATIENT
Start: 2024-04-30 | End: 2024-04-30

## 2024-04-30 RX ORDER — OLANZAPINE 15 MG/1
10 TABLET, FILM COATED ORAL ONCE
Refills: 0 | Status: DISCONTINUED | OUTPATIENT
Start: 2024-04-30 | End: 2024-04-30

## 2024-04-30 RX ORDER — DIGOXIN 250 MCG
125 TABLET ORAL EVERY 24 HOURS
Refills: 0 | Status: DISCONTINUED | OUTPATIENT
Start: 2024-04-30 | End: 2024-05-04

## 2024-04-30 RX ADMIN — Medication 125 MICROGRAM(S): at 11:07

## 2024-04-30 RX ADMIN — PANTOPRAZOLE SODIUM 40 MILLIGRAM(S): 20 TABLET, DELAYED RELEASE ORAL at 17:36

## 2024-04-30 RX ADMIN — SPIRONOLACTONE 25 MILLIGRAM(S): 25 TABLET, FILM COATED ORAL at 11:04

## 2024-04-30 RX ADMIN — Medication 25 MILLIGRAM(S): at 07:19

## 2024-04-30 RX ADMIN — SODIUM CHLORIDE 75 MILLILITER(S): 9 INJECTION, SOLUTION INTRAVENOUS at 10:38

## 2024-04-30 RX ADMIN — Medication 1 GRAM(S): at 13:06

## 2024-04-30 RX ADMIN — MUPIROCIN 1 APPLICATION(S): 20 OINTMENT TOPICAL at 07:20

## 2024-04-30 RX ADMIN — VALSARTAN 20 MILLIGRAM(S): 80 TABLET ORAL at 10:24

## 2024-04-30 RX ADMIN — Medication 25 MILLIGRAM(S): at 17:38

## 2024-04-30 RX ADMIN — Medication 1 GRAM(S): at 17:38

## 2024-04-30 RX ADMIN — PANTOPRAZOLE SODIUM 40 MILLIGRAM(S): 20 TABLET, DELAYED RELEASE ORAL at 07:19

## 2024-04-30 NOTE — PROGRESS NOTE ADULT - ASSESSMENT
74M with no known PMH, initially admitted to cardiac telemetry s/p fall for rapid a fib. Course complicated by new diagnosis of new CHF (LVEF 20-25%), PE, SILKE on CKD, and melena. GI consulted for melena when transitioning from heparin gtt to Eliquis, with EGD showing bleeding duodenal ulcer s/p clipping.    NEURO  #Fever.   Intermittent fevers, highest temp rectal 101.6F 4/17. Febrile 4/25  Per ID, favor community acquired aspiration PNA (given poor dentition, L side opacity on CXR)  Ceftriaxone course completed, Last Dose of Linezolid 4/24/2024   HIV -, HEP -, UStrep -, Ulegionella -, Procal - BCx 3 NGTD for 48 hours, BC collected 4/19   - CTA pe rule out on 4/20 with improvement in study showing Multiple small subsegmental emboli identified within the right lower lobe pulmonary artery branches. Small plueral effusions with atelectasis.   - B/L LE dopplers negative for DVT.  - Monitor off Abx      CV  #Hypotension  #Hypovolemic shock  BP 70s/40s on 4/24 iso multiple episodes melena. Levo gtt started, 2U pRBC and 1U FFP given iso active GIB and shock. Cordis, R IJ TLC, R axillary a line placed for close BP monitoring and access for transfusions. EGD sowed bleeding duopdenal ulcer now s/p clipping. Now off pressor support and HDS. No further signs or symptoms of active bleeding; Hgb lateral ~8.      #Acute decompensated HF  TTE 4/15/24: Severely reduced LVSF and RVSF, LVEF 20-25%, mild biatrial enlargement, mild TR, PASP 68, dilated IVC  RHC/LHC: focal RCA lesion w/ evidence of hypovolemia  -CTA chest: Neg for PE. Small right and trace left pleural effusions.   -Diuresis: s/p daily IV Lasix 60   - GDMT: Valsartan 20 BID, spironolactone 25 mg QD, metoprolol tartrate 25 mg BID   -Core measures, strict I/O's daily weights, 1.2L PO restriction, strict I/Os, cont tele/pulse ox  -Advanced HF team following  - F/u repeat TTE now that pt is out of shock    #AFib  HR 130s, unclear whether patient has hx of afib or is new. Never f/u with Drs.  - BB as above  - Hold AC iso bleed    #Aneurysm of thoracic aorta   - CT Abd/Pelvis 04/13/24: No thoracoabdominal dissection, no thoracic aorta hematoma. Ascending thoracic aortic aneurysm 4.0 cm  - Consider CTS follow up in outpatient setting for surveillance.    # Rhabdomyolysis. - RESOLVED  Patient had mechanical fall and was down on the ground x 2 days.   - CK improving, 2336 -> 2002-> 777,       #HTN (hypertension).   - hold valsartan 40 mg BID and metoprolol 25 mg BID i/s/o SILKE and intubation/sedation    PULMONARY  #AHRF w/ hypercapnia   Likely 2/2 persistent tachypnea 2/2 compensatory respiratory acidosis in reaction to contraction alkalosis 2/2 aggressive diuresis vs aspiration  s/p BiPAP -->5L NC --> HFNC 40/40. Patient electively intubated for EGD now extubated to 2LNC and satting well.  - Monitor respiratory status  -Wean O2    #Subsegmental PE   CTPE w/ evidence of small subsegmental PE in RLL   transitioned from heparin gtt to eliquis 4/24 but discontinued i/s/o GIB  -Hold AC    GI  #UGI Bleed  Pt w/ large  melena when transitioning from heparin gtt to Eliquis. EGD with GI 4/24 revealed bleeding duodenal ulcer s/p clipping.     - Continue pantoprazole IVP BID - switch to oral when appropriate  - hold AC   - maintain active type & screen   - 2 large-bore IVs    RENAL  #SILKE - RESOLVED  Initial Cr of 0.8 -->2.12. Today 2.01  Likely 2/2 aggressive diuresis & volume loss from melena       #Hypernatremia   hypovolemic hypernatremia i/s/o diuresis; urine studies evidencing appropriate response to hypovolemia, low suspicion of DI    - hold diuresis  - Encourage oral FW,  - IVF with caution, given HF    ID  #Lower extremity wounds   S/p linezolid  -wound care     #prophylactic measures  F: None   E: Replete as necessary K>4 Mg>2  N: Clears mildly thick  DVT Prophylaxis: hold i/s/o melena  GI prophylaxis: Protonix  CODE STATUS: FULL   74M with no known PMH, initially admitted to cardiac telemetry s/p fall for rapid a fib. Course complicated by new diagnosis of new CHF (LVEF 20-25%), PE, SILKE on CKD, and melena. GI consulted for melena when transitioning from heparin gtt to Eliquis, with EGD showing bleeding duodenal ulcer s/p clipping. Now satble and ready for stepdown    NEURO  #Delirium  Patient with acute change in mental status overnight associated with urinary retention Now back at baseline without any interventions. Likely represents delirium in setting of extended ICU stay.  - Delirium precautions      CV  #Hypotension  #Hypovolemic shock  BP 70s/40s on 4/24 iso multiple episodes melena. Levo gtt started, 2U pRBC and 1U FFP given iso active GIB and shock. Cordis, R IJ TLC, R axillary a line placed for close BP monitoring and access for transfusions. EGD sowed bleeding duopdenal ulcer now s/p clipping. Now off pressor support and HDS. No further signs or symptoms of active bleeding; Hgb lateral ~8.      #Acute decompensated HF  TTE 4/15/24: Severely reduced LVSF and RVSF, LVEF 20-25%, mild biatrial enlargement, mild TR, PASP 68, dilated IVC  RHC/LHC: focal RCA lesion w/ evidence of hypovolemia  -CTA chest: Neg for PE. Small right and trace left pleural effusions.   -Diuresis: s/p daily IV Lasix 60   - GDMT: Valsartan 20 BID, spironolactone 25 mg QD, metoprolol succinate 50 mg QD   -Core measures, strict I/O's daily weights, 1.2L PO restriction, strict I/Os, cont tele/pulse ox  -Advanced HF team following  - F/u repeat TTE now that pt is out of shock    #AFib  HR 130s, unclear whether patient has hx of afib or is new. Never f/u with Drs.  - BB as above  - Start dig 125 PO QD  - Hold AC iso bleed    #Aneurysm of thoracic aorta   - CT Abd/Pelvis 04/13/24: No thoracoabdominal dissection, no thoracic aorta hematoma. Ascending thoracic aortic aneurysm 4.0 cm  - Consider CTS follow up in outpatient setting for surveillance.    # Rhabdomyolysis. - RESOLVED  Patient had mechanical fall and was down on the ground x 2 days.   - CK improving, 2336 -> 2002-> 777,       #HTN (hypertension).   - meds as above    PULMONARY  #AHRF w/ hypercapnia   Likely 2/2 persistent tachypnea 2/2 compensatory respiratory acidosis in reaction to contraction alkalosis 2/2 aggressive diuresis vs aspiration  s/p BiPAP -->5L NC --> HFNC 40/40. Patient electively intubated for EGD now extubated to 2LNC and satting well.  - Monitor respiratory status  -Wean O2    #Subsegmental PE   CTPE w/ evidence of small subsegmental PE in RLL   transitioned from heparin gtt to eliquis 4/24 but discontinued i/s/o GIB  -Hold AC    GI  #UGI Bleed  Pt w/ large  melena when transitioning from heparin gtt to Eliquis. EGD with GI 4/24 revealed bleeding duodenal ulcer s/p clipping.     - pantoprazole 40 BID  - hold AC   - maintain active type & screen   - 2 large-bore IVs    RENAL  #SILKE - RESOLVED  Initial Cr of 0.8 -->2.12. Today 2.01  Likely 2/2 aggressive diuresis & volume loss from melena       #Hypernatremia   hypovolemic hypernatremia i/s/o diuresis; urine studies evidencing appropriate response to hypovolemia, low suspicion of DI    - hold diuresis  - Encourage oral FW,  - IVF with caution, given HF    ID  #Lower extremity wounds   S/p linezolid  -wound care     #prophylactic measures  F: None   E: Replete as necessary K>4 Mg>2  N: Clears mildly thick  DVT Prophylaxis: hold i/s/o melena  GI prophylaxis: Protonix  CODE STATUS: FULL   74M with no known PMH, initially admitted to cardiac telemetry s/p fall for rapid a fib. Course complicated by new diagnosis of new CHF (LVEF 20-25%), PE, SILKE on CKD, and melena. GI consulted for melena when transitioning from heparin gtt to Eliquis, with EGD showing bleeding duodenal ulcer s/p clipping. Now satble and ready for stepdown    NEURO  #Delirium  Patient with acute change in mental status overnight associated with urinary retention Now back at baseline without any interventions. Likely represents delirium in setting of extended ICU stay.  - Delirium precautions      CV  #Hypotension  #Hypovolemic shock  BP 70s/40s on 4/24 iso multiple episodes melena. Levo gtt started, 2U pRBC and 1U FFP given iso active GIB and shock. Cordis, R IJ TLC, R axillary a line placed for close BP monitoring and access for transfusions. EGD sowed bleeding duopdenal ulcer now s/p clipping. Now off pressor support and HDS. No further signs or symptoms of active bleeding; Hgb lateral ~8.      #Acute decompensated HF  TTE 4/15/24: Severely reduced LVSF and RVSF, LVEF 20-25%, mild biatrial enlargement, mild TR, PASP 68, dilated IVC  RHC/LHC: focal RCA lesion w/ evidence of hypovolemia  -CTA chest: Neg for PE. Small right and trace left pleural effusions.   -Diuresis: s/p daily IV Lasix 60   - GDMT: Valsartan 20 BID, spironolactone 25 mg QD, metoprolol succinate 50 mg QD   -Core measures, strict I/O's daily weights, 1.2L PO restriction, strict I/Os, cont tele/pulse ox  -Advanced HF team following  - F/u repeat TTE now that pt is out of shock    #AFib  HR 130s, unclear whether patient has hx of afib or is new. Never f/u with Drs.  - BB as above  - Start dig 125 PO QD  - Hold AC iso bleed    #Aneurysm of thoracic aorta   - CT Abd/Pelvis 04/13/24: No thoracoabdominal dissection, no thoracic aorta hematoma. Ascending thoracic aortic aneurysm 4.0 cm  - Consider CTS follow up in outpatient setting for surveillance.    # Rhabdomyolysis. - RESOLVED  Patient had mechanical fall and was down on the ground x 2 days.   - CK improving, 2336 -> 2002-> 777,       #HTN (hypertension).   - meds as above    PULMONARY  #AHRF w/ hypercapnia   Likely 2/2 persistent tachypnea 2/2 compensatory respiratory acidosis in reaction to contraction alkalosis 2/2 aggressive diuresis vs aspiration  s/p BiPAP -->5L NC --> HFNC 40/40. Patient electively intubated for EGD now extubated to 2LNC and satting well.  - Monitor respiratory status  -Wean O2    #Subsegmental PE   CTPE w/ evidence of small subsegmental PE in RLL   transitioned from heparin gtt to eliquis 4/24 but discontinued i/s/o GIB  -Hold AC    GI  #UGI Bleed  Pt w/ large  melena when transitioning from heparin gtt to Eliquis. EGD with GI 4/24 revealed bleeding duodenal ulcer s/p clipping.     - pantoprazole 40 BID  - hold AC   - maintain active type & screen   - 2 large-bore IVs    RENAL  #SILKE - RESOLVED  Initial Cr of 0.8 -->2.12. Today 2.01  Likely 2/2 aggressive diuresis & volume loss from melena       #Hypernatremia   hypovolemic hypernatremia i/s/o diuresis; urine studies evidencing appropriate response to hypovolemia, low suspicion of DI    - hold diuresis  - D5W @ 75 cc/hr  - Encourage oral FW,    ID  #Lower extremity wounds   S/p linezolid  -wound care     #prophylactic measures  F: None   E: Replete as necessary K>4 Mg>2  N: Clears mildly thick  DVT Prophylaxis: hold i/s/o melena  GI prophylaxis: Protonix  CODE STATUS: FULL   74M with no known PMH, initially admitted to cardiac telemetry s/p fall for rapid a fib. Course complicated by new diagnosis of new CHF (LVEF 20-25%), PE, SILKE on CKD, and melena. GI consulted for melena when transitioning from heparin gtt to Eliquis, with EGD showing bleeding duodenal ulcer s/p clipping. Now satble and ready for stepdown    NEURO  #Delirium  Patient with acute change in mental status overnight associated with urinary retention Now back at baseline without any interventions. Likely represents delirium in setting of extended ICU stay.  - Delirium precautions      CV  #Hypotension  #Hypovolemic shock  BP 70s/40s on 4/24 iso multiple episodes melena. Levo gtt started, 2U pRBC and 1U FFP given iso active GIB and shock. Cordis, R IJ TLC, R axillary a line placed for close BP monitoring and access for transfusions. EGD sowed bleeding duopdenal ulcer now s/p clipping. Now off pressor support and HDS. No further signs or symptoms of active bleeding; Hgb lateral ~8.      #Acute decompensated HF  TTE 4/15/24: Severely reduced LVSF and RVSF, LVEF 20-25%, mild biatrial enlargement, mild TR, PASP 68, dilated IVC  RHC/LHC: focal RCA lesion w/ evidence of hypovolemia  -CTA chest: Neg for PE. Small right and trace left pleural effusions.   -Diuresis: s/p daily IV Lasix 60   - GDMT: Valsartan 20 BID, spironolactone 25 mg QD, metoprolol succinate 50 mg QD   -Core measures, strict I/O's daily weights, 1.2L PO restriction, strict I/Os, cont tele/pulse ox  -Advanced HF team following  - F/u repeat TTE now that pt is out of shock    #AFib  HR 130s, unclear whether patient has hx of afib or is new. Never f/u with Drs.  - BB as above  - Start dig 125 PO QD  - Hold AC iso bleed    #Aneurysm of thoracic aorta   - CT Abd/Pelvis 04/13/24: No thoracoabdominal dissection, no thoracic aorta hematoma. Ascending thoracic aortic aneurysm 4.0 cm  - Consider CTS follow up in outpatient setting for surveillance.    # Rhabdomyolysis. - RESOLVED  Patient had mechanical fall and was down on the ground x 2 days.   - CK improving, 2336 -> 2002-> 777,       #HTN (hypertension).   - meds as above    PULMONARY  #AHRF w/ hypercapnia   Likely 2/2 persistent tachypnea 2/2 compensatory respiratory acidosis in reaction to contraction alkalosis 2/2 aggressive diuresis vs aspiration  s/p BiPAP -->5L NC --> HFNC 40/40. Patient electively intubated for EGD now extubated to 2LNC and satting well.  - Monitor respiratory status  -Wean O2    #Subsegmental PE   CTPE w/ evidence of small subsegmental PE in RLL   transitioned from heparin gtt to eliquis 4/24 but discontinued i/s/o GIB  -Hold AC    GI  #UGI Bleed  Pt w/ large  melena when transitioning from heparin gtt to Eliquis. EGD with GI 4/24 revealed bleeding duodenal ulcer s/p clipping.     - pantoprazole 40 BID  - hold AC   - maintain active type & screen   - 2 large-bore IVs  - Per GI patient is ready for heparin challenge. - will defer timing to team post-stepdown.  RENAL  #SILKE - RESOLVED  Initial Cr of 0.8 -->2.12. Today 2.01  Likely 2/2 aggressive diuresis & volume loss from melena       #Hypernatremia   hypovolemic hypernatremia i/s/o diuresis; urine studies evidencing appropriate response to hypovolemia, low suspicion of DI    - hold diuresis  - D5W @ 75 cc/hr  - Encourage oral FW,    ID  #Lower extremity wounds   S/p linezolid  -wound care     #prophylactic measures  F: None   E: Replete as necessary K>4 Mg>2  N: Clears mildly thick  DVT Prophylaxis: hold i/s/o melena  GI prophylaxis: Protonix  CODE STATUS: FULL   74M with no known PMH, initially admitted to cardiac telemetry s/p fall for rapid a fib. Course complicated by new diagnosis of new CHF (LVEF 20-25%), PE, SILKE on CKD, and melena. GI consulted for melena when transitioning from heparin gtt to Eliquis, with EGD showing bleeding duodenal ulcer s/p clipping. Now satble and ready for stepdown    NEURO  #Delirium  Patient with acute change in mental status overnight associated with urinary retention Now back at baseline without any interventions. Likely represents delirium in setting of extended ICU stay.  - Delirium precautions      CV  #Hypotension  #Hypovolemic shock  BP 70s/40s on 4/24 iso multiple episodes melena. Levo gtt started, 2U pRBC and 1U FFP given iso active GIB and shock. Cordis, R IJ TLC, R axillary a line placed for close BP monitoring and access for transfusions. EGD sowed bleeding duopdenal ulcer now s/p clipping. Now off pressor support and HDS. No further signs or symptoms of active bleeding; Hgb lateral ~8.      #Acute decompensated HF  TTE 4/15/24: Severely reduced LVSF and RVSF, LVEF 20-25%, mild biatrial enlargement, mild TR, PASP 68, dilated IVC  RHC/LHC: focal RCA lesion w/ evidence of hypovolemia  -CTA chest: Neg for PE. Small right and trace left pleural effusions.   -Diuresis: s/p daily IV Lasix 60   - GDMT: Valsartan 20 BID, spironolactone 25 mg QD, metoprolol succinate 50 mg QD   -Core measures, strict I/O's daily weights, 1.2L PO restriction, strict I/Os, cont tele/pulse ox  -Advanced HF team following  - F/u repeat TTE now that pt is out of shock    #AFib  HR 130s, unclear whether patient has hx of afib or is new. Never f/u with Drs.  - BB as above  - Start dig 125 PO QD  - Hold AC iso bleed    #Aneurysm of thoracic aorta   - CT Abd/Pelvis 04/13/24: No thoracoabdominal dissection, no thoracic aorta hematoma. Ascending thoracic aortic aneurysm 4.0 cm  - Consider CTS follow up in outpatient setting for surveillance.    # Rhabdomyolysis. - RESOLVED  Patient had mechanical fall and was down on the ground x 2 days.   - CK improving, 2336 -> 2002-> 777,       #HTN (hypertension).   - meds as above    PULMONARY  #AHRF w/ hypercapnia   Likely 2/2 persistent tachypnea 2/2 compensatory respiratory acidosis in reaction to contraction alkalosis 2/2 aggressive diuresis vs aspiration  s/p BiPAP -->5L NC --> HFNC 40/40. Patient electively intubated for EGD now extubated to 2LNC and satting well.  - Monitor respiratory status  -Wean O2    #Subsegmental PE   CTPE w/ evidence of small subsegmental PE in RLL   transitioned from heparin gtt to eliquis 4/24 but discontinued i/s/o GIB  -Hold AC    GI  #UGI Bleed  Pt w/ large  melena when transitioning from heparin gtt to Eliquis. EGD with GI 4/24 revealed bleeding duodenal ulcer s/p clipping.     - pantoprazole 40 BID  -sucralfate 1 QID  - hold AC   - maintain active type & screen   - 2 large-bore IVs  - Per GI patient is ready for heparin challenge. - will defer timing to team post-stepdown.    RENAL  #SILKE - RESOLVED  Initial Cr of 0.8 -->2.12. Today 2.01  Likely 2/2 aggressive diuresis & volume loss from melena       #Hypernatremia   hypovolemic hypernatremia i/s/o diuresis; urine studies evidencing appropriate response to hypovolemia, low suspicion of DI    - hold diuresis  - D5W @ 75 cc/hr  - Encourage oral FW,    ID  #Lower extremity wounds   S/p linezolid  -wound care     #prophylactic measures  F: None   E: Replete as necessary K>4 Mg>2  N: Clears mildly thick  DVT Prophylaxis: hold i/s/o melena  GI prophylaxis: Protonix  CODE STATUS: FULL

## 2024-04-30 NOTE — PROGRESS NOTE ADULT - PROBLEM SELECTOR PLAN 7
- On admission Na 152->148  - Initially received IV NS hydration, then IV lactate ringers  - Remains hypernatremic, monitor closely. Asx.  - medicine following - Initially received IV NS hydration, then IV lactate ringers  - Remains hypernatremic, monitor closely. Asx.  - D5 fluids; repeat 8 pm BMP if it may continue

## 2024-04-30 NOTE — PROGRESS NOTE ADULT - ASSESSMENT
74 yr old M with no known significant medical history or recent medical contact who had a fall at home and found on the ground 2 days later who was brought to the ED 4/14 where he was found to have rapid AF and severe LV dysfunction with elevated cardiac troponins, rhabdomyolyis, hypernatremia, and segmental PE. He underwent R/LHC which revealed non-obstructive CAD and normal hemodynamics. He became hypercapnic and developed metabolic alkalosis with respiratory compensation in the setting of overdiuresis. He then developed hemorrhagic shock with GI bleeding with EGD 4/24 showing a bleeding duodenal ulcer which was treated. Course c/b failed attempts at speech and swallow post extubation with some hyperactive delirium/agitation. Stepped down to cardiac telemetry for reintroduction of AC and further management.

## 2024-04-30 NOTE — PROGRESS NOTE ADULT - PROBLEM SELECTOR PLAN 1
-TTE 4/15/24: Severely reduced LVSF and RVSF, LVEF 20-25%, mild biatrial enlargement, mild TR, PASP 68, dilated IVC  -CTA chest: Neg for PE. Small right and trace left pleural effusions.   -Diuresis: IV Lasix 60   -GDMT: Valsartan 40 BID, spironolactone 25 mg QD, metoprolol tartrate 25 mg BID   -Core measures, strict I/O's daily weights, 1.2L PO restriction, strict I/Os, cont tele/pulse ox  -Advanced HF team following; L/RHC tentatively for 4/22 -TTE 4/15/24: Severely reduced LVSF and RVSF, LVEF 20-25%, mild biatrial enlargement, mild TR, PASP 68, dilated IVC  -CTA chest: Neg for PE. Small right and trace left pleural effusions.   L/RHC 4/22/24: nonobstructive CAD, RCA large dominant w/ mid focal stenosis of 40%, LAD with mild diffuse disease. RV 38/16, PA 35/16 (22), PCWP 5, Pa Sat 73% on 40% FIO bipap 10/5, CO/CI 4.3/2.2  -Diuresis: IV Lasix 60   -GDMT: Valsartan 40 BID, spironolactone 25 mg QD, metoprolol tartrate 25 mg BID   -Core measures, strict I/O's daily weights, 1.2L PO restriction, strict I/Os, cont tele/pulse ox  -Advanced HF team following -TTE 4/15/24: Severely reduced LVSF and RVSF, LVEF 20-25%, mild biatrial enlargement, mild TR, PASP 68, dilated IVC  -CTA chest: Neg for PE. Small right and trace left pleural effusions.   -Diuresis: on hold- now euvolemic   -GDMT: Valsartan 40 BID, spironolactone 25 mg QD, metoprolol tartrate 25 mg BID   -Core measures, strict I/O's daily weights, 1.2L PO restriction, strict I/Os, cont tele/pulse ox  - S/p RHC/LHC 4/22/24 w/ Dr. 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 R TR access, R brachial vein 14G   - patient put on BiPAP machine for CO2 retention and given diamox 250 mg IVP x1 and  x1

## 2024-04-30 NOTE — PROGRESS NOTE ADULT - SUBJECTIVE AND OBJECTIVE BOX
Chief complaint: Patient is a 74y old  Male who presents with a chief complaint of CHF, AFib (30 Apr 2024 07:44)    ***CCU to Cardiac Telemetry Stepdown***  73 y/o M with no known PMHx presented to [facility] on 04/14/24 after mechanical fall, was found down on floor x 2 days by jannad. Labs significant for Trop T 52->54, CK 2k BNP 7k . CTH without acute bleed. CT A/P neg for PE, + ascites. Pt admitted to cardiac tele for newly dx rapid Afib, rhabdo and suspected ADHF exacerbation. Course c/b hypernatremia, s/p lactated ringers. Echo revealing LVEF 20-25%, reduced RVSF. Advanced HF team consulted plan for R/LHC however postponed given new fevers (fever peak 4/18 pm rectal 101). 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 CT PE on 4/20 with small PE right side, s/p RHC/LHC 4/22/24 w/ Dr. 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 R TR access, R brachial vein 14G. Patient prior to cath becoming more obtunded patient became alkalotic and hypercapnic requiring brief BiPAP use. Diuretics and all nephrotoxic medications on hold pending resolution of SILKE.     On 4/23/2024 Patient taken off Bipap, S+S consulted as concern for failed dysphagia screen. Patient passed S+S. Heparin gtt was discontinued and Eliquis started. Course was then complicated by an episode of melena in the morning approximately where stat CBC was ordered and T+S. Patient Hgb remained stable from 15.6 @ 5:30 AM to 15.8 @ 11AM. BMP at that time repeated Cr slightly elevated, Concern for rising BUN. Patient again in the afternoon with a very large episode of melena in the bed saturating sheets. Stat Labs ordered once again and GI consulted for concern for acute bleed.  CCU fellow evaluated patient as concerns appearing diaphoretic with rates in the 140s and 150s sustaining. ICU consulted for concerns for acute bleed and upgrade for closer monitoring. Patient around 6-7 pm with another episode of melena.   ICU spoke to CCU in regards to dispo and patient given concern of acute bleed, risk of stroke and known PE, requiring closer monitoring and tenuous hemodynamic stablity, patient was upgraded to CCU.   Patient was deemed to be in hypovolemic shock requiring phenylephrine and vasopressin.   On 4/27/24, GI did a bedside EGD/CScope that revealed a duodenal ulcer that was a Heraclio Class IIa. Four Endoclips were successfully applied to the duodenal bulb for the purpose of hemostasis. Patient remained intubated and started on a Pantoprazole gtt for risk of a re-bleed. He was extubated on 4/28 that lagged mental status likely i/s/o sedation from extubation. S+S was reconsulted post extubation and failed due to FEES with silent aspiration. Course c/b hypernatremia likely due to overdiuresis with metabolic alkolosis with respiratory acidosis compensation that is being treated with D5 gtt. On 4/29/24, patient became agitated and began to retain urine. This AM he was at baseline and urinating spontaneously. Pressors have been weaned off and patient is HD stable to be stepped down to 5 uris.     Review of systems: A complete 10-point review of systems was obtained and is negative except as stated in the interval history.    Vitals:  T(F): 98.9, Max: 99.4 (04-29 @ 16:54)  HR: 114 (91 - 122)  BP: 168/113 (80/52 - 168/113)  RR: 20 (12 - 36)  SpO2: 97% (96% - 100%)    Ins & outs:     04-27 @ 07:01  -  04-28 @ 07:00  --------------------------------------------------------  IN: 510 mL / OUT: 1545 mL / NET: -1035 mL    04-28 @ 07:01 - 04-29 @ 07:00  --------------------------------------------------------  IN: 0 mL / OUT: 900 mL / NET: -900 mL    04-29 @ 07:01 - 04-30 @ 07:00  --------------------------------------------------------  IN: 3298 mL / OUT: 625 mL / NET: 2673 mL    04-30 @ 07:01 - 04-30 @ 14:24  --------------------------------------------------------  IN: 600 mL / OUT: 0 mL / NET: 600 mL      Weight trend:      Physical exam:  General: No apparent distress  HEENT: Anicteric sclera. Moist mucous membranes. JVD +/-***     Cardiac: Regular rate and rhythm. No murmurs, rubs, or gallops.   Vascular: Symmetric radial pulses. Dorsalis pedis pulses palpable.   Respiratory: Normal effort. Bibasilar crackles. Clear to ascultation. ***  Abdomen: Soft, nontender. Audible bowel sounds.   Extremities: Warm with *** edema. No cyanosis or clubbing.   Skin: Warm and dry. No rash.   Neurologic: Grossly normal motor function.   Psychiatric: Oriented to person, place, and time.      EKG: atrial fibrillation  TTE: LV 5.1 cm, LVEF 30% with global hypokinesis, mild-moderate concentric LVH, moderate RV dysfunction     - Labs:                        8.5    9.72  )-----------( 225      ( 30 Apr 2024 08:52 )             28.1     04-30    153<H>  |  117<H>  |  34<H>  ----------------------------<  121<H>  4.1   |  28  |  1.00    Ca    9.1      30 Apr 2024 08:52  Phos  2.6     04-30  Mg     2.1     04-30    TPro  6.0  /  Alb  3.1<L>  /  TBili  0.6  /  DBili  x   /  AST  28  /  ALT  26  /  AlkPhos  45  04-30            Triglycerides, Serum: 59 mg/dL (04-15-24 @ 05:30)  LDL Cholesterol Calculated: 72 mg/dL (04-15-24 @ 05:30)    Thyroid Stimulating Hormone, Serum: 2.040 uIU/mL (04-15-24 @ 05:30)  Thyroid Stimulating Hormone, Serum: 3.092 uIU/mL (04-14-24 @ 04:13)    Urinalysis Basic - ( 30 Apr 2024 08:52 )    Color: x / Appearance: x / SG: x / pH: x  Gluc: 121 mg/dL / Ketone: x  / Bili: x / Urobili: x   Blood: x / Protein: x / Nitrite: x   Leuk Esterase: x / RBC: x / WBC x   Sq Epi: x / Non Sq Epi: x / Bacteria: x        Medications:  chlorhexidine 2% Cloths 1 Application(s) Topical <User Schedule>  dextrose 10% Bolus 125 milliLiter(s) IV Bolus once  dextrose 50% Injectable 25 Gram(s) IV Push once  dextrose 50% Injectable 12.5 Gram(s) IV Push once  digoxin     Tablet 125 MICROGram(s) Oral every 24 hours  glucagon  Injectable 1 milliGRAM(s) IntraMuscular once  influenza  Vaccine (HIGH DOSE) 0.7 milliLiter(s) IntraMuscular once  insulin lispro (ADMELOG) corrective regimen sliding scale   SubCutaneous three times a day before meals  insulin lispro (ADMELOG) corrective regimen sliding scale   SubCutaneous at bedtime  metoprolol tartrate 25 milliGRAM(s) Oral once  mupirocin 2% Ointment 1 Application(s) Topical two times a day  pantoprazole  Injectable 40 milliGRAM(s) IV Push every 12 hours  spironolactone 25 milliGRAM(s) Oral every 24 hours  sucralfate 1 Gram(s) Oral every 6 hours  valsartan 20 milliGRAM(s) Oral every 12 hours    Drips:  dextrose 5%. 1000 milliLiter(s) (50 mL/Hr) IV Continuous <Continuous>  dextrose 5%. 1000 milliLiter(s) (75 mL/Hr) IV Continuous <Continuous>  dextrose 5%. 1000 milliLiter(s) (100 mL/Hr) IV Continuous <Continuous>    PRN:     Allergies    No Known Allergies    Intolerances        Home Medications:   Chief complaint: Patient is a 74y old  Male who presents with a chief complaint of CHF, AFib (30 Apr 2024 07:44)    ***CCU to Cardiac Telemetry Stepdown***  73 y/o M with no known PMHx presented to [facility] on 04/14/24 after mechanical fall, was found down on floor x 2 days by jannad. Labs significant for Trop T 52->54, CK 2k BNP 7k . CTH without acute bleed. CT A/P neg for PE, + ascites. Pt admitted to cardiac tele for newly dx rapid Afib, rhabdo and suspected ADHF exacerbation. Course c/b hypernatremia, s/p lactated ringers. Echo revealing LVEF 20-25%, reduced RVSF. Advanced HF team consulted plan for R/LHC however postponed given new fevers (fever peak 4/18 pm rectal 101). 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 CT PE on 4/20 with small PE right side, s/p RHC/LHC 4/22/24 w/ Dr. 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 R TR access, R brachial vein 14G. Patient prior to cath becoming more obtunded patient became alkalotic and hypercapnic requiring brief BiPAP use. Diuretics and all nephrotoxic medications on hold pending resolution of SILKE.     On 4/23/2024 Patient taken off Bipap, S+S consulted as concern for failed dysphagia screen. Patient passed S+S. Heparin gtt was discontinued and Eliquis started. Course was then complicated by an episode of melena in the morning approximately where stat CBC was ordered and T+S. Patient Hgb remained stable from 15.6 @ 5:30 AM to 15.8 @ 11AM. BMP at that time repeated Cr slightly elevated, Concern for rising BUN. Patient again in the afternoon with a very large episode of melena in the bed saturating sheets. Stat Labs ordered once again and GI consulted for concern for acute bleed.  CCU fellow evaluated patient as concerns appearing diaphoretic with rates in the 140s and 150s sustaining. ICU consulted for concerns for acute bleed and upgrade for closer monitoring. Patient around 6-7 pm with another episode of melena.   ICU spoke to CCU in regards to dispo and patient given concern of acute bleed, risk of stroke and known PE, requiring closer monitoring and tenuous hemodynamic stablity, patient was upgraded to CCU.   Patient was deemed to be in hypovolemic shock requiring phenylephrine and vasopressin.   On 4/27/24, GI did a bedside EGD/CScope that revealed a duodenal ulcer that was a Heraclio Class IIa. Four Endoclips were successfully applied to the duodenal bulb for the purpose of hemostasis. Patient remained intubated and started on a Pantoprazole gtt for risk of a re-bleed. He was extubated on 4/28 that lagged mental status likely i/s/o sedation from extubation. S+S was reconsulted post extubation and failed due to FEES with silent aspiration. Course c/b hypernatremia likely due to overdiuresis with metabolic alkolosis with respiratory acidosis compensation that is being treated with D5 gtt. On 4/29/24, patient became agitated and began to retain urine. This AM he was at baseline and urinating spontaneously. Pressors have been weaned off and patient is HD stable to be stepped down to 5 uris.     Review of systems: A complete 10-point review of systems was obtained and is negative except as stated in the interval history.    Vitals:  T(F): 98.9, Max: 99.4 (04-29 @ 16:54)  HR: 114 (91 - 122)  BP: 168/113 (80/52 - 168/113)  RR: 20 (12 - 36)  SpO2: 97% (96% - 100%)    Ins & outs:     04-27 @ 07:01  -  04-28 @ 07:00  --------------------------------------------------------  IN: 510 mL / OUT: 1545 mL / NET: -1035 mL    04-28 @ 07:01 - 04-29 @ 07:00  --------------------------------------------------------  IN: 0 mL / OUT: 900 mL / NET: -900 mL    04-29 @ 07:01 - 04-30 @ 07:00  --------------------------------------------------------  IN: 3298 mL / OUT: 625 mL / NET: 2673 mL    04-30 @ 07:01 - 04-30 @ 14:24  --------------------------------------------------------  IN: 600 mL / OUT: 0 mL / NET: 600 mL      Weight trend:      Physical exam:  General: No apparent distress  HEENT: Anicteric sclera. Moist mucous membranes. JVD -     Cardiac: Regular rate and rhythm. No murmurs, rubs, or gallops.   Vascular: Symmetric radial pulses. Dorsalis pedis pulses palpable.   Respiratory: Normal effort. Clear to ascultation.   Abdomen: Soft, nontender. Audible bowel sounds.   Extremities: Warm with no edema. No cyanosis or clubbing.   Skin: Warm and dry. No rash.   Neurologic: Grossly normal motor function.   Psychiatric: Oriented to person, place, and time.      EKG: atrial fibrillation  TTE: LV 5.1 cm, LVEF 30% with global hypokinesis, mild-moderate concentric LVH, moderate RV dysfunction     - Labs:                        8.5    9.72  )-----------( 225      ( 30 Apr 2024 08:52 )             28.1     04-30    153<H>  |  117<H>  |  34<H>  ----------------------------<  121<H>  4.1   |  28  |  1.00    Ca    9.1      30 Apr 2024 08:52  Phos  2.6     04-30  Mg     2.1     04-30    TPro  6.0  /  Alb  3.1<L>  /  TBili  0.6  /  DBili  x   /  AST  28  /  ALT  26  /  AlkPhos  45  04-30            Triglycerides, Serum: 59 mg/dL (04-15-24 @ 05:30)  LDL Cholesterol Calculated: 72 mg/dL (04-15-24 @ 05:30)    Thyroid Stimulating Hormone, Serum: 2.040 uIU/mL (04-15-24 @ 05:30)  Thyroid Stimulating Hormone, Serum: 3.092 uIU/mL (04-14-24 @ 04:13)    Urinalysis Basic - ( 30 Apr 2024 08:52 )    Color: x / Appearance: x / SG: x / pH: x  Gluc: 121 mg/dL / Ketone: x  / Bili: x / Urobili: x   Blood: x / Protein: x / Nitrite: x   Leuk Esterase: x / RBC: x / WBC x   Sq Epi: x / Non Sq Epi: x / Bacteria: x        Medications:  chlorhexidine 2% Cloths 1 Application(s) Topical <User Schedule>  dextrose 10% Bolus 125 milliLiter(s) IV Bolus once  dextrose 50% Injectable 25 Gram(s) IV Push once  dextrose 50% Injectable 12.5 Gram(s) IV Push once  digoxin     Tablet 125 MICROGram(s) Oral every 24 hours  glucagon  Injectable 1 milliGRAM(s) IntraMuscular once  influenza  Vaccine (HIGH DOSE) 0.7 milliLiter(s) IntraMuscular once  insulin lispro (ADMELOG) corrective regimen sliding scale   SubCutaneous three times a day before meals  insulin lispro (ADMELOG) corrective regimen sliding scale   SubCutaneous at bedtime  metoprolol tartrate 25 milliGRAM(s) Oral once  mupirocin 2% Ointment 1 Application(s) Topical two times a day  pantoprazole  Injectable 40 milliGRAM(s) IV Push every 12 hours  spironolactone 25 milliGRAM(s) Oral every 24 hours  sucralfate 1 Gram(s) Oral every 6 hours  valsartan 20 milliGRAM(s) Oral every 12 hours    Drips:  dextrose 5%. 1000 milliLiter(s) (50 mL/Hr) IV Continuous <Continuous>  dextrose 5%. 1000 milliLiter(s) (75 mL/Hr) IV Continuous <Continuous>  dextrose 5%. 1000 milliLiter(s) (100 mL/Hr) IV Continuous <Continuous>    PRN:     Allergies    No Known Allergies    Intolerances        Home Medications:

## 2024-04-30 NOTE — PROGRESS NOTE ADULT - PROBLEM SELECTOR PLAN 11
- -140s.   - c/w valsartan 40 mg BID and metoprolol 25 mg BID     F: none   E: Replete for K <4, Mg <2  N: DASH (1.2L restriction)   GI PPx: Protonix   DVT PPx: Heparin gtt full AC     FULL CODE - -140s.   - c/w valsartan 40 mg BID and metoprolol 50 mg QD   - consider switching valsartan to Entresto 5/1       F: none   E: Replete for K <4, Mg <2  N: DASH (1.2L restriction)   GI PPx: Protonix   DVT PPx: SCD given prior GIB       FULL CODE

## 2024-04-30 NOTE — PROGRESS NOTE ADULT - PROBLEM SELECTOR PLAN 9
- -140s.   - c/w valsartan 40 mg BID and metoprolol 25 mg BID     F: none   E: Replete for K <4, Mg <2  N: DASH (1.2L restriction)   GI PPx: Protonix   DVT PPx: Heparin gtt full AC     FULL CODE
- -140s.   - c/w valsartan 40 mg BID and metoprolol 25 mg BID     F: none   E: Replete for K <4, Mg <2  N: DASH (1.2L restriction)   GI PPx: Protonix   DVT PPx: Heparin gtt full AC     FULL CODE
Last BM was 7 days ago.     - CONT miralax BID, senna qHS   - s/p mineral oil enema 4/19  - tap water enema ordered
- -140s.   - c/w valsartan 40 mg BID and metoprolol 25 mg BID     F: none   E: Replete for K <4, Mg <2  N: DASH (1.2L restriction)   GI PPx: Protonix   DVT PPx: Heparin gtt full AC     FULL CODE
- -140s.   - c/w valsartan 40 mg BID and metoprolol 25 mg BID     F: none   E: Replete for K <4, Mg <2  N: DASH (1.2L restriction)   GI PPx: Protonix   DVT PPx: Heparin gtt full AC     FULL CODE

## 2024-04-30 NOTE — PROGRESS NOTE ADULT - SUBJECTIVE AND OBJECTIVE BOX
SUBJECTIVE/OVERNIGHT EVENTS: 530AM pt found to have urinary retention BS around 400. Pt acutely confused, agitated, refusing straight cath, blood draw. Acutely danger to self and others, pulling at lines attempting to get out of bed despite reorientation. Zyprexa, restraints ordered iso necessary straight cath. Before zyprexa could be given patient urinated in urinal, 350cc UOP. Zyprexa, restraints cancelled.    Enhanced observation placed. 1 hour after spontaneous urination, pt able to be verbally redirectable. OPqatient now return ed to baseline mental status.     Pt seen in AM at bedside, resting comfortably in bed, and does not appear to be in any acute distress. When asked, pt denies any recent or active fever, chills, nausea, vomiting, headache, acute sob, chest pain, abdominal pain, genitourinary sx, extremity pain or swelling.    VITAL SIGNS:  Vital Signs Last 24 Hrs  T(C): 36.7 (30 Apr 2024 08:07), Max: 37.4 (29 Apr 2024 16:54)  T(F): 98.1 (30 Apr 2024 08:07), Max: 99.4 (29 Apr 2024 16:54)  HR: 122 (30 Apr 2024 10:00) (91 - 122)  BP: 80/52 (30 Apr 2024 10:00) (80/52 - 124/71)  BP(mean): 61 (30 Apr 2024 10:00) (61 - 85)  RR: 24 (30 Apr 2024 10:00) (12 - 36)  SpO2: 98% (30 Apr 2024 10:00) (93% - 100%)    Parameters below as of 30 Apr 2024 11:00  Patient On (Oxygen Delivery Method): room air      PHYSICAL EXAM:  General: NAD; speaking in full sentences  HEENT: NC/AT; PERRL; EOMI; MMM  Neck: supple; no JVD  Cardiac: irregular fast rate; +S1/S2  Pulm: CTA B/L; no W/R/R  GI: soft, NT/ND, +BS  Extremities: WWP; no edema, clubbing or cyanosis  Vasc: 2+ radial, DP pulses B/L  Neuro: AAOx3; no focal deficits    MEDICATIONS:  MEDICATIONS  (STANDING):  chlorhexidine 2% Cloths 1 Application(s) Topical <User Schedule>  dextrose 10% Bolus 125 milliLiter(s) IV Bolus once  dextrose 5%. 1000 milliLiter(s) (50 mL/Hr) IV Continuous <Continuous>  dextrose 5%. 1000 milliLiter(s) (100 mL/Hr) IV Continuous <Continuous>  dextrose 5%. 1000 milliLiter(s) (75 mL/Hr) IV Continuous <Continuous>  dextrose 50% Injectable 12.5 Gram(s) IV Push once  dextrose 50% Injectable 25 Gram(s) IV Push once  digoxin     Tablet 125 MICROGram(s) Oral every 24 hours  glucagon  Injectable 1 milliGRAM(s) IntraMuscular once  influenza  Vaccine (HIGH DOSE) 0.7 milliLiter(s) IntraMuscular once  insulin lispro (ADMELOG) corrective regimen sliding scale   SubCutaneous three times a day before meals  insulin lispro (ADMELOG) corrective regimen sliding scale   SubCutaneous at bedtime  metoprolol tartrate 25 milliGRAM(s) Oral once  mupirocin 2% Ointment 1 Application(s) Topical two times a day  pantoprazole    Tablet 40 milliGRAM(s) Oral every 12 hours  spironolactone 25 milliGRAM(s) Oral every 24 hours  valsartan 20 milliGRAM(s) Oral every 12 hours    MEDICATIONS  (PRN):  dextrose Oral Gel 15 Gram(s) Oral once PRN Blood Glucose LESS THAN 70 milliGRAM(s)/deciliter  ipratropium    for Nebulization 500 MICROGram(s) Nebulizer every 6 hours PRN Shortness of Breath and/or Wheezing      ALLERGIES:  Allergies    No Known Allergies    Intolerances        LABS:                        8.5    9.72  )-----------( 225      ( 30 Apr 2024 08:52 )             28.1     04-30    153<H>  |  117<H>  |  34<H>  ----------------------------<  121<H>  4.1   |  28  |  1.00    Ca    9.1      30 Apr 2024 08:52  Phos  2.6     04-30  Mg     2.1     04-30    TPro  6.0  /  Alb  3.1<L>  /  TBili  0.6  /  DBili  x   /  AST  28  /  ALT  26  /  AlkPhos  45  04-30        RADIOLOGY & ADDITIONAL TESTS: Reviewed. ***Transfer from CCU to cardiology***  74M with no known PMH, who presented to [facility] on 04/14/24 after mechanical fall, was found down on floor x 2 days by karen. Labs significant for Trop T 52->54, CK 2k BNP 7k . CTH without acute bleed. CT A/P neg for PE, + ascites. Pt admitted to cardiac tele for newly dx rapid a fib, rhabdo, and suspected ADHF exacerbation. Course c/b hypernatremia, s/p lactated ringers. Echo revealing LVEF 20-25%, reduced RVSF. Advanced HF team consulted plan for R/LHC however postponed given new fevers (fever peak 4/18 pm rectal 101). 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 CT PE on 4/20 with small PE right side. s/p R/LHC on 4/22 where patient became alkalotic and hypercapnic requiring brief BiPAP use. Diuretics and all nephrotoxic medications on hold pending resolution of SILKE. Course complicated by two episodes of melena on 04/23, for which GI was consulted. Patient developed melena when transitioned from heparin gtt to Eliquis for newly diagnosed PE, so was started on pantoprazole 40 mg IV BID, which was transitioned top Drip. Patient transfered to CCU in hypovolemic shock requiring phenylephrine and vasopressin. Pateint was electively intubated and sedated for EGD. On 4/24 EGD showed oozing duodenal ulcer, which was clipped x4 with good hemostasis. Patient was weaned off of sedation following procedure, but with lagging mental sttaus. He was extubated 26, when his mental status allowed, and has had good respiratory status since on NC. Pateint weaned off pressors. Pateint failed speech and swallow evaluation and had FEES with silent aspiration. Recommended for soft and bite sized with mildly thick liquids. Diet advanced. Patient stable for stepdown.      SUBJECTIVE/OVERNIGHT EVENTS: 530AM pt found to have urinary retention BS around 400. Pt acutely confused, agitated, refusing straight cath, blood draw. Acutely danger to self and others, pulling at lines attempting to get out of bed despite reorientation. Zyprexa, restraints ordered iso necessary straight cath. Before zyprexa could be given patient urinated in urinal, 350cc UOP. Zyprexa, restraints cancelled.    Enhanced observation placed. 1 hour after spontaneous urination, pt able to be verbally redirectable. OPqatient now return ed to baseline mental status.     Pt seen in AM at bedside, resting comfortably in bed, and does not appear to be in any acute distress. When asked, pt denies any recent or active fever, chills, nausea, vomiting, headache, acute sob, chest pain, abdominal pain, genitourinary sx, extremity pain or swelling.    VITAL SIGNS:  Vital Signs Last 24 Hrs  T(C): 36.7 (30 Apr 2024 08:07), Max: 37.4 (29 Apr 2024 16:54)  T(F): 98.1 (30 Apr 2024 08:07), Max: 99.4 (29 Apr 2024 16:54)  HR: 122 (30 Apr 2024 10:00) (91 - 122)  BP: 80/52 (30 Apr 2024 10:00) (80/52 - 124/71)  BP(mean): 61 (30 Apr 2024 10:00) (61 - 85)  RR: 24 (30 Apr 2024 10:00) (12 - 36)  SpO2: 98% (30 Apr 2024 10:00) (93% - 100%)    Parameters below as of 30 Apr 2024 11:00  Patient On (Oxygen Delivery Method): room air      PHYSICAL EXAM:  General: NAD; speaking in full sentences  HEENT: NC/AT; PERRL; EOMI; MMM  Neck: supple; no JVD  Cardiac: irregular fast rate; +S1/S2  Pulm: CTA B/L; no W/R/R  GI: soft, NT/ND, +BS  Extremities: WWP; no edema, clubbing or cyanosis  Vasc: 2+ radial, DP pulses B/L  Neuro: AAOx3; no focal deficits    MEDICATIONS:  MEDICATIONS  (STANDING):  chlorhexidine 2% Cloths 1 Application(s) Topical <User Schedule>  dextrose 10% Bolus 125 milliLiter(s) IV Bolus once  dextrose 5%. 1000 milliLiter(s) (50 mL/Hr) IV Continuous <Continuous>  dextrose 5%. 1000 milliLiter(s) (100 mL/Hr) IV Continuous <Continuous>  dextrose 5%. 1000 milliLiter(s) (75 mL/Hr) IV Continuous <Continuous>  dextrose 50% Injectable 12.5 Gram(s) IV Push once  dextrose 50% Injectable 25 Gram(s) IV Push once  digoxin     Tablet 125 MICROGram(s) Oral every 24 hours  glucagon  Injectable 1 milliGRAM(s) IntraMuscular once  influenza  Vaccine (HIGH DOSE) 0.7 milliLiter(s) IntraMuscular once  insulin lispro (ADMELOG) corrective regimen sliding scale   SubCutaneous three times a day before meals  insulin lispro (ADMELOG) corrective regimen sliding scale   SubCutaneous at bedtime  metoprolol tartrate 25 milliGRAM(s) Oral once  mupirocin 2% Ointment 1 Application(s) Topical two times a day  pantoprazole    Tablet 40 milliGRAM(s) Oral every 12 hours  spironolactone 25 milliGRAM(s) Oral every 24 hours  valsartan 20 milliGRAM(s) Oral every 12 hours    MEDICATIONS  (PRN):  dextrose Oral Gel 15 Gram(s) Oral once PRN Blood Glucose LESS THAN 70 milliGRAM(s)/deciliter  ipratropium    for Nebulization 500 MICROGram(s) Nebulizer every 6 hours PRN Shortness of Breath and/or Wheezing      ALLERGIES:  Allergies    No Known Allergies    Intolerances        LABS:                        8.5    9.72  )-----------( 225      ( 30 Apr 2024 08:52 )             28.1     04-30    153<H>  |  117<H>  |  34<H>  ----------------------------<  121<H>  4.1   |  28  |  1.00    Ca    9.1      30 Apr 2024 08:52  Phos  2.6     04-30  Mg     2.1     04-30    TPro  6.0  /  Alb  3.1<L>  /  TBili  0.6  /  DBili  x   /  AST  28  /  ALT  26  /  AlkPhos  45  04-30        RADIOLOGY & ADDITIONAL TESTS: Reviewed. ***Transfer from CCU to cardiology***  74M with no known PMH, who presented to [facility] on 04/14/[facility] after mechanical fall, was found down on floor x 2 days by karen. Labs significant for Trop T 52->54, CK 2k BNP 7k . CTH without acute bleed. CT A/P neg for PE, + ascites. Pt admitted to cardiac tele for newly dx rapid a fib, rhabdo, and suspected ADHF exacerbation. Course c/b hypernatremia, s/p lactated ringers. Echo revealing LVEF 20-25%, reduced RVSF. Advanced HF team consulted plan for R/LHC however postponed given new fevers (fever peak 4/18 pm rectal 101). 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 CT PE on 4/20 with small PE right side. s/p R/LHC on 4/22 where patient became alkalotic and hypercapnic requiring brief BiPAP use. Diuretics and all nephrotoxic medications on hold pending resolution of SILKE. Course complicated by two episodes of melena on 04/23, for which GI was consulted. Patient developed melena when transitioned from heparin gtt to Eliquis for newly diagnosed PE, so was started on pantoprazole 40 mg IV BID, which was transitioned top Drip. Patient transfered to CCU in hypovolemic shock requiring phenylephrine and vasopressin. Pateint was electively intubated and sedated for EGD. On 4/24 EGD showed oozing duodenal ulcer, which was clipped x4 with good hemostasis. Patient was weaned off of sedation following procedure, but with lagging mental sttaus. He was extubated 26, when his mental status allowed, and has had good respiratory status since on NC. Pateint weaned off pressors. Pateint failed speech and swallow evaluation and had FEES with silent aspiration. Recommended for soft and bite sized with mildly thick liquids. Diet advanced. Patient with hypernatremia improving on D5. Patient stable for stepdown.      SUBJECTIVE/OVERNIGHT EVENTS: 530AM pt found to have urinary retention BS around 400. Pt acutely confused, agitated, refusing straight cath, blood draw. Acutely danger to self and others, pulling at lines attempting to get out of bed despite reorientation. Zyprexa, restraints ordered iso necessary straight cath. Before zyprexa could be given patient urinated in urinal, 350cc UOP. Zyprexa, restraints cancelled.    Enhanced observation placed. 1 hour after spontaneous urination, pt able to be verbally redirectable. OPqatient now return ed to baseline mental status.     Pt seen in AM at bedside, resting comfortably in bed, and does not appear to be in any acute distress. When asked, pt denies any recent or active fever, chills, nausea, vomiting, headache, acute sob, chest pain, abdominal pain, genitourinary sx, extremity pain or swelling.    VITAL SIGNS:  Vital Signs Last 24 Hrs  T(C): 36.7 (30 Apr 2024 08:07), Max: 37.4 (29 Apr 2024 16:54)  T(F): 98.1 (30 Apr 2024 08:07), Max: 99.4 (29 Apr 2024 16:54)  HR: 122 (30 Apr 2024 10:00) (91 - 122)  BP: 80/52 (30 Apr 2024 10:00) (80/52 - 124/71)  BP(mean): 61 (30 Apr 2024 10:00) (61 - 85)  RR: 24 (30 Apr 2024 10:00) (12 - 36)  SpO2: 98% (30 Apr 2024 10:00) (93% - 100%)    Parameters below as of 30 Apr 2024 11:00  Patient On (Oxygen Delivery Method): room air      PHYSICAL EXAM:  General: NAD; speaking in full sentences  HEENT: NC/AT; PERRL; EOMI; MMM  Neck: supple; no JVD  Cardiac: irregular fast rate; +S1/S2  Pulm: CTA B/L; no W/R/R  GI: soft, NT/ND, +BS  Extremities: WWP; no edema, clubbing or cyanosis  Vasc: 2+ radial, DP pulses B/L  Neuro: AAOx3; no focal deficits    MEDICATIONS:  MEDICATIONS  (STANDING):  chlorhexidine 2% Cloths 1 Application(s) Topical <User Schedule>  dextrose 10% Bolus 125 milliLiter(s) IV Bolus once  dextrose 5%. 1000 milliLiter(s) (50 mL/Hr) IV Continuous <Continuous>  dextrose 5%. 1000 milliLiter(s) (100 mL/Hr) IV Continuous <Continuous>  dextrose 5%. 1000 milliLiter(s) (75 mL/Hr) IV Continuous <Continuous>  dextrose 50% Injectable 12.5 Gram(s) IV Push once  dextrose 50% Injectable 25 Gram(s) IV Push once  digoxin     Tablet 125 MICROGram(s) Oral every 24 hours  glucagon  Injectable 1 milliGRAM(s) IntraMuscular once  influenza  Vaccine (HIGH DOSE) 0.7 milliLiter(s) IntraMuscular once  insulin lispro (ADMELOG) corrective regimen sliding scale   SubCutaneous three times a day before meals  insulin lispro (ADMELOG) corrective regimen sliding scale   SubCutaneous at bedtime  metoprolol tartrate 25 milliGRAM(s) Oral once  mupirocin 2% Ointment 1 Application(s) Topical two times a day  pantoprazole    Tablet 40 milliGRAM(s) Oral every 12 hours  spironolactone 25 milliGRAM(s) Oral every 24 hours  valsartan 20 milliGRAM(s) Oral every 12 hours    MEDICATIONS  (PRN):  dextrose Oral Gel 15 Gram(s) Oral once PRN Blood Glucose LESS THAN 70 milliGRAM(s)/deciliter  ipratropium    for Nebulization 500 MICROGram(s) Nebulizer every 6 hours PRN Shortness of Breath and/or Wheezing      ALLERGIES:  Allergies    No Known Allergies    Intolerances        LABS:                        8.5    9.72  )-----------( 225      ( 30 Apr 2024 08:52 )             28.1     04-30    153<H>  |  117<H>  |  34<H>  ----------------------------<  121<H>  4.1   |  28  |  1.00    Ca    9.1      30 Apr 2024 08:52  Phos  2.6     04-30  Mg     2.1     04-30    TPro  6.0  /  Alb  3.1<L>  /  TBili  0.6  /  DBili  x   /  AST  28  /  ALT  26  /  AlkPhos  45  04-30        RADIOLOGY & ADDITIONAL TESTS: Reviewed. ***Transfer from CCU to cardiology***  74M with no known PMH, who presented to [facility] on 04/14/24 after mechanical fall, was found down on floor x 2 days by karen. Labs significant for Trop T 52->54, CK 2k BNP 7k . CTH without acute bleed. CT A/P neg for PE, + ascites. Pt admitted to cardiac tele for newly dx rapid a fib, rhabdo, and suspected ADHF exacerbation. Course c/b hypernatremia, s/p lactated ringers. Echo revealing LVEF 20-25%, reduced RVSF. Advanced HF team consulted plan for R/LHC however postponed given new fevers (fever peak 4/18 pm rectal 101). 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 CT PE on 4/20 with small PE right side. s/p R/LHC on 4/22 where patient became alkalotic and hypercapnic requiring brief BiPAP use. Diuretics and all nephrotoxic medications on hold pending resolution of SILKE. Course complicated by two episodes of melena on 04/23, for which GI was consulted. Patient developed melena when transitioned from heparin gtt to Eliquis for newly diagnosed PE, so was started on pantoprazole 40 mg IV BID, which was transitioned top Drip. Patient transfered to CCU in hypovolemic shock requiring phenylephrine and vasopressin. Pateint was electively intubated and sedated for EGD. On 4/24 EGD showed oozing duodenal ulcer, which was clipped x4 with good hemostasis. Patient was weaned off of sedation following procedure, but with lagging mental sttaus. He was extubated 26, when his mental status allowed, and has had good respiratory status since on NC. Pateint weaned off pressors. Pateint failed speech and swallow evaluation and had FEES with silent aspiration. Recommended for soft and bite sized with mildly thick liquids. Diet advanced. Patient with hypernatremia improving on D5. Patient stable for stepdown. Per GI patient is ready for heparin challenge.      SUBJECTIVE/OVERNIGHT EVENTS: 530AM pt found to have urinary retention BS around 400. Pt acutely confused, agitated, refusing straight cath, blood draw. Acutely danger to self and others, pulling at lines attempting to get out of bed despite reorientation. Zyprexa, restraints ordered iso necessary straight cath. Before zyprexa could be given patient urinated in urinal, 350cc UOP. Zyprexa, restraints cancelled.    Enhanced observation placed. 1 hour after spontaneous urination, pt able to be verbally redirectable. OPqatient now return ed to baseline mental status.     Pt seen in AM at bedside, resting comfortably in bed, and does not appear to be in any acute distress. When asked, pt denies any recent or active fever, chills, nausea, vomiting, headache, acute sob, chest pain, abdominal pain, genitourinary sx, extremity pain or swelling.    VITAL SIGNS:  Vital Signs Last 24 Hrs  T(C): 36.7 (30 Apr 2024 08:07), Max: 37.4 (29 Apr 2024 16:54)  T(F): 98.1 (30 Apr 2024 08:07), Max: 99.4 (29 Apr 2024 16:54)  HR: 122 (30 Apr 2024 10:00) (91 - 122)  BP: 80/52 (30 Apr 2024 10:00) (80/52 - 124/71)  BP(mean): 61 (30 Apr 2024 10:00) (61 - 85)  RR: 24 (30 Apr 2024 10:00) (12 - 36)  SpO2: 98% (30 Apr 2024 10:00) (93% - 100%)    Parameters below as of 30 Apr 2024 11:00  Patient On (Oxygen Delivery Method): room air      PHYSICAL EXAM:  General: NAD; speaking in full sentences  HEENT: NC/AT; PERRL; EOMI; MMM  Neck: supple; no JVD  Cardiac: irregular fast rate; +S1/S2  Pulm: CTA B/L; no W/R/R  GI: soft, NT/ND, +BS  Extremities: WWP; no edema, clubbing or cyanosis  Vasc: 2+ radial, DP pulses B/L  Neuro: AAOx3; no focal deficits    MEDICATIONS:  MEDICATIONS  (STANDING):  chlorhexidine 2% Cloths 1 Application(s) Topical <User Schedule>  dextrose 10% Bolus 125 milliLiter(s) IV Bolus once  dextrose 5%. 1000 milliLiter(s) (50 mL/Hr) IV Continuous <Continuous>  dextrose 5%. 1000 milliLiter(s) (100 mL/Hr) IV Continuous <Continuous>  dextrose 5%. 1000 milliLiter(s) (75 mL/Hr) IV Continuous <Continuous>  dextrose 50% Injectable 12.5 Gram(s) IV Push once  dextrose 50% Injectable 25 Gram(s) IV Push once  digoxin     Tablet 125 MICROGram(s) Oral every 24 hours  glucagon  Injectable 1 milliGRAM(s) IntraMuscular once  influenza  Vaccine (HIGH DOSE) 0.7 milliLiter(s) IntraMuscular once  insulin lispro (ADMELOG) corrective regimen sliding scale   SubCutaneous three times a day before meals  insulin lispro (ADMELOG) corrective regimen sliding scale   SubCutaneous at bedtime  metoprolol tartrate 25 milliGRAM(s) Oral once  mupirocin 2% Ointment 1 Application(s) Topical two times a day  pantoprazole    Tablet 40 milliGRAM(s) Oral every 12 hours  spironolactone 25 milliGRAM(s) Oral every 24 hours  valsartan 20 milliGRAM(s) Oral every 12 hours    MEDICATIONS  (PRN):  dextrose Oral Gel 15 Gram(s) Oral once PRN Blood Glucose LESS THAN 70 milliGRAM(s)/deciliter  ipratropium    for Nebulization 500 MICROGram(s) Nebulizer every 6 hours PRN Shortness of Breath and/or Wheezing      ALLERGIES:  Allergies    No Known Allergies    Intolerances        LABS:                        8.5    9.72  )-----------( 225      ( 30 Apr 2024 08:52 )             28.1     04-30    153<H>  |  117<H>  |  34<H>  ----------------------------<  121<H>  4.1   |  28  |  1.00    Ca    9.1      30 Apr 2024 08:52  Phos  2.6     04-30  Mg     2.1     04-30    TPro  6.0  /  Alb  3.1<L>  /  TBili  0.6  /  DBili  x   /  AST  28  /  ALT  26  /  AlkPhos  45  04-30        RADIOLOGY & ADDITIONAL TESTS: Reviewed.

## 2024-04-30 NOTE — PROGRESS NOTE ADULT - PROBLEM SELECTOR PLAN 10
- CT Abd/Pelvis 04/13/24: No thoracoabdominal dissection, no thoracic aorta hematoma. Ascending thoracic aortic aneurysm 4.0 cm  - Consider CTS follow up in outpatient setting for surveillance - CT Abd/Pelvis 04/13/24: No thoracoabdominal dissection, no thoracic aorta hematoma. Ascending thoracic aortic aneurysm 4.0 cm

## 2024-04-30 NOTE — CHART NOTE - NSCHARTNOTEFT_GEN_A_CORE
530AM pt found to have urinary retention BS around 400. Pt acutely confused, agitated, refusing straight cath, blood draw. Acutely danger to self and others, pulling at lines attempting to get out of bed despite reorientation. Zyprexa, restraints ordered iso necessary straight cath. Before zyprexa could be given patient urinated in urinal, 350cc UOP. Zyprexa, restraints cancelled. 530AM pt found to have urinary retention BS around 400. Pt acutely confused, agitated, refusing straight cath, blood draw. Acutely danger to self and others, pulling at lines attempting to get out of bed despite reorientation. Zyprexa, restraints ordered iso necessary straight cath. Before zyprexa could be given patient urinated in urinal, 350cc UOP. Zyprexa, restraints cancelled.    Enhanced observation placed. 1 hour after spontaneous urination, pt able to be verbally redirectable.

## 2024-04-30 NOTE — PROGRESS NOTE ADULT - PROBLEM SELECTOR PLAN 3
- Pt had moderate pharyngeal dysphagia, silent aspiration occurred with thin liquids and with mildly thick via straw and consecutive cup sip. Airway protection optimized with mildly thick via SINGLE CUP SIP. Pt benefitted from intermittent secondary swallows to reduce pharyngeal residue.   - Pt is deemed safe for soft and bite size solids/ mildly thick liquids via SINGLE CUP SIPS (no straw, no consecutive sips). Pt requires feeding assistance to provide single cup sips.   - Medical team reported plan to initiate CLD due to GI concerns, however agreed to mildly thick liquids.  Therefore, recommend initiate clear - MILDLY THICK liquids diet, and advance to soft and bite size per GI/medical team recs.

## 2024-04-30 NOTE — PROGRESS NOTE ADULT - PROBLEM SELECTOR PLAN 5
- HR 100s, unclear whether patient has hx of afib or is new. Never f/u with Drs.  - EKG  BPM   - Rate control: c/w metoprolol tartrate 25 mg BID  - AC: c/w Heparin gtt HR 100s, unclear whether patient has hx of afib or is new. Never f/u with Drs.  - EKG  BPM   - Rate control: c/w metoprolol tartrate 50 mg QD, digoxin started on 4/29  - AC: on hold pending AC challenge on 5/1   - consider EP consult for possible Watchman to try to avoid AC given recent GIB

## 2024-04-30 NOTE — PROGRESS NOTE ADULT - PROBLEM SELECTOR PLAN 4
- Intermittent fevers, highest temp rectal 101.6F 4/17, now rectal 100. 6 4/19  - Per ID, favor community acquired aspiration PNA (given poor dentition, L side opacity on CXR)  - Empiric coverage with ceftriaxone 2 g IV QD per ID recs, anticipate 5 day empiric course.  - HIV -, HEP -, UStrep -, Ulegionella -, Procal -  - If patient develops cough/sputum production, will send sputum cx  - BCx 3 NGTD for 48 hours, BC collected 4/19 f/u  - CTA PE negative for PE at [facility]  - was indeterminate d/t motion artifact  - CTA pe rule out on 4/20 with improvement in study showing Multiple small subsegmental emboli identified within the right lower lobe pulmonary artery branches. Small plueral effusions with atelectasis.   - B/L LE dopplers negative for DVT - Intermittent fevers, highest temp rectal 101.6F 4/17 --> rectal 100. 6 4/19  - Per ID, favor community acquired aspiration PNA (given poor dentition, L side opacity on CXR)  - Empiric coverage with ceftriaxone 2 g IV QD per ID recs, anticipate 5 day empiric course.  - HIV -, HEP -, UStrep -, Ulegionella -, Procal -  - If patient develops cough/sputum production, will send sputum cx  - BCx 3 NGTD for 48 hours, BC collected 4/19 f/u  - CTA PE negative for PE at [facility]  - was indeterminate d/t motion artifact  - CTA pe rule out on 4/20 with improvement in study showing Multiple small subsegmental emboli identified within the right lower lobe pulmonary artery branches. Small pleural effusions with atelectasis.   - B/L LE dopplers negative for DVT

## 2024-04-30 NOTE — PROGRESS NOTE ADULT - PROBLEM SELECTOR PLAN 2
- S/p episodes of melena after switching from heparin gtt to Eliquis, EGD   - EGD on 04/24/24: normal mucosa in whole esophagus and stomach. A single oozing ulcer was found in the duodenal bulb. Additional findings include An ulcer with a visible vessel was seen in the duodenal sweep. This ulcer was a Heraclio Class IIa. Four Endoclips were successfully applied to the duodenal bulb for the purpose of hemostasis. Bi-cap electrocautery was successfully applied for hemostasis.  - Last episode of melena 4/26/24  - continue pantoprazole 40 mg IV BID x 2 weeks, followed by daily (can be changed to PO at time of discharge)  - start carafate suspension 1g PO QID now that patient is on PO diet   - currently tolerating soft and bite sized diet   - trend Hb with AM labs and maintain active type and screen   - GI following patient

## 2024-05-01 DIAGNOSIS — I26.99 OTHER PULMONARY EMBOLISM WITHOUT ACUTE COR PULMONALE: ICD-10-CM

## 2024-05-01 DIAGNOSIS — I48.91 UNSPECIFIED ATRIAL FIBRILLATION: ICD-10-CM

## 2024-05-01 LAB
ANION GAP SERPL CALC-SCNC: 11 MMOL/L — SIGNIFICANT CHANGE UP (ref 5–17)
ANION GAP SERPL CALC-SCNC: 8 MMOL/L — SIGNIFICANT CHANGE UP (ref 5–17)
ANION GAP SERPL CALC-SCNC: 9 MMOL/L — SIGNIFICANT CHANGE UP (ref 5–17)
APTT BLD: 27.9 SEC — SIGNIFICANT CHANGE UP (ref 24.5–35.6)
APTT BLD: 79.1 SEC — HIGH (ref 24.5–35.6)
BUN SERPL-MCNC: 31 MG/DL — HIGH (ref 7–23)
BUN SERPL-MCNC: 34 MG/DL — HIGH (ref 7–23)
BUN SERPL-MCNC: 35 MG/DL — HIGH (ref 7–23)
CALCIUM SERPL-MCNC: 8.3 MG/DL — LOW (ref 8.4–10.5)
CALCIUM SERPL-MCNC: 8.7 MG/DL — SIGNIFICANT CHANGE UP (ref 8.4–10.5)
CALCIUM SERPL-MCNC: 8.7 MG/DL — SIGNIFICANT CHANGE UP (ref 8.4–10.5)
CHLORIDE SERPL-SCNC: 113 MMOL/L — HIGH (ref 96–108)
CHLORIDE SERPL-SCNC: 114 MMOL/L — HIGH (ref 96–108)
CHLORIDE SERPL-SCNC: 114 MMOL/L — HIGH (ref 96–108)
CO2 SERPL-SCNC: 25 MMOL/L — SIGNIFICANT CHANGE UP (ref 22–31)
CO2 SERPL-SCNC: 26 MMOL/L — SIGNIFICANT CHANGE UP (ref 22–31)
CO2 SERPL-SCNC: 28 MMOL/L — SIGNIFICANT CHANGE UP (ref 22–31)
CREAT SERPL-MCNC: 1.06 MG/DL — SIGNIFICANT CHANGE UP (ref 0.5–1.3)
CREAT SERPL-MCNC: 1.1 MG/DL — SIGNIFICANT CHANGE UP (ref 0.5–1.3)
CREAT SERPL-MCNC: 1.11 MG/DL — SIGNIFICANT CHANGE UP (ref 0.5–1.3)
EGFR: 69 ML/MIN/1.73M2 — SIGNIFICANT CHANGE UP
EGFR: 70 ML/MIN/1.73M2 — SIGNIFICANT CHANGE UP
EGFR: 73 ML/MIN/1.73M2 — SIGNIFICANT CHANGE UP
GLUCOSE BLDC GLUCOMTR-MCNC: 126 MG/DL — HIGH (ref 70–99)
GLUCOSE BLDC GLUCOMTR-MCNC: 135 MG/DL — HIGH (ref 70–99)
GLUCOSE BLDC GLUCOMTR-MCNC: 180 MG/DL — HIGH (ref 70–99)
GLUCOSE SERPL-MCNC: 112 MG/DL — HIGH (ref 70–99)
GLUCOSE SERPL-MCNC: 124 MG/DL — HIGH (ref 70–99)
GLUCOSE SERPL-MCNC: 135 MG/DL — HIGH (ref 70–99)
HCT VFR BLD CALC: 28.1 % — LOW (ref 39–50)
HGB BLD-MCNC: 8.4 G/DL — LOW (ref 13–17)
INR BLD: 1.14 — SIGNIFICANT CHANGE UP (ref 0.85–1.18)
MAGNESIUM SERPL-MCNC: 2.1 MG/DL — SIGNIFICANT CHANGE UP (ref 1.6–2.6)
MCHC RBC-ENTMCNC: 29.4 PG — SIGNIFICANT CHANGE UP (ref 27–34)
MCHC RBC-ENTMCNC: 29.9 GM/DL — LOW (ref 32–36)
MCV RBC AUTO: 98.3 FL — SIGNIFICANT CHANGE UP (ref 80–100)
NRBC # BLD: 0 /100 WBCS — SIGNIFICANT CHANGE UP (ref 0–0)
PLATELET # BLD AUTO: 262 K/UL — SIGNIFICANT CHANGE UP (ref 150–400)
POTASSIUM SERPL-MCNC: 3.6 MMOL/L — SIGNIFICANT CHANGE UP (ref 3.5–5.3)
POTASSIUM SERPL-MCNC: 4 MMOL/L — SIGNIFICANT CHANGE UP (ref 3.5–5.3)
POTASSIUM SERPL-MCNC: 4.4 MMOL/L — SIGNIFICANT CHANGE UP (ref 3.5–5.3)
POTASSIUM SERPL-SCNC: 3.6 MMOL/L — SIGNIFICANT CHANGE UP (ref 3.5–5.3)
POTASSIUM SERPL-SCNC: 4 MMOL/L — SIGNIFICANT CHANGE UP (ref 3.5–5.3)
POTASSIUM SERPL-SCNC: 4.4 MMOL/L — SIGNIFICANT CHANGE UP (ref 3.5–5.3)
PROTHROM AB SERPL-ACNC: 13 SEC — SIGNIFICANT CHANGE UP (ref 9.5–13)
RBC # BLD: 2.86 M/UL — LOW (ref 4.2–5.8)
RBC # FLD: 17.4 % — HIGH (ref 10.3–14.5)
SODIUM SERPL-SCNC: 148 MMOL/L — HIGH (ref 135–145)
SODIUM SERPL-SCNC: 150 MMOL/L — HIGH (ref 135–145)
SODIUM SERPL-SCNC: 150 MMOL/L — HIGH (ref 135–145)
WBC # BLD: 9.11 K/UL — SIGNIFICANT CHANGE UP (ref 3.8–10.5)
WBC # FLD AUTO: 9.11 K/UL — SIGNIFICANT CHANGE UP (ref 3.8–10.5)

## 2024-05-01 PROCEDURE — 74230 X-RAY XM SWLNG FUNCJ C+: CPT | Mod: 26

## 2024-05-01 PROCEDURE — 99233 SBSQ HOSP IP/OBS HIGH 50: CPT | Mod: GC

## 2024-05-01 PROCEDURE — 99233 SBSQ HOSP IP/OBS HIGH 50: CPT

## 2024-05-01 RX ORDER — SODIUM CHLORIDE 9 MG/ML
1000 INJECTION, SOLUTION INTRAVENOUS
Refills: 0 | Status: DISCONTINUED | OUTPATIENT
Start: 2024-05-01 | End: 2024-05-02

## 2024-05-01 RX ORDER — HEPARIN SODIUM 5000 [USP'U]/ML
1400 INJECTION INTRAVENOUS; SUBCUTANEOUS
Qty: 25000 | Refills: 0 | Status: DISCONTINUED | OUTPATIENT
Start: 2024-05-01 | End: 2024-05-01

## 2024-05-01 RX ORDER — APIXABAN 2.5 MG/1
1 TABLET, FILM COATED ORAL
Qty: 180 | Refills: 3
Start: 2024-05-01 | End: 2025-04-25

## 2024-05-01 RX ORDER — DAPAGLIFLOZIN 10 MG/1
1 TABLET, FILM COATED ORAL
Qty: 90 | Refills: 3
Start: 2024-05-01 | End: 2025-04-25

## 2024-05-01 RX ORDER — QUETIAPINE FUMARATE 200 MG/1
25 TABLET, FILM COATED ORAL AT BEDTIME
Refills: 0 | Status: DISCONTINUED | OUTPATIENT
Start: 2024-05-01 | End: 2024-05-04

## 2024-05-01 RX ORDER — LANOLIN ALCOHOL/MO/W.PET/CERES
3 CREAM (GRAM) TOPICAL AT BEDTIME
Refills: 0 | Status: DISCONTINUED | OUTPATIENT
Start: 2024-05-01 | End: 2024-05-04

## 2024-05-01 RX ORDER — HEPARIN SODIUM 5000 [USP'U]/ML
1300 INJECTION INTRAVENOUS; SUBCUTANEOUS
Qty: 25000 | Refills: 0 | Status: DISCONTINUED | OUTPATIENT
Start: 2024-05-01 | End: 2024-05-02

## 2024-05-01 RX ADMIN — SODIUM CHLORIDE 75 MILLILITER(S): 9 INJECTION, SOLUTION INTRAVENOUS at 12:12

## 2024-05-01 RX ADMIN — Medication 1 GRAM(S): at 17:35

## 2024-05-01 RX ADMIN — PANTOPRAZOLE SODIUM 40 MILLIGRAM(S): 20 TABLET, DELAYED RELEASE ORAL at 05:35

## 2024-05-01 RX ADMIN — SPIRONOLACTONE 25 MILLIGRAM(S): 25 TABLET, FILM COATED ORAL at 12:11

## 2024-05-01 RX ADMIN — PANTOPRAZOLE SODIUM 40 MILLIGRAM(S): 20 TABLET, DELAYED RELEASE ORAL at 17:35

## 2024-05-01 RX ADMIN — Medication 125 MICROGRAM(S): at 12:11

## 2024-05-01 RX ADMIN — HEPARIN SODIUM 14 UNIT(S)/HR: 5000 INJECTION INTRAVENOUS; SUBCUTANEOUS at 13:49

## 2024-05-01 RX ADMIN — QUETIAPINE FUMARATE 25 MILLIGRAM(S): 200 TABLET, FILM COATED ORAL at 22:03

## 2024-05-01 RX ADMIN — MUPIROCIN 1 APPLICATION(S): 20 OINTMENT TOPICAL at 18:40

## 2024-05-01 RX ADMIN — VALSARTAN 20 MILLIGRAM(S): 80 TABLET ORAL at 22:03

## 2024-05-01 RX ADMIN — Medication 2: at 17:34

## 2024-05-01 RX ADMIN — Medication 50 MILLIGRAM(S): at 12:11

## 2024-05-01 RX ADMIN — MUPIROCIN 1 APPLICATION(S): 20 OINTMENT TOPICAL at 03:27

## 2024-05-01 RX ADMIN — MUPIROCIN 1 APPLICATION(S): 20 OINTMENT TOPICAL at 05:43

## 2024-05-01 RX ADMIN — Medication 1 GRAM(S): at 12:11

## 2024-05-01 RX ADMIN — Medication 3 MILLIGRAM(S): at 22:03

## 2024-05-01 RX ADMIN — HEPARIN SODIUM 13 UNIT(S)/HR: 5000 INJECTION INTRAVENOUS; SUBCUTANEOUS at 22:50

## 2024-05-01 RX ADMIN — VALSARTAN 20 MILLIGRAM(S): 80 TABLET ORAL at 12:11

## 2024-05-01 NOTE — SWALLOW VFSS/MBS ASSESSMENT ADULT - INTACT
There is a mild swallow trigger delay as the boluses collect in the vallecular momentarily before the swallow is triggered.

## 2024-05-01 NOTE — PROGRESS NOTE ADULT - PROBLEM SELECTOR PLAN 2
- AC: Consider Heparin gtt challenge with plan to transition to NOAC   - Meds as above + Digoxin 125mcg QD   - Consider EP consult for possible Watchman if unable to tolerate AC

## 2024-05-01 NOTE — PROGRESS NOTE ADULT - PROBLEM SELECTOR PLAN 6
- 2/2 complex hospital course and recent intubation   - F/up Barium swallow study   - Upgraded to Soft and bite-sized solids / mildly thick liquids   - Aspiration risk  - S&S following, appreciate recs

## 2024-05-01 NOTE — SWALLOW VFSS/MBS ASSESSMENT ADULT - RECOMMENDED FEEDING/EATING TECHNIQUES
alternate food with liquid/maintain upright posture during/after eating for 30 mins/oral hygiene/position upright (90 degrees)

## 2024-05-01 NOTE — PROGRESS NOTE ADULT - NS ATTEND AMEND GEN_ALL_CORE FT
74M with no known PMH, who presented to [facility] on 04/14/24 after mechanical fall, was found down on floor x 2 days by landlordennis and admitted to [facility] for ADHF. Diagnosed with acute NICM HFrEF (20%), pAFib, rhabdomyolysis and PE. LHC/RHC confirmed no evidence of CAD w/RHC showing normal CO/CI however profound contraction metabolic alkalosis w/respiratory compensation in the setting of over diuresis. Course c/b UGIB w/hypovolemic shock iso Eliquis initiation, stepped up to CCU 4/23/24 for mgmt of shock and elective intubation for EGD d/t HFNC requirements. Patient s/p 4 endoclips w/cauterization of a large ulcer w/visible vessel in the duodenal sweep-this was deemed as a high-risk site d/t proximity of the lesion to the gastroduodenal artery. Extubated 4/26/24.  EP following for AFib, on Toprol 50mg qd and digoxin 125mcg PO daily, eventual plan for CIELO/DCCV vs. outpt f/u with eventual LAAO device. GI cleared for trial of A/C, initiated on hep gtt.   Plan for:  Barium swallow study today  Cont Toprol 50XL daily, Valsartan 20 BID, Spironolactone 25  CTM off diuretics  PT, rec for EKATERINA  SW/CM assist with EKATERINA placement  Khushi Evans M.D.  Cardiology Attending  During non face-to-face time, I reviewed relevant portions of the patient’s medical record; including vitals, labs, medications, cardiac studies, remaining additional imaging and consultant recommendations. During face-to-face time, I took a relevant history and examined the patient. I also explained to the patient their diagnoses, and specific cardiopulmonary management plan, which required a high level of medical decision making.  I answered all questions related to the patient's medical conditions. I spent 55 minutes on total encounter; more than 50% of the visit was spent counseling and/or coordinating care by the attending physician, with plan of care discussed with the patient, CHF team and cardiac care team.

## 2024-05-01 NOTE — SWALLOW VFSS/MBS ASSESSMENT ADULT - COMMENTS
Pt underwent FEES with this dept on 4/28/24. Soft and bite sized solids and mildly thick liquids was recommended. Upon follow up, SLP recommends this test to assess for candidacy of diet upgrade.

## 2024-05-01 NOTE — PROGRESS NOTE ADULT - PROBLEM SELECTOR PLAN 7
- S/p CTX 2g IV QD   - Infectious work-up:    o Negative HIV, HEP, UStrep, Ulegionella    o BCx: NGTD

## 2024-05-01 NOTE — SWALLOW VFSS/MBS ASSESSMENT ADULT - ORAL PHASE COMMENTS
Labial seal is intact as there is no anterior bolus escape/loss. Oral bolus control is mildly impaired with partial bolus spilling into the pharynx prematurely. Lingual motion for bolus transport is a bit prolonged/delayed. There is mild residue in the oral cavity post swallows but this clears with repeat swallows.

## 2024-05-01 NOTE — PROGRESS NOTE ADULT - PROBLEM SELECTOR PLAN 1
- Diuresis: None given euvolemia   - GDMT: Metoprolol tartrate 25mg BID, Valsartan 40mg BID, and Spironolactone 25mg QD    o Consider transition to Entresto once Cr stabilizes     o Consider Farxiga 10mg QD upon discharge   - TTE (4/15/24): EF 20-25%, severely reduced LV/RV fxn, BIAE. 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 PCQP 5, MV 73%

## 2024-05-01 NOTE — SWALLOW VFSS/MBS ASSESSMENT ADULT - PHARYNGEAL PHASE COMMENTS
Laryngeal elevation, Anterior hyoid movement, epiglottic movement and laryngeal vestibular closure is mostly complete without a true impairment seen. Base of tongue retraction to pharyngeal wall is impaired and there is mild-moderate residue in the vallecula post swallows. This clears with repeat swallows and/or liquid washes. Pharyngeal stripping wave is grossly intact as there is little to no residue along the posterior pharyngeal wall nor pyriform sinuses after the swallow. Pharyngoesophageal opening is complete and the boluses flow freely through the UES. Trace and shallow penetration of thin liquids is seen during the swallow but this never reaches the vocal folds nor is seen to be aspirated. This level of penetration can be considered a variant of normal. Pt is seen to clear his throat throughout this exam in absence or airway invasion.

## 2024-05-01 NOTE — PROGRESS NOTE ADULT - PROBLEM SELECTOR PLAN 4
- Continue PPI IV BID x 2wks and then daily thereafter   - Continue Sucralfate 1g QID   - Cleared for AC by GI   - EGD (4/04/24): A single oozing ulcer found in duodenal bulb s/p Endoclip x4 with bi-cap electrocautery. Normal mucosa esophagus and stomach   - GI following, appreciate recs

## 2024-05-01 NOTE — PROGRESS NOTE ADULT - PROBLEM SELECTOR PLAN 8
- 2/2 being found down x 2d; resolved   - Monitor Cr     - Fluids: not indicated  - Replete Lytes PRN to K>4, Mg>2  - DietL Soft & Bite sized   - DVT ppx: None -- plan for Heparin challenge   - GI ppx: IV PPI BID   - PT: EKATERINA  - SW/Dispo: Medically active

## 2024-05-01 NOTE — PROGRESS NOTE ADULT - SUBJECTIVE AND OBJECTIVE BOX
INTERVAL EVENTS:  -On 4/23, after he was transitioned from heparin gtt to eliquis, patient noted to become acutely hypotensive with GI bleed. Started on vasopressors and moved to CCU  -On 4/24, patient electively intubated then underwent EGD, which showed 1 oozing duodenal ulcer and 1 ulcer with a visible vessel. He underwent 4 endoclips and electrocautery for hemostasis  -He was febrile and was started on empiric antibiotics. Fever curve downtrended  -Course also c/b afib RVR s/p digoxin and hypernatremia s/p free water  -He was extubated 4/26 and started on GDMT  -Overnight, patient noted to have episode of hypotension to 80/55. Hgb stable at that time but noted new SILKE. Valsartan was held. IVF were running and were continued. No additional fluids given.     Cardiac medications administered in the last 48h:  metoprolol tartrate: 25 milliGRAM(s) Oral (04-30-24 @ 17:38)  digoxin     Tablet: 125 MICROGram(s) Oral (04-30-24 @ 11:07)  spironolactone: 25 milliGRAM(s) Oral (04-30-24 @ 11:04)  valsartan: 20 milliGRAM(s) Oral (04-30-24 @ 10:24)  metoprolol tartrate: 25 milliGRAM(s) Oral (04-30-24 @ 07:19)  valsartan: 20 milliGRAM(s) Oral (04-29-24 @ 19:27)  metoprolol tartrate: 25 milliGRAM(s) Oral (04-29-24 @ 17:54)  spironolactone: 25 milliGRAM(s) Oral (04-29-24 @ 13:02)      MEDICATIONS  (STANDING):  chlorhexidine 2% Cloths 1 Application(s) Topical <User Schedule>  dextrose 10% Bolus 125 milliLiter(s) IV Bolus once  dextrose 5%. 1000 milliLiter(s) (100 mL/Hr) IV Continuous <Continuous>  dextrose 5%. 1000 milliLiter(s) (50 mL/Hr) IV Continuous <Continuous>  dextrose 50% Injectable 12.5 Gram(s) IV Push once  dextrose 50% Injectable 25 Gram(s) IV Push once  digoxin     Tablet 125 MICROGram(s) Oral every 24 hours  glucagon  Injectable 1 milliGRAM(s) IntraMuscular once  influenza  Vaccine (HIGH DOSE) 0.7 milliLiter(s) IntraMuscular once  insulin lispro (ADMELOG) corrective regimen sliding scale   SubCutaneous three times a day before meals  insulin lispro (ADMELOG) corrective regimen sliding scale   SubCutaneous at bedtime  metoprolol succinate ER 50 milliGRAM(s) Oral every 24 hours  mupirocin 2% Ointment 1 Application(s) Topical two times a day  pantoprazole  Injectable 40 milliGRAM(s) IV Push every 12 hours  spironolactone 25 milliGRAM(s) Oral every 24 hours  sucralfate 1 Gram(s) Oral every 6 hours  valsartan 20 milliGRAM(s) Oral every 12 hours    MEDICATIONS  (PRN):  dextrose Oral Gel 15 Gram(s) Oral once PRN Blood Glucose LESS THAN 70 milliGRAM(s)/deciliter  ipratropium    for Nebulization 500 MICROGram(s) Nebulizer every 6 hours PRN Shortness of Breath and/or Wheezing      VITALS 24H  T(F): 97.6 (05-01-24 @ 05:29), Max: 98.9 (04-30-24 @ 13:00)  HR: 92 (05-01-24 @ 05:29) (85 - 122)  BP: 120/62 (05-01-24 @ 05:29) (80/52 - 168/113)  BP(mean): 78 (04-30-24 @ 17:37) (61 - 135)  ABP: --  ABP(mean): --  RR: 19 (05-01-24 @ 05:29) (17 - 32)  SpO2: 98% (05-01-24 @ 05:29) (93% - 99%)    I/O Detail 24H    30 Apr 2024 07:01  -  01 May 2024 07:00  --------------------------------------------------------  IN:    dextrose 5%: 300 mL    dextrose 5%: 300 mL    Oral Fluid: 200 mL  Total IN: 800 mL    OUT:    Voided (mL): 100 mL  Total OUT: 100 mL    Total NET: 700 mL        Daily Weight Trend (kg):   76 (05-01-24 @ 06:20)      PHYSICAL EXAM:  GEN: NAD  HEENT: EOMI   RESP: CTA b/l  CV: RRR. Normal S1/S2. No m/r/g.  ABD: soft, non-distended  EXT: No edema   NEURO: alert and attentive    LABS:  CBC 04-30-24 @ 17:22                        8.4    11.65 )-----------( 226                   27.1       Hgb trend: 8.4 <-- , 8.5 <-- , 7.9 <--   WBC trend: 11.65 <-- , 9.72 <-- , 10.21 <--       CMP 04-30-24 @ 22:27    146<H>  |  112<H>  |  39<H>  ----------------------------<  127<H>  4.0   |  25  |  1.40<H>    Ca    8.4      04-30-24 @ 22:27  Phos  2.6     04-30  Mg     2.1     04-30    TPro  6.0  /  Alb  3.1<L>  /  TBili  0.6  /  DBili  x   /  AST  28  /  ALT  26  /  AlkPhos  45     04-30      Serum Cr trend: 1.40 <-- , 1.67 <-- , 1.00 <-- , 1.24 <-- , 1.33 <-- , 1.12 <--         Cardiac Markers            INTERVAL EVENTS:  -On 4/23, after he was transitioned from heparin gtt to eliquis, patient noted to become acutely hypotensive with GI bleed. Started on vasopressors and moved to CCU     -On 4/24, patient electively intubated then underwent EGD, which showed 1 oozing duodenal ulcer and 1 ulcer with a visible vessel. He underwent 4 endoclips and electrocautery for hemostasis     -He was febrile and was started on empiric antibiotics. Fever curve downtrended     -Course also c/b afib RVR s/p digoxin and hypernatremia s/p free water     -He was extubated 4/26 and started on GDMT     -Overnight, patient noted to have episode of hypotension to 80/55. Hgb stable at that time but noted new SILKE. Valsartan was held. IVF were running and were continued. No additional fluids given.          Cardiac medications administered in the last 48h:  metoprolol tartrate: 25 milliGRAM(s) Oral (04-30-24 @ 17:38)  digoxin     Tablet: 125 MICROGram(s) Oral (04-30-24 @ 11:07)  spironolactone: 25 milliGRAM(s) Oral (04-30-24 @ 11:04)  valsartan: 20 milliGRAM(s) Oral (04-30-24 @ 10:24)  metoprolol tartrate: 25 milliGRAM(s) Oral (04-30-24 @ 07:19)  valsartan: 20 milliGRAM(s) Oral (04-29-24 @ 19:27)  metoprolol tartrate: 25 milliGRAM(s) Oral (04-29-24 @ 17:54)  spironolactone: 25 milliGRAM(s) Oral (04-29-24 @ 13:02)      MEDICATIONS  (STANDING):  chlorhexidine 2% Cloths 1 Application(s) Topical <User Schedule>  dextrose 10% Bolus 125 milliLiter(s) IV Bolus once  dextrose 5%. 1000 milliLiter(s) (50 mL/Hr) IV Continuous <Continuous>  dextrose 5%. 1000 milliLiter(s) (100 mL/Hr) IV Continuous <Continuous>  dextrose 50% Injectable 12.5 Gram(s) IV Push once  dextrose 50% Injectable 25 Gram(s) IV Push once  digoxin     Tablet 125 MICROGram(s) Oral every 24 hours  glucagon  Injectable 1 milliGRAM(s) IntraMuscular once  influenza  Vaccine (HIGH DOSE) 0.7 milliLiter(s) IntraMuscular once  insulin lispro (ADMELOG) corrective regimen sliding scale   SubCutaneous three times a day before meals  insulin lispro (ADMELOG) corrective regimen sliding scale   SubCutaneous at bedtime  metoprolol succinate ER 50 milliGRAM(s) Oral every 24 hours  mupirocin 2% Ointment 1 Application(s) Topical two times a day  pantoprazole  Injectable 40 milliGRAM(s) IV Push every 12 hours  spironolactone 25 milliGRAM(s) Oral every 24 hours  sucralfate 1 Gram(s) Oral every 6 hours  valsartan 20 milliGRAM(s) Oral every 12 hours    MEDICATIONS  (PRN):  dextrose Oral Gel 15 Gram(s) Oral once PRN Blood Glucose LESS THAN 70 milliGRAM(s)/deciliter  ipratropium    for Nebulization 500 MICROGram(s) Nebulizer every 6 hours PRN Shortness of Breath and/or Wheezing      VITALS 24H  T(F): 98.2 (05-01-24 @ 08:34), Max: 98.9 (04-30-24 @ 13:00)  HR: 93 (05-01-24 @ 08:34) (85 - 121)  BP: 101/56 (05-01-24 @ 08:34) (80/55 - 168/113)  BP(mean): 78 (04-30-24 @ 17:37) (64 - 135)  ABP: --  ABP(mean): --  RR: 18 (05-01-24 @ 08:34) (17 - 32)  SpO2: 96% (05-01-24 @ 08:34) (93% - 99%)    I/O Detail 24H    30 Apr 2024 07:01  -  01 May 2024 07:00  --------------------------------------------------------  IN:    dextrose 5%: 300 mL    dextrose 5%: 300 mL    Oral Fluid: 200 mL  Total IN: 800 mL    OUT:    Voided (mL): 100 mL  Total OUT: 100 mL    Total NET: 700 mL        Daily Weight Trend (kg):   76 (05-01-24 @ 06:20)      PHYSICAL EXAM:  GEN: NAD  HEENT: EOMI   RESP: CTA b/l  CV: RRR. Normal S1/S2. No m/r/g.  ABD: soft, non-distended  EXT: No edema   NEURO: alert and attentive    LABS:  CBC 04-30-24 @ 17:22                        8.4    11.65 )-----------( 226                   27.1       Hgb trend: 8.4 <-- , 8.5 <-- , 7.9 <--   WBC trend: 11.65 <-- , 9.72 <-- , 10.21 <--       CMP 04-30-24 @ 22:27    146<H>  |  112<H>  |  39<H>  ----------------------------<  127<H>  4.0   |  25  |  1.40<H>    Ca    8.4      04-30-24 @ 22:27  Phos  2.6     04-30  Mg     2.1     04-30    TPro  6.0  /  Alb  3.1<L>  /  TBili  0.6  /  DBili  x   /  AST  28  /  ALT  26  /  AlkPhos  45     04-30      Serum Cr trend: 1.40 <-- , 1.67 <-- , 1.00 <-- , 1.24 <-- , 1.33 <-- , 1.12 <--         Cardiac Markers

## 2024-05-01 NOTE — SWALLOW VFSS/MBS ASSESSMENT ADULT - DIAGNOSTIC IMPRESSIONS
Pt p/w a mild oropharyngeal dysphagia in this assessment which can be a variant of normal given pt age. There is no significant residue post swallow nor is there airway invasion across all trials. SLP to sign off.

## 2024-05-01 NOTE — PROGRESS NOTE ADULT - ASSESSMENT
75 yo M with no reported PMHx presents after a mechanical fall to [facility], also found to have acute decompensated systolic heart failure and new atrial fibrillation. Course complicated by hypervolemic hypernatremia and hemorrhagic shock 2/2 UGIB, s/p EGD w/intervention, now resolved    Review of studies  CTA chest 4/20/24: Multiple small subsegmental PE RLL  TTE 4/25/24: LVEF 20-25% (global). Diastolic dysfunction likely. Reduced RV systolic function. Biatrial dilation. Mild TR. PASP 68.   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 Poor candidate for ischemic evaluation at this time given recent bleeding  - GDMT: C/w Toprol 50mg QD, spironolactone 25mg QD, valsartan 20mg BID. SGLT2i on discharge  - Diuretics:   - Device: premature, will need to reassess after 3months of maximal GDMT    #Atrial Fibrillation  [id number]: 3   Has-Bled: 1  AC: On hold  -On digoxin and BB       73 yo M with no reported PMHx presents after a mechanical fall to [facility], also found to have acute decompensated systolic heart failure and new atrial fibrillation. Course complicated by hypervolemic hypernatremia and hemorrhagic shock 2/2 UGIB, s/p EGD w/intervention, now resolved    Review of studies  CTA chest 4/20/24: Multiple small subsegmental PE RLL  TTE 4/25/24: LVEF 20-25% (global). Diastolic dysfunction likely. Reduced RV systolic function. Biatrial dilation. Mild TR. PASP 68.   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 Poor candidate for ischemic evaluation at this time given recent bleeding  - GDMT: C/w Toprol 50mg QD, spironolactone 25mg QD, valsartan 20mg BID. SGLT2i on discharge  - Diuretics: Patient is hypovolemic. Encourage PO intake  - Device: premature, will need to reassess after 3months of maximal GDMT    #Atrial Fibrillation  [id number]: 3   Has-Bled: 1  AC: On hold  -On digoxin and BB  -F/u GI as above       75 yo M with no reported PMHx presents after a mechanical fall to [facility], also found to have acute decompensated systolic heart failure and new atrial fibrillation. Course complicated by hypervolemic hypernatremia and hemorrhagic shock 2/2 UGIB, s/p EGD w/intervention, now resolved    Review of studies  CTA chest 4/20/24: Multiple small subsegmental PE RLL  TTE 4/25/24: LVEF 20-25% (global). Diastolic dysfunction likely. Reduced RV systolic function. Biatrial dilation. Mild TR. PASP 68.   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 Poor candidate for ischemic evaluation at this time given recent bleeding  - GDMT: C/w Toprol 50mg QD, spironolactone 25mg QD. Given episode of hypotension yesterday, continue valsartan 20mg BID for now; if tolerating this then by tomorrow would consider switching to low dose entresto. SGLT2i on discharge  - Diuretics: Patient is hypovolemic. Encourage PO intake  - Device: premature, will need to reassess after 3months of maximal GDMT    #Atrial Fibrillation  [id number]: 3   Has-Bled: 1  AC: On hold  -On digoxin and BB  -F/u GI as above       73 yo M with no reported PMHx presents after a mechanical fall to [facility], also found to have acute decompensated systolic heart failure and new atrial fibrillation. Course complicated by hypervolemic hypernatremia and hemorrhagic shock 2/2 UGIB, s/p EGD w/intervention, now resolved    Review of studies  CTA chest 4/20/24: Multiple small subsegmental PE RLL  TTE 4/25/24: LVEF 20-25% (global). Diastolic dysfunction likely. Reduced RV systolic function. Biatrial dilation. Mild TR. PASP 68.   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 Poor candidate for ischemic evaluation at this time given recent bleeding  - GDMT: C/w Toprol 50mg QD, spironolactone 25mg QD. Given episode of hypotension yesterday, continue valsartan 20mg BID for now; if tolerating this then by tomorrow would consider switching to low dose entresto. SGLT2i on discharge  - Diuretics: Patient is hypovolemic. Encourage PO intake  - Device: premature, will need to reassess after 3months of maximal GDMT    #Atrial Fibrillation  [id number]: 3   Has-Bled: 1  AC: On hold  -On digoxin and BB  -F/u GI as above

## 2024-05-01 NOTE — PROGRESS NOTE ADULT - SUBJECTIVE AND OBJECTIVE BOX
CARDIOLOGY PA PROGRESS NOTE    Interval Events:  - Stepped down from CCU   - Pending Barium swallow study given dysphagia       Overnight Events:  - Agitated overnight, but was able to be re-directed     Subjective:  - Pt seen and examined this AM sitting comfortably in bed. Feels well; no acute complaints of CP, palpitations, SOB, abdominal discomfort, N/V/D      ROS: Negative except per HPI and Subjective     Tele: Afib     MEDICATIONS:  digoxin     Tablet 125 MICROGram(s) Oral every 24 hours  metoprolol succinate ER 50 milliGRAM(s) Oral every 24 hours  spironolactone 25 milliGRAM(s) Oral every 24 hours  valsartan 20 milliGRAM(s) Oral every 12 hours  ipratropium    for Nebulization 500 MICROGram(s) Nebulizer every 6 hours PRN    pantoprazole  Injectable 40 milliGRAM(s) IV Push every 12 hours  sucralfate 1 Gram(s) Oral every 6 hours    dextrose 50% Injectable 12.5 Gram(s) IV Push once  dextrose 50% Injectable 25 Gram(s) IV Push once  dextrose Oral Gel 15 Gram(s) Oral once PRN  glucagon  Injectable 1 milliGRAM(s) IntraMuscular once  insulin lispro (ADMELOG) corrective regimen sliding scale   SubCutaneous three times a day before meals  insulin lispro (ADMELOG) corrective regimen sliding scale   SubCutaneous at bedtime    chlorhexidine 2% Cloths 1 Application(s) Topical <User Schedule>  dextrose 10% Bolus 125 milliLiter(s) IV Bolus once  dextrose 5%. 1000 milliLiter(s) IV Continuous <Continuous>  dextrose 5%. 1000 milliLiter(s) IV Continuous <Continuous>  influenza  Vaccine (HIGH DOSE) 0.7 milliLiter(s) IntraMuscular once  mupirocin 2% Ointment 1 Application(s) Topical two times a day    	  VITAL SIGNS:  T(C): 36.8 (05-01-24 @ 08:34), Max: 37.2 (04-30-24 @ 13:00)  HR: 93 (05-01-24 @ 08:34) (85 - 121)  BP: 101/56 (05-01-24 @ 08:34) (80/55 - 168/113)  RR: 18 (05-01-24 @ 08:34) (17 - 32)  SpO2: 96% (05-01-24 @ 08:34) (93% - 99%)  Wt(kg): --    I&O's Summary    30 Apr 2024 07:01  -  01 May 2024 07:00  --------------------------------------------------------  IN: 800 mL / OUT: 100 mL / NET: 700 mL    01 May 2024 07:01  -  01 May 2024 10:50  --------------------------------------------------------  IN: 120 mL / OUT: 0 mL / NET: 120 mL                          PHYSICAL EXAM:  HENT: NCAT, EOMI, PEERLA  CV: RRR, S1/S2. No JVD. No murmurs, rubs or gallops   PULM: CTA B/L. No wheezing, rales, or rhonchi   ABD: Soft, NT/ND, +BS throughout   EXT: Warm, no LE edema  NEURO: AOx2; no focal neuro deficits      LABS:

## 2024-05-01 NOTE — PROGRESS NOTE ADULT - ASSESSMENT
74M with no known PMH, presenting after mechanical fall and found down - found to have new-onset Afib RVR with severe LV fxn, Rhabdomyolysis, Hypernatremia, and segmental PE. Course c/b hemorrhagic shock 2/2 GIB with EGD noting bleeding duodenal ulcer s/p clipx 4. Current course notable for hypovolemic hypernatremia and hyperactive delirium/agitation. Undergoing medical optimization.

## 2024-05-01 NOTE — PROGRESS NOTE ADULT - PROBLEM SELECTOR PLAN 5
- Monitor O2 sat; satting well on RA   - Management as above   - CTA CHEST (4/20/24): Multiple small subsegmental emboli within the RLL PA branches  - LE US (4/19/24): negative for DVT

## 2024-05-02 ENCOUNTER — TRANSCRIPTION ENCOUNTER (OUTPATIENT)
Age: 74
End: 2024-05-02

## 2024-05-02 LAB
ANION GAP SERPL CALC-SCNC: 10 MMOL/L — SIGNIFICANT CHANGE UP (ref 5–17)
ANION GAP SERPL CALC-SCNC: 9 MMOL/L — SIGNIFICANT CHANGE UP (ref 5–17)
APTT BLD: 101.7 SEC — HIGH (ref 24.5–35.6)
APTT BLD: 91 SEC — HIGH (ref 24.5–35.6)
BUN SERPL-MCNC: 25 MG/DL — HIGH (ref 7–23)
BUN SERPL-MCNC: 30 MG/DL — HIGH (ref 7–23)
CALCIUM SERPL-MCNC: 8.3 MG/DL — LOW (ref 8.4–10.5)
CALCIUM SERPL-MCNC: 8.4 MG/DL — SIGNIFICANT CHANGE UP (ref 8.4–10.5)
CHLORIDE SERPL-SCNC: 110 MMOL/L — HIGH (ref 96–108)
CHLORIDE SERPL-SCNC: 112 MMOL/L — HIGH (ref 96–108)
CO2 SERPL-SCNC: 26 MMOL/L — SIGNIFICANT CHANGE UP (ref 22–31)
CO2 SERPL-SCNC: 29 MMOL/L — SIGNIFICANT CHANGE UP (ref 22–31)
CREAT SERPL-MCNC: 0.96 MG/DL — SIGNIFICANT CHANGE UP (ref 0.5–1.3)
CREAT SERPL-MCNC: 0.99 MG/DL — SIGNIFICANT CHANGE UP (ref 0.5–1.3)
EGFR: 79 ML/MIN/1.73M2 — SIGNIFICANT CHANGE UP
EGFR: 82 ML/MIN/1.73M2 — SIGNIFICANT CHANGE UP
GLUCOSE BLDC GLUCOMTR-MCNC: 136 MG/DL — HIGH (ref 70–99)
GLUCOSE BLDC GLUCOMTR-MCNC: 137 MG/DL — HIGH (ref 70–99)
GLUCOSE BLDC GLUCOMTR-MCNC: 140 MG/DL — HIGH (ref 70–99)
GLUCOSE BLDC GLUCOMTR-MCNC: 81 MG/DL — SIGNIFICANT CHANGE UP (ref 70–99)
GLUCOSE SERPL-MCNC: 106 MG/DL — HIGH (ref 70–99)
GLUCOSE SERPL-MCNC: 134 MG/DL — HIGH (ref 70–99)
HCT VFR BLD CALC: 25.9 % — LOW (ref 39–50)
HGB BLD-MCNC: 7.8 G/DL — LOW (ref 13–17)
IL1 SERPL-MCNC: <3.9 PG/ML — SIGNIFICANT CHANGE UP
INR BLD: 1.17 — SIGNIFICANT CHANGE UP (ref 0.85–1.18)
MCHC RBC-ENTMCNC: 29.7 PG — SIGNIFICANT CHANGE UP (ref 27–34)
MCHC RBC-ENTMCNC: 30.1 GM/DL — LOW (ref 32–36)
MCV RBC AUTO: 98.5 FL — SIGNIFICANT CHANGE UP (ref 80–100)
NRBC # BLD: 0 /100 WBCS — SIGNIFICANT CHANGE UP (ref 0–0)
PLATELET # BLD AUTO: 253 K/UL — SIGNIFICANT CHANGE UP (ref 150–400)
POTASSIUM SERPL-MCNC: 3.7 MMOL/L — SIGNIFICANT CHANGE UP (ref 3.5–5.3)
POTASSIUM SERPL-MCNC: 3.9 MMOL/L — SIGNIFICANT CHANGE UP (ref 3.5–5.3)
POTASSIUM SERPL-SCNC: 3.7 MMOL/L — SIGNIFICANT CHANGE UP (ref 3.5–5.3)
POTASSIUM SERPL-SCNC: 3.9 MMOL/L — SIGNIFICANT CHANGE UP (ref 3.5–5.3)
PROTHROM AB SERPL-ACNC: 13.3 SEC — HIGH (ref 9.5–13)
RBC # BLD: 2.63 M/UL — LOW (ref 4.2–5.8)
RBC # FLD: 17.2 % — HIGH (ref 10.3–14.5)
SODIUM SERPL-SCNC: 146 MMOL/L — HIGH (ref 135–145)
SODIUM SERPL-SCNC: 150 MMOL/L — HIGH (ref 135–145)
WBC # BLD: 8.83 K/UL — SIGNIFICANT CHANGE UP (ref 3.8–10.5)
WBC # FLD AUTO: 8.83 K/UL — SIGNIFICANT CHANGE UP (ref 3.8–10.5)

## 2024-05-02 PROCEDURE — 99233 SBSQ HOSP IP/OBS HIGH 50: CPT

## 2024-05-02 PROCEDURE — 99233 SBSQ HOSP IP/OBS HIGH 50: CPT | Mod: GC

## 2024-05-02 RX ORDER — SODIUM CHLORIDE 9 MG/ML
1000 INJECTION, SOLUTION INTRAVENOUS
Refills: 0 | Status: DISCONTINUED | OUTPATIENT
Start: 2024-05-02 | End: 2024-05-03

## 2024-05-02 RX ORDER — HEPARIN SODIUM 5000 [USP'U]/ML
1100 INJECTION INTRAVENOUS; SUBCUTANEOUS
Qty: 25000 | Refills: 0 | Status: DISCONTINUED | OUTPATIENT
Start: 2024-05-02 | End: 2024-05-02

## 2024-05-02 RX ORDER — DAPAGLIFLOZIN 10 MG/1
1 TABLET, FILM COATED ORAL
Qty: 90 | Refills: 3
Start: 2024-05-02 | End: 2025-04-26

## 2024-05-02 RX ORDER — HEPARIN SODIUM 5000 [USP'U]/ML
7000 INJECTION INTRAVENOUS; SUBCUTANEOUS EVERY 6 HOURS
Refills: 0 | Status: DISCONTINUED | OUTPATIENT
Start: 2024-05-02 | End: 2024-05-03

## 2024-05-02 RX ORDER — HEPARIN SODIUM 5000 [USP'U]/ML
900 INJECTION INTRAVENOUS; SUBCUTANEOUS
Qty: 25000 | Refills: 0 | Status: DISCONTINUED | OUTPATIENT
Start: 2024-05-02 | End: 2024-05-03

## 2024-05-02 RX ORDER — HEPARIN SODIUM 5000 [USP'U]/ML
3500 INJECTION INTRAVENOUS; SUBCUTANEOUS EVERY 6 HOURS
Refills: 0 | Status: DISCONTINUED | OUTPATIENT
Start: 2024-05-02 | End: 2024-05-03

## 2024-05-02 RX ORDER — SACUBITRIL AND VALSARTAN 24; 26 MG/1; MG/1
1 TABLET, FILM COATED ORAL
Qty: 60 | Refills: 0
Start: 2024-05-02 | End: 2024-05-31

## 2024-05-02 RX ORDER — APIXABAN 2.5 MG/1
1 TABLET, FILM COATED ORAL
Qty: 180 | Refills: 3
Start: 2024-05-02 | End: 2025-04-26

## 2024-05-02 RX ORDER — VALSARTAN 80 MG/1
20 TABLET ORAL
Refills: 0 | Status: DISCONTINUED | OUTPATIENT
Start: 2024-05-03 | End: 2024-05-04

## 2024-05-02 RX ORDER — SODIUM CHLORIDE 9 MG/ML
1000 INJECTION, SOLUTION INTRAVENOUS
Refills: 0 | Status: DISCONTINUED | OUTPATIENT
Start: 2024-05-02 | End: 2024-05-02

## 2024-05-02 RX ADMIN — QUETIAPINE FUMARATE 25 MILLIGRAM(S): 200 TABLET, FILM COATED ORAL at 22:11

## 2024-05-02 RX ADMIN — HEPARIN SODIUM 13 UNIT(S)/HR: 5000 INJECTION INTRAVENOUS; SUBCUTANEOUS at 10:48

## 2024-05-02 RX ADMIN — PANTOPRAZOLE SODIUM 40 MILLIGRAM(S): 20 TABLET, DELAYED RELEASE ORAL at 12:29

## 2024-05-02 RX ADMIN — Medication 1 GRAM(S): at 22:12

## 2024-05-02 RX ADMIN — HEPARIN SODIUM 11 UNIT(S)/HR: 5000 INJECTION INTRAVENOUS; SUBCUTANEOUS at 15:24

## 2024-05-02 RX ADMIN — Medication 1 GRAM(S): at 16:44

## 2024-05-02 RX ADMIN — SODIUM CHLORIDE 50 MILLILITER(S): 9 INJECTION, SOLUTION INTRAVENOUS at 22:19

## 2024-05-02 RX ADMIN — Medication 50 MILLIGRAM(S): at 12:29

## 2024-05-02 RX ADMIN — VALSARTAN 20 MILLIGRAM(S): 80 TABLET ORAL at 09:04

## 2024-05-02 RX ADMIN — VALSARTAN 20 MILLIGRAM(S): 80 TABLET ORAL at 22:12

## 2024-05-02 RX ADMIN — SODIUM CHLORIDE 50 MILLILITER(S): 9 INJECTION, SOLUTION INTRAVENOUS at 00:42

## 2024-05-02 RX ADMIN — Medication 1 GRAM(S): at 10:00

## 2024-05-02 RX ADMIN — Medication 1 GRAM(S): at 00:50

## 2024-05-02 RX ADMIN — HEPARIN SODIUM 13 UNIT(S)/HR: 5000 INJECTION INTRAVENOUS; SUBCUTANEOUS at 08:29

## 2024-05-02 RX ADMIN — MUPIROCIN 1 APPLICATION(S): 20 OINTMENT TOPICAL at 17:20

## 2024-05-02 NOTE — PROGRESS NOTE ADULT - PROBLEM SELECTOR PLAN 4
- Continue PPI IV BID x 2wks and then daily thereafter   - Continue Sucralfate 1g QID   - Cleared for AC by GI   - EGD (4/04/24): A single oozing ulcer found in duodenal bulb s/p Endoclip x4 with bi-cap electrocautery. Normal mucosa esophagus and stomach   - GI following, appreciate recs - Continue PPI IV BID x 2wks and then daily thereafter   - Continue Sucralfate 1g QID   - Cleared for AC by GI   - EGD (4/04/24): A single oozing ulcer found in duodenal bulb s/p Endoclip x4 with bi-cap electrocautery. Normal mucosa esophagus and stomach   - GI following, appreciate recs    #Transfusion reaction workup   HLA AB Class 1 positive   IL 1 and IL 6 pending

## 2024-05-02 NOTE — DISCHARGE NOTE PROVIDER - CARE PROVIDER_API CALL
Narinder Pizano  Cardiology  [address] [place] [address], [address] 3  [place] [address]  Phone: [phone]  Fax: [phone]  Scheduled Appointment: 05/15/2024    Rama Yee  Physician Assistant Services  [address], [address] [place] [address]  Phone: [phone]  Fax: [phone]  Established Patient  Scheduled Appointment: 05/13/2024 02:00 PM    [facility],   Phone: [phone]  Fax: (   )    -  Follow Up Time:    Narinder Pizano  Cardiology  [address] [place] [address], [address]  [place] [address]  Phone: [phone]  Fax: [phone]  Scheduled Appointment: 05/15/2024    Rama Yee  Physician Assistant Services  [address], [address] [place] [address]  Phone: [phone]  Fax: [phone]  Established Patient  Scheduled Appointment: 05/13/2024 02:00 PM    [facility],   Phone: [phone]  Fax: (   )    -  Follow Up Time:     Edson Moe  Cardiac Electrophysiology  [address], [address] [place] [address]  Phone: [phone]  Fax: [phone]  Follow Up Time: 1 month   Narinder Pizano  Cardiology  [address] [place] [address], [address]  [place] [address]  Phone: [phone]  Fax: [phone]  Scheduled Appointment: 05/15/2024    Rama Yee  Physician Assistant Services  [address], [address] [place] [address]  Phone: [phone]  Fax: [phone]  Established Patient  Scheduled Appointment: 05/13/2024 02:00 PM    Edson Moe  Cardiac Electrophysiology  [address], [address] [place] [address]  Phone: [phone]  Fax: [phone]  Follow Up Time: 1 month    [facility],   Phone: [phone]  Fax: (   )    -  Follow Up Time: 1 month

## 2024-05-02 NOTE — DISCHARGE NOTE PROVIDER - HOSPITAL COURSE
INCOMPLETE -- Last Updated: 5/02    74M with no known PMH, presenting after mechanical fall and found down - found to have new-onset Afib RVR with severe LV fxn, Rhabdomyolysis, Hypernatremia, and segmental PE.     Labs on admission notable for hypernatremia with Na 162, transaminitis (AST 91 / ALT 54), elevated CK 2002 c/f Rhabdomyolysis, and positive cardiac enzymes with non-ischemic EKG.  Pt was then admitted to cardiology service for further monitoring and management.    During hospital course, the following issues were addressed:   ACUTE DECOMPENSATED HEART FAILURE  - Diuresed with Lasix IV   - GDMT initiated with Metoprolol tartrate 25mg BID, Valsartan 20mg BID, and Spironolactone 25mg QD    o Further GDMT (i.e. ARNI, SGLT2-i) initiation limited by cost   - CARDIAC DATA:    o TTE (4/15/24): EF 20-25%, severely reduced LV/RV fxn, BIAE. 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 PCWP 5, MV 73%     AFIB RVR  - S/p Metoprolol IV and Digoxin IV     FEVER  - S/p CTX IV (4/17-4/22), Linezolid 500mg BID, and Vanc/Zosyn   - Multiple infectious work-up (RVP, MRSA/MSSA swab, UA/UCx, and BCx) done and have been negative     PULMONARY EMBOLISM   - CTA CHEST (4/20/24): Multiple small subsegmental emboli within the RLL PA branches    GIB   - 4/23: pt noted to have melena x3 with worsening Afib RVR and hypotension with BP 70s/30s -- pt was upgraded to CCU where he briefly required pressors and received a total of 2u PRBC and 1u FFP  - EGD (4/04/24): A single oozing ulcer found in duodenal bulb s/p Endoclip x4 with bi-cap electrocautery. Normal mucosa esophagus and stomach   - Pt was started on IV PPI BID x 2wks and then daily thereafter in addition to Sucralfate 1g QID     HYPOVOLEMIC HYPERNATREMIA   - Treated with D5W    DYSPHAGIA   - Likely 2/2 complex hospital course and intubation while in CCU   - Improved prior to discharge     Pt seen and examined at bedside this AM without any complaints or events overnight, VSS, labs and telemetry reviewed and pt stable for discharge to [facility] on ___  as discussed with  ___.     Pt has received appropriate discharge instructions, including medication regimen, access site management and follow up with  __ in 1-2 weeks.     GLP-1 receptor antagonist/SGLT2 inhibitor meds discussed with patient and encouraged to discuss further with PMD or Endocrinologist at next visit.     Pt's discharge copies detailed cardiovascular history, medications, testing/treatments, OR pt has created a patient portal account and instructed to provide their records at their 1st appointment.    Discharge Cr:   Discharge Wt:     CHF REGIMEN UPON DISCHARGE:    CV REGIMEN UPON DISCHARGE:    DIABETIC REGIMEN UPON DISCHARGE: -- HbA1c    OTHER MEDICATIONS UPON DISCHARGE:     74M with no known PMH, presenting after mechanical fall and found down - found to have new-onset Afib RVR with severe LV fxn, Rhabdomyolysis, Hypernatremia, and segmental PE.     Labs on admission notable for hypernatremia with Na 162, transaminitis (AST 91 / ALT 54), elevated CK 2002 c/f Rhabdomyolysis, and positive cardiac enzymes with non-ischemic EKG.  Pt was then admitted to cardiology service for further monitoring and management.    During hospital course, the following issues were addressed:     ACUTE DECOMPENSATED HEART FAILURE  - Diuresed with Lasix IV - will not go home on standing diuretic. will follow up with heart failure team.  - GDMT: Valsartan 20 mg BID, Spironolactone 25 mg QD, Toprol 50 QD,     o Further GDMT (i.e. ARNI, SGLT2-i) initiation limited by cost   - CARDIAC DATA:    o TTE (4/15/24): EF 20-25%, severely reduced LV/RV fxn, BIAE. 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 PCWP 5, MV 73%     AFIB RVR  - S/p Metoprolol IV and Digoxin IV   - discharged in RATE CONTROLLED AFIB  - patient unwilling to pay NAOC cost ($371/month for Eliquis), discussed with the patient the importance of anticoagulation as well as frequent follow-up to monitor INR levels while on Coumadin. Patient opting for Coumadin over NOAC d/t cost.   - continue Toprol 50 XL daily for rate control and Digoxin 125 mcg daily.  - continue Warfarin 5 mg QHS and Lovenox 80 mg IV x 1 - coumadin bridging to be done at [facility]. Repeat INR in 2-3days.   - f/u with EP in 4-6 weeks as outpatient for potential DCCV.    FEVER  - S/p CTX IV (4/17-4/22), Linezolid 500mg BID, and Vanc/Zosyn   - Multiple infectious work-up (RVP, MRSA/MSSA swab, UA/UCx, and BCx) done and have been negative     PULMONARY EMBOLISM   - CTA CHEST (4/20/24): Multiple small subsegmental emboli within the RLL PA branches  - continue Warfarin 5 mg QHS and Lovenox 80 mg IV x 1 - coumadin bridging to be done at [facility]. Repeat INR in 2-3days.     GIB   - 4/23: pt noted to have melena x3 with worsening Afib RVR and hypotension with BP 70s/30s -- pt was upgraded to CCU where he briefly required pressors and received a total of 2u PRBC and 1u FFP  - EGD (4/04/24): A single oozing ulcer found in duodenal bulb s/p Endoclip x4 with bi-cap electrocautery. Normal mucosa esophagus and stomach   - f/u with [facility] at [facility] in 1month.  - Continue IV PPI BID x 2wks (last dose 5/13/24) and then daily thereafter in addition to Sucralfate 1g QID   - IV Iron 300 Mg daily (last dose 5/5/25)    HYPOVOLEMIC HYPERNATREMIA   - Treated with D5W, discontinued on 5/3/24.  - discharged with Na of 145.    DYSPHAGIA   - Likely 2/2 complex hospital course and intubation while in CCU   - Passed S&S assessment on 5/1/24, to be discharged on regular diet.     Pt seen and examined at bedside this AM without any complaints or events overnight, VSS, labs and telemetry reviewed and pt stable for discharge to [facility] on 5/4/24 as discussed with Dr. Duggan.    Pt has received appropriate discharge instructions, including medication regimen, access site management and follow up with Dr. Pizano  5/15 @ 3pm.  Pt to follow up with HF team GALO Yee 5/13/23 at 2pm @ [id number]   Pt to follow up with [facility] [phone] within 1 month of discharge.      GLP-1 receptor antagonist/SGLT2 inhibitor meds discussed with patient and encouraged to discuss further with PMD or Endocrinologist at next visit.     Pt's discharge copies detailed cardiovascular history, medications, testing/treatments, OR pt has created a patient portal account and instructed to provide their records at their 1st appointment.    Discharge Cr: 0.85  Discharge Wt: 75.4 kg    CHF REGIMEN UPON DISCHARGE: Valsartan 20 BID, Spironolactone 25 daily, Metoprolol succinate ER 50 daily      OTHER MEDICATIONS UPON DISCHARGE:  digoxin     Tablet 125 MICROGram(s) Oral every 24 hours  metoprolol succinate ER 50 milliGRAM(s) Oral every 24 hours  spironolactone 25 milliGRAM(s) Oral every 24 hours  valsartan 20 milliGRAM(s) Oral two times a day  ipratropium    for Nebulization 500 MICROGram(s) Nebulizer every 6 hours PRN  melatonin 3 milliGRAM(s) Oral at bedtime  QUEtiapine 25 milliGRAM(s) Oral at bedtime  pantoprazole  Injectable 40 milliGRAM(s) IV Push every 12 hours (transition to daily on 5/13/24)  sucralfate 1 Gram(s) Oral every 6 hours  insulin lispro (ADMELOG) corrective regimen sliding scale   SubCutaneous three times a day before meals  insulin lispro (ADMELOG) corrective regimen sliding scale   SubCutaneous at bedtime  enoxaparin Injectable 80 milliGRAM(s) SubCutaneous every 12 hours  influenza  Vaccine (HIGH DOSE) 0.7 milliLiter(s) IntraMuscular once  iron sucrose IVPB 300 milliGRAM(s) IV Intermittent <User Schedule>  mupirocin 2% Ointment 1 Application(s) Topical two times a day

## 2024-05-02 NOTE — PROGRESS NOTE ADULT - SUBJECTIVE AND OBJECTIVE BOX
Chief complaint: Patient is a 74y old  Male who presents with a chief complaint of CHF, AFib (02 May 2024 09:36)    Interval history: Overnight no acute events; Patient laying comfortably in bed. Currently denies CP, dizziness, palpitations, SOB, dyspnea, coughing, headaches, N/V/D/abdominal pain. Patient understands plan for the day.     Review of systems: A complete 10-point review of systems was obtained and is negative except as stated in the interval history.    Vitals:  T(F): 97.8, Max: 98 (05-01 @ 12:08)  HR: 84 (84 - 120)  BP: 94/51 (94/51 - 139/58)  RR: 18 (18 - 18)  SpO2: 96% (95% - 98%)    Ins & outs:     04-29 @ 07:01  -  04-30 @ 07:00  --------------------------------------------------------  IN: 3298 mL / OUT: 625 mL / NET: 2673 mL    04-30 @ 07:01  -  05-01 @ 07:00  --------------------------------------------------------  IN: 800 mL / OUT: 100 mL / NET: 700 mL    05-01 @ 07:01  -  05-02 @ 07:00  --------------------------------------------------------  IN: 300 mL / OUT: 650 mL / NET: -350 mL      Weight trend:      Physical exam:  General: No apparent distress  HEENT: Anicteric sclera. Moist mucous membranes. JVD -     Cardiac: Regular rate and rhythm. No murmurs, rubs, or gallops.   Vascular: Symmetric radial pulses. Dorsalis pedis pulses palpable.   Respiratory: Normal effort. Clear to ascultation.   Abdomen: Soft, nontender. Audible bowel sounds.   Extremities: Warm with no edema. No cyanosis or clubbing.   Skin: Warm and dry. No rash.   Neurologic: Grossly normal motor function.   Psychiatric: Oriented to person, place, and time.     Data reviewed:  - Telemetry: AF 72-97   - TTE (date 4/15/24):    1. Severely reduced left ventricular systolic function.   2. Reduced right ventricular systolic function.   3. Mild biatrial enlargement.   4. Mild tricuspid regurgitation.   5. No other significant valvular disease.   6. Pulmonary hypertension present, pulmonary artery systolic pressure is  68 mmHg.   7. No pericardial effusion.   8. Dilated IVC.   9. Patient was tachycardic during the study.    - Chest x-ray (date 4/28/24):   No acute infiltrates. Deviation of the trachea to the right noted as described    - CTA PE protocol 4/20/24  1.  Multiple small subsegmental emboli identified within the right lower  lobe pulmonary artery branches.  2.  Small bowel pleural effusions, left greater than right, with  associated atelectasis.    - NO prior Stress test, CCTA, Cardiac catheterization, Cardiac MRI    - Labs:                        8.4    9.11  )-----------( 262      ( 01 May 2024 12:00 )             28.1     05-01    148<H>  |  113<H>  |  31<H>  ----------------------------<  135<H>  3.6   |  26  |  1.11    Ca    8.3<L>      01 May 2024 20:30  Mg     2.1     05-01      PT/INR - ( 01 May 2024 12:00 )   PT: 13.0 sec;   INR: 1.14          PTT - ( 01 May 2024 20:31 )  PTT:79.1 sec        Triglycerides, Serum: 59 mg/dL (04-15-24 @ 05:30)  LDL Cholesterol Calculated: 72 mg/dL (04-15-24 @ 05:30)    Thyroid Stimulating Hormone, Serum: 2.040 uIU/mL (04-15-24 @ 05:30)  Thyroid Stimulating Hormone, Serum: 3.092 uIU/mL (04-14-24 @ 04:13)    Urinalysis Basic - ( 01 May 2024 20:30 )    Color: x / Appearance: x / SG: x / pH: x  Gluc: 135 mg/dL / Ketone: x  / Bili: x / Urobili: x   Blood: x / Protein: x / Nitrite: x   Leuk Esterase: x / RBC: x / WBC x   Sq Epi: x / Non Sq Epi: x / Bacteria: x        Medications:  chlorhexidine 2% Cloths 1 Application(s) Topical <User Schedule>  dextrose 10% Bolus 125 milliLiter(s) IV Bolus once  dextrose 50% Injectable 12.5 Gram(s) IV Push once  dextrose 50% Injectable 25 Gram(s) IV Push once  digoxin     Tablet 125 MICROGram(s) Oral every 24 hours  glucagon  Injectable 1 milliGRAM(s) IntraMuscular once  influenza  Vaccine (HIGH DOSE) 0.7 milliLiter(s) IntraMuscular once  insulin lispro (ADMELOG) corrective regimen sliding scale   SubCutaneous three times a day before meals  insulin lispro (ADMELOG) corrective regimen sliding scale   SubCutaneous at bedtime  melatonin 3 milliGRAM(s) Oral at bedtime  metoprolol succinate ER 50 milliGRAM(s) Oral every 24 hours  mupirocin 2% Ointment 1 Application(s) Topical two times a day  pantoprazole  Injectable 40 milliGRAM(s) IV Push every 12 hours  QUEtiapine 25 milliGRAM(s) Oral at bedtime  spironolactone 25 milliGRAM(s) Oral every 24 hours  sucralfate 1 Gram(s) Oral every 6 hours  valsartan 20 milliGRAM(s) Oral every 12 hours    Drips:  dextrose 5%. 1000 milliLiter(s) (50 mL/Hr) IV Continuous <Continuous>  dextrose 5%. 1000 milliLiter(s) (100 mL/Hr) IV Continuous <Continuous>  dextrose 5%. 1000 milliLiter(s) (50 mL/Hr) IV Continuous <Continuous>  heparin  Infusion 1300 Unit(s)/Hr (13 mL/Hr) IV Continuous <Continuous>    PRN:     Allergies    No Known Allergies    Intolerances  Home Medications:

## 2024-05-02 NOTE — DISCHARGE NOTE PROVIDER - NSDCMRMEDTOKEN_GEN_ALL_CORE_FT
Aspirin EC 81 mg oral delayed release tablet: 1 tab(s) orally once a day  Eliquis 5 mg oral tablet: 1 tab(s) orally 2 times a day  Eliquis Starter Pack for Treatment of DVT and PE 5 mg oral tablet: 1 tab(s) orally 2 times a day  Farxiga 10 mg oral tablet: 1 tab(s) orally once a day   Aspirin EC 81 mg oral delayed release tablet: 1 tab(s) orally once a day  Eliquis 5 mg oral tablet: 1 tab(s) orally 2 times a day  Eliquis Starter Pack for Treatment of DVT and PE 5 mg oral tablet: 1 tab(s) orally 2 times a day  Entresto 24 mg-26 mg oral tablet: 1 tab(s) orally 2 times a day  Farxiga 10 mg oral tablet: 1 tab(s) orally once a day   digoxin 125 mcg (0.125 mg) oral tablet: 1 tab(s) orally every 24 hours  insulin lispro 100 units/mL injectable solution: injectable twice a day before lunch and dinner 2 Unit(s) if Glucose 151 - 200  4 Unit(s) if Glucose 201 - 250  6 Unit(s) if Glucose 251 - 300  8 Unit(s) if Glucose 301 - 350  10 Unit(s) if Glucose 351 - 400  12 Unit(s) if Glucose Greater Than 400  insulin lispro 100 units/mL injectable solution: injectable  ipratropium 500 mcg/2.5 mL inhalation solution: 2.5 milliliter(s) inhaled every 6 hours As needed Shortness of Breath and/or Wheezing  iron sucrose 20 mg/mL intravenous solution: 15 milliliter(s) intravenous once a day last day 5/5/24  Lovenox 80 mg/0.8 mL injectable solution: 80 milligram(s) subcutaneously every 12 hours  melatonin 3 mg oral tablet: 1 tab(s) orally once a day (at bedtime)  metoprolol succinate 50 mg oral tablet, extended release: 1 tab(s) orally every 24 hours  mupirocin 2% topical ointment: 1 Apply topically to affected area 2 times a day  pantoprazole 40 mg intravenous injection: 40 milligram(s) intravenous every 12 hours to transition to IV Pantoprazole 40 daily on 5/14/24  QUEtiapine 25 mg oral tablet: 1 tab(s) orally once a day (at bedtime)  spironolactone 25 mg oral tablet: 1 tab(s) orally every 24 hours  sucralfate 1 g oral tablet: 1 tab(s) orally every 6 hours  valsartan 40 mg oral tablet: 0.5 tab(s) orally 2 times a day  warfarin 5 mg oral tablet: 1 tab(s) orally once a day (at bedtime)

## 2024-05-02 NOTE — PROGRESS NOTE ADULT - ASSESSMENT
73 yo M with no reported PMHx presents after a mechanical fall to [facility], also found to have acute decompensated systolic heart failure and new atrial fibrillation. Course complicated by hypervolemic hypernatremia and hemorrhagic shock 2/2 UGIB, s/p EGD w/intervention, now resolved    Review of studies  CTA chest 4/20/24: Multiple small subsegmental PE RLL  TTE 4/25/24: LVEF 20-25% (global). Diastolic dysfunction likely. Reduced RV systolic function. Biatrial dilation. Mild TR. PASP 68.   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 Poor candidate for ischemic evaluation at this time given recent bleeding  - GDMT: C/w Toprol 50mg QD, spironolactone 25mg QD. Entresto:  SGLT2i on discharge   - Diuretics:   - Device: premature, will need to reassess after 3months of maximal GDMT    #Atrial Fibrillation  [id number]: 3   Has-Bled: 1  -On digoxin and BB  -On AC w/heparin gtt. Transition to DOAC per primary team       75 yo M with no reported PMHx presents after a mechanical fall to [facility], also found to have acute decompensated systolic heart failure and new atrial fibrillation. Course complicated by hypervolemic hypernatremia and hemorrhagic shock 2/2 UGIB, s/p EGD w/intervention, now resolved      Review of studies   CTA chest 4/20/24: Multiple small subsegmental PE RLL   TTE 4/25/24: LVEF 20-25% (global). Diastolic dysfunction likely. Reduced RV systolic function. Biatrial dilation. Mild TR. PASP 68.    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 Poor candidate for ischemic evaluation at this time given recent bleeding   - GDMT: C/w Toprol 50mg QD, spironolactone 25mg QD. Switch to entresto 24/26mg BID, start covered SGLT2 inhibitor  - Diuretics: Hypovolemic, encourage PO intake  - Device: premature, will need to reassess after 3months of maximal GDMT   - Rhythm: Consider EP evaluation for rhythm control    #Atrial Fibrillation   [id number]: 3    Has-Bled: 1   -On digoxin and BB   -On AC w/heparin gtt. Transition to DOAC per primary team. Consider more frequent CBC  75 yo M with no reported PMHx presents after a mechanical fall to [facility], also found to have acute decompensated systolic heart failure and new atrial fibrillation. Course complicated by hypervolemic hypernatremia and hemorrhagic shock 2/2 UGIB, s/p EGD w/intervention, now resolved      Review of studies   CTA chest 4/20/24: Multiple small subsegmental PE RLL   TTE 4/25/24: LVEF 20-25% (global). Diastolic dysfunction likely. Reduced RV systolic function. Biatrial dilation. Mild TR. PASP 68.    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 Poor candidate for ischemic evaluation at this time given recent bleeding   - GDMT: C/w Toprol 50mg QD, spironolactone 25mg QD. Switch to entresto 24/26mg BID, start covered SGLT2 inhibitor  - Diuretics: Hypovolemic, encourage PO intake  - Device: premature, will need to reassess after 3months of maximal GDMT   - Rhythm: Consider EP evaluation for rhythm control    #Atrial Fibrillation   [id number]: 3    Has-Bled: 1   -On digoxin and BB   -On AC w/heparin gtt. Transition to DOAC per primary team. Consider more frequent CBC     Please email for HF followup with Dr. Hernandez on day of discharge

## 2024-05-02 NOTE — DISCHARGE NOTE PROVIDER - CARE PROVIDERS DIRECT ADDRESSES
,DirectAddress_Unknown,[email or web address],DirectAddress_Unknown ,DirectAddress_Unknown,[email or web address],DirectAddress_Unknown,[email or web address] ,DirectAddress_Unknown,[email or web address],DirectAddress_Unknown

## 2024-05-02 NOTE — DISCHARGE NOTE PROVIDER - NPI NUMBER (FOR SYSADMIN USE ONLY) :
[[id number]],[[id number]] [[id number]],[[id number]],[UNKNOWN],[[id number]] [[id number]],[[id number]],[[id number]],[UNKNOWN]

## 2024-05-02 NOTE — DISCHARGE NOTE PROVIDER - PROVIDER TOKENS
PROVIDER:[TOKEN:[[id number]],SCHEDULEDAPPT:[05/15/2024]],PROVIDER:[TOKEN:[[id number]],SCHEDULEDAPPT:[05/13/2024],SCHEDULEDAPPTTIME:[02:00 PM],ESTABLISHEDPATIENT:[T]],FREE:[LAST:[[facility]],PHONE:[phone]],FAX:[(   )    -]] PROVIDER:[TOKEN:[[id number]],SCHEDULEDAPPT:[05/15/2024]],PROVIDER:[TOKEN:[[id number]],SCHEDULEDAPPT:[05/13/2024],SCHEDULEDAPPTTIME:[02:00 PM],ESTABLISHEDPATIENT:[T]],FREE:[LAST:[[facility]],PHONE:[phone]],FAX:[(   )    -]],PROVIDER:[TOKEN:[[id number]],FOLLOWUP:[1 month]] PROVIDER:[TOKEN:[[id number]],SCHEDULEDAPPT:[05/15/2024]],PROVIDER:[TOKEN:[[id number]],SCHEDULEDAPPT:[05/13/2024],SCHEDULEDAPPTTIME:[02:00 PM],ESTABLISHEDPATIENT:[T]],PROVIDER:[TOKEN:[[id number]],FOLLOWUP:[1 month]],FREE:[LAST:[[facility]],PHONE:[phone]],FAX:[(   )    -],FOLLOWUP:[1 month]]

## 2024-05-02 NOTE — PROGRESS NOTE ADULT - NS ATTEND AMEND GEN_ALL_CORE FT
74M with no known PMH, who presented to [facility] on 04/14/24 after mechanical fall, was found down on floor x 2 days by karen and admitted to [facility] for ADHF. Diagnosed with acute NICM HFrEF (20%), pAFib, rhabdomyolysis and PE. LHC/RHC confirmed no evidence of CAD w/RHC showing normal CO/CI however profound contraction metabolic alkalosis w/respiratory compensation in the setting of over diuresis. Course c/b UGIB w/hypovolemic shock iso Eliquis initiation, stepped up to CCU 4/23/24 for mgmt of shock and elective intubation for EGD d/t HFNC requirements. Patient s/p 4 endoclips w/cauterization of a large ulcer w/visible vessel in the duodenal sweep-this was deemed as a high-risk site d/t proximity of the lesion to the gastroduodenal artery. Extubated 4/26/24.  EP following for AFib, on Toprol 50mg qd and digoxin 125mcg PO daily, eventual plan for CIELO/DCCV vs. outpt f/u with eventual LAAO device. GI cleared for trial of A/C, initiated on hep gtt.   Plan for:  Heparin gtt challenge, anticipate transition to NOAC if patient amenable to co pay cost  s/p Barium swallow study- cleared for solids with thin liquids  Initiate Farxiga 10mg po daily per CHF team recs  Transition Valsartan 20 BID to Entresto 24/26 BID if patient amenable to co pay cost  Cont Toprol 50XL daily, Spironolactone 25  Check iron studies, initiate IV iron protocol if indicated  CTM off diuretics  EP consulted, rec patient fu in clinic for outpatient evaluation of rhythm control  PT, rec for EKATERINA  SW/CM assist with EKATERINA placement  Patient will require 1 wk fu appt with CHF team Rama/Dr Amaro upon VT  NANCY.Bright Evans M.D.  Cardiology Attending  During non face-to-face time, I reviewed relevant portions of the patient’s medical record; including vitals, labs, medications, cardiac studies, remaining additional imaging and consultant recommendations. During face-to-face time, I took a relevant history and examined the patient. I also explained to the patient their diagnoses, and specific cardiopulmonary management plan, which required a high level of medical decision making.  I answered all questions related to the patient's medical conditions. I spent 55 minutes on total encounter; more than 50% of the visit was spent counseling and/or coordinating care by the attending physician, with plan of care discussed with the patient, CHF team and cardiac care team.

## 2024-05-02 NOTE — PROGRESS NOTE ADULT - ASSESSMENT
74M with no known PMH, who presented to [facility] on 04/14/24 after mechanical fall, was found down on floor x 2 days by karen. Labs significant for Trop T 52->54, CK 2k BNP 7k . CTH without acute bleed. CT A/P neg for PE, + ascites. Pt admitted to cardiac tele for newly dx rapid a fib, rhabdo, and suspected ADHF exacerbation. Echo revealing LVEF 20-25%, reduced RVSF. Advanced HF team consulted plan for R/LHC however postponed given new fevers (fever peak 4/18 pm rectal 101). ID consulted, rec ceftriaxone 2 g IV daily for possible aspiration PNA + Linezolid 500 mg BID x 5 days(started 4/19) for wounds at b/l LE,  CT PE on 4/20 with small PE right side. s/p R/LHC on 4/22 where patient became alkalotic and hypercapnic requiring brief BiPAP use. Diuretics and all nephrotoxic medications on hold pending resolution of SILKE. Course complicated by two episodes of melena on 04/23, for which GI was consulted. Patient developed melena when transitioned from heparin gtt to Eliquis for newly diagnosed PE, so was started on pantoprazole 40 mg IV BID, which was transitioned top Drip. Patient transfered to CCU in hypovolemic shock requiring phenylephrine and vasopressin. Patiint was electively intubated and sedated for EGD. On 4/24 EGD showed oozing duodenal ulcer, which was clipped x4 with good hemostasis. Patient was weaned off of sedation following procedure, but with lagging mental sttaus. He was extubated 26, when his mental status allowed, and has had good respiratory status since on NC. weaned off pressors, failed speech and swallow evaluation and had FEES with silent aspiration. Recommended for soft and bite sized with mildly thick liquids.     Acute HFrEF  Afib with RVR  Hemorrhagic shock   Patient weaned off pressors, warm, perfused  Appreciate HF, patient euvolemic/hypovolemic  GDMT optimization per primary team   HR improved, on Dig and Metoprolol. Consider EP  AC resumed with heparin gtt, monitor Hb  Wean 02 as tolerated     GIB  S/p duodenal ulcer clipping  On heparin gtt, monitor Hb  Continue on PPI    Acute blood loss anemia  In setting of GIB  Would check iron panel, patient would ebnefit from IV Iron     Acute hypoxia  Subsegmental PE  Wean 02 as tolerated, IS  CXR reviewed    DVT ppx: Heparin gtt

## 2024-05-02 NOTE — PROGRESS NOTE ADULT - ASSESSMENT
74M with no known PMH, presenting after mechanical fall and found down - found to have new-onset Afib RVR with severe LV fxn, Rhabdomyolysis, Hypernatremia, and segmental PE. Course c/b hemorrhagic shock 2/2 GIB with EGD noting bleeding duodenal ulcer s/p clipx 4. Current course notable for hypovolemic hypernatremia and hyperactive delirium/agitation. Undergoing medical optimization.  74M with no known PMH, presenting after mechanical fall and found down - found to have new-onset Afib RVR with severe LV fxn, Rhabdomyolysis, Hypernatremia, and segmental PE. Course c/b hemorrhagic shock 2/2 GIB with EGD noting bleeding duodenal ulcer s/p clipx 4. Current course notable for hypovolemic hypernatremia and hyperactive delirium/agitation. Undergoing medical optimization for hypernatremia and therapeutic aptt.

## 2024-05-02 NOTE — PROGRESS NOTE ADULT - SUBJECTIVE AND OBJECTIVE BOX
INTERVAL EVENTS:  -Continues to receive D5 50cc/hr for hypernatremia   -Cleared for thin liquids s/p swallow eval  -Started heparin gtt challenge yesterday after discussion with GI      Cardiac medications administered in the last 48h:  valsartan: 20 milliGRAM(s) Oral (05-01-24 @ 22:03)  valsartan: 20 milliGRAM(s) Oral (05-01-24 @ 12:11)  spironolactone: 25 milliGRAM(s) Oral (05-01-24 @ 12:11)  metoprolol succinate ER: 50 milliGRAM(s) Oral (05-01-24 @ 12:11)  digoxin     Tablet: 125 MICROGram(s) Oral (05-01-24 @ 12:11)  metoprolol tartrate: 25 milliGRAM(s) Oral (04-30-24 @ 17:38)  digoxin     Tablet: 125 MICROGram(s) Oral (04-30-24 @ 11:07)  spironolactone: 25 milliGRAM(s) Oral (04-30-24 @ 11:04)  valsartan: 20 milliGRAM(s) Oral (04-30-24 @ 10:24)      MEDICATIONS  (STANDING):  chlorhexidine 2% Cloths 1 Application(s) Topical <User Schedule>  dextrose 10% Bolus 125 milliLiter(s) IV Bolus once  dextrose 5%. 1000 milliLiter(s) (50 mL/Hr) IV Continuous <Continuous>  dextrose 5%. 1000 milliLiter(s) (100 mL/Hr) IV Continuous <Continuous>  dextrose 5%. 1000 milliLiter(s) (50 mL/Hr) IV Continuous <Continuous>  dextrose 50% Injectable 12.5 Gram(s) IV Push once  dextrose 50% Injectable 25 Gram(s) IV Push once  digoxin     Tablet 125 MICROGram(s) Oral every 24 hours  glucagon  Injectable 1 milliGRAM(s) IntraMuscular once  heparin  Infusion 1300 Unit(s)/Hr (13 mL/Hr) IV Continuous <Continuous>  influenza  Vaccine (HIGH DOSE) 0.7 milliLiter(s) IntraMuscular once  insulin lispro (ADMELOG) corrective regimen sliding scale   SubCutaneous three times a day before meals  insulin lispro (ADMELOG) corrective regimen sliding scale   SubCutaneous at bedtime  melatonin 3 milliGRAM(s) Oral at bedtime  metoprolol succinate ER 50 milliGRAM(s) Oral every 24 hours  mupirocin 2% Ointment 1 Application(s) Topical two times a day  pantoprazole  Injectable 40 milliGRAM(s) IV Push every 12 hours  QUEtiapine 25 milliGRAM(s) Oral at bedtime  spironolactone 25 milliGRAM(s) Oral every 24 hours  sucralfate 1 Gram(s) Oral every 6 hours  valsartan 20 milliGRAM(s) Oral every 12 hours    MEDICATIONS  (PRN):  dextrose Oral Gel 15 Gram(s) Oral once PRN Blood Glucose LESS THAN 70 milliGRAM(s)/deciliter  ipratropium    for Nebulization 500 MICROGram(s) Nebulizer every 6 hours PRN Shortness of Breath and/or Wheezing      VITALS 24H  T(F): 97.9 (05-01-24 @ 20:15), Max: 98.2 (05-01-24 @ 08:34)  HR: 101 (05-02-24 @ 00:36) (85 - 120)  BP: 111/72 (05-02-24 @ 00:36) (101/56 - 139/58)  BP(mean): 81 (05-02-24 @ 00:36) (76 - 81)  ABP: --  ABP(mean): --  RR: 18 (05-02-24 @ 00:36) (18 - 18)  SpO2: 97% (05-02-24 @ 00:36) (95% - 98%)    I/O Detail 24H  IVF not recorded - estimated below    01 May 2024 07:01  -  02 May 2024 07:00  --------------------------------------------------------  IN:    Oral Fluid: 300 mL    D5: 1200 mL    Heparin: ~300  Total IN: 1800 mL    OUT:    Incontinent per Condom Catheter (mL): 650 mL  Total OUT: 650 mL    Total NET: +1150 mL        Daily Weight Trend (kg):   76 (05-01-24 @ 06:20)      PHYSICAL EXAM:  GEN: NAD  HEENT: EOMI   RESP: CTA b/l  CV: RRR. Normal S1/S2. No m/r/g.  ABD: soft, non-distended  EXT: No edema   NEURO: alert and attentive    LABS:  CBC 05-01-24 @ 12:00                        8.4    9.11  )-----------( 262                   28.1       Hgb trend: 8.4 <-- , 8.4 <-- , 8.5 <--   WBC trend: 9.11 <-- , 11.65 <-- , 9.72 <--       CMP 05-01-24 @ 20:30    148<H>  |  113<H>  |  31<H>  ----------------------------<  135<H>  3.6   |  26  |  1.11    Ca    8.3<L>      05-01-24 @ 20:30  Phos  2.6     04-30  Mg     2.1     05-01    TPro  6.0  /  Alb  3.1<L>  /  TBili  0.6  /  DBili  x   /  AST  28  /  ALT  26  /  AlkPhos  45     04-30      Serum Cr trend: 1.11 <-- , 1.10 <-- , 1.06 <-- , 1.40 <-- , 1.67 <-- , 1.00 <-- , 1.24 <-- , 1.33 <--     PT/INR - ( 01 May 2024 12:00 )   PT: 13.0 sec;   INR: 1.14          PTT - ( 01 May 2024 20:31 ):79.1 sec    Cardiac Markers

## 2024-05-02 NOTE — PROGRESS NOTE ADULT - PROBLEM SELECTOR PLAN 2
- AC: Consider Heparin gtt challenge with plan to transition to NOAC   - Meds as above + Digoxin 125mcg QD   - Consider EP consult for possible Watchman if unable to tolerate AC - AC: Consider Heparin gtt challenge with plan to transition to NOAC (must have aptt 50-70 x2 to transfer to DOAC)   - Meds as above + Digoxin 125mcg QD   - EP will follow up OP

## 2024-05-02 NOTE — DISCHARGE NOTE PROVIDER - NSDCFUSCHEDAPPT_GEN_ALL_CORE_FT
[facility] Physician [facility]  [facility] [address]  Scheduled Appointment: 05/13/2024    Narinder Pizano  [facility]  [facility]ART[facility] [address]  Scheduled Appointment: 05/15/2024

## 2024-05-02 NOTE — PROGRESS NOTE ADULT - PROBLEM SELECTOR PLAN 6
- 2/2 complex hospital course and recent intubation   - F/up Barium swallow study   - Upgraded to Soft and bite-sized solids / mildly thick liquids   - Aspiration risk  - S&S following, appreciate recs - 2/2 complex hospital course and recent intubation   - Barium swallow study negative; diet advanced to solids and thin liquids   - Aspiration risk  - S&S following, appreciate recs

## 2024-05-02 NOTE — DISCHARGE NOTE PROVIDER - ATTENDING DISCHARGE PHYSICAL EXAMINATION:
74M with no known PMH, who presented to [facility] on 04/14/24 after mechanical fall, was found down on floor x 2 days by landlord and admitted to [facility] for ADHF. Diagnosed with acute NICM HFrEF (20%), pAFib, rhabdomyolysis and PE. LHC/RHC confirmed no evidence of CAD w/RHC showing normal CO/CI however profound contraction metabolic alkalosis w/respiratory compensation in the setting of over diuresis. Course c/b UGIB w/hypovolemic shock iso Eliquis initiation, stepped up to CCU 4/23/24 for mgmt of shock and elective intubation for EGD d/t HFNC requirements. Patient s/p 4 endoclips w/cauterization of a large ulcer w/visible vessel in the duodenal sweep-this was deemed as a high-risk site d/t proximity of the lesion to the gastroduodenal artery. Extubated 4/26/24.  EP following for AFib, on Toprol 50mg qd and digoxin 125mcg PO daily, plan for outpt EP follow up. GI cleared for trial of A/C, initiated on hep gtt which patient passed.    Discharged euvolemic, with rate controlled AF strategy.   follow up with Dr. Pizano  5/15 @ 3pm.  Pt to follow up with HF team GALO Yee 5/13/23 at 2pm @ [address]   Pt to follow up with GI Weiser [phone] within 1 month of discharge.

## 2024-05-02 NOTE — PROGRESS NOTE ADULT - ATTENDING SUPERVISION STATEMENT
Fellow
Fellow
Resident/Fellow
Fellow
Resident/Fellow
Fellow
Fellow
Resident/Fellow
Fellow
Resident
Resident/Fellow
Resident/Fellow
Resident

## 2024-05-02 NOTE — PROGRESS NOTE ADULT - ATTENDING COMMENTS
74/M with New onset HFrEF and A.fib who had LHC on 4/22 w/o CAD and RHC with low filling pressure.   Pt has 2 episodes of large sb   became hypotensive with fluid resuscitation and required pressors  transferred to CCU for pressors  s/p 2 unit of PRBC  wean pressors  GDMT held in the setting of SILKE and Hypotension  CVP low   will monitor for volume overload with blood product and IVF  PRN diuretics  Plan for EGD  after elective intubation   PPI  hold AC for A.fib  Plan d/w CCU team
74M w/ no known pmh, initially presented to [facility] on 4/14 after sustaining mech fall, remained on floor x 2 days - found to have rhabomyolysis and new-onset Afib c/b acute systolic CHF 2/2 tachycardia induced CM, admitted to cardiac tele, hospital course c/b GI bleed and AF w/ RVR, tx'd to CCU    -GI - acute UGIB given multiple episodes of melena; A/C held; started on IV PPIgtt, Transfused 2U PRBCs. GI Consulted s/p EGD yesterday w/ large duodenal ulcer s/p 4 clips and cauterization; H&H stable today, will continue to monitor off AC  -AF - newly dx'd AF, w/ RVR i/s/o GI bleed, rates better controlled now after transfusion, currently on Lopressor 2.5 IV q12h. otherwise off A/C for now  -CHF - Acute systolic CHF 2/2 tachycardia mediated CM, s/p C w/ no obstructive CAD; Otherwise euvolemic, WWP, currently only on Lopressor BID, will start GDMT when stable from GI bleed perspective  -Pulm - electively intubated for EGD yesterday; Plan for SAT/SBT and extubation today  -NPO  -DVT PPx: SCDs  -Dispo: CCU  -Full Code    Shreya Johnson MD  CCU Attending
74M w/ no known pmh, initially presented to [facility] on 4/14 after sustaining mech fall, remained on floor x 2 days - found to have rhabomyolysis and new-onset Afib c/b acute systolic CHF 2/2 tachycardia induced CM, admitted to cardiac tele, hospital course c/b GI bleed and AF w/ RVR, tx'd to CCU    -GI - acute UGIB given multiple episodes of melena; A/C held; c/w IV PPIgtt, Transfused 2U PRBCs. GI Consulted s/p EGD yesterday w/ large duodenal ulcer s/p 4 clips and cauterization; Hgb downtrended today from 10->8, repeat Hgb stable at 8 - likely reflective of previous bleed, will continue to monitor off AC; no plan for repeat EGD at this time  -AF - newly dx'd AF, w/ RVR i/s/o GI bleed, rates better controlled now after transfusion, currently on Lopressor 2.5 IV q12h. otherwise off A/C   -CHF - Acute systolic CHF 2/2 tachycardia mediated CM, s/p C w/ no obstructive CAD; Otherwise euvolemic, WWP, currently only on Lopressor BID, will start GDMT when stable from GI bleed perspective  -Pulm - electively intubated for EGD; Plan for SAT/SBT and extubation today  -NPO; Consider clear liquid diet after dysphagia screening  -DVT PPx: SCDs  -Dispo: CCU  -Full Code    Shreya Johnson MD  CCU Attending
Agree w/ above, Bleeding duodenal ulcer s/p endoscopic tx. C/w IV PPI and monitor for recurrent bleeding, trend Hb.
Patient seen, examined, and discussed with Dr. Rubin. Agree with above. 74M with no known PMH, initially admitted to cardiac telemetry s/p fall for rapid a fib. Course complicated by new diagnosis of new CHF (LVEF 20-25%), PE, SILKE on CKD, and melena. GI consulted for melena when transitioning from heparin gtt to Eliquis. S/p EGD with duodenal sweep ulcer with visible vessel treated with hemoclip x4 and bipolar cautery. Continue PPI gtt. Hgb with downtrend, but no significant output. From GI perspective, can extubate, but would keep NPO. Monitor stool output.     Cesar Jerome MD  Gastroenterology
74M w/ no known pmh, initially presented to [facility] on 4/14 after sustaining mech fall, remained on floor x 2 days - found to have rhabomyolysis and new-onset Afib c/b acute systolic CHF 2/2 tachycardia induced CM, admitted to cardiac tele, hospital course c/b GI bleed and AF w/ RVR, tx'd to CCU    -GI - acute GI bleed, likely UGIB given multiple episodes of melena over past 24 hrs; A/C held; started on IV PPI BID, Transfused 2U PRBCs. GI Consulted w/ plan for EGD today  -AF - newly dx'd AF, w/ RVR i/s/o GI bleed, rates better controlled now after transfusion, currently on Lopressor BID. otherwise off A/C pending EGD and GI clearance  -CHF - Acute systolic CHF 2/2 tachycardia mediated CM, s/p [facility] w/ no obstructive CAD; Otherwise euvolemic, WWP, currently only on Lopressor BID, will start GDMT when stable from GI bleed perspective  -Pulm - electively intubated for EGD today; Plan for SAT/SBT and extubation tomorrow  -NPO  -DVT PPx: SCDs  -Dispo: CCU  -Full Code    Shreya Johnson MD  CCU Attending
75 YO M with no known significant medical history or recent medical contact who had a fall at home and found on the ground 2 days later who was brought to the ED 4/14 where he was found to have rapid AF and severe LV dysfunction with elevated cardiac troponins, rhabdomyolyis, hypernatremia,  and segmental PE. He underwent R/LHC which revealed non-obstructive CAD and normal hemodynamics. He then developed hemorrhagic shock with GI bleeding with EGD 4/24 showing a bleeding duodenal ulcers which was treated.     He is slowly improving but remains in AF. He is euvolemic on exam. He is reassuringly tolerating GDMT    REVIEW OF STUDIES  EKG: atrial fibrillation  TTE: LV 5.1 cm, LVEF 30% with global hypokinesis, mild-moderate concentric LVH, moderate RV dysfunction     # Acute systolic heart failure  - Etiology: non-ischemic, possibly tachycardia mediated or stress related in setting of fall with rhabdo on admission. defer ischemic evaluation as would be unable to tolerate antiplatelet therapy if found to have CAD but will consider as outpatient if LVEF remains reduced  - GDMT: continue metoprolol succinate 50 mg daily. switch valsartan to entresto 24-26 mg BID. continue spironolactone 25 mg daily. start Farxiga 10 mg daily  - Diuretics: euvolemic/dry off diuretics    # Atrial fibrillation   - Continue BB and digoxin   - A/c per primary team   - Would benefit from rhythm control in the future pending recovery after recent GI bleed and ability to tolerate uninterrupted anticoagulation. Should be plugged into EP before discharge    As he is euvolemic on appropriate HF medical therapy our team will sign off. Please arrange followup with Rama Yee and Dr. Hernandez on discharge.
75 yo man admitted after being on the floor for ~2 days having fallen from his couch and unable to stand up due to LE weakness.  He had no significant PMH.   Initial work-up showed hypernatremia, anasarca and Afib.  TTE later showed severe biventricular dysfunction  Normal Creatinine, very high CPK, mild Troponin elevation    - Recommend diuresis despite hypernatremia (hypervolemic) - encourage PO intake of water   - Tolerating low dose Valsartan  - Add Spironolactone 25 mg daily   - Metoprolol Tartrate, will transition to XL eventually   - Defered R+LHC due to new fever  - Agree with empiric antibiotics    Lenin Ortiz MD
s/p LHC w/o CAD  RHC with low filling pressure with preserved CO/CI  replete volume with albumin/IVF  SILKE in the s/o overdiuresis  hold diuretics / valsartan and MRA  will follow
74/M with New onset HFrEF and A.fib who had LHC on 4/22 w/o CAD and RHC with low filling pressure.   Pt has 2 episodes of large sb   became hypotensive with fluid resuscitation and required pressors  transferred to CCU for pressors  s/p 2 unit of PRBC  s/p EGD with bleeding ulcer in duodenum s/p clipping/ cautery  GI follow up  further drop in H/H  transfuse PRBC  SBT and extubate    GDMT held in the s/o hemorraghic shock  HF will follow   plan d/w CCU team
Patient seen, examined, and discussed with Dr. Rubin. Agree with above. 74M with no known PMH, initially admitted to cardiac telemetry s/p fall for rapid a fib. Course complicated by new diagnosis of new CHF (LVEF 20-25%), PE, SILKE on CKD, and melena. GI consulted for melena when transitioning from heparin gtt to Eliquis. S/p EGD with duodenal sweep ulcer with visible vessel treated with hemoclip x4 and bipolar cautery. Continue PPI gtt. Hgb with slight downtrend, but no output. From GI perspective, can extubate, but would keep NPO.     Cesar Jerome MD  Gastroenterology
73 YO M with no known significant medical history or recent medical contact who had a fall at home and found on the ground 2 days later who was brought to the ED 4/14 where he was found to have rapid AF and severe LV dysfunction with elevated cardiac troponins, rhabdomyolyis, hypernatremia,  and segmental PE. He underwent R/LHC which revealed non-obstructive CAD and normal hemodynamics. He then developed hemorrhagic shock with GI bleeding with EGD 4/24 showing a bleeding duodenal ulcers which was treated.     He is slowly improving but remains in AF. He is euvolemic on exam. Will uptitrate GDMT as tolerates     REVIEW OF STUDIES  EKG: atrial fibrillation  TTE: LV 5.1 cm, LVEF 30% with global hypokinesis, mild-moderate concentric LVH, moderate RV dysfunction     # Acute systolic heart failure  - Etiology: non-ischemic, possibly tachycardia mediated or stress related in setting of fall with rhabdo on admission. defer ischemic evaluation as would be unable to tolerate antiplatelet therapy if found to have CAD but will consider as outpatient if LVEF remains reduced  - GDMT: continue metoprolol succinate 50 mg daily. continue valsartan 20 mg BID, possible transition to ARNI tomorrow. continue spironolactone 25 mg daily. eventual SGLT2i  - Diuretics: euvolemic/dry off diuretics    # Atrial fibrillation   - Continue BB and digoxin   - Off a/c given recent GIB, please discuss with GI if/when can be considered in the future   - Would benefit from rhythm control in the future pending recovery after recent GI bleed and ability to tolerate uninterrupted anticoagulation. Should be plugged into EP before discharge
74M w/ no known pmh, initially presented to [facility] on 4/14 after sustaining mech fall, remained on floor x 2 days - found to have rhabomyolysis and new-onset Afib c/b acute systolic CHF 2/2 tachycardia induced CM, admitted to cardiac tele, hospital course c/b GI bleed and AF w/ RVR, tx'd to CCU    -GI - acute UGIB w/ multiple episodes of melena; A/C held; c/w IV PPI gtt, finishes today at 4 pm, Transfused 2U PRBCs. GI Consulted s/p EGD yesterday w/ large duodenal ulcer s/p 4 clips and cauterization; Hgb downtrended today from 10->8, repeat Hgb stable at 8 - likely reflective of previous bleed, will continue to monitor off AC; no plan for repeat EGD at this time  -AF - newly dx'd AF, w/ RVR i/s/o GI bleed, rates better controlled now after transfusion, currently on Lopressor 2.5 IV q12h. otherwise off A/C   -CHF - Acute systolic CHF 2/2 tachycardia mediated CM, s/p LHC w/ no obstructive CAD; Otherwise euvolemic, WWP, currently only on Lopressor BID, will start GDMT when stable from GI bleed perspective; WWP, euvolemic, stable end-organ function  -NPO; Failed Sp/Sw eval, plan for FEES tmrw  -DVT PPx: SCDs  -Dispo: CCU  -Full Code    Shreya Johnson MD  CCU Attending
75 YO M with no known significant medical history or recent medical contact who had a fall at home and found on the ground 2 days later who was brought to the ED 4/14 where he was found to have rapid AF and severe LV dysfunction with elevated cardiac troponins, rhabdomyolyis, hypernatremia,  and segmental PE. He underwent R/LHC which revealed non-obstructive CAD and normal hemodynamics. He then developed hemorrhagic shock with GI bleeding with EGD 4/24 showing a bleeding duodenal ulcers which was treated.     He is slowly improving but remains with rapid AF and hypernatremia    REVIEW OF STUDIES  EKG: atrial fibrillation  TTE: LV 5.1 cm, LVEF 30% with global hypokinesis, mild-moderate concentric LVH, moderate RV dysfunction     # Acute systolic heart failure  - Etiology: non-ischemic, possibly tachycardia mediated or stress related in setting of fall with rhabdo on admission. defer ischemic evaluation as would be unable to tolerate antiplatelet therapy if found to have CAD but will consider as outpatient if LVEF remains reduced  - GDMT: increase metoprolol tartrate to 25 mg BID for rate control, eventual succinate. continue valsartan 20 mg BID, possible transition to ARNI tomorrow. start spironolactone 25 mg daily. eventual SGLT2i  - Diuretics: euvolemic/dry off diuretics    # Atrial fibrillation   - Increase metoprolol to 25 mg BID   - Off a/c given recent GIB, will discuss with GI if can be considered in the future   - Would benefit from rhythm control in the future pending recovery after recent GI bleed and ability to tolerate uninterrupted anticoagulation. Should be plugged into EP before discharge     # PE  - a/c as above    # Hypernatremia  - Will start D5 drip and q6 hour BMPs     # Fevers  - No signs of infection and cultures 4/25 negative    DVT ppx: SCDs  Diet: per GI and s/s  Dispo: CCU pending improvement in sodium and rate control
74M w/ no known pmh, initially presented to [facility] on 4/14 after sustaining mech fall, remained on floor x 2 days - found to have rhabomyolysis and new-onset Afib c/b acute systolic CHF 2/2 tachycardia induced CM, admitted to cardiac tele, hospital course c/b GI bleed and AF w/ RVR, tx'd to CCU    -GI - acute UGIB w/ multiple episodes of melena; A/C held; c/w IV PPI BID; Transfused 2U PRBCs. GI Consulted s/p EGD 4/25 w/ large duodenal ulcer s/p 4 clips and cauterization; Hgb downtrended from 10->8, but has remained stable since - likely reflective of previous bleed, will continue to monitor off AC; no plan for repeat EGD at this time  -AF - newly dx'd AF, w/ RVR i/s/o GI bleed, rates better controlled now after transfusion, currently on Lopressor 2.5 IV q12h -'> transition to Lopressor 12.5 PO BID. otherwise will remain off A/C   -CHF - Acute systolic CHF 2/2 tachycardia mediated CM, s/p LHC w/ no obstructive CAD; Otherwise euvolemic, WWP, transition Lopressor to 12.5 PO BID, start Valsartan 20 PO BID  -DVT PPx: SCDs  -Passed Sp/Sw eval today - start clear liquid diet  -Dispo: SD to Cardiac tele  -Full Code    Shreya Johnson MD  CCU Attending
73 YO M with no known significant medical history or recent medical contact who had a fall at home and found on the ground 2 days later who was brought to the ED 4/14 where he was found to have rapid AF and severe LV dysfunction with elevated cardiac troponins, rhabdomyolyis, hypernatremia,  and segmental PE. He underwent R/LHC which revealed non-obstructive CAD and normal hemodynamics. He then developed hemorrhagic shock with GI bleeding with EGD 4/24 showing a bleeding duodenal ulcer which was treated.     He is slowly improving. He had some hyperactive delirium/agitation last night which has resolved.     REVIEW OF STUDIES  EKG: atrial fibrillation  TTE: LV 5.1 cm, LVEF 30% with global hypokinesis, mild-moderate concentric LVH, moderate RV dysfunction     # Acute systolic heart failure  - Etiology: non-ischemic, possibly tachycardia mediated or stress related in setting of fall with rhabdo on admission. defer ischemic evaluation as would be unable to tolerate antiplatelet therapy if found to have CAD but will consider as outpatient if LVEF remains reduced  - GDMT: switch metoprolol to succinate 50 mg daily. continue valsartan 20 mg BID, possible transition to ARNI tomorrow. continue spironolactone 25 mg daily. eventual SGLT2i  - Diuretics: euvolemic/dry off diuretics    # Atrial fibrillation   - Continue beta blocker as above  - Start digoxin 125 mcg daily for improved rate control   - Off a/c given recent GIB, will discuss with GI when it would be safe to retrial   - Would benefit from rhythm control in the future pending recovery after recent GI bleed and ability to tolerate uninterrupted anticoagulation. Should be plugged into EP before discharge     # PE  - a/c as above    # Hypernatremia  - Will administer another 24 hours of D5 at 75/hour  - Encouraged increased PO hydration, once able to swallow thin liquids this will likely improve     DVT ppx: SCDs given recent bleeding   Diet: per GI and s/s  Dispo: OK to transfer to step down

## 2024-05-02 NOTE — DISCHARGE NOTE PROVIDER - NSDCCPCAREPLAN_GEN_ALL_CORE_FT
PRINCIPAL DISCHARGE DIAGNOSIS  Diagnosis: Acute CHF  Assessment and Plan of Treatment: You have a weak heart, also known as Congestive Heart Failure (CHF). Heart failure is a condition in which the heart does not pump or fill with blood well. As a result, the heart lags behind in its job of moving blood throughout the body. This can lead to symptoms such as swelling, trouble breathing, and feeling tired. Your Ejection Fraction (EF) is 20-25%, a normal EF is 55-60%.  -Please continue _____ to prevent fluid build up in the body.  -Please continue ___ to help strenghten your heart muscle  -Avoid drinking more than 1.5L of fluid daily and maintain a low salt diet (max 2grams daily).  -Please weigh yourself daily, for any significant increases in daily weight of 2lbs/day or 5lbs/week with associated swelling in the legs or abdomen and/or shortness of breath, please call your doctor or go to the emergency room.  -Follow up with  __ in 1 week.        SECONDARY DISCHARGE DIAGNOSES  Diagnosis: Pulmonary embolism  Assessment and Plan of Treatment: A pulmonary embolism is a blood clot that blocks and stops blood flow to an artery in the lung. Because one or more clots block blood flow to the lungs, pulmonary embolism can be life-threatening. However, prompt treatment greatly reduces the risk of death.   Please continue to take your ____.   A pulmonary embolism can be life-threatening. Seek urgent medical attention if you experience unexplained shortness of breath, chest pain or fainting.      Diagnosis: Atrial fibrillation  Assessment and Plan of Treatment: You have an abnormal heart rhythm (arrhythmia) called atrial fibrillation. With this condition, the hearts 2 upper chambers (the atria) quiver rather than squeeze the blood out in a normal pattern. This leads to an irregular and sometimes rapid heartbeat. Atrial fibrillation is serious condition as it affects the heart’s ability to fill with blood as it should and blood clots may form, which increases the risk for stroke.  -Please CONTINUE  ELIQUIS 5MG TWICE A DAY to prevent a stroke.  -Please CONTINUE _____ TWICE A DAY to keep your heart rate regular  -Please CONTINUE _____ DAILY to keep your heart in normal rhythm  -Please follow up with  ___.      Diagnosis: Duodenal ulcer  Assessment and Plan of Treatment:      PRINCIPAL DISCHARGE DIAGNOSIS  Diagnosis: Acute CHF  Assessment and Plan of Treatment: You have a weak heart, also known as Congestive Heart Failure (CHF). Heart failure is a condition in which the heart does not pump or fill with blood well. As a result, the heart lags behind in its job of moving blood throughout the body. This can lead to symptoms such as swelling, trouble breathing, and feeling tired. Your Ejection Fraction (EF) is 20-25%, a normal EF is 55-60%.  -Please continue Valsartan 20 mg twice daily, Metoprolol succinate 50 mg once daily, Spironolactone 25 mg once daily to help strenghten your heart muscle  -Avoid drinking more than 1.5L of fluid daily and maintain a low salt diet (max 2grams daily).  -Please weigh yourself daily, for any significant increases in daily weight of 2lbs/day or 5lbs/week with associated swelling in the legs or abdomen and/or shortness of breath, please call your doctor or go to the emergency room.  -Follow up with GALO Yee on 5/13/23 @ 2pm @ [address] [place] [address].  -Follow up with Dr. Pizano on 5/15/23 @ 3pm.        SECONDARY DISCHARGE DIAGNOSES  Diagnosis: Pulmonary embolism  Assessment and Plan of Treatment: A pulmonary embolism is a blood clot that blocks and stops blood flow to an artery in the lung. Because one or more clots block blood flow to the lungs, pulmonary embolism can be life-threatening. However, prompt treatment greatly reduces the risk of death.   Please continue to take your Lovenox 80 mg injectable every 12 hours as well as Coumadin 5 mg at bedtime. [facility] will be bridging you to Warfarin. Please follow up with a Coumadin Clinic close to you and be sure to monitor your INR levels.    A pulmonary embolism can be life-threatening. Seek urgent medical attention if you experience unexplained shortness of breath, chest pain or fainting.      Diagnosis: Atrial fibrillation  Assessment and Plan of Treatment: You have an abnormal heart rhythm (arrhythmia) called atrial fibrillation. With this condition, the hearts 2 upper chambers (the atria) quiver rather than squeeze the blood out in a normal pattern. This leads to an irregular and sometimes rapid heartbeat. Atrial fibrillation is serious condition as it affects the heart’s ability to fill with blood as it should and blood clots may form, which increases the risk for stroke.  -Please CONTINUE  Warfarin 5 mg and Lovenox 80 mg IV Q 12 - with plan to bridge to Coumadin at [facility] - to prevent a stroke.  -Please CONTINUE Digoxin 125 mcg every day and Metoprolol succinate 50 mg daily to keep your heart rate regular  -Please follow up with Dr. Dr. Pizano on 5/15/23 @ 3pm.  -Please follow up with Electrophysiology team outpatient for further managment.       Diagnosis: Duodenal ulcer  Assessment and Plan of Treatment: You had bleeding in your duodenum that was treated with clips by GI team at [facility]. Your blood levels have been stable for discharge. Please follow up with [facility] [place] in 1 month, please call [phone] for appointment.   If any dark, bloody stools, go to your nearest emergency room.     PRINCIPAL DISCHARGE DIAGNOSIS  Diagnosis: Acute CHF  Assessment and Plan of Treatment: You have a weak heart, also known as Congestive Heart Failure (CHF). Heart failure is a condition in which the heart does not pump or fill with blood well. As a result, the heart lags behind in its job of moving blood throughout the body. This can lead to symptoms such as swelling, trouble breathing, and feeling tired. Your Ejection Fraction (EF) is 20-25%, a normal EF is 55-60%.  -Please continue Valsartan 20 mg twice daily, Metoprolol succinate 50 mg once daily, Spironolactone 25 mg once daily to help strenghten your heart muscle  -Avoid drinking more than 1.5L of fluid daily and maintain a low salt diet (max 2grams daily).  -Please weigh yourself daily, for any significant increases in daily weight of 2lbs/day or 5lbs/week with associated swelling in the legs or abdomen and/or shortness of breath, please call your doctor or go to the emergency room.  -Follow up with GALO Yee on 5/13/23 @ 2pm @ [address] [place] [address].  -Follow up with Dr. Pizano on 5/15/23 @ 3pm.        SECONDARY DISCHARGE DIAGNOSES  Diagnosis: Pulmonary embolism  Assessment and Plan of Treatment: A pulmonary embolism is a blood clot that blocks and stops blood flow to an artery in the lung. Because one or more clots block blood flow to the lungs, pulmonary embolism can be life-threatening. However, prompt treatment greatly reduces the risk of death.   Please continue to take your Lovenox 80 mg injectable every 12 hours as well as Coumadin 5 mg at bedtime. [facility] will be bridging you to Warfarin. Please follow up with a Coumadin Clinic close to you and be sure to monitor your INR levels.    A pulmonary embolism can be life-threatening. Seek urgent medical attention if you experience unexplained shortness of breath, chest pain or fainting.      Diagnosis: Atrial fibrillation  Assessment and Plan of Treatment: You have an abnormal heart rhythm (arrhythmia) called atrial fibrillation. With this condition, the hearts 2 upper chambers (the atria) quiver rather than squeeze the blood out in a normal pattern. This leads to an irregular and sometimes rapid heartbeat. Atrial fibrillation is serious condition as it affects the heart’s ability to fill with blood as it should and blood clots may form, which increases the risk for stroke.  -Please CONTINUE  Warfarin 5 mg and Lovenox 80 mg IV Q 12 - with plan to bridge to Coumadin at [facility] - to prevent a stroke.  -Please CONTINUE Digoxin 125 mcg every day and Metoprolol succinate 50 mg daily to keep your heart rate regular  -Please follow up with Dr. Dr. Pizano on 5/15/23 @ 3pm.  -Please follow up with Electrophysiology team outpatient for further managment.       Diagnosis: Duodenal ulcer  Assessment and Plan of Treatment: You had bleeding in your duodenum that was treated with clips by GI team at [facility]. Your blood levels have been stable for discharge. Please follow up with [facility] [place] in 1 month, please call [phone] for appointment.   -Please continue IV Pantoprazole 40 twice daily with transition to ONCE DAILY on 5/14/24.  -Please continue Sucralfate 1gram every 6 hours.  If any dark, bloody stools, go to your nearest emergency room.

## 2024-05-02 NOTE — PROGRESS NOTE ADULT - SUBJECTIVE AND OBJECTIVE BOX
Patient is a 74y old  Male who presents with a chief complaint of CHF, AFib (02 May 2024 09:56)    INTERVAL EVENTS:  CCU course reviewed    SUBJECTIVE:  Patient was seen and examined at bedside. Reports feeling ok, denies SOB, no chest pain, no BM or melena reported, Heparin gtt resumed. No other complaints or events reported. Telemetry reviewed.     Review of systems: No fever, chills, dizziness, HA, Changes in vision, CP, dyspnea, nausea or vomiting, dysuria, changes in bowel movements, LE edema. Rest of 12 point Review of systems negative unless otherwise documented elsewhere in note.     Diet, DASH/TLC:   Sodium & Cholesterol Restricted  Mildly Thick Liquids (MILDTHICKLIQS) (05-01-24 @ 13:43) [Active]      MEDICATIONS:  MEDICATIONS  (STANDING):  chlorhexidine 2% Cloths 1 Application(s) Topical <User Schedule>  dextrose 10% Bolus 125 milliLiter(s) IV Bolus once  dextrose 5%. 1000 milliLiter(s) (50 mL/Hr) IV Continuous <Continuous>  dextrose 5%. 1000 milliLiter(s) (50 mL/Hr) IV Continuous <Continuous>  dextrose 5%. 1000 milliLiter(s) (100 mL/Hr) IV Continuous <Continuous>  dextrose 50% Injectable 12.5 Gram(s) IV Push once  dextrose 50% Injectable 25 Gram(s) IV Push once  digoxin     Tablet 125 MICROGram(s) Oral every 24 hours  glucagon  Injectable 1 milliGRAM(s) IntraMuscular once  heparin  Infusion 1300 Unit(s)/Hr (13 mL/Hr) IV Continuous <Continuous>  influenza  Vaccine (HIGH DOSE) 0.7 milliLiter(s) IntraMuscular once  insulin lispro (ADMELOG) corrective regimen sliding scale   SubCutaneous three times a day before meals  insulin lispro (ADMELOG) corrective regimen sliding scale   SubCutaneous at bedtime  melatonin 3 milliGRAM(s) Oral at bedtime  metoprolol succinate ER 50 milliGRAM(s) Oral every 24 hours  mupirocin 2% Ointment 1 Application(s) Topical two times a day  pantoprazole  Injectable 40 milliGRAM(s) IV Push every 12 hours  QUEtiapine 25 milliGRAM(s) Oral at bedtime  spironolactone 25 milliGRAM(s) Oral every 24 hours  sucralfate 1 Gram(s) Oral every 6 hours  valsartan 20 milliGRAM(s) Oral every 12 hours    MEDICATIONS  (PRN):  dextrose Oral Gel 15 Gram(s) Oral once PRN Blood Glucose LESS THAN 70 milliGRAM(s)/deciliter  ipratropium    for Nebulization 500 MICROGram(s) Nebulizer every 6 hours PRN Shortness of Breath and/or Wheezing      Allergies    No Known Allergies    Intolerances        OBJECTIVE:  Vital Signs Last 24 Hrs  T(C): 36.6 (02 May 2024 09:13), Max: 36.6 (01 May 2024 17:32)  T(F): 97.8 (02 May 2024 09:13), Max: 97.9 (01 May 2024 20:15)  HR: 88 (02 May 2024 12:36) (84 - 101)  BP: 128/61 (02 May 2024 12:36) (94/51 - 139/58)  BP(mean): 65 (02 May 2024 09:13) (65 - 81)  RR: 18 (02 May 2024 12:36) (18 - 18)  SpO2: 99% (02 May 2024 12:36) (95% - 99%)    Parameters below as of 02 May 2024 12:36  Patient On (Oxygen Delivery Method): nasal cannula  O2 Flow (L/min): 2    I&O's Summary    01 May 2024 07:01  -  02 May 2024 07:00  --------------------------------------------------------  IN: 300 mL / OUT: 650 mL / NET: -350 mL    02 May 2024 07:01  -  02 May 2024 14:40  --------------------------------------------------------  IN: 472 mL / OUT: 0 mL / NET: 472 mL        PHYSICAL EXAM:  General: AOX2, NAD, lying in bed, no labored breathing on NC 2L  HEENT: AT/NC, no facial asymmetry  Lungs: poor inspiration, decreased air in bases, no crackles  Heart: mild tachycardia  Abdomen: soft, no tenderness,   Extremities:  warm, no edema,  no tenderness, no focal deficit   LABS:                        7.8    8.83  )-----------( 253      ( 02 May 2024 10:00 )             25.9     05-01    148<H>  |  113<H>  |  31<H>  ----------------------------<  135<H>  3.6   |  26  |  1.11    Ca    8.3<L>      01 May 2024 20:30  Mg     2.1     05-01        PT/INR - ( 02 May 2024 14:09 )   PT: 13.3 sec;   INR: 1.17          PTT - ( 01 May 2024 20:31 )  PTT:79.1 sec  CAPILLARY BLOOD GLUCOSE      POCT Blood Glucose.: 140 mg/dL (02 May 2024 12:08)  POCT Blood Glucose.: 81 mg/dL (02 May 2024 08:40)  POCT Blood Glucose.: 135 mg/dL (01 May 2024 21:48)  POCT Blood Glucose.: 180 mg/dL (01 May 2024 17:19)    Urinalysis Basic - ( 01 May 2024 20:30 )    Color: x / Appearance: x / SG: x / pH: x  Gluc: 135 mg/dL / Ketone: x  / Bili: x / Urobili: x   Blood: x / Protein: x / Nitrite: x   Leuk Esterase: x / RBC: x / WBC x   Sq Epi: x / Non Sq Epi: x / Bacteria: x        MICRODATA:      RADIOLOGY/OTHER STUDIES:

## 2024-05-03 ENCOUNTER — TRANSCRIPTION ENCOUNTER (OUTPATIENT)
Age: 74
End: 2024-05-03

## 2024-05-03 PROBLEM — Z00.00 ENCOUNTER FOR PREVENTIVE HEALTH EXAMINATION: Status: ACTIVE | Noted: 2024-05-03

## 2024-05-03 LAB
ALBUMIN SERPL ELPH-MCNC: 2.9 G/DL — LOW (ref 3.3–5)
ALP SERPL-CCNC: 50 U/L — SIGNIFICANT CHANGE UP (ref 40–120)
ALT FLD-CCNC: 21 U/L — SIGNIFICANT CHANGE UP (ref 10–45)
ANION GAP SERPL CALC-SCNC: 7 MMOL/L — SIGNIFICANT CHANGE UP (ref 5–17)
ANION GAP SERPL CALC-SCNC: 9 MMOL/L — SIGNIFICANT CHANGE UP (ref 5–17)
APTT BLD: 37.1 SEC — HIGH (ref 24.5–35.6)
APTT BLD: 56.7 SEC — HIGH (ref 24.5–35.6)
APTT BLD: 68 SEC — HIGH (ref 24.5–35.6)
AST SERPL-CCNC: 20 U/L — SIGNIFICANT CHANGE UP (ref 10–40)
BILIRUB SERPL-MCNC: 0.3 MG/DL — SIGNIFICANT CHANGE UP (ref 0.2–1.2)
BUN SERPL-MCNC: 24 MG/DL — HIGH (ref 7–23)
BUN SERPL-MCNC: 25 MG/DL — HIGH (ref 7–23)
CALCIUM SERPL-MCNC: 8.6 MG/DL — SIGNIFICANT CHANGE UP (ref 8.4–10.5)
CALCIUM SERPL-MCNC: 8.7 MG/DL — SIGNIFICANT CHANGE UP (ref 8.4–10.5)
CHLORIDE SERPL-SCNC: 109 MMOL/L — HIGH (ref 96–108)
CHLORIDE SERPL-SCNC: 111 MMOL/L — HIGH (ref 96–108)
CO2 SERPL-SCNC: 27 MMOL/L — SIGNIFICANT CHANGE UP (ref 22–31)
CO2 SERPL-SCNC: 28 MMOL/L — SIGNIFICANT CHANGE UP (ref 22–31)
CREAT SERPL-MCNC: 0.91 MG/DL — SIGNIFICANT CHANGE UP (ref 0.5–1.3)
CREAT SERPL-MCNC: 0.94 MG/DL — SIGNIFICANT CHANGE UP (ref 0.5–1.3)
EGFR: 85 ML/MIN/1.73M2 — SIGNIFICANT CHANGE UP
EGFR: 88 ML/MIN/1.73M2 — SIGNIFICANT CHANGE UP
FERRITIN SERPL-MCNC: 172 NG/ML — SIGNIFICANT CHANGE UP (ref 30–400)
GLUCOSE BLDC GLUCOMTR-MCNC: 117 MG/DL — HIGH (ref 70–99)
GLUCOSE BLDC GLUCOMTR-MCNC: 129 MG/DL — HIGH (ref 70–99)
GLUCOSE BLDC GLUCOMTR-MCNC: 156 MG/DL — HIGH (ref 70–99)
GLUCOSE BLDC GLUCOMTR-MCNC: 168 MG/DL — HIGH (ref 70–99)
GLUCOSE SERPL-MCNC: 100 MG/DL — HIGH (ref 70–99)
GLUCOSE SERPL-MCNC: 118 MG/DL — HIGH (ref 70–99)
HCT VFR BLD CALC: 26.2 % — LOW (ref 39–50)
HGB BLD-MCNC: 8.2 G/DL — LOW (ref 13–17)
INR BLD: 1.11 — SIGNIFICANT CHANGE UP (ref 0.85–1.18)
IRON SATN MFR SERPL: 15 % — LOW (ref 16–55)
IRON SATN MFR SERPL: 33 UG/DL — LOW (ref 45–165)
MAGNESIUM SERPL-MCNC: 2 MG/DL — SIGNIFICANT CHANGE UP (ref 1.6–2.6)
MCHC RBC-ENTMCNC: 29.7 PG — SIGNIFICANT CHANGE UP (ref 27–34)
MCHC RBC-ENTMCNC: 31.3 GM/DL — LOW (ref 32–36)
MCV RBC AUTO: 94.9 FL — SIGNIFICANT CHANGE UP (ref 80–100)
NRBC # BLD: 0 /100 WBCS — SIGNIFICANT CHANGE UP (ref 0–0)
PHOSPHATE SERPL-MCNC: 2.5 MG/DL — SIGNIFICANT CHANGE UP (ref 2.5–4.5)
PLATELET # BLD AUTO: 287 K/UL — SIGNIFICANT CHANGE UP (ref 150–400)
POTASSIUM SERPL-MCNC: 3.7 MMOL/L — SIGNIFICANT CHANGE UP (ref 3.5–5.3)
POTASSIUM SERPL-MCNC: 3.8 MMOL/L — SIGNIFICANT CHANGE UP (ref 3.5–5.3)
POTASSIUM SERPL-SCNC: 3.7 MMOL/L — SIGNIFICANT CHANGE UP (ref 3.5–5.3)
POTASSIUM SERPL-SCNC: 3.8 MMOL/L — SIGNIFICANT CHANGE UP (ref 3.5–5.3)
PROT SERPL-MCNC: 5.7 G/DL — LOW (ref 6–8.3)
PROTHROM AB SERPL-ACNC: 12.6 SEC — SIGNIFICANT CHANGE UP (ref 9.5–13)
RBC # BLD: 2.76 M/UL — LOW (ref 4.2–5.8)
RBC # FLD: 17.6 % — HIGH (ref 10.3–14.5)
SODIUM SERPL-SCNC: 145 MMOL/L — SIGNIFICANT CHANGE UP (ref 135–145)
SODIUM SERPL-SCNC: 146 MMOL/L — HIGH (ref 135–145)
TIBC SERPL-MCNC: 222 UG/DL — SIGNIFICANT CHANGE UP (ref 220–430)
UIBC SERPL-MCNC: 189 UG/DL — SIGNIFICANT CHANGE UP (ref 110–370)
WBC # BLD: 7.83 K/UL — SIGNIFICANT CHANGE UP (ref 3.8–10.5)
WBC # FLD AUTO: 7.83 K/UL — SIGNIFICANT CHANGE UP (ref 3.8–10.5)

## 2024-05-03 PROCEDURE — 99233 SBSQ HOSP IP/OBS HIGH 50: CPT

## 2024-05-03 RX ORDER — ENOXAPARIN SODIUM 100 MG/ML
80 INJECTION SUBCUTANEOUS EVERY 12 HOURS
Refills: 0 | Status: DISCONTINUED | OUTPATIENT
Start: 2024-05-03 | End: 2024-05-04

## 2024-05-03 RX ORDER — POTASSIUM CHLORIDE 20 MEQ
20 PACKET (EA) ORAL ONCE
Refills: 0 | Status: COMPLETED | OUTPATIENT
Start: 2024-05-03 | End: 2024-05-03

## 2024-05-03 RX ORDER — WARFARIN SODIUM 2.5 MG/1
5 TABLET ORAL AT BEDTIME
Refills: 0 | Status: DISCONTINUED | OUTPATIENT
Start: 2024-05-03 | End: 2024-05-04

## 2024-05-03 RX ORDER — IRON SUCROSE 20 MG/ML
300 INJECTION, SOLUTION INTRAVENOUS
Refills: 0 | Status: DISCONTINUED | OUTPATIENT
Start: 2024-05-03 | End: 2024-05-04

## 2024-05-03 RX ORDER — WARFARIN SODIUM 2.5 MG/1
5 TABLET ORAL AT BEDTIME
Refills: 0 | Status: DISCONTINUED | OUTPATIENT
Start: 2024-05-03 | End: 2024-05-03

## 2024-05-03 RX ADMIN — VALSARTAN 20 MILLIGRAM(S): 80 TABLET ORAL at 17:09

## 2024-05-03 RX ADMIN — Medication 125 MICROGRAM(S): at 13:45

## 2024-05-03 RX ADMIN — PANTOPRAZOLE SODIUM 40 MILLIGRAM(S): 20 TABLET, DELAYED RELEASE ORAL at 18:10

## 2024-05-03 RX ADMIN — HEPARIN SODIUM 900 UNIT(S)/HR: 5000 INJECTION INTRAVENOUS; SUBCUTANEOUS at 09:45

## 2024-05-03 RX ADMIN — PANTOPRAZOLE SODIUM 40 MILLIGRAM(S): 20 TABLET, DELAYED RELEASE ORAL at 13:45

## 2024-05-03 RX ADMIN — SPIRONOLACTONE 25 MILLIGRAM(S): 25 TABLET, FILM COATED ORAL at 13:45

## 2024-05-03 RX ADMIN — Medication 2: at 17:08

## 2024-05-03 RX ADMIN — VALSARTAN 20 MILLIGRAM(S): 80 TABLET ORAL at 07:39

## 2024-05-03 RX ADMIN — Medication 1 GRAM(S): at 07:40

## 2024-05-03 RX ADMIN — Medication 1 GRAM(S): at 13:45

## 2024-05-03 RX ADMIN — IRON SUCROSE 176.67 MILLIGRAM(S): 20 INJECTION, SOLUTION INTRAVENOUS at 22:39

## 2024-05-03 RX ADMIN — Medication 1 GRAM(S): at 18:10

## 2024-05-03 RX ADMIN — QUETIAPINE FUMARATE 25 MILLIGRAM(S): 200 TABLET, FILM COATED ORAL at 21:54

## 2024-05-03 RX ADMIN — Medication 20 MILLIEQUIVALENT(S): at 22:39

## 2024-05-03 RX ADMIN — ENOXAPARIN SODIUM 80 MILLIGRAM(S): 100 INJECTION SUBCUTANEOUS at 17:08

## 2024-05-03 RX ADMIN — HEPARIN SODIUM 900 UNIT(S)/HR: 5000 INJECTION INTRAVENOUS; SUBCUTANEOUS at 08:50

## 2024-05-03 RX ADMIN — HEPARIN SODIUM 900 UNIT(S)/HR: 5000 INJECTION INTRAVENOUS; SUBCUTANEOUS at 00:52

## 2024-05-03 RX ADMIN — Medication 50 MILLIGRAM(S): at 07:40

## 2024-05-03 RX ADMIN — MUPIROCIN 1 APPLICATION(S): 20 OINTMENT TOPICAL at 07:39

## 2024-05-03 RX ADMIN — WARFARIN SODIUM 5 MILLIGRAM(S): 2.5 TABLET ORAL at 21:54

## 2024-05-03 NOTE — PROGRESS NOTE ADULT - SUBJECTIVE AND OBJECTIVE BOX
Interventional Cardiology PA Adult Progress Note    Overnight Events:      Subjective Assessment:      ROS Negative except as per Subjective and HPI  	  MEDICATIONS:  digoxin     Tablet 125 MICROGram(s) Oral every 24 hours  metoprolol succinate ER 50 milliGRAM(s) Oral every 24 hours  spironolactone 25 milliGRAM(s) Oral every 24 hours  valsartan 20 milliGRAM(s) Oral two times a day      ipratropium    for Nebulization 500 MICROGram(s) Nebulizer every 6 hours PRN    melatonin 3 milliGRAM(s) Oral at bedtime  QUEtiapine 25 milliGRAM(s) Oral at bedtime    pantoprazole  Injectable 40 milliGRAM(s) IV Push every 12 hours  sucralfate 1 Gram(s) Oral every 6 hours    dextrose 50% Injectable 12.5 Gram(s) IV Push once  dextrose 50% Injectable 25 Gram(s) IV Push once  dextrose Oral Gel 15 Gram(s) Oral once PRN  glucagon  Injectable 1 milliGRAM(s) IntraMuscular once  insulin lispro (ADMELOG) corrective regimen sliding scale   SubCutaneous three times a day before meals  insulin lispro (ADMELOG) corrective regimen sliding scale   SubCutaneous at bedtime    chlorhexidine 2% Cloths 1 Application(s) Topical <User Schedule>  dextrose 10% Bolus 125 milliLiter(s) IV Bolus once  dextrose 5%. 1000 milliLiter(s) IV Continuous <Continuous>  dextrose 5%. 1000 milliLiter(s) IV Continuous <Continuous>  enoxaparin Injectable 80 milliGRAM(s) SubCutaneous every 12 hours  influenza  Vaccine (HIGH DOSE) 0.7 milliLiter(s) IntraMuscular once  iron sucrose IVPB 300 milliGRAM(s) IV Intermittent <User Schedule>  mupirocin 2% Ointment 1 Application(s) Topical two times a day        [PHYSICAL EXAM:  TELEMETRY:  T(C): 36.9 (05-03-24 @ 16:14), Max: 36.9 (05-03-24 @ 16:14)  HR: 94 (05-03-24 @ 16:14) (80 - 98)  BP: 114/63 (05-03-24 @ 16:14) (95/56 - 119/80)  RR: 18 (05-03-24 @ 16:14) (18 - 18)  SpO2: 97% (05-03-24 @ 16:14) (95% - 100%)  Wt(kg): --  I&O's Summary    02 May 2024 07:01  -  03 May 2024 07:00  --------------------------------------------------------  IN: 753 mL / OUT: 1125 mL / NET: -372 mL    03 May 2024 07:01  -  03 May 2024 17:26  --------------------------------------------------------  IN: 240 mL / OUT: 0 mL / NET: 240 mL        Weight (kg): 75 (05-03 @ 15:49)                                     Appearance: Normal	  HEENT:   Normal oral mucosa, PERRL, EOMI	  Neck: Supple, + JVD/ - JVD; Carotid Bruit   Cardiovascular: Normal S1 S2, No JVD, No murmurs   Respiratory: Lungs clear to auscultation anteriorly  Gastrointestinal:  Soft, Non-tender, + BS	  Skin: No rashes, No ecchymoses, No cyanosis  Extremities: Normal range of motion, No clubbing, cyanosis or edema  Vascular: Peripheral pulses palpable 2+ bilaterally  Neurologic: Non-focal  Psychiatry: A & O x 3, Mood & affect appropriate  	  CARDIAC MARKERS:                        8.2    7.83  )-----------( 287      ( 03 May 2024 07:08 )             26.2     05-03    146<H>  |  111<H>  |  25<H>  ----------------------------<  100<H>  3.7   |  28  |  0.94    Ca    8.6      03 May 2024 07:08  Phos  2.5     05-03  Mg     2.0     05-03    TPro  5.7<L>  /  Alb  2.9<L>  /  TBili  0.3  /  DBili  x   /  AST  20  /  ALT  21  /  AlkPhos  50  05-03    PT/INR - ( 02 May 2024 14:09 )   PT: 13.3 sec;   INR: 1.17          PTT - ( 03 May 2024 14:14 )  PTT:56.7 sec     Interventional Cardiology PA Adult Progress Note    Subjective Assessment:  Patient seen and assessed this AM. Patient denied chest pain, SOB, abdominal pain, fatigue, orthopnea.     ROS Negative except as per Subjective and HPI  	  MEDICATIONS:  digoxin     Tablet 125 MICROGram(s) Oral every 24 hours  metoprolol succinate ER 50 milliGRAM(s) Oral every 24 hours  spironolactone 25 milliGRAM(s) Oral every 24 hours  valsartan 20 milliGRAM(s) Oral two times a day  ipratropium    for Nebulization 500 MICROGram(s) Nebulizer every 6 hours PRN  melatonin 3 milliGRAM(s) Oral at bedtime  QUEtiapine 25 milliGRAM(s) Oral at bedtime  pantoprazole  Injectable 40 milliGRAM(s) IV Push every 12 hours  sucralfate 1 Gram(s) Oral every 6 hours  dextrose 50% Injectable 12.5 Gram(s) IV Push once  dextrose 50% Injectable 25 Gram(s) IV Push once  dextrose Oral Gel 15 Gram(s) Oral once PRN  glucagon  Injectable 1 milliGRAM(s) IntraMuscular once  insulin lispro (ADMELOG) corrective regimen sliding scale   SubCutaneous three times a day before meals  insulin lispro (ADMELOG) corrective regimen sliding scale   SubCutaneous at bedtime  chlorhexidine 2% Cloths 1 Application(s) Topical <User Schedule>  dextrose 10% Bolus 125 milliLiter(s) IV Bolus once  dextrose 5%. 1000 milliLiter(s) IV Continuous <Continuous>  dextrose 5%. 1000 milliLiter(s) IV Continuous <Continuous>  enoxaparin Injectable 80 milliGRAM(s) SubCutaneous every 12 hours  influenza  Vaccine (HIGH DOSE) 0.7 milliLiter(s) IntraMuscular once  iron sucrose IVPB 300 milliGRAM(s) IV Intermittent <User Schedule>  mupirocin 2% Ointment 1 Application(s) Topical two times a day    [PHYSICAL EXAM:  TELEMETRY:  T(C): 36.9 (05-03-24 @ 16:14), Max: 36.9 (05-03-24 @ 16:14)  HR: 94 (05-03-24 @ 16:14) (80 - 98)  BP: 114/63 (05-03-24 @ 16:14) (95/56 - 119/80)  RR: 18 (05-03-24 @ 16:14) (18 - 18)  SpO2: 97% (05-03-24 @ 16:14) (95% - 100%)  Wt(kg): --  I&O's Summary    02 May 2024 07:01  -  03 May 2024 07:00  --------------------------------------------------------  IN: 753 mL / OUT: 1125 mL / NET: -372 mL    03 May 2024 07:01  -  03 May 2024 17:26  --------------------------------------------------------  IN: 240 mL / OUT: 0 mL / NET: 240 mL        Weight (kg): 75 (05-03 @ 15:49)                                     Appearance: Normal	  HEENT:   Normal oral mucosa, PERRL, EOMI	  Neck: Supple, - JVD; Carotid Bruit   Cardiovascular: Normal S1 S2, No JVD, No murmurs   Respiratory: Lungs clear to auscultation anteriorly  Gastrointestinal:  Soft, Non-tender, + BS	  Skin: No rashes, No ecchymoses, No cyanosis  Extremities: Normal range of motion, No clubbing, cyanosis or edema  Vascular: Peripheral pulses palpable 2+ bilaterally  Neurologic: Non-focal  Psychiatry: A & O x 3, Mood & affect appropriate  	  CARDIAC MARKERS:                        8.2    7.83  )-----------( 287      ( 03 May 2024 07:08 )             26.2     05-03    146<H>  |  111<H>  |  25<H>  ----------------------------<  100<H>  3.7   |  28  |  0.94    Ca    8.6      03 May 2024 07:08  Phos  2.5     05-03  Mg     2.0     05-03    TPro  5.7<L>  /  Alb  2.9<L>  /  TBili  0.3  /  DBili  x   /  AST  20  /  ALT  21  /  AlkPhos  50  05-03    PT/INR - ( 02 May 2024 14:09 )   PT: 13.3 sec;   INR: 1.17          PTT - ( 03 May 2024 14:14 )  PTT:56.7 sec

## 2024-05-03 NOTE — PROGRESS NOTE ADULT - PROBLEM SELECTOR PLAN 4
- Continue PPI IV BID x 2wks and then daily thereafter   - Continue Sucralfate 1g QID   - Cleared for AC by GI   - EGD (4/04/24): A single oozing ulcer found in duodenal bulb s/p Endoclip x4 with bi-cap electrocautery. Normal mucosa esophagus and stomach   - GI signed off    #Transfusion reaction workup   HLA AB Class 1 positive   IL 1 and IL 6 pending

## 2024-05-03 NOTE — PROGRESS NOTE ADULT - ASSESSMENT
74M with no known PMH, presenting after mechanical fall and found down - found to have new-onset Afib RVR with severe LV fxn, Rhabdomyolysis, Hypernatremia, and segmental PE. Course c/b hemorrhagic shock 2/2 GIB with EGD noting bleeding duodenal ulcer s/p clipx 4. Current course notable for hypovolemic hypernatremia and hyperactive delirium/agitation. Undergoing medical optimization for hypernatremia, D5% has been discontinued. Patient assessed by S/S and now on regular diet. Passed heparin gtt challenge with tranisition to Lovenox 80 mg PO QD and Warfarin 10 mg daily. Plan for discharge to [facility] this weekend.

## 2024-05-03 NOTE — PROGRESS NOTE ADULT - PROBLEM SELECTOR PLAN 1
- Diuresis: None given euvolemia   - GDMT: Toprol 50mg QD, Valsartan 20mg BID, and Spironolactone 25mg QD    o Consider Farxiga 10mg QD upon discharge   - TTE (4/15/24): EF 20-25%, severely reduced LV/RV fxn, BIAE. 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 PCQP 5, MV 73%

## 2024-05-03 NOTE — PROGRESS NOTE ADULT - PROBLEM SELECTOR PLAN 8
- 2/2 being found down x 2d; resolved   - Monitor Cr     - Fluids: not indicated  - Replete Lytes PRN to K>4, Mg>2  - DietL Soft & Bite sized   - DVT ppx: Lovenox and Warfarin  - GI ppx: IV PPI BID   - PT: EKATERINA  - SW/Dispo: medically clear

## 2024-05-03 NOTE — PROGRESS NOTE ADULT - PROBLEM SELECTOR PROBLEM 1
Congestive heart failure (CHF)
Acute on chronic HFrEF (heart failure with reduced ejection fraction)
Congestive heart failure (CHF)
Acute on chronic HFrEF (heart failure with reduced ejection fraction)
Acute on chronic HFrEF (heart failure with reduced ejection fraction)
Congestive heart failure (CHF)

## 2024-05-03 NOTE — PROGRESS NOTE ADULT - PROBLEM SELECTOR PLAN 6
- 2/2 complex hospital course and recent intubation   - Patient passed S&S assessment 5/1 -> diet advanced to regular DASH

## 2024-05-03 NOTE — PROGRESS NOTE ADULT - REASON FOR ADMISSION
CHF, AFib
CHF, AFib, and GIB
CHF, AFib

## 2024-05-03 NOTE — PROGRESS NOTE ADULT - TIME BILLING
Review of hospital course, labs, vitals, medical records.   Bedside exam and interview   Discussed plan of care with primary team ACP and attd, ID consult attd  Documenting the encounter
Managing PNA, SSTI
Bedside exam and interview   Reviewed vitals, labs   Discussed patient's plan of care with primary team   Documentation of encounter
Chart review, coordination of care.
Managing PNA
Time spent includes direct patient care  (interview and examination of patient), discussion with other providers, support staff and/or patient's family members, review of medical records, ordering diagnostic tests and analyzing results, and documentation.
Time spent includes direct patient care  (interview and examination of patient), discussion with other providers, support staff and/or patient's family members, review of medical records, ordering diagnostic tests and analyzing results, and documentation.

## 2024-05-03 NOTE — CHART NOTE - NSCHARTNOTEFT_GEN_A_CORE
Admitting Diagnosis:   Patient is a 74y old  Male who presents with a chief complaint of CHF, AFib (02 May 2024 14:40)      PAST MEDICAL & SURGICAL HISTORY:  Hiatal hernia        Current Nutrition Order:  DASH diet, mildly thick liquids     PO Intake: Good (%) [   ]  Fair (50-75%) [ XX  ] Poor (<25%) [   ]     GI Issues:  Protonix ordered; per flow sheets: distended, LBM+4/23  EGD on 4/24/24: Normal mucosa was noted in the whole esophagus. Normal mucosa was noted in the whole stomach. A single oozing ulcer was found in the duodenal bulb. Additional findings include An ulcer with a visible vessel was seen in the duodenal sweep. This ulcer was a Heraclio Class IIa. Four Endoclips were successfully applied to the duodenal bulb for the purpose of hemostasis. Bi-cap electrocautery was successfully applied for hemostasis.    Pain: no pain/discomfort noted    Skin Integrity:  +BL Shin abrasions  Edema Varying During Admit: 1+generalized, 2+BL Arm, 3+BL Leg  Alvaro score 16      05-02-24 @ 07:01  -  05-03-24 @ 07:00  --------------------------------------------------------  IN: 753 mL / OUT: 1125 mL / NET: -372 mL        Labs:   05-03    146<H>  |  111<H>  |  25<H>  ----------------------------<  100<H>  3.7   |  28  |  0.94    Ca    8.6      03 May 2024 07:08  Phos  2.5     05-03  Mg     2.0     05-03    TPro  5.7<L>  /  Alb  2.9<L>  /  TBili  0.3  /  DBili  x   /  AST  20  /  ALT  21  /  AlkPhos  50  05-03    CAPILLARY BLOOD GLUCOSE      POCT Blood Glucose.: 117 mg/dL (03 May 2024 08:03)  POCT Blood Glucose.: 137 mg/dL (02 May 2024 22:27)  POCT Blood Glucose.: 136 mg/dL (02 May 2024 17:00)  POCT Blood Glucose.: 140 mg/dL (02 May 2024 12:08)      Medications:  MEDICATIONS  (STANDING):  chlorhexidine 2% Cloths 1 Application(s) Topical <User Schedule>  dextrose 10% Bolus 125 milliLiter(s) IV Bolus once  dextrose 5%. 1000 milliLiter(s) (50 mL/Hr) IV Continuous <Continuous>  dextrose 5%. 1000 milliLiter(s) (100 mL/Hr) IV Continuous <Continuous>  dextrose 5%. 1000 milliLiter(s) (50 mL/Hr) IV Continuous <Continuous>  dextrose 50% Injectable 25 Gram(s) IV Push once  dextrose 50% Injectable 12.5 Gram(s) IV Push once  digoxin     Tablet 125 MICROGram(s) Oral every 24 hours  glucagon  Injectable 1 milliGRAM(s) IntraMuscular once  heparin  Infusion. 900 Unit(s)/Hr (9 mL/Hr) IV Continuous <Continuous>  influenza  Vaccine (HIGH DOSE) 0.7 milliLiter(s) IntraMuscular once  insulin lispro (ADMELOG) corrective regimen sliding scale   SubCutaneous three times a day before meals  insulin lispro (ADMELOG) corrective regimen sliding scale   SubCutaneous at bedtime  melatonin 3 milliGRAM(s) Oral at bedtime  metoprolol succinate ER 50 milliGRAM(s) Oral every 24 hours  mupirocin 2% Ointment 1 Application(s) Topical two times a day  pantoprazole  Injectable 40 milliGRAM(s) IV Push every 12 hours  QUEtiapine 25 milliGRAM(s) Oral at bedtime  spironolactone 25 milliGRAM(s) Oral every 24 hours  sucralfate 1 Gram(s) Oral every 6 hours  valsartan 20 milliGRAM(s) Oral two times a day    MEDICATIONS  (PRN):  dextrose Oral Gel 15 Gram(s) Oral once PRN Blood Glucose LESS THAN 70 milliGRAM(s)/deciliter  heparin   Injectable 7000 Unit(s) IV Push every 6 hours PRN For aPTT less than 40  heparin   Injectable 3500 Unit(s) IV Push every 6 hours PRN For aPTT between 40 - 57  ipratropium    for Nebulization 500 MICROGram(s) Nebulizer every 6 hours PRN Shortness of Breath and/or Wheezing          5'6''  pounds+-10%  Wegiht 259 pounds BMI 42 %IBW>120%    Weight Change: Wt fluctuations noted, continue daily/weekly weights for trending  5/3 166.2 pounds   5/1 167.5 pounds   4/25 204.3 pounds   4/22 195.9 pounds   4/21 175.9 pounds   4/20 179 pounds  4/16 200.4 pounds     [PCM}  - Unable to DX at this time, however pt with Risk d/t Being found down PTA x at least 2 days     Estimated energy needs:   IBW used to calculate energy needs due to pt's current body weight exceeding 120% of IBW   Adjusted for age/BMI, HF/Renal, Fevers, Malnutrition Risk - fluids per team (elevated Na)   Estimated Energy Needs From (juventino/kg)	25  Estimated Energy Needs To (juventino/kg)	30  Estimated Energy Needs Calculated From (juventino/kg)	1610  Estimated Energy Needs Calculated To (juventino/kg)	1932  Estimated Protein Needs From (g/kg)	1.25  Estimated Protein Needs To (g/kg)	1.5  Estimated Protein Needs Calculated From (g/kg)	80.5  Estimated Protein Needs Calculated To (g/kg)	96.6    Subjective:   74M with no known PMH, initially admitted to cardiac telemetry s/p fall for rapid afib. Course complicated by new diagnosis of new CHF (LVEF 20-25%), PE, SILKE/CKD, and melena. GI consulted for melena when transitioning from heparin gtt to Eliquis, with EGD showing bleeding duodenal ulcer s/p clipping. Current course notable for hypovolemic hypernatremia and hyperactive delirium/agitation. Now Undergoing medical optimization for hypernatremia and therapeutic aptt to begin coumadin bridge. Possible plan for EP evaluation for rhythm control as pt remains in AF.     Pt seen at bedside on 5UR. , POCT 117 137 136 (Suggested inpatient goals: 140-180) - corrective sliding scales premeals/at bedtime ordered. Pt S/p Swallow VFSS/MBS Assessment: Regular solids and thin liquids 5/1. Pt seen consuming breakfast today - had 100% of eggs during visit. RN reports pt tolerating meals without noted issues chewing/swallowing, no cough or choke. Please see below for nutritions recommendations. Recommendations Provided to team. RDN will continue to monitor, reassess, and intervene as appropriate.     Previous Nutrition Diagnosis: Increased Nutrient Needs related to Increased Demands as evidenced by: Fevers, HF  Active [ X  ]  Resolved [   ]  Goal: Pt will meet at least 75% of protein & energy needs via most appropriate route for nutrition     Recommendations:  1. Recommend Low fiber, Low sodium diet - PO consisntecy per SLP.  -- Monitor PO intake/appetite, diet tolerance.  -- Consider oral nutrition supplements if PO intake falls below 50% of meals   -- Should pt be noted with s/s of issues swallowing, recommend NPO/SLP.  2. Pain/GI per team.  -- Optimize Regime PRN.  3. Labs: monitor BMP, CBC, glucose, lytes - Replete PRN, trend renal indices, LFTs.  4. Monitor daily wts, Skin, GOC.  5. RD to remain available for additional nutrition interventions as needed.     Education: NA.     Risk Level: High [  ] Moderate [ X  ] Low [   ].

## 2024-05-03 NOTE — PROGRESS NOTE ADULT - NS ATTEND AMEND GEN_ALL_CORE FT
74M with no known PMH, who presented to [facility] on 04/14/24 after mechanical fall, was found down on floor x 2 days by landlord and admitted to [facility] for ADHF. Diagnosed with acute NICM HFrEF (20%), pAFib, rhabdomyolysis and PE. LHC/RHC confirmed no evidence of CAD w/RHC showing normal CO/CI however profound contraction metabolic alkalosis w/respiratory compensation in the setting of over diuresis. Course c/b UGIB w/hypovolemic shock iso Eliquis initiation, stepped up to CCU 4/23/24 for mgmt of shock and elective intubation for EGD d/t HFNC requirements. Patient s/p 4 endoclips w/cauterization of a large ulcer w/visible vessel in the duodenal sweep-this was deemed as a high-risk site d/t proximity of the lesion to the gastroduodenal artery. Extubated 4/26/24.  EP following for AFib, on Toprol 50mg qd and digoxin 125mcg PO daily, plan for outpt EP follow up. GI cleared for trial of A/C, initiated on hep gtt which patient passed   Plan for:  s/p Heparin gtt challenge, initiate lovenox to Coumadin bridge, NOAC prohibitively expensive per d/w patient and VIVO  s/p Barium swallow study- cleared for solids with thin liquids  CHF GDMT: Valsartan 20 BID, ARNI and SGLT2i prohibitively expensive for patient, cont Toprol 50XL daily, Spironolactone 25, no standing diuretics  IV iron protocol in house x 3D, then transition to once daily po iron supplementation for MARY  EP consulted, rec patient fu in clinic for outpatient evaluation of rhythm control  Advanced CHF team med recs as above, will continue to follow patient in outpt setting  PT, rec for EKATERINA  SW/CM assist with EKATERINA placement, bed pending, will inform if EKATERINA is able to perform lovenox injections  Patient will require 1 wk fu appt with CHF team Rama/Dr Amaro upon martha Evans M.D.  Cardiology Attending  During non face-to-face time, I reviewed relevant portions of the patient’s medical record; including vitals, labs, medications, cardiac studies, remaining additional imaging and consultant recommendations. During face-to-face time, I took a relevant history and examined the patient. I also explained to the patient their diagnoses, and specific cardiopulmonary management plan, which required a high level of medical decision making.  I answered all questions related to the patient's medical conditions. I spent 55 minutes on total encounter; more than 50% of the visit was spent counseling and/or coordinating care by the attending physician, with plan of care discussed with the patient, CHF team and cardiac care team.

## 2024-05-03 NOTE — PROVIDER CONTACT NOTE (CHANGE IN STATUS NOTIFICATION) - ASSESSMENT
Pt diaphoretic, , pulse ox 73. Pt then cleaned, condom cath reapplied, oxygen on and sat up to 90 degrees, HR then 88 and pulse ox 95 above.

## 2024-05-03 NOTE — PROGRESS NOTE ADULT - SUBJECTIVE AND OBJECTIVE BOX
SUBJECTIVE:  Patient was seen and examined at bedside. Patient reports feeling at baseline, denies complaints, more talkative and interactive today. No other complaints or events reported. Telemetry reviewed.     Review of systems: No fever, chills, dizziness, HA, Changes in vision, CP, dyspnea, nausea or vomiting, dysuria, changes in bowel movements, LE edema. Rest of 12 point Review of systems negative unless otherwise documented elsewhere in note.     Diet, DASH/TLC:   Sodium & Cholesterol Restricted  Mildly Thick Liquids (MILDTHICKLIQS) (05-01-24 @ 13:43) [Active]      MEDICATIONS:  MEDICATIONS  (STANDING):  chlorhexidine 2% Cloths 1 Application(s) Topical <User Schedule>  dextrose 10% Bolus 125 milliLiter(s) IV Bolus once  dextrose 5%. 1000 milliLiter(s) (50 mL/Hr) IV Continuous <Continuous>  dextrose 5%. 1000 milliLiter(s) (50 mL/Hr) IV Continuous <Continuous>  dextrose 5%. 1000 milliLiter(s) (100 mL/Hr) IV Continuous <Continuous>  dextrose 50% Injectable 25 Gram(s) IV Push once  dextrose 50% Injectable 12.5 Gram(s) IV Push once  digoxin     Tablet 125 MICROGram(s) Oral every 24 hours  glucagon  Injectable 1 milliGRAM(s) IntraMuscular once  heparin  Infusion. 900 Unit(s)/Hr (9 mL/Hr) IV Continuous <Continuous>  influenza  Vaccine (HIGH DOSE) 0.7 milliLiter(s) IntraMuscular once  insulin lispro (ADMELOG) corrective regimen sliding scale   SubCutaneous three times a day before meals  insulin lispro (ADMELOG) corrective regimen sliding scale   SubCutaneous at bedtime  melatonin 3 milliGRAM(s) Oral at bedtime  metoprolol succinate ER 50 milliGRAM(s) Oral every 24 hours  mupirocin 2% Ointment 1 Application(s) Topical two times a day  pantoprazole  Injectable 40 milliGRAM(s) IV Push every 12 hours  QUEtiapine 25 milliGRAM(s) Oral at bedtime  spironolactone 25 milliGRAM(s) Oral every 24 hours  sucralfate 1 Gram(s) Oral every 6 hours  valsartan 20 milliGRAM(s) Oral two times a day    MEDICATIONS  (PRN):  dextrose Oral Gel 15 Gram(s) Oral once PRN Blood Glucose LESS THAN 70 milliGRAM(s)/deciliter  heparin   Injectable 7000 Unit(s) IV Push every 6 hours PRN For aPTT less than 40  heparin   Injectable 3500 Unit(s) IV Push every 6 hours PRN For aPTT between 40 - 57  ipratropium    for Nebulization 500 MICROGram(s) Nebulizer every 6 hours PRN Shortness of Breath and/or Wheezing      Allergies    No Known Allergies    Intolerances        OBJECTIVE:  Vital Signs Last 24 Hrs  T(C): 36.7 (03 May 2024 08:00), Max: 36.7 (02 May 2024 16:54)  T(F): 98.1 (03 May 2024 08:00), Max: 98.1 (03 May 2024 08:00)  HR: 92 (03 May 2024 08:00) (81 - 112)  BP: 106/62 (03 May 2024 08:00) (95/56 - 128/61)  BP(mean): 77 (03 May 2024 08:00) (67 - 81)  RR: 18 (03 May 2024 08:00) (18 - 18)  SpO2: 97% (03 May 2024 08:00) (97% - 100%)    Parameters below as of 03 May 2024 08:00  Patient On (Oxygen Delivery Method): nasal cannula  O2 Flow (L/min): 2    I&O's Summary    02 May 2024 07:01  -  03 May 2024 07:00  --------------------------------------------------------  IN: 753 mL / OUT: 1125 mL / NET: -372 mL        PHYSICAL EXAM:  General: AOX2-3, NAD, lying in bed, no labored breathing on NC 2L  HEENT: AT/NC, no facial asymmetry  Lungs: poor inspiration, decreased air in bases, no crackles  Heart: irregular   Abdomen: soft, no tenderness, + BS  Extremities:  warm, no edema,  no tenderness, no calf tenderness, no focal deficit     LABS:                        8.2    7.83  )-----------( 287      ( 03 May 2024 07:08 )             26.2     05-03    146<H>  |  111<H>  |  25<H>  ----------------------------<  100<H>  3.7   |  28  |  0.94    Ca    8.6      03 May 2024 07:08  Phos  2.5     05-03  Mg     2.0     05-03    TPro  5.7<L>  /  Alb  2.9<L>  /  TBili  0.3  /  DBili  x   /  AST  20  /  ALT  21  /  AlkPhos  50  05-03    LIVER FUNCTIONS - ( 03 May 2024 07:08 )  Alb: 2.9 g/dL / Pro: 5.7 g/dL / ALK PHOS: 50 U/L / ALT: 21 U/L / AST: 20 U/L / GGT: x           PT/INR - ( 02 May 2024 14:09 )   PT: 13.3 sec;   INR: 1.17          PTT - ( 03 May 2024 07:08 )  PTT:68.0 sec  CAPILLARY BLOOD GLUCOSE      POCT Blood Glucose.: 117 mg/dL (03 May 2024 08:03)  POCT Blood Glucose.: 137 mg/dL (02 May 2024 22:27)  POCT Blood Glucose.: 136 mg/dL (02 May 2024 17:00)  POCT Blood Glucose.: 140 mg/dL (02 May 2024 12:08)    Urinalysis Basic - ( 03 May 2024 07:08 )    Color: x / Appearance: x / SG: x / pH: x  Gluc: 100 mg/dL / Ketone: x  / Bili: x / Urobili: x   Blood: x / Protein: x / Nitrite: x   Leuk Esterase: x / RBC: x / WBC x   Sq Epi: x / Non Sq Epi: x / Bacteria: x        MICRODATA:      RADIOLOGY/OTHER STUDIES:

## 2024-05-03 NOTE — PROVIDER CONTACT NOTE (CHANGE IN STATUS NOTIFICATION) - SITUATION
Pt attempt to get out of bed, oxygen off, desating to 73 and then sustained. pt ripped out IV. RN stayed with pt, got oxygen back on pt stabilized.

## 2024-05-03 NOTE — PROGRESS NOTE ADULT - ASSESSMENT
74M with no known PMH, who presented to [facility] on 04/14/24 after mechanical fall, was found down on floor x 2 days by karen. Labs significant for Trop T 52->54, CK 2k BNP 7k . CTH without acute bleed. CT A/P neg for PE, + ascites. Pt admitted to cardiac tele for newly dx rapid a fib, rhabdo, and suspected ADHF exacerbation. Echo revealing LVEF 20-25%, reduced RVSF. Advanced HF team consulted plan for R/LHC however postponed given new fevers (fever peak 4/18 pm rectal 101). ID consulted, rec ceftriaxone 2 g IV daily for possible aspiration PNA + Linezolid 500 mg BID x 5 days(started 4/19) for wounds at b/l LE,  CT PE on 4/20 with small PE right side. s/p R/LHC on 4/22 where patient became alkalotic and hypercapnic requiring brief BiPAP use. Diuretics and all nephrotoxic medications on hold pending resolution of SILKE. Course complicated by two episodes of melena on 04/23, for which GI was consulted. Patient developed melena when transitioned from heparin gtt to Eliquis for newly diagnosed PE, so was started on pantoprazole 40 mg IV BID, which was transitioned top Drip. Patient transfered to CCU in hypovolemic shock requiring phenylephrine and vasopressin. Patiint was electively intubated and sedated for EGD. On 4/24 EGD showed oozing duodenal ulcer, which was clipped x4 with good hemostasis. Patient was weaned off of sedation following procedure, but with lagging mental sttaus. He was extubated 26, when his mental status allowed, and has had good respiratory status since on NC. weaned off pressors, failed speech and swallow evaluation and had FEES with silent aspiration. Recommended for soft and bite sized with mildly thick liquids.     Acute HFrEF  Afib with RVR  Hemorrhagic shock   Patient weaned off pressors, warm, perfused  Diuresis and GDMT optimization per primary team   HR improved, on Dig and Metoprolol. Consider EP and Dig level monitoring   AC resumed with heparin gtt, monitor Hb  Wean 02 as tolerated     GIB  S/p duodenal ulcer clipping  On heparin gtt, Hb stable  Continue on PPI    Acute blood loss anemia  In setting of GIB  Iron deficit around 900 mg. Can give 300 mg IV daily for 3 days     Hypernatremia  Improved on D5W, patient with good PO intake.   Can discontinue and monitor Na  If worsening hypernatremia, would resume     Acute hypoxia  Subsegmental PE  Multifactorial hypoxia, evaluated by Pulmonary initially.   S/p diuresis, continue on Albuterol prn  Wean 02 as tolerated, IS    DVT ppx: Heparin gtt  Discussed with primary team

## 2024-05-03 NOTE — PROVIDER CONTACT NOTE (CHANGE IN STATUS NOTIFICATION) - RECOMMENDATIONS
Continue to monitor, alert her to any status changed. Continuous pulse ox. Re insert IV after pt calm and stable.

## 2024-05-03 NOTE — PROGRESS NOTE ADULT - NUTRITIONAL ASSESSMENT
This patient has been assessed with a concern for Malnutrition and has been determined to have a diagnosis/diagnoses of Morbid obesity (BMI > 40).    This patient is being managed with:   Diet DASH/TLC-  Sodium & Cholesterol Restricted  Entered: Apr 16 2024 10:45AM  
This patient has been assessed with a concern for Malnutrition and has been determined to have a diagnosis/diagnoses of Morbid obesity (BMI > 40).    This patient is being managed with:   Diet Clear Liquid-  Mildly Thick Liquids (MILDTHICKLIQS)  Entered: Apr 28 2024 11:55AM  
This patient has been assessed with a concern for Malnutrition and has been determined to have a diagnosis/diagnoses of Morbid obesity (BMI > 40).    This patient is being managed with:   Diet DASH/TLC-  Sodium & Cholesterol Restricted  Mildly Thick Liquids (MILDTHICKLIQS)  Entered: May  1 2024  1:43PM    The following pending diet order is being considered for treatment of Morbid obesity (BMI > 40):  Diet Soft and Bite Sized-  Consistent Carbohydrate {No Snacks} (CSTCHO)  Fiber/Residue Restricted (LOWFIBER)  Mildly Thick Liquids (MILDTHICKLIQS)  Low Sodium  Entered: Apr 29 2024  4:12PM  
This patient has been assessed with a concern for Malnutrition and has been determined to have a diagnosis/diagnoses of Morbid obesity (BMI > 40).    This patient is being managed with:   Diet Clear Liquid-  Entered: Apr 23 2024  6:19PM  
This patient has been assessed with a concern for Malnutrition and has been determined to have a diagnosis/diagnoses of Morbid obesity (BMI > 40).    This patient is being managed with:   Diet NPO after Midnight-     NPO Start Date: 23-Apr-2024   NPO Start Time: 23:59  Entered: Apr 23 2024  9:14PM    Diet Clear Liquid-  Entered: Apr 23 2024  6:19PM  
This patient has been assessed with a concern for Malnutrition and has been determined to have a diagnosis/diagnoses of Morbid obesity (BMI > 40).    This patient is being managed with:   Diet NPO after Midnight-     NPO Start Date: 23-Apr-2024   NPO Start Time: 23:59  Entered: Apr 23 2024  9:14PM    Diet Clear Liquid-  Entered: Apr 23 2024  6:19PM  
This patient has been assessed with a concern for Malnutrition and has been determined to have a diagnosis/diagnoses of Morbid obesity (BMI > 40).    This patient is being managed with:   Diet NPO-  Entered: Apr 25 2024  3:12PM  
This patient has been assessed with a concern for Malnutrition and has been determined to have a diagnosis/diagnoses of Morbid obesity (BMI > 40).    This patient is being managed with:   Diet Soft and Bite Sized-  Consistent Carbohydrate {No Snacks} (CSTCHO)  Fiber/Residue Restricted (LOWFIBER)  Mildly Thick Liquids (MILDTHICKLIQS)  Low Sodium  Entered: Apr 29 2024  4:13PM    Diet Soft and Bite Sized-  Consistent Carbohydrate {No Snacks} (CSTCHO)  Fiber/Residue Restricted (LOWFIBER)  Mildly Thick Liquids (MILDTHICKLIQS)  Low Sodium  Entered: Apr 29 2024  4:12PM    The following pending diet order is being considered for treatment of Morbid obesity (BMI > 40):null
This patient has been assessed with a concern for Malnutrition and has been determined to have a diagnosis/diagnoses of Morbid obesity (BMI > 40).    This patient is being managed with:   Diet Soft and Bite Sized-  Consistent Carbohydrate {No Snacks} (CSTCHO)  Fiber/Residue Restricted (LOWFIBER)  Mildly Thick Liquids (MILDTHICKLIQS)  Low Sodium  Entered: Apr 29 2024  4:13PM    Diet Soft and Bite Sized-  Consistent Carbohydrate {No Snacks} (CSTCHO)  Fiber/Residue Restricted (LOWFIBER)  Mildly Thick Liquids (MILDTHICKLIQS)  Low Sodium  Entered: Apr 29 2024  4:12PM    The following pending diet order is being considered for treatment of Morbid obesity (BMI > 40):null
This patient has been assessed with a concern for Malnutrition and has been determined to have a diagnosis/diagnoses of Morbid obesity (BMI > 40).    This patient is being managed with:   Diet Clear Liquid-  Entered: Apr 23 2024  6:19PM  
This patient has been assessed with a concern for Malnutrition and has been determined to have a diagnosis/diagnoses of Morbid obesity (BMI > 40).    This patient is being managed with:   Diet NPO after Midnight-     NPO Start Date: 21-Apr-2024   NPO Start Time: 23:59  Entered: Apr 21 2024  2:15PM    Diet DASH/TLC-  Sodium & Cholesterol Restricted  Entered: Apr 16 2024 10:45AM  
This patient has been assessed with a concern for Malnutrition and has been determined to have a diagnosis/diagnoses of Morbid obesity (BMI > 40).    This patient is being managed with:   Diet NPO-  Entered: Apr 25 2024  3:12PM  
This patient has been assessed with a concern for Malnutrition and has been determined to have a diagnosis/diagnoses of Morbid obesity (BMI > 40).    This patient is being managed with:   Diet NPO-  Entered: Apr 25 2024  3:12PM  
This patient has been assessed with a concern for Malnutrition and has been determined to have a diagnosis/diagnoses of Morbid obesity (BMI > 40).    This patient is being managed with:   Diet Soft and Bite Sized-  Consistent Carbohydrate {No Snacks} (CSTCHO)  Fiber/Residue Restricted (LOWFIBER)  Mildly Thick Liquids (MILDTHICKLIQS)  Low Sodium  Entered: Apr 29 2024  4:13PM    Diet Soft and Bite Sized-  Consistent Carbohydrate {No Snacks} (CSTCHO)  Fiber/Residue Restricted (LOWFIBER)  Mildly Thick Liquids (MILDTHICKLIQS)  Low Sodium  Entered: Apr 29 2024  4:12PM    The following pending diet order is being considered for treatment of Morbid obesity (BMI > 40):null
This patient has been assessed with a concern for Malnutrition and has been determined to have a diagnosis/diagnoses of Morbid obesity (BMI > 40).    This patient is being managed with:   Diet DASH/TLC-  Sodium & Cholesterol Restricted  Entered: Apr 16 2024 10:45AM  
This patient has been assessed with a concern for Malnutrition and has been determined to have a diagnosis/diagnoses of Morbid obesity (BMI > 40).    This patient is being managed with:   Diet DASH/TLC-  Sodium & Cholesterol Restricted  Mildly Thick Liquids (MILDTHICKLIQS)  Entered: May  1 2024  1:43PM    The following pending diet order is being considered for treatment of Morbid obesity (BMI > 40):  Diet Soft and Bite Sized-  Consistent Carbohydrate {No Snacks} (CSTCHO)  Fiber/Residue Restricted (LOWFIBER)  Mildly Thick Liquids (MILDTHICKLIQS)  Low Sodium  Entered: Apr 29 2024  4:12PM  
This patient has been assessed with a concern for Malnutrition and has been determined to have a diagnosis/diagnoses of Morbid obesity (BMI > 40).    This patient is being managed with:   Diet NPO after Midnight-     NPO Start Date: 21-Apr-2024   NPO Start Time: 23:59  Entered: Apr 21 2024  2:15PM    Diet DASH/TLC-  Sodium & Cholesterol Restricted  Entered: Apr 16 2024 10:45AM

## 2024-05-03 NOTE — CHART NOTE - NSCHARTNOTESELECT_GEN_ALL_CORE
Event Note
Nutrition Follow Up/Nutrition Services
Nutrition Services
Brief Infectious Diseases Note/Event Note
EGD
GI
Nutrition Services
Nutrition Services

## 2024-05-03 NOTE — DISCHARGE NOTE NURSING/CASE MANAGEMENT/SOCIAL WORK - PATIENT PORTAL LINK FT
You can access the FollowMyHealth Patient Portal offered by [facility] by registering at the following website: [email or web address]. By joining [employer]’s FollowMyHealth portal, you will also be able to view your health information using other applications (apps) compatible with our system.

## 2024-05-04 VITALS
TEMPERATURE: 98 F | OXYGEN SATURATION: 97 % | SYSTOLIC BLOOD PRESSURE: 113 MMHG | RESPIRATION RATE: 18 BRPM | DIASTOLIC BLOOD PRESSURE: 82 MMHG | HEART RATE: 80 BPM

## 2024-05-04 PROBLEM — K44.9 DIAPHRAGMATIC HERNIA WITHOUT OBSTRUCTION OR GANGRENE: Chronic | Status: ACTIVE | Noted: 2024-04-14

## 2024-05-04 LAB
ALBUMIN SERPL ELPH-MCNC: 2.9 G/DL — LOW (ref 3.3–5)
ALP SERPL-CCNC: 57 U/L — SIGNIFICANT CHANGE UP (ref 40–120)
ALT FLD-CCNC: 21 U/L — SIGNIFICANT CHANGE UP (ref 10–45)
ANION GAP SERPL CALC-SCNC: 6 MMOL/L — SIGNIFICANT CHANGE UP (ref 5–17)
APTT BLD: 45.3 SEC — HIGH (ref 24.5–35.6)
AST SERPL-CCNC: 21 U/L — SIGNIFICANT CHANGE UP (ref 10–40)
BILIRUB SERPL-MCNC: 0.3 MG/DL — SIGNIFICANT CHANGE UP (ref 0.2–1.2)
BUN SERPL-MCNC: 22 MG/DL — SIGNIFICANT CHANGE UP (ref 7–23)
CALCIUM SERPL-MCNC: 8.9 MG/DL — SIGNIFICANT CHANGE UP (ref 8.4–10.5)
CHLORIDE SERPL-SCNC: 109 MMOL/L — HIGH (ref 96–108)
CO2 SERPL-SCNC: 29 MMOL/L — SIGNIFICANT CHANGE UP (ref 22–31)
CREAT SERPL-MCNC: 0.85 MG/DL — SIGNIFICANT CHANGE UP (ref 0.5–1.3)
CULTURE RESULTS: SIGNIFICANT CHANGE UP
DIGOXIN SERPL-MCNC: 0.7 NG/ML — LOW (ref 0.8–2)
EGFR: 91 ML/MIN/1.73M2 — SIGNIFICANT CHANGE UP
GLUCOSE BLDC GLUCOMTR-MCNC: 104 MG/DL — HIGH (ref 70–99)
GLUCOSE BLDC GLUCOMTR-MCNC: 126 MG/DL — HIGH (ref 70–99)
GLUCOSE SERPL-MCNC: 104 MG/DL — HIGH (ref 70–99)
HCT VFR BLD CALC: 26.1 % — LOW (ref 39–50)
HGB BLD-MCNC: 8.2 G/DL — LOW (ref 13–17)
INR BLD: 1.24 — HIGH (ref 0.85–1.18)
MAGNESIUM SERPL-MCNC: 2.2 MG/DL — SIGNIFICANT CHANGE UP (ref 1.6–2.6)
MCHC RBC-ENTMCNC: 29.8 PG — SIGNIFICANT CHANGE UP (ref 27–34)
MCHC RBC-ENTMCNC: 31.4 GM/DL — LOW (ref 32–36)
MCV RBC AUTO: 94.9 FL — SIGNIFICANT CHANGE UP (ref 80–100)
NRBC # BLD: 0 /100 WBCS — SIGNIFICANT CHANGE UP (ref 0–0)
PLATELET # BLD AUTO: 314 K/UL — SIGNIFICANT CHANGE UP (ref 150–400)
POTASSIUM SERPL-MCNC: 4 MMOL/L — SIGNIFICANT CHANGE UP (ref 3.5–5.3)
POTASSIUM SERPL-SCNC: 4 MMOL/L — SIGNIFICANT CHANGE UP (ref 3.5–5.3)
PROT SERPL-MCNC: 5.9 G/DL — LOW (ref 6–8.3)
PROTHROM AB SERPL-ACNC: 14 SEC — HIGH (ref 9.5–13)
RBC # BLD: 2.75 M/UL — LOW (ref 4.2–5.8)
RBC # FLD: 17.4 % — HIGH (ref 10.3–14.5)
SODIUM SERPL-SCNC: 144 MMOL/L — SIGNIFICANT CHANGE UP (ref 135–145)
SPECIMEN SOURCE: SIGNIFICANT CHANGE UP
WBC # BLD: 7.66 K/UL — SIGNIFICANT CHANGE UP (ref 3.8–10.5)
WBC # FLD AUTO: 7.66 K/UL — SIGNIFICANT CHANGE UP (ref 3.8–10.5)

## 2024-05-04 PROCEDURE — 80053 COMPREHEN METABOLIC PANEL: CPT

## 2024-05-04 PROCEDURE — 36430 TRANSFUSION BLD/BLD COMPNT: CPT

## 2024-05-04 PROCEDURE — 86704 HEP B CORE ANTIBODY TOTAL: CPT

## 2024-05-04 PROCEDURE — 83519 RIA NONANTIBODY: CPT

## 2024-05-04 PROCEDURE — 85025 COMPLETE CBC W/AUTO DIFF WBC: CPT

## 2024-05-04 PROCEDURE — 83550 IRON BINDING TEST: CPT

## 2024-05-04 PROCEDURE — 86140 C-REACTIVE PROTEIN: CPT

## 2024-05-04 PROCEDURE — 92612 ENDOSCOPY SWALLOW (FEES) VID: CPT

## 2024-05-04 PROCEDURE — 97110 THERAPEUTIC EXERCISES: CPT

## 2024-05-04 PROCEDURE — 82550 ASSAY OF CK (CPK): CPT

## 2024-05-04 PROCEDURE — 84295 ASSAY OF SERUM SODIUM: CPT

## 2024-05-04 PROCEDURE — 87086 URINE CULTURE/COLONY COUNT: CPT

## 2024-05-04 PROCEDURE — 99285 EMERGENCY DEPT VISIT HI MDM: CPT | Mod: 25

## 2024-05-04 PROCEDURE — 85652 RBC SED RATE AUTOMATED: CPT

## 2024-05-04 PROCEDURE — 82570 ASSAY OF URINE CREATININE: CPT

## 2024-05-04 PROCEDURE — 83540 ASSAY OF IRON: CPT

## 2024-05-04 PROCEDURE — 96375 TX/PRO/DX INJ NEW DRUG ADDON: CPT

## 2024-05-04 PROCEDURE — 82803 BLOOD GASES ANY COMBINATION: CPT

## 2024-05-04 PROCEDURE — 71045 X-RAY EXAM CHEST 1 VIEW: CPT

## 2024-05-04 PROCEDURE — 86705 HEP B CORE ANTIBODY IGM: CPT

## 2024-05-04 PROCEDURE — 86803 HEPATITIS C AB TEST: CPT

## 2024-05-04 PROCEDURE — 87340 HEPATITIS B SURFACE AG IA: CPT

## 2024-05-04 PROCEDURE — 85730 THROMBOPLASTIN TIME PARTIAL: CPT

## 2024-05-04 PROCEDURE — 83935 ASSAY OF URINE OSMOLALITY: CPT

## 2024-05-04 PROCEDURE — 0225U NFCT DS DNA&RNA 21 SARSCOV2: CPT

## 2024-05-04 PROCEDURE — C1887: CPT

## 2024-05-04 PROCEDURE — 86706 HEP B SURFACE ANTIBODY: CPT

## 2024-05-04 PROCEDURE — 80061 LIPID PANEL: CPT

## 2024-05-04 PROCEDURE — 84540 ASSAY OF URINE/UREA-N: CPT

## 2024-05-04 PROCEDURE — C1769: CPT

## 2024-05-04 PROCEDURE — 96374 THER/PROPH/DIAG INJ IV PUSH: CPT

## 2024-05-04 PROCEDURE — 87899 AGENT NOS ASSAY W/OPTIC: CPT

## 2024-05-04 PROCEDURE — 97164 PT RE-EVAL EST PLAN CARE: CPT

## 2024-05-04 PROCEDURE — 93321 DOPPLER ECHO F-UP/LMTD STD: CPT

## 2024-05-04 PROCEDURE — 82330 ASSAY OF CALCIUM: CPT

## 2024-05-04 PROCEDURE — 74174 CTA ABD&PLVS W/CONTRAST: CPT | Mod: MC

## 2024-05-04 PROCEDURE — 92526 ORAL FUNCTION THERAPY: CPT

## 2024-05-04 PROCEDURE — 71275 CT ANGIOGRAPHY CHEST: CPT | Mod: MC

## 2024-05-04 PROCEDURE — 80076 HEPATIC FUNCTION PANEL: CPT

## 2024-05-04 PROCEDURE — C8929: CPT

## 2024-05-04 PROCEDURE — 86832 HLA CLASS I HIGH DEFIN QUAL: CPT

## 2024-05-04 PROCEDURE — 84484 ASSAY OF TROPONIN QUANT: CPT

## 2024-05-04 PROCEDURE — 84300 ASSAY OF URINE SODIUM: CPT

## 2024-05-04 PROCEDURE — 85027 COMPLETE CBC AUTOMATED: CPT

## 2024-05-04 PROCEDURE — 81001 URINALYSIS AUTO W/SCOPE: CPT

## 2024-05-04 PROCEDURE — 86901 BLOOD TYPING SEROLOGIC RH(D): CPT

## 2024-05-04 PROCEDURE — 80048 BASIC METABOLIC PNL TOTAL CA: CPT

## 2024-05-04 PROCEDURE — 93970 EXTREMITY STUDY: CPT

## 2024-05-04 PROCEDURE — 97530 THERAPEUTIC ACTIVITIES: CPT

## 2024-05-04 PROCEDURE — 84133 ASSAY OF URINE POTASSIUM: CPT

## 2024-05-04 PROCEDURE — 87637 SARSCOV2&INF A&B&RSV AMP PRB: CPT

## 2024-05-04 PROCEDURE — 84156 ASSAY OF PROTEIN URINE: CPT

## 2024-05-04 PROCEDURE — 85347 COAGULATION TIME ACTIVATED: CPT

## 2024-05-04 PROCEDURE — 84145 PROCALCITONIN (PCT): CPT

## 2024-05-04 PROCEDURE — 97163 PT EVAL HIGH COMPLEX 45 MIN: CPT

## 2024-05-04 PROCEDURE — 99232 SBSQ HOSP IP/OBS MODERATE 35: CPT

## 2024-05-04 PROCEDURE — 86828 HLA CLASS I&II ANTIBODY QUAL: CPT

## 2024-05-04 PROCEDURE — 86923 COMPATIBILITY TEST ELECTRIC: CPT

## 2024-05-04 PROCEDURE — 70450 CT HEAD/BRAIN W/O DYE: CPT | Mod: MC

## 2024-05-04 PROCEDURE — 86900 BLOOD TYPING SEROLOGIC ABO: CPT

## 2024-05-04 PROCEDURE — 86709 HEPATITIS A IGM ANTIBODY: CPT

## 2024-05-04 PROCEDURE — 84436 ASSAY OF TOTAL THYROXINE: CPT

## 2024-05-04 PROCEDURE — 94660 CPAP INITIATION&MGMT: CPT

## 2024-05-04 PROCEDURE — 82553 CREATINE MB FRACTION: CPT

## 2024-05-04 PROCEDURE — 87389 HIV-1 AG W/HIV-1&-2 AB AG IA: CPT

## 2024-05-04 PROCEDURE — 80162 ASSAY OF DIGOXIN TOTAL: CPT

## 2024-05-04 PROCEDURE — 87507 IADNA-DNA/RNA PROBE TQ 12-25: CPT

## 2024-05-04 PROCEDURE — 36415 COLL VENOUS BLD VENIPUNCTURE: CPT

## 2024-05-04 PROCEDURE — 94640 AIRWAY INHALATION TREATMENT: CPT

## 2024-05-04 PROCEDURE — 83735 ASSAY OF MAGNESIUM: CPT

## 2024-05-04 PROCEDURE — 84132 ASSAY OF SERUM POTASSIUM: CPT

## 2024-05-04 PROCEDURE — 97166 OT EVAL MOD COMPLEX 45 MIN: CPT

## 2024-05-04 PROCEDURE — 85610 PROTHROMBIN TIME: CPT

## 2024-05-04 PROCEDURE — 87449 NOS EACH ORGANISM AG IA: CPT

## 2024-05-04 PROCEDURE — 83036 HEMOGLOBIN GLYCOSYLATED A1C: CPT

## 2024-05-04 PROCEDURE — 83880 ASSAY OF NATRIURETIC PEPTIDE: CPT

## 2024-05-04 PROCEDURE — P9059: CPT

## 2024-05-04 PROCEDURE — 92610 EVALUATE SWALLOWING FUNCTION: CPT

## 2024-05-04 PROCEDURE — 82962 GLUCOSE BLOOD TEST: CPT

## 2024-05-04 PROCEDURE — 76705 ECHO EXAM OF ABDOMEN: CPT

## 2024-05-04 PROCEDURE — 83529 ASAY OF INTERLEUKIN-6 (IL-6): CPT

## 2024-05-04 PROCEDURE — 86850 RBC ANTIBODY SCREEN: CPT

## 2024-05-04 PROCEDURE — 97116 GAIT TRAINING THERAPY: CPT

## 2024-05-04 PROCEDURE — 93005 ELECTROCARDIOGRAM TRACING: CPT

## 2024-05-04 PROCEDURE — P9016: CPT

## 2024-05-04 PROCEDURE — C1894: CPT

## 2024-05-04 PROCEDURE — 99239 HOSP IP/OBS DSCHRG MGMT >30: CPT

## 2024-05-04 PROCEDURE — 87040 BLOOD CULTURE FOR BACTERIA: CPT

## 2024-05-04 PROCEDURE — 94003 VENT MGMT INPAT SUBQ DAY: CPT

## 2024-05-04 PROCEDURE — 92611 MOTION FLUOROSCOPY/SWALLOW: CPT

## 2024-05-04 PROCEDURE — 74230 X-RAY XM SWLNG FUNCJ C+: CPT

## 2024-05-04 PROCEDURE — 82728 ASSAY OF FERRITIN: CPT

## 2024-05-04 PROCEDURE — 84100 ASSAY OF PHOSPHORUS: CPT

## 2024-05-04 PROCEDURE — 83605 ASSAY OF LACTIC ACID: CPT

## 2024-05-04 PROCEDURE — 84443 ASSAY THYROID STIM HORMONE: CPT

## 2024-05-04 RX ORDER — ENOXAPARIN SODIUM 100 MG/ML
80 INJECTION SUBCUTANEOUS
Qty: 12 | Refills: 1
Start: 2024-05-04 | End: 2024-07-02

## 2024-05-04 RX ORDER — SUCRALFATE 1 G
1 TABLET ORAL
Qty: 0 | Refills: 0 | DISCHARGE
Start: 2024-05-04

## 2024-05-04 RX ORDER — LANOLIN ALCOHOL/MO/W.PET/CERES
1 CREAM (GRAM) TOPICAL
Qty: 0 | Refills: 0 | DISCHARGE
Start: 2024-05-04

## 2024-05-04 RX ORDER — WARFARIN SODIUM 2.5 MG/1
1 TABLET ORAL
Qty: 30 | Refills: 1
Start: 2024-05-04 | End: 2024-07-02

## 2024-05-04 RX ORDER — PANTOPRAZOLE SODIUM 20 MG/1
40 TABLET, DELAYED RELEASE ORAL
Qty: 0 | Refills: 0 | DISCHARGE
Start: 2024-05-04

## 2024-05-04 RX ORDER — DIGOXIN 250 MCG
1 TABLET ORAL
Qty: 0 | Refills: 0 | DISCHARGE
Start: 2024-05-04

## 2024-05-04 RX ORDER — VALSARTAN 80 MG/1
0.5 TABLET ORAL
Qty: 30 | Refills: 1
Start: 2024-05-04 | End: 2024-07-02

## 2024-05-04 RX ORDER — INSULIN LISPRO 100/ML
0 VIAL (ML) SUBCUTANEOUS
Qty: 0 | Refills: 0 | DISCHARGE
Start: 2024-05-04

## 2024-05-04 RX ORDER — MUPIROCIN 20 MG/G
1 OINTMENT TOPICAL
Qty: 0 | Refills: 0 | DISCHARGE
Start: 2024-05-04

## 2024-05-04 RX ORDER — QUETIAPINE FUMARATE 200 MG/1
1 TABLET, FILM COATED ORAL
Qty: 0 | Refills: 0 | DISCHARGE
Start: 2024-05-04

## 2024-05-04 RX ORDER — SPIRONOLACTONE 25 MG/1
1 TABLET, FILM COATED ORAL
Qty: 0 | Refills: 0 | DISCHARGE
Start: 2024-05-04

## 2024-05-04 RX ORDER — METOPROLOL TARTRATE 50 MG
1 TABLET ORAL
Qty: 0 | Refills: 0 | DISCHARGE
Start: 2024-05-04

## 2024-05-04 RX ORDER — IRON SUCROSE 20 MG/ML
15 INJECTION, SOLUTION INTRAVENOUS
Qty: 0 | Refills: 0 | DISCHARGE
Start: 2024-05-04

## 2024-05-04 RX ORDER — CHLORHEXIDINE GLUCONATE 213 G/1000ML
1 SOLUTION TOPICAL
Qty: 0 | Refills: 0 | DISCHARGE
Start: 2024-05-04

## 2024-05-04 RX ORDER — ASPIRIN/CALCIUM CARB/MAGNESIUM 324 MG
1 TABLET ORAL
Refills: 0 | DISCHARGE

## 2024-05-04 RX ORDER — IPRATROPIUM BROMIDE 0.2 MG/ML
2.5 SOLUTION, NON-ORAL INHALATION
Qty: 0 | Refills: 0 | DISCHARGE
Start: 2024-05-04

## 2024-05-04 RX ADMIN — VALSARTAN 20 MILLIGRAM(S): 80 TABLET ORAL at 06:25

## 2024-05-04 RX ADMIN — Medication 125 MICROGRAM(S): at 13:05

## 2024-05-04 RX ADMIN — Medication 1 GRAM(S): at 06:24

## 2024-05-04 RX ADMIN — Medication 500 MICROGRAM(S): at 06:48

## 2024-05-04 RX ADMIN — MUPIROCIN 1 APPLICATION(S): 20 OINTMENT TOPICAL at 06:37

## 2024-05-04 RX ADMIN — PANTOPRAZOLE SODIUM 40 MILLIGRAM(S): 20 TABLET, DELAYED RELEASE ORAL at 06:24

## 2024-05-04 RX ADMIN — ENOXAPARIN SODIUM 80 MILLIGRAM(S): 100 INJECTION SUBCUTANEOUS at 06:24

## 2024-05-04 RX ADMIN — Medication 50 MILLIGRAM(S): at 06:25

## 2024-05-04 RX ADMIN — Medication 1 GRAM(S): at 13:05

## 2024-05-04 RX ADMIN — SPIRONOLACTONE 25 MILLIGRAM(S): 25 TABLET, FILM COATED ORAL at 13:05

## 2024-05-04 NOTE — PROGRESS NOTE ADULT - ASSESSMENT
74M with no known PMH, who presented to [facility] on 04/14/24 after mechanical fall, was found down on floor x 2 days by karen. Labs significant for Trop T 52->54, CK 2k BNP 7k . CTH without acute bleed. CT A/P neg for PE, + ascites. Pt admitted to cardiac tele for newly dx rapid a fib, rhabdo, and suspected ADHF exacerbation. Echo revealing LVEF 20-25%, reduced RVSF. Advanced HF team consulted plan for R/LHC however postponed given new fevers (fever peak 4/18 pm rectal 101). ID consulted, rec ceftriaxone 2 g IV daily for possible aspiration PNA + Linezolid 500 mg BID x 5 days(started 4/19) for wounds at b/l LE,  CT PE on 4/20 with small PE right side. s/p R/LHC on 4/22 where patient became alkalotic and hypercapnic requiring brief BiPAP use. Diuretics and all nephrotoxic medications on hold pending resolution of SILKE. Course complicated by two episodes of melena on 04/23, for which GI was consulted. Patient developed melena when transitioned from heparin gtt to Eliquis for newly diagnosed PE, so was started on pantoprazole 40 mg IV BID, which was transitioned top Drip. Patient transfered to CCU in hypovolemic shock requiring phenylephrine and vasopressin. Patiint was electively intubated and sedated for EGD. On 4/24 EGD showed oozing duodenal ulcer, which was clipped x4 with good hemostasis. Patient was weaned off of sedation following procedure, but with lagging mental sttaus. He was extubated 26, when his mental status allowed, and has had good respiratory status since on NC. weaned off pressors, failed speech and swallow evaluation and had FEES with silent aspiration. Recommended for soft and bite sized with mildly thick liquids.     #Acute HFrEF (resolved)  #Afib with RVR  - GDMT optimization per primary team   -Continue Dig and Metoprolol.    -AC  warfarin resumed with lovenox; Will continue to monitor INR   -Will continue to monitor Hgb (stable)       #GIB  -Pt is s/p duodenal ulcer clipping  -Continue on PPI  -Will continue to monitor H/H     #Acute blood loss anemia  -Likely secondary to GIB  -Pt s/p 300 mg IV daily X  3 days     #Hypernatremia (resolved)  -Will continue to  monitor Na    #Acute hypoxia  -Continue Albuterol prn    #Subsegmental PE dx April 2024  -AC  warfarin resumed with lovenox; Will continue to monitor INR     #DVT ppx  -Pt is on systemic AC  warfarin and  lovenox    #DISPO  -Pt is for discharge today as per primary team     40 minutes spent on total encounter. The necessity of the time spent during the encounter on this date of service was due to:    Review of hospital course, labs, vitals, radiology and medical records.  Direct patient encounter.   Discussed plan of care with primary team   Documenting the encounter. 74M with no known PMH, who presented to [facility] on 04/14/24 after mechanical fall, was found down on floor x 2 days by karen. Labs significant for Trop T 52->54, CK 2k BNP 7k . CTH without acute bleed. CT A/P neg for PE, + ascites. Pt admitted to cardiac tele for newly dx rapid a fib, rhabdo, and suspected ADHF exacerbation. Echo revealing LVEF 20-25%, reduced RVSF. Advanced HF team consulted plan for R/LHC however postponed given new fevers (fever peak 4/18 pm rectal 101). ID consulted, rec ceftriaxone 2 g IV daily for possible aspiration PNA + Linezolid 500 mg BID x 5 days(started 4/19) for wounds at b/l LE,  CT PE on 4/20 with small PE right side. s/p R/LHC on 4/22 where patient became alkalotic and hypercapnic requiring brief BiPAP use. Diuretics and all nephrotoxic medications on hold pending resolution of SILKE. Course complicated by two episodes of melena on 04/23, for which GI was consulted. Patient developed melena when transitioned from heparin gtt to Eliquis for newly diagnosed PE, so was started on pantoprazole 40 mg IV BID, which was transitioned top Drip. Patient transfered to CCU in hypovolemic shock requiring phenylephrine and vasopressin. Patiint was electively intubated and sedated for EGD. On 4/24 EGD showed oozing duodenal ulcer, which was clipped x4 with good hemostasis. Patient was weaned off of sedation following procedure, but with lagging mental sttaus. He was extubated 26, when his mental status allowed, and has had good respiratory status since on NC. weaned off pressors, failed speech and swallow evaluation and had FEES with silent aspiration. Recommended for soft and bite sized with mildly thick liquids.     #Acute HFrEF (resolved)  #Afib with RVR  - GDMT optimization per primary team   -Continue Dig and Metoprolol.    -AC  warfarin resumed with lovenox; Will continue to monitor INR   -Will continue to monitor Hgb (stable)       #GIB  -Pt is s/p duodenal ulcer clipping  -Continue on PPI  -Will continue to monitor H/H     #Acute blood loss anemia  -Likely secondary to GIB  -Pt s/p 300 mg IV daily X  3 days     #Hypernatremia (resolved)  -Will continue to  monitor Na    #Acute hypoxia (resolved)  -Continue Albuterol prn    #Subsegmental PE dx April 2024  -AC  warfarin resumed with lovenox; Will continue to monitor INR     #DVT ppx  -Pt is on systemic AC  warfarin and  lovenox    #DISPO  -Pt is for discharge today as per primary team     40 minutes spent on total encounter. The necessity of the time spent during the encounter on this date of service was due to:    Review of hospital course, labs, vitals, radiology and medical records.  Direct patient encounter.   Discussed plan of care with primary team   Documenting the encounter.

## 2024-05-04 NOTE — PROGRESS NOTE ADULT - SUBJECTIVE AND OBJECTIVE BOX
Patient was seen and examined at bedside. Case discuss with the primary  team. Pt w/o any complaints this morning.     OBJECTIVE:  Vital Signs Last 24 Hrs  T(C): 36.9 (04 May 2024 12:50), Max: 36.9 (03 May 2024 16:14)  T(F): 98.4 (04 May 2024 12:50), Max: 98.5 (04 May 2024 09:24)  HR: 80 (04 May 2024 12:50) (80 - 95)  BP: 113/82 (04 May 2024 12:50) (111/61 - 136/76)  BP(mean): 95 (04 May 2024 12:50) (78 - 100)  RR: 18 (04 May 2024 12:50) (18 - 18)  SpO2: 97% (04 May 2024 12:50) (94% - 100%)    Parameters below as of 04 May 2024 12:50  Patient On (Oxygen Delivery Method): nasal cannula  O2 Flow (L/min): 2      PHYSICAL EXAM:  Gen: NAD laying in bed; AAOx2-3   CV: RRR, +S1/S2, no mumur  Pulm: CTA b/l no wheezing or crackles   Abd: soft, NTND + BS no rebound or guarding   Extremities:  warm, no edema,  no tenderness, no calf tenderness, no focal deficit     LABS:                        8.2    7.66  )-----------( 314      ( 04 May 2024 05:30 )             26.1     05-04    144  |  109<H>  |  22  ----------------------------<  104<H>  4.0   |  29  |  0.85    Ca    8.9      04 May 2024 05:30  Phos  2.5     05-03  Mg     2.2     05-04    TPro  5.9<L>  /  Alb  2.9<L>  /  TBili  0.3  /  DBili  x   /  AST  21  /  ALT  21  /  AlkPhos  57  05-04    LIVER FUNCTIONS - ( 04 May 2024 05:30 )  Alb: 2.9 g/dL / Pro: 5.9 g/dL / ALK PHOS: 57 U/L / ALT: 21 U/L / AST: 21 U/L / GGT: x            PT: 14.0 sec;   INR: 1.24     PTT:45.3 sec    CAPILLARY BLOOD GLUCOSE  POCT Blood Glucose.: 126 mg/dL (04 May 2024 12:07)  POCT Blood Glucose.: 104 mg/dL (04 May 2024 07:48)  POCT Blood Glucose.: 156 mg/dL (03 May 2024 21:47)  POCT Blood Glucose.: 168 mg/dL (03 May 2024 16:54)        chlorhexidine 2% Cloths 1 Application(s) Topical <User Schedule>  dextrose 10% Bolus 125 milliLiter(s) IV Bolus once  dextrose 5%. 1000 milliLiter(s) IV Continuous <Continuous>  dextrose 5%. 1000 milliLiter(s) IV Continuous <Continuous>  dextrose 50% Injectable 25 Gram(s) IV Push once  dextrose 50% Injectable 12.5 Gram(s) IV Push once  dextrose Oral Gel 15 Gram(s) Oral once PRN  digoxin     Tablet 125 MICROGram(s) Oral every 24 hours  enoxaparin Injectable 80 milliGRAM(s) SubCutaneous every 12 hours  glucagon  Injectable 1 milliGRAM(s) IntraMuscular once  influenza  Vaccine (HIGH DOSE) 0.7 milliLiter(s) IntraMuscular once  insulin lispro (ADMELOG) corrective regimen sliding scale   SubCutaneous three times a day before meals  insulin lispro (ADMELOG) corrective regimen sliding scale   SubCutaneous at bedtime  ipratropium    for Nebulization 500 MICROGram(s) Nebulizer every 6 hours PRN  iron sucrose IVPB 300 milliGRAM(s) IV Intermittent <User Schedule>  melatonin 3 milliGRAM(s) Oral at bedtime  metoprolol succinate ER 50 milliGRAM(s) Oral every 24 hours  mupirocin 2% Ointment 1 Application(s) Topical two times a day  pantoprazole  Injectable 40 milliGRAM(s) IV Push every 12 hours  QUEtiapine 25 milliGRAM(s) Oral at bedtime  spironolactone 25 milliGRAM(s) Oral every 24 hours  sucralfate 1 Gram(s) Oral every 6 hours  valsartan 20 milliGRAM(s) Oral two times a day  warfarin 5 milliGRAM(s) Oral at bedtime

## 2024-05-04 NOTE — PROGRESS NOTE ADULT - PROVIDER SPECIALTY LIST ADULT
CCU
Cardiology
Gastroenterology
Heart Failure
Hospitalist
Infectious Disease
Internal Medicine
CCU
Cardiology
Heart Failure
Hospitalist
Infectious Disease
Internal Medicine
Internal Medicine
CCU
Cardiology
Heart Failure
Hospitalist
Hospitalist
CCU
CCU
Internal Medicine
Internal Medicine
CCU
Cardiology

## 2024-05-09 DIAGNOSIS — J96.02 ACUTE RESPIRATORY FAILURE WITH HYPERCAPNIA: ICD-10-CM

## 2024-05-09 DIAGNOSIS — K26.4 CHRONIC OR UNSPECIFIED DUODENAL ULCER WITH HEMORRHAGE: ICD-10-CM

## 2024-05-09 DIAGNOSIS — J96.01 ACUTE RESPIRATORY FAILURE WITH HYPOXIA: ICD-10-CM

## 2024-05-09 DIAGNOSIS — E86.1 HYPOVOLEMIA: ICD-10-CM

## 2024-05-09 DIAGNOSIS — E66.01 MORBID (SEVERE) OBESITY DUE TO EXCESS CALORIES: ICD-10-CM

## 2024-05-09 DIAGNOSIS — L97.919 NON-PRESSURE CHRONIC ULCER OF UNSPECIFIED PART OF RIGHT LOWER LEG WITH UNSPECIFIED SEVERITY: ICD-10-CM

## 2024-05-09 DIAGNOSIS — R33.9 RETENTION OF URINE, UNSPECIFIED: ICD-10-CM

## 2024-05-09 DIAGNOSIS — E87.4 MIXED DISORDER OF ACID-BASE BALANCE: ICD-10-CM
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
## 2024-05-09 DIAGNOSIS — K59.00 CONSTIPATION, UNSPECIFIED: ICD-10-CM

## 2024-05-09 DIAGNOSIS — D62 ACUTE POSTHEMORRHAGIC ANEMIA: ICD-10-CM

## 2024-05-09 DIAGNOSIS — I26.93 SINGLE SUBSEGMENTAL PULMONARY EMBOLISM WITHOUT ACUTE COR PULMONALE: ICD-10-CM

## 2024-05-09 DIAGNOSIS — I50.23 ACUTE ON CHRONIC SYSTOLIC (CONGESTIVE) HEART FAILURE: ICD-10-CM

## 2024-05-09 DIAGNOSIS — I11.0 HYPERTENSIVE HEART DISEASE WITH HEART FAILURE: ICD-10-CM

## 2024-05-09 DIAGNOSIS — R57.8 OTHER SHOCK: ICD-10-CM

## 2024-05-09 DIAGNOSIS — N17.9 ACUTE KIDNEY FAILURE, UNSPECIFIED: ICD-10-CM

## 2024-05-09 DIAGNOSIS — I48.0 PAROXYSMAL ATRIAL FIBRILLATION: ICD-10-CM

## 2024-05-09 DIAGNOSIS — I71.21 ANEURYSM OF THE ASCENDING AORTA, WITHOUT RUPTURE: ICD-10-CM

## 2024-05-09 DIAGNOSIS — I71.20 THORACIC AORTIC ANEURYSM, WITHOUT RUPTURE, UNSPECIFIED: ICD-10-CM

## 2024-05-09 DIAGNOSIS — J69.0 PNEUMONITIS DUE TO INHALATION OF FOOD AND VOMIT: ICD-10-CM
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
## 2024-05-09 DIAGNOSIS — E87.0 HYPEROSMOLALITY AND HYPERNATREMIA: ICD-10-CM

## 2024-05-09 DIAGNOSIS — L97.929 NON-PRESSURE CHRONIC ULCER OF UNSPECIFIED PART OF LEFT LOWER LEG WITH UNSPECIFIED SEVERITY: ICD-10-CM

## 2024-05-09 DIAGNOSIS — R00.0 TACHYCARDIA, UNSPECIFIED: ICD-10-CM

## 2024-05-09 DIAGNOSIS — T79.6XXA TRAUMATIC ISCHEMIA OF MUSCLE, INITIAL ENCOUNTER: ICD-10-CM

## 2024-05-09 DIAGNOSIS — I73.9 PERIPHERAL VASCULAR DISEASE, UNSPECIFIED: ICD-10-CM

## 2024-05-09 DIAGNOSIS — I87.8 OTHER SPECIFIED DISORDERS OF VEINS: ICD-10-CM

## 2024-05-09 DIAGNOSIS — R13.10 DYSPHAGIA, UNSPECIFIED: ICD-10-CM

## 2024-05-09 DIAGNOSIS — R41.0 DISORIENTATION, UNSPECIFIED: ICD-10-CM

## 2024-05-09 DIAGNOSIS — J98.11 ATELECTASIS: ICD-10-CM

## 2024-05-09 DIAGNOSIS — Z79.82 LONG TERM (CURRENT) USE OF ASPIRIN: ICD-10-CM

## 2024-05-09 DIAGNOSIS — R74.01 ELEVATION OF LEVELS OF LIVER TRANSAMINASE LEVELS: ICD-10-CM

## 2024-05-09 DIAGNOSIS — Z80.7 FAMILY HISTORY OF OTHER MALIGNANT NEOPLASMS OF LYMPHOID, HEMATOPOIETIC AND RELATED TISSUES: ICD-10-CM

## 2024-05-12 ENCOUNTER — NON-APPOINTMENT (OUTPATIENT)
Age: 74
End: 2024-05-12

## 2024-05-13 ENCOUNTER — NON-APPOINTMENT (OUTPATIENT)
Age: 74
End: 2024-05-13

## 2024-05-13 ENCOUNTER — APPOINTMENT (OUTPATIENT)
Dept: HEART AND VASCULAR | Facility: CLINIC | Age: 74
End: 2024-05-13

## 2024-05-15 ENCOUNTER — APPOINTMENT (OUTPATIENT)
Dept: HEART AND VASCULAR | Facility: CLINIC | Age: 74
End: 2024-05-15

## 2024-05-16 NOTE — HISTORY OF PRESENT ILLNESS
[FreeTextEntry1] :   Cards- Dr. Pizano HF- Dr. Hernandez [facility] 4/14-5/4/2024  75 YO M with ACC/AHA NICM (LV 5.1 cm, LVEF 30%) likely in the setting of AF w/ RVR. No known significant past medical hx or recent medical contact. Presents to HF Clinic today for further management.   Brought to [facility] where he was found to have rapid AF and severe LV dysfunction with elevated cardiac troponins, rhabdomyolyis, hypernatremia,  and segmental PE. Transferred to [facility] for further management 4/14-5/4/2024. He underwent R/LHC which revealed non-obstructive CAD and normal hemodynamics. He then developed hemorrhagic shock with GI bleeding with EGD 4/24 showing a bleeding duodenal ulcers which was treated. Was able to tolerate small doses of GDMT prior to discharge recommended for low dose ARNI and SGLT2i prior to discharge but was discharge on valsartan 20 mg BID, Toprol 50 mg daily, and spironolactone 25 mg daily. Without diuretic requirement at a standing weight of__ Discharged to [facility].      EKG: atrial fibrillation TTE: LV 5.1 cm, LVEF 30% with global hypokinesis, mild-moderate concentric LVH, moderate RV dysfunction  [facility] 4/22/2024: RA 5 , RV 38/16(22), PCWP 5), PASAT 73% (on 40% on FIO on BIPAP 10/5), CO/CI (F) 4.3/2.2 [facility] 4/22/2024: nonobstructive CAD. LM normal. LAD luminal irregularities. 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 RCA: mRCA 40% focal stenosis (FFR negative 0.93) large dominant vessel. RPDA minor irregularities.  Labs at discharge 5/4/2024: Na 144, K 4.0, Cl 109, CO2 29, Cr 0.85, proBNP (on admission 4/14) [id number]

## 2024-05-16 NOTE — ASSESSMENT
[FreeTextEntry1] : # Acute systolic heart failure - Etiology: non-ischemic, possibly tachycardia mediated or stress related in setting of fall with rhabdo on admission. defer ischemic evaluation as would be unable to tolerate antiplatelet therapy if found to have CAD but will consider as outpatient if LVEF remains reduced - GDMT: current regimen valsartan 20 mg BID, metoprolol succinate 50 mg daily, spironolactone 25 mg daily.  - Diuretics: euvolemic/dry off diuretics  # Atrial fibrillation  - Continue BB and digoxin  - A/c per primary team  - Would benefit from rhythm control in the future pending recovery after recent GI bleed and ability to tolerate uninterrupted anticoagulation.  -follow up w/ EP as an outpatient   #GIB -s/p EGD on 4/24/2024 revealing ulcer at duodenal bulb s/p endoclips and electrocautery  - c/w PPI as per GI`  #non obstructive CAD - mRCA 40% focal stenosis`

## 2024-05-29 ENCOUNTER — TRANSCRIPTION ENCOUNTER (OUTPATIENT)
Age: 74
End: 2024-05-29

## 2024-06-21 ENCOUNTER — APPOINTMENT (OUTPATIENT)
Dept: GASTROENTEROLOGY | Facility: CLINIC | Age: 74
End: 2024-06-21

## 2024-06-25 ENCOUNTER — INPATIENT (INPATIENT)
Facility: HOSPITAL | Age: 74
LOS: 2 days | Discharge: NURSING, REHAB OR LONG-TERM CARE | DRG: 641 | End: 2024-06-28
Attending: STUDENT IN AN ORGANIZED HEALTH CARE EDUCATION/TRAINING PROGRAM | Admitting: INTERNAL MEDICINE
Payer: MEDICARE

## 2024-06-25 VITALS
DIASTOLIC BLOOD PRESSURE: 73 MMHG | TEMPERATURE: 98 F | OXYGEN SATURATION: 93 % | WEIGHT: 190.04 LBS | RESPIRATION RATE: 17 BRPM | SYSTOLIC BLOOD PRESSURE: 117 MMHG | HEART RATE: 78 BPM

## 2024-06-25 DIAGNOSIS — K44.9 DIAPHRAGMATIC HERNIA WITHOUT OBSTRUCTION OR GANGRENE: ICD-10-CM

## 2024-06-25 DIAGNOSIS — Z29.9 ENCOUNTER FOR PROPHYLACTIC MEASURES, UNSPECIFIED: ICD-10-CM

## 2024-06-25 DIAGNOSIS — Z87.19 PERSONAL HISTORY OF OTHER DISEASES OF THE DIGESTIVE SYSTEM: ICD-10-CM

## 2024-06-25 DIAGNOSIS — I26.99 OTHER PULMONARY EMBOLISM WITHOUT ACUTE COR PULMONALE: ICD-10-CM

## 2024-06-25 DIAGNOSIS — I48.20 CHRONIC ATRIAL FIBRILLATION, UNSPECIFIED: ICD-10-CM

## 2024-06-25 DIAGNOSIS — R62.7 ADULT FAILURE TO THRIVE: ICD-10-CM

## 2024-06-25 DIAGNOSIS — I50.22 CHRONIC SYSTOLIC (CONGESTIVE) HEART FAILURE: ICD-10-CM

## 2024-06-25 LAB
ALBUMIN SERPL ELPH-MCNC: 3.2 G/DL — LOW (ref 3.4–5)
ALP SERPL-CCNC: 51 U/L — SIGNIFICANT CHANGE UP (ref 40–120)
ALT FLD-CCNC: SIGNIFICANT CHANGE UP U/L (ref 12–42)
ANION GAP SERPL CALC-SCNC: 10 MMOL/L — SIGNIFICANT CHANGE UP (ref 9–16)
APTT BLD: 43.6 SEC — HIGH (ref 24.5–35.6)
AST SERPL-CCNC: SIGNIFICANT CHANGE UP U/L (ref 15–37)
BASOPHILS # BLD AUTO: 0.03 K/UL — SIGNIFICANT CHANGE UP (ref 0–0.2)
BASOPHILS NFR BLD AUTO: 0.5 % — SIGNIFICANT CHANGE UP (ref 0–2)
BILIRUB SERPL-MCNC: 0.5 MG/DL — SIGNIFICANT CHANGE UP (ref 0.2–1.2)
BUN SERPL-MCNC: 23 MG/DL — SIGNIFICANT CHANGE UP (ref 7–23)
CALCIUM SERPL-MCNC: 9.6 MG/DL — SIGNIFICANT CHANGE UP (ref 8.5–10.5)
CHLORIDE SERPL-SCNC: 109 MMOL/L — HIGH (ref 96–108)
CO2 SERPL-SCNC: 18 MMOL/L — LOW (ref 22–31)
CREAT SERPL-MCNC: 0.91 MG/DL — SIGNIFICANT CHANGE UP (ref 0.5–1.3)
EGFR: 88 ML/MIN/1.73M2 — SIGNIFICANT CHANGE UP
EOSINOPHIL # BLD AUTO: 0.04 K/UL — SIGNIFICANT CHANGE UP (ref 0–0.5)
EOSINOPHIL NFR BLD AUTO: 0.6 % — SIGNIFICANT CHANGE UP (ref 0–6)
GLUCOSE SERPL-MCNC: 106 MG/DL — HIGH (ref 70–99)
HCT VFR BLD CALC: 42.9 % — SIGNIFICANT CHANGE UP (ref 39–50)
HGB BLD-MCNC: 13.7 G/DL — SIGNIFICANT CHANGE UP (ref 13–17)
IMM GRANULOCYTES NFR BLD AUTO: 0.5 % — SIGNIFICANT CHANGE UP (ref 0–0.9)
INR BLD: 2.38 — HIGH (ref 0.85–1.18)
LG PLATELETS BLD QL AUTO: SLIGHT — SIGNIFICANT CHANGE UP
LYMPHOCYTES # BLD AUTO: 1.5 K/UL — SIGNIFICANT CHANGE UP (ref 1–3.3)
LYMPHOCYTES # BLD AUTO: 22.6 % — SIGNIFICANT CHANGE UP (ref 13–44)
MACROCYTES BLD QL: SLIGHT — SIGNIFICANT CHANGE UP
MANUAL SMEAR VERIFICATION: SIGNIFICANT CHANGE UP
MCHC RBC-ENTMCNC: 30.4 PG — SIGNIFICANT CHANGE UP (ref 27–34)
MCHC RBC-ENTMCNC: 31.9 GM/DL — LOW (ref 32–36)
MCV RBC AUTO: 95.1 FL — SIGNIFICANT CHANGE UP (ref 80–100)
MONOCYTES # BLD AUTO: 0.54 K/UL — SIGNIFICANT CHANGE UP (ref 0–0.9)
MONOCYTES NFR BLD AUTO: 8.1 % — SIGNIFICANT CHANGE UP (ref 2–14)
NEUTROPHILS # BLD AUTO: 4.51 K/UL — SIGNIFICANT CHANGE UP (ref 1.8–7.4)
NEUTROPHILS NFR BLD AUTO: 67.7 % — SIGNIFICANT CHANGE UP (ref 43–77)
NRBC # BLD: 0 /100 WBCS — SIGNIFICANT CHANGE UP (ref 0–0)
PLAT MORPH BLD: ABNORMAL
PLATELET # BLD AUTO: 229 K/UL — SIGNIFICANT CHANGE UP (ref 150–400)
POTASSIUM SERPL-MCNC: 5 MMOL/L — SIGNIFICANT CHANGE UP (ref 3.5–5.3)
POTASSIUM SERPL-SCNC: 5 MMOL/L — SIGNIFICANT CHANGE UP (ref 3.5–5.3)
PROT SERPL-MCNC: 8 G/DL — SIGNIFICANT CHANGE UP (ref 6.4–8.2)
PROTHROM AB SERPL-ACNC: 26.2 SEC — HIGH (ref 9.5–13)
RBC # BLD: 4.51 M/UL — SIGNIFICANT CHANGE UP (ref 4.2–5.8)
RBC # FLD: 17.4 % — HIGH (ref 10.3–14.5)
RBC BLD AUTO: ABNORMAL
SODIUM SERPL-SCNC: 137 MMOL/L — SIGNIFICANT CHANGE UP (ref 132–145)
WBC # BLD: 6.65 K/UL — SIGNIFICANT CHANGE UP (ref 3.8–10.5)
WBC # FLD AUTO: 6.65 K/UL — SIGNIFICANT CHANGE UP (ref 3.8–10.5)

## 2024-06-25 PROCEDURE — 99223 1ST HOSP IP/OBS HIGH 75: CPT

## 2024-06-25 PROCEDURE — 93010 ELECTROCARDIOGRAM REPORT: CPT

## 2024-06-25 PROCEDURE — 99285 EMERGENCY DEPT VISIT HI MDM: CPT

## 2024-06-25 RX ORDER — WARFARIN SODIUM 4 MG/1
5 TABLET ORAL ONCE
Refills: 0 | Status: COMPLETED | OUTPATIENT
Start: 2024-06-25 | End: 2024-06-25

## 2024-06-25 RX ORDER — DIGOXIN 125 MCG
125 TABLET ORAL DAILY
Refills: 0 | Status: DISCONTINUED | OUTPATIENT
Start: 2024-06-25 | End: 2024-06-28

## 2024-06-25 RX ORDER — ACETAMINOPHEN 325 MG
650 TABLET ORAL EVERY 6 HOURS
Refills: 0 | Status: DISCONTINUED | OUTPATIENT
Start: 2024-06-25 | End: 2024-06-28

## 2024-06-25 RX ORDER — METOPROLOL TARTRATE 50 MG
50 TABLET ORAL DAILY
Refills: 0 | Status: DISCONTINUED | OUTPATIENT
Start: 2024-06-25 | End: 2024-06-28

## 2024-06-25 RX ORDER — SUCRALFATE 1 G
1 TABLET ORAL EVERY 6 HOURS
Refills: 0 | Status: DISCONTINUED | OUTPATIENT
Start: 2024-06-26 | End: 2024-06-28

## 2024-06-25 RX ORDER — SPIRONOLACTONE 25 MG/1
25 TABLET ORAL EVERY 24 HOURS
Refills: 0 | Status: DISCONTINUED | OUTPATIENT
Start: 2024-06-26 | End: 2024-06-28

## 2024-06-25 RX ORDER — VALSARTAN 320 MG/1
40 TABLET ORAL ONCE
Refills: 0 | Status: COMPLETED | OUTPATIENT
Start: 2024-06-25 | End: 2024-06-25

## 2024-06-25 RX ORDER — VALSARTAN 320 MG/1
40 TABLET ORAL EVERY 24 HOURS
Refills: 0 | Status: DISCONTINUED | OUTPATIENT
Start: 2024-06-26 | End: 2024-06-28

## 2024-06-25 RX ADMIN — Medication 25 MILLIGRAM(S): at 20:38

## 2024-06-25 RX ADMIN — WARFARIN SODIUM 5 MILLIGRAM(S): 4 TABLET ORAL at 22:12

## 2024-06-25 RX ADMIN — VALSARTAN 40 MILLIGRAM(S): 320 TABLET ORAL at 20:39

## 2024-06-26 LAB
ALBUMIN SERPL ELPH-MCNC: 3.6 G/DL — SIGNIFICANT CHANGE UP (ref 3.3–5)
ALP SERPL-CCNC: 48 U/L — SIGNIFICANT CHANGE UP (ref 40–120)
ALT FLD-CCNC: 15 U/L — SIGNIFICANT CHANGE UP (ref 10–45)
ANION GAP SERPL CALC-SCNC: 12 MMOL/L — SIGNIFICANT CHANGE UP (ref 5–17)
APTT BLD: 24.6 SEC — SIGNIFICANT CHANGE UP (ref 24.5–35.6)
AST SERPL-CCNC: 25 U/L — SIGNIFICANT CHANGE UP (ref 10–40)
BILIRUB SERPL-MCNC: 0.4 MG/DL — SIGNIFICANT CHANGE UP (ref 0.2–1.2)
BLD GP AB SCN SERPL QL: NEGATIVE — SIGNIFICANT CHANGE UP
BUN SERPL-MCNC: 20 MG/DL — SIGNIFICANT CHANGE UP (ref 7–23)
CALCIUM SERPL-MCNC: 9.4 MG/DL — SIGNIFICANT CHANGE UP (ref 8.4–10.5)
CHLORIDE SERPL-SCNC: 110 MMOL/L — HIGH (ref 96–108)
CO2 SERPL-SCNC: 20 MMOL/L — LOW (ref 22–31)
CREAT SERPL-MCNC: 0.93 MG/DL — SIGNIFICANT CHANGE UP (ref 0.5–1.3)
DIGOXIN SERPL-MCNC: 0.7 NG/ML — LOW (ref 0.8–2)
EGFR: 86 ML/MIN/1.73M2 — SIGNIFICANT CHANGE UP
GLUCOSE SERPL-MCNC: 81 MG/DL — SIGNIFICANT CHANGE UP (ref 70–99)
INR BLD: 2.02 — HIGH (ref 0.85–1.18)
MAGNESIUM SERPL-MCNC: 2 MG/DL — SIGNIFICANT CHANGE UP (ref 1.6–2.6)
PHOSPHATE SERPL-MCNC: 3.9 MG/DL — SIGNIFICANT CHANGE UP (ref 2.5–4.5)
POTASSIUM SERPL-MCNC: 4.1 MMOL/L — SIGNIFICANT CHANGE UP (ref 3.5–5.3)
POTASSIUM SERPL-SCNC: 4.1 MMOL/L — SIGNIFICANT CHANGE UP (ref 3.5–5.3)
PROT SERPL-MCNC: 6.9 G/DL — SIGNIFICANT CHANGE UP (ref 6–8.3)
PROTHROM AB SERPL-ACNC: 22.6 SEC — HIGH (ref 9.5–13)
RH IG SCN BLD-IMP: POSITIVE — SIGNIFICANT CHANGE UP
SODIUM SERPL-SCNC: 142 MMOL/L — SIGNIFICANT CHANGE UP (ref 135–145)

## 2024-06-26 PROCEDURE — 99239 HOSP IP/OBS DSCHRG MGMT >30: CPT

## 2024-06-26 PROCEDURE — 99232 SBSQ HOSP IP/OBS MODERATE 35: CPT

## 2024-06-26 RX ORDER — WARFARIN SODIUM 4 MG/1
5 TABLET ORAL ONCE
Refills: 0 | Status: COMPLETED | OUTPATIENT
Start: 2024-06-26 | End: 2024-06-26

## 2024-06-26 RX ORDER — PANTOPRAZOLE SODIUM 40 MG/10ML
40 INJECTION, POWDER, FOR SOLUTION INTRAVENOUS
Refills: 0 | Status: DISCONTINUED | OUTPATIENT
Start: 2024-06-26 | End: 2024-06-28

## 2024-06-26 RX ORDER — INSULIN LISPRO 100/ML
1 VIAL (ML) SUBCUTANEOUS
Qty: 1 | Refills: 0
Start: 2024-06-26

## 2024-06-26 RX ADMIN — VALSARTAN 40 MILLIGRAM(S): 320 TABLET ORAL at 17:46

## 2024-06-26 RX ADMIN — Medication 1 GRAM(S): at 00:23

## 2024-06-26 RX ADMIN — Medication 1 GRAM(S): at 22:56

## 2024-06-26 RX ADMIN — WARFARIN SODIUM 5 MILLIGRAM(S): 4 TABLET ORAL at 22:58

## 2024-06-26 RX ADMIN — Medication 3 MILLIGRAM(S): at 22:56

## 2024-06-26 RX ADMIN — PANTOPRAZOLE SODIUM 40 MILLIGRAM(S): 40 INJECTION, POWDER, FOR SOLUTION INTRAVENOUS at 06:49

## 2024-06-26 RX ADMIN — Medication 1 GRAM(S): at 17:46

## 2024-06-26 RX ADMIN — Medication 1 GRAM(S): at 06:49

## 2024-06-26 RX ADMIN — Medication 25 MILLIGRAM(S): at 22:56

## 2024-06-26 RX ADMIN — Medication 125 MICROGRAM(S): at 06:49

## 2024-06-26 RX ADMIN — Medication 1 GRAM(S): at 12:33

## 2024-06-27 ENCOUNTER — TRANSCRIPTION ENCOUNTER (OUTPATIENT)
Age: 74
End: 2024-06-27

## 2024-06-27 PROCEDURE — 99232 SBSQ HOSP IP/OBS MODERATE 35: CPT | Mod: GC

## 2024-06-27 RX ADMIN — Medication 25 MILLIGRAM(S): at 22:52

## 2024-06-27 RX ADMIN — Medication 1 GRAM(S): at 18:48

## 2024-06-27 RX ADMIN — Medication 1 GRAM(S): at 22:52

## 2024-06-27 RX ADMIN — Medication 1 GRAM(S): at 12:17

## 2024-06-27 RX ADMIN — Medication 3 MILLIGRAM(S): at 22:52

## 2024-06-27 RX ADMIN — Medication 125 MICROGRAM(S): at 05:28

## 2024-06-27 RX ADMIN — Medication 50 MILLIGRAM(S): at 05:28

## 2024-06-27 RX ADMIN — VALSARTAN 40 MILLIGRAM(S): 320 TABLET ORAL at 18:48

## 2024-06-27 RX ADMIN — PANTOPRAZOLE SODIUM 40 MILLIGRAM(S): 40 INJECTION, POWDER, FOR SOLUTION INTRAVENOUS at 05:42

## 2024-06-27 RX ADMIN — Medication 1 GRAM(S): at 05:28

## 2024-06-27 RX ADMIN — SPIRONOLACTONE 25 MILLIGRAM(S): 25 TABLET ORAL at 05:28

## 2024-06-28 VITALS
RESPIRATION RATE: 18 BRPM | TEMPERATURE: 98 F | DIASTOLIC BLOOD PRESSURE: 73 MMHG | OXYGEN SATURATION: 98 % | HEART RATE: 73 BPM | SYSTOLIC BLOOD PRESSURE: 110 MMHG

## 2024-06-28 LAB
APTT BLD: 40 SEC — HIGH (ref 24.5–35.6)
HCT VFR BLD CALC: 41 % — SIGNIFICANT CHANGE UP (ref 39–50)
HGB BLD-MCNC: 12.9 G/DL — LOW (ref 13–17)
INR BLD: 1.64 — HIGH (ref 0.85–1.18)
MCHC RBC-ENTMCNC: 29.5 PG — SIGNIFICANT CHANGE UP (ref 27–34)
MCHC RBC-ENTMCNC: 31.5 GM/DL — LOW (ref 32–36)
MCV RBC AUTO: 93.8 FL — SIGNIFICANT CHANGE UP (ref 80–100)
NRBC # BLD: 0 /100 WBCS — SIGNIFICANT CHANGE UP (ref 0–0)
PLATELET # BLD AUTO: 359 K/UL — SIGNIFICANT CHANGE UP (ref 150–400)
PROTHROM AB SERPL-ACNC: 18.4 SEC — HIGH (ref 9.5–13)
RBC # BLD: 4.37 M/UL — SIGNIFICANT CHANGE UP (ref 4.2–5.8)
RBC # FLD: 17.2 % — HIGH (ref 10.3–14.5)
WBC # BLD: 6.51 K/UL — SIGNIFICANT CHANGE UP (ref 3.8–10.5)
WBC # FLD AUTO: 6.51 K/UL — SIGNIFICANT CHANGE UP (ref 3.8–10.5)

## 2024-06-28 PROCEDURE — 84100 ASSAY OF PHOSPHORUS: CPT

## 2024-06-28 PROCEDURE — 86850 RBC ANTIBODY SCREEN: CPT

## 2024-06-28 PROCEDURE — 86900 BLOOD TYPING SEROLOGIC ABO: CPT

## 2024-06-28 PROCEDURE — 99285 EMERGENCY DEPT VISIT HI MDM: CPT

## 2024-06-28 PROCEDURE — 85025 COMPLETE CBC W/AUTO DIFF WBC: CPT

## 2024-06-28 PROCEDURE — 97165 OT EVAL LOW COMPLEX 30 MIN: CPT

## 2024-06-28 PROCEDURE — 85610 PROTHROMBIN TIME: CPT

## 2024-06-28 PROCEDURE — 80162 ASSAY OF DIGOXIN TOTAL: CPT

## 2024-06-28 PROCEDURE — 83735 ASSAY OF MAGNESIUM: CPT

## 2024-06-28 PROCEDURE — 99239 HOSP IP/OBS DSCHRG MGMT >30: CPT

## 2024-06-28 PROCEDURE — 85730 THROMBOPLASTIN TIME PARTIAL: CPT

## 2024-06-28 PROCEDURE — 36415 COLL VENOUS BLD VENIPUNCTURE: CPT

## 2024-06-28 PROCEDURE — 85027 COMPLETE CBC AUTOMATED: CPT

## 2024-06-28 PROCEDURE — 86901 BLOOD TYPING SEROLOGIC RH(D): CPT

## 2024-06-28 PROCEDURE — 97161 PT EVAL LOW COMPLEX 20 MIN: CPT

## 2024-06-28 PROCEDURE — 93005 ELECTROCARDIOGRAM TRACING: CPT

## 2024-06-28 PROCEDURE — 80053 COMPREHEN METABOLIC PANEL: CPT

## 2024-06-28 RX ORDER — WARFARIN SODIUM 4 MG/1
5 TABLET ORAL ONCE
Refills: 0 | Status: DISCONTINUED | OUTPATIENT
Start: 2024-06-28 | End: 2024-06-28

## 2024-06-28 RX ORDER — ENOXAPARIN SODIUM 100 MG/ML
80 INJECTION SUBCUTANEOUS
Qty: 1 | Refills: 0
Start: 2024-06-28 | End: 2024-06-30

## 2024-06-28 RX ORDER — ENOXAPARIN SODIUM 100 MG/ML
80 INJECTION SUBCUTANEOUS EVERY 12 HOURS
Refills: 0 | Status: DISCONTINUED | OUTPATIENT
Start: 2024-06-28 | End: 2024-06-28

## 2024-06-28 RX ADMIN — Medication 125 MICROGRAM(S): at 07:28

## 2024-06-28 RX ADMIN — WARFARIN SODIUM 5 MILLIGRAM(S): 4 TABLET ORAL at 19:01

## 2024-06-28 RX ADMIN — Medication 1 GRAM(S): at 18:24

## 2024-06-28 RX ADMIN — PANTOPRAZOLE SODIUM 40 MILLIGRAM(S): 40 INJECTION, POWDER, FOR SOLUTION INTRAVENOUS at 07:28

## 2024-06-28 RX ADMIN — VALSARTAN 40 MILLIGRAM(S): 320 TABLET ORAL at 18:24

## 2024-06-28 RX ADMIN — Medication 1 GRAM(S): at 07:29

## 2024-06-28 RX ADMIN — ENOXAPARIN SODIUM 80 MILLIGRAM(S): 100 INJECTION SUBCUTANEOUS at 19:01

## 2024-06-28 RX ADMIN — Medication 1 GRAM(S): at 12:02

## 2024-06-28 RX ADMIN — Medication 50 MILLIGRAM(S): at 07:29

## 2024-06-28 RX ADMIN — SPIRONOLACTONE 25 MILLIGRAM(S): 25 TABLET ORAL at 07:28

## 2024-07-02 ENCOUNTER — APPOINTMENT (OUTPATIENT)
Dept: INTERNAL MEDICINE | Facility: CLINIC | Age: 74
End: 2024-07-02

## 2024-07-02 DIAGNOSIS — Z87.19 PERSONAL HISTORY OF OTHER DISEASES OF THE DIGESTIVE SYSTEM: ICD-10-CM

## 2024-07-02 DIAGNOSIS — I48.91 UNSPECIFIED ATRIAL FIBRILLATION: ICD-10-CM

## 2024-07-02 DIAGNOSIS — I50.20 UNSPECIFIED SYSTOLIC (CONGESTIVE) HEART FAILURE: ICD-10-CM

## 2024-07-02 RX ORDER — PANTOPRAZOLE SODIUM 40 MG/1
40 TABLET, DELAYED RELEASE ORAL DAILY
Refills: 0 | Status: ACTIVE | COMMUNITY
Start: 2024-07-02

## 2024-07-02 RX ORDER — VALSARTAN 40 MG/1
40 TABLET, COATED ORAL
Refills: 0 | Status: ACTIVE | COMMUNITY
Start: 2024-07-02

## 2024-07-02 RX ORDER — METOPROLOL SUCCINATE 50 MG/1
50 TABLET, EXTENDED RELEASE ORAL DAILY
Refills: 0 | Status: ACTIVE | COMMUNITY
Start: 2024-07-02

## 2024-07-02 RX ORDER — WARFARIN 5 MG/1
5 TABLET ORAL DAILY
Refills: 0 | Status: ACTIVE | COMMUNITY
Start: 2024-07-02

## 2024-07-02 RX ORDER — DIGOXIN 125 UG/1
125 TABLET ORAL DAILY
Refills: 0 | Status: ACTIVE | COMMUNITY
Start: 2024-07-02

## 2024-07-02 RX ORDER — SPIRONOLACTONE 25 MG/1
25 TABLET ORAL DAILY
Refills: 0 | Status: ACTIVE | COMMUNITY
Start: 2024-07-02

## 2024-07-10 DIAGNOSIS — I48.20 CHRONIC ATRIAL FIBRILLATION, UNSPECIFIED: ICD-10-CM

## 2024-07-10 DIAGNOSIS — Z86.711 PERSONAL HISTORY OF PULMONARY EMBOLISM: ICD-10-CM

## 2024-07-10 DIAGNOSIS — R62.7 ADULT FAILURE TO THRIVE: ICD-10-CM

## 2024-07-10 DIAGNOSIS — Z79.01 LONG TERM (CURRENT) USE OF ANTICOAGULANTS: ICD-10-CM

## 2024-07-10 DIAGNOSIS — Z91.81 HISTORY OF FALLING: ICD-10-CM

## 2024-07-10 DIAGNOSIS — Z75.1 PERSON AWAITING ADMISSION TO ADEQUATE FACILITY ELSEWHERE: ICD-10-CM

## 2024-07-10 DIAGNOSIS — K44.9 DIAPHRAGMATIC HERNIA WITHOUT OBSTRUCTION OR GANGRENE: ICD-10-CM

## 2024-07-10 DIAGNOSIS — I50.22 CHRONIC SYSTOLIC (CONGESTIVE) HEART FAILURE: ICD-10-CM

## 2024-07-10 DIAGNOSIS — Z59.19 OTHER INADEQUATE HOUSING: ICD-10-CM

## 2024-07-10 SDOH — ECONOMIC STABILITY - HOUSING INSECURITY: OTHER INADEQUATE HOUSING: Z59.19

## 2024-08-13 ENCOUNTER — APPOINTMENT (OUTPATIENT)
Dept: HEART AND VASCULAR | Facility: CLINIC | Age: 74
End: 2024-08-13

## 2024-09-18 NOTE — PROGRESS NOTE ADULT - ASSESSMENT
75 y/o M with no known PMHx presented to [facility] on 04/14/24 after mechanical fall, was found down on floor x 2 days by landlord. Labs significant for Trop T 52->54, CK 2k BNP 7k . CTH without acute bleed. CT A/P neg for PE, + ascites. Pt admitted to cardiac tele for newly dx rapid Afib, rhabdo and suspected ADHF exacerbation, initiated on IV Lasix. Course c/b hypernatremia, s/p lactated ringers. Echo reveals LVEF 20-25%, reduced RVSF. Advanced HF team consulted, rec'd IV Lasix 40 mg daily w/ plan for RHC/LHC, however postponed given new fevers. ID consulted, rec ceftriaxone 2 g IV daily for possible aspiration PNA.    [FreeTextEntry1] : Here for flu vaccine.  No illness, no fever  73 y/o M with no known PMHx presented to [facility] on 04/14/24 after mechanical fall, was found down on floor x 2 days by landlord. Labs significant for Trop T 52->54, CK 2k BNP 7k . CTH without acute bleed. CT A/P neg for PE, + ascites. Pt admitted to cardiac tele for newly dx rapid Afib, rhabdo and suspected ADHF exacerbation. Course c/b hypernatremia, s/p lactated ringers. Echo revealing LVEF 20-25%, reduced RVSF. Advanced HF team consulted, rec'd IV Lasix 40 mg BID w/ plan for RHC/LHC, however postponed given new fevers (fever peak 4/18 pm rectal 101). ID consulted, rec ceftriaxone 2 g IV daily for possible aspiration PNA, medicine team following, pulm consulted for mosaicism and air trapping on CT. LHC to be done when patient is afebrile.

## 2024-11-13 ENCOUNTER — APPOINTMENT (OUTPATIENT)
Dept: GASTROENTEROLOGY | Facility: CLINIC | Age: 74
End: 2024-11-13